# Patient Record
Sex: FEMALE | Race: WHITE | NOT HISPANIC OR LATINO | Employment: UNEMPLOYED | URBAN - METROPOLITAN AREA
[De-identification: names, ages, dates, MRNs, and addresses within clinical notes are randomized per-mention and may not be internally consistent; named-entity substitution may affect disease eponyms.]

---

## 2017-03-01 ENCOUNTER — ALLSCRIPTS OFFICE VISIT (OUTPATIENT)
Dept: OTHER | Facility: OTHER | Age: 57
End: 2017-03-01

## 2017-03-01 DIAGNOSIS — D05.10 INTRADUCTAL CARCINOMA IN SITU OF BREAST: ICD-10-CM

## 2017-03-13 ENCOUNTER — TRANSCRIBE ORDERS (OUTPATIENT)
Dept: LAB | Facility: HOSPITAL | Age: 57
End: 2017-03-13

## 2017-03-13 ENCOUNTER — HOSPITAL ENCOUNTER (OUTPATIENT)
Dept: CT IMAGING | Facility: HOSPITAL | Age: 57
Discharge: HOME | End: 2017-03-13
Attending: INTERNAL MEDICINE | Admitting: RADIOLOGY
Payer: OTHER GOVERNMENT

## 2017-03-13 VITALS
WEIGHT: 185 LBS | SYSTOLIC BLOOD PRESSURE: 116 MMHG | HEART RATE: 83 BPM | RESPIRATION RATE: 16 BRPM | DIASTOLIC BLOOD PRESSURE: 62 MMHG | BODY MASS INDEX: 31.58 KG/M2 | HEIGHT: 64 IN | OXYGEN SATURATION: 98 % | TEMPERATURE: 98.1 F

## 2017-03-13 DIAGNOSIS — D64.9 ANEMIA: ICD-10-CM

## 2017-03-13 DIAGNOSIS — D61.818 PANCYTOPENIA (HCC): Primary | ICD-10-CM

## 2017-03-13 DIAGNOSIS — K70.30 ALCOHOLIC CIRRHOSIS OF LIVER WITHOUT ASCITES (HCC): ICD-10-CM

## 2017-03-13 PROCEDURE — 36415 COLL VENOUS BLD VENIPUNCTURE: CPT

## 2017-03-13 PROCEDURE — 77012 CT SCAN FOR NEEDLE BIOPSY: CPT

## 2017-03-13 PROCEDURE — 38220 DX BONE MARROW ASPIRATIONS: CPT

## 2017-03-13 PROCEDURE — 88342 IMHCHEM/IMCYTCHM 1ST ANTB: CPT | Performed by: INTERNAL MEDICINE

## 2017-03-13 PROCEDURE — 88184 FLOWCYTOMETRY/ TC 1 MARKER: CPT

## 2017-03-13 PROCEDURE — 88365 INSITU HYBRIDIZATION (FISH): CPT | Performed by: INTERNAL MEDICINE

## 2017-03-13 PROCEDURE — 88305 TISSUE EXAM BY PATHOLOGIST: CPT | Performed by: INTERNAL MEDICINE

## 2017-03-13 PROCEDURE — 38221 DX BONE MARROW BIOPSIES: CPT

## 2017-03-13 PROCEDURE — 85097 BONE MARROW INTERPRETATION: CPT | Performed by: INTERNAL MEDICINE

## 2017-03-13 PROCEDURE — 88341 IMHCHEM/IMCYTCHM EA ADD ANTB: CPT | Performed by: INTERNAL MEDICINE

## 2017-03-13 PROCEDURE — 99152 MOD SED SAME PHYS/QHP 5/>YRS: CPT

## 2017-03-13 PROCEDURE — 88237 TISSUE CULTURE BONE MARROW: CPT

## 2017-03-13 PROCEDURE — 88313 SPECIAL STAINS GROUP 2: CPT | Performed by: INTERNAL MEDICINE

## 2017-03-13 PROCEDURE — 88311 DECALCIFY TISSUE: CPT | Performed by: INTERNAL MEDICINE

## 2017-03-13 PROCEDURE — 99153 MOD SED SAME PHYS/QHP EA: CPT

## 2017-03-13 PROCEDURE — 88189 FLOWCYTOMETRY/READ 16 & >: CPT

## 2017-03-13 PROCEDURE — 81406 MOPATH PROCEDURE LEVEL 7: CPT

## 2017-03-13 PROCEDURE — 88185 FLOWCYTOMETRY/TC ADD-ON: CPT

## 2017-03-13 PROCEDURE — 88280 CHROMOSOME KARYOTYPE STUDY: CPT

## 2017-03-13 RX ORDER — FENTANYL CITRATE 50 UG/ML
INJECTION, SOLUTION INTRAMUSCULAR; INTRAVENOUS CODE/TRAUMA/SEDATION MEDICATION
Status: COMPLETED | OUTPATIENT
Start: 2017-03-13 | End: 2017-03-13

## 2017-03-13 RX ORDER — PREGABALIN 300 MG/1
300 CAPSULE ORAL 2 TIMES DAILY
COMMUNITY
End: 2018-04-19 | Stop reason: SDUPTHER

## 2017-03-13 RX ORDER — VITAMIN B COMPLEX
1 CAPSULE ORAL 3 TIMES WEEKLY
COMMUNITY
End: 2017-08-01

## 2017-03-13 RX ORDER — SODIUM CHLORIDE 9 MG/ML
75 INJECTION, SOLUTION INTRAVENOUS CONTINUOUS
Status: DISCONTINUED | OUTPATIENT
Start: 2017-03-13 | End: 2017-03-14 | Stop reason: HOSPADM

## 2017-03-13 RX ORDER — LANOLIN ALCOHOL/MO/W.PET/CERES
200 CREAM (GRAM) TOPICAL 3 TIMES WEEKLY
COMMUNITY
End: 2017-08-01

## 2017-03-13 RX ORDER — ERGOCALCIFEROL 1.25 MG/1
50000 CAPSULE ORAL 3 TIMES WEEKLY
COMMUNITY
End: 2017-08-01

## 2017-03-13 RX ORDER — MIDAZOLAM HYDROCHLORIDE 1 MG/ML
INJECTION INTRAMUSCULAR; INTRAVENOUS CODE/TRAUMA/SEDATION MEDICATION
Status: COMPLETED | OUTPATIENT
Start: 2017-03-13 | End: 2017-03-13

## 2017-03-13 RX ORDER — FOLIC ACID 1 MG/1
2 TABLET ORAL DAILY
COMMUNITY
End: 2017-08-01

## 2017-03-13 RX ORDER — VITAMIN E 268 MG
400 CAPSULE ORAL 3 TIMES WEEKLY
COMMUNITY
End: 2017-08-01

## 2017-03-13 RX ADMIN — FENTANYL CITRATE 50 MCG: 50 INJECTION, SOLUTION INTRAMUSCULAR; INTRAVENOUS at 10:33

## 2017-03-13 RX ADMIN — MIDAZOLAM HYDROCHLORIDE 1 MG: 1 INJECTION, SOLUTION INTRAMUSCULAR; INTRAVENOUS at 10:19

## 2017-03-13 RX ADMIN — FENTANYL CITRATE 50 MCG: 50 INJECTION, SOLUTION INTRAMUSCULAR; INTRAVENOUS at 10:19

## 2017-03-13 RX ADMIN — MIDAZOLAM HYDROCHLORIDE 0.5 MG: 1 INJECTION, SOLUTION INTRAMUSCULAR; INTRAVENOUS at 10:33

## 2017-03-13 RX ADMIN — SODIUM CHLORIDE 75 ML/HR: 0.9 INJECTION, SOLUTION INTRAVENOUS at 08:47

## 2017-03-13 RX ADMIN — MIDAZOLAM HYDROCHLORIDE 1 MG: 1 INJECTION, SOLUTION INTRAMUSCULAR; INTRAVENOUS at 10:24

## 2017-03-16 ENCOUNTER — TRANSCRIBE ORDERS (OUTPATIENT)
Dept: ADMINISTRATIVE | Facility: HOSPITAL | Age: 57
End: 2017-03-16

## 2017-03-16 ENCOUNTER — ALLSCRIPTS OFFICE VISIT (OUTPATIENT)
Dept: OTHER | Facility: OTHER | Age: 57
End: 2017-03-16

## 2017-03-16 DIAGNOSIS — K76.9 UNSPECIFIED CHRONIC LIVER DISEASE WITHOUT MENTION OF ALCOHOL: Primary | ICD-10-CM

## 2017-03-16 LAB — SCAN RESULT: NORMAL

## 2017-03-17 DIAGNOSIS — K76.9 LIVER DISEASE: ICD-10-CM

## 2017-03-17 DIAGNOSIS — D64.9 ANEMIA: ICD-10-CM

## 2017-03-17 DIAGNOSIS — D61.818 OTHER PANCYTOPENIA (HCC): ICD-10-CM

## 2017-03-17 DIAGNOSIS — K74.60 CIRRHOSIS OF LIVER (HCC): ICD-10-CM

## 2017-03-19 LAB
MISCELLANEOUS LAB TEST RESULT: NORMAL
SCAN RESULT: NORMAL

## 2017-03-23 ENCOUNTER — HOSPITAL ENCOUNTER (OUTPATIENT)
Dept: CT IMAGING | Facility: HOSPITAL | Age: 57
Discharge: HOME | End: 2017-03-23
Attending: INTERNAL MEDICINE
Payer: OTHER GOVERNMENT

## 2017-03-23 DIAGNOSIS — K76.9 UNSPECIFIED CHRONIC LIVER DISEASE WITHOUT MENTION OF ALCOHOL: ICD-10-CM

## 2017-03-23 PROCEDURE — 74177 CT ABD & PELVIS W/CONTRAST: CPT

## 2017-03-23 RX ADMIN — IOHEXOL 100 ML: 350 INJECTION, SOLUTION INTRAVENOUS at 18:46

## 2017-03-24 ENCOUNTER — GENERIC CONVERSION - ENCOUNTER (OUTPATIENT)
Dept: OTHER | Facility: OTHER | Age: 57
End: 2017-03-24

## 2017-04-03 ENCOUNTER — ALLSCRIPTS OFFICE VISIT (OUTPATIENT)
Dept: OTHER | Facility: OTHER | Age: 57
End: 2017-04-03

## 2017-04-05 RX ORDER — CYANOCOBALAMIN 1000 UG/ML
1000 INJECTION INTRAMUSCULAR; SUBCUTANEOUS ONCE
Status: COMPLETED | OUTPATIENT
Start: 2017-04-11 | End: 2017-04-11

## 2017-04-05 RX ORDER — SODIUM CHLORIDE 9 MG/ML
20 INJECTION, SOLUTION INTRAVENOUS CONTINUOUS
Status: DISCONTINUED | OUTPATIENT
Start: 2017-04-11 | End: 2017-04-14 | Stop reason: HOSPADM

## 2017-04-11 ENCOUNTER — HOSPITAL ENCOUNTER (OUTPATIENT)
Dept: INFUSION CENTER | Facility: HOSPITAL | Age: 57
Discharge: HOME | End: 2017-04-11
Payer: OTHER GOVERNMENT

## 2017-04-11 VITALS
TEMPERATURE: 97.6 F | OXYGEN SATURATION: 98 % | RESPIRATION RATE: 16 BRPM | HEART RATE: 97 BPM | DIASTOLIC BLOOD PRESSURE: 66 MMHG | SYSTOLIC BLOOD PRESSURE: 114 MMHG

## 2017-04-11 PROCEDURE — 96372 THER/PROPH/DIAG INJ SC/IM: CPT

## 2017-04-11 PROCEDURE — 96365 THER/PROPH/DIAG IV INF INIT: CPT

## 2017-04-11 RX ORDER — FUROSEMIDE 20 MG/1
40 TABLET ORAL DAILY
COMMUNITY
End: 2018-04-02 | Stop reason: SDUPTHER

## 2017-04-11 RX ORDER — SPIRONOLACTONE 50 MG/1
50 TABLET, FILM COATED ORAL DAILY
COMMUNITY
Start: 2017-03-25 | End: 2017-08-01

## 2017-04-11 RX ADMIN — IRON SUCROSE 200 MG: 20 INJECTION, SOLUTION INTRAVENOUS at 15:19

## 2017-04-11 RX ADMIN — CYANOCOBALAMIN 1000 MCG: 1000 INJECTION, SOLUTION INTRAMUSCULAR at 15:23

## 2017-04-11 NOTE — PROGRESS NOTES
Rec'd pt for first Venofer infusion and B12 inj  VSS, c/o chronic neuropathy of the feet pt states "is not much of a problem for me today"  Made comfortable in recliner for PIV in L forearm  Venofer now infusing  B12 inj to Left deltoid tolerated well  Will continue to monitor

## 2017-04-11 NOTE — PROGRESS NOTES
Venofer infused w/o adverse reaction, pt offered no complaints  PIV dc'd, sm coban pressure dressing applied  Pt then d/c'd to home with steady gait

## 2017-04-17 RX ORDER — SODIUM CHLORIDE 9 MG/ML
20 INJECTION, SOLUTION INTRAVENOUS CONTINUOUS
Status: DISCONTINUED | OUTPATIENT
Start: 2017-04-18 | End: 2017-04-21 | Stop reason: HOSPADM

## 2017-04-17 RX ORDER — CYANOCOBALAMIN 1000 UG/ML
1000 INJECTION INTRAMUSCULAR; SUBCUTANEOUS ONCE
Status: COMPLETED | OUTPATIENT
Start: 2017-04-18 | End: 2017-04-18

## 2017-04-18 ENCOUNTER — HOSPITAL ENCOUNTER (OUTPATIENT)
Dept: INFUSION CENTER | Facility: HOSPITAL | Age: 57
Discharge: HOME | End: 2017-04-18
Payer: OTHER GOVERNMENT

## 2017-04-18 VITALS
TEMPERATURE: 98.4 F | OXYGEN SATURATION: 98 % | HEART RATE: 91 BPM | SYSTOLIC BLOOD PRESSURE: 109 MMHG | RESPIRATION RATE: 16 BRPM | DIASTOLIC BLOOD PRESSURE: 64 MMHG

## 2017-04-18 PROCEDURE — 96365 THER/PROPH/DIAG IV INF INIT: CPT

## 2017-04-18 PROCEDURE — 96372 THER/PROPH/DIAG INJ SC/IM: CPT

## 2017-04-18 RX ADMIN — IRON SUCROSE 200 MG: 20 INJECTION, SOLUTION INTRAVENOUS at 15:03

## 2017-04-18 RX ADMIN — CYANOCOBALAMIN 1000 MCG: 1000 INJECTION, SOLUTION INTRAMUSCULAR at 16:09

## 2017-04-24 ENCOUNTER — ALLSCRIPTS OFFICE VISIT (OUTPATIENT)
Dept: OTHER | Facility: OTHER | Age: 57
End: 2017-04-24

## 2017-04-24 RX ORDER — SODIUM CHLORIDE 9 MG/ML
20 INJECTION, SOLUTION INTRAVENOUS CONTINUOUS
Status: DISCONTINUED | OUTPATIENT
Start: 2017-04-25 | End: 2017-04-28 | Stop reason: HOSPADM

## 2017-04-24 RX ORDER — CYANOCOBALAMIN 1000 UG/ML
1000 INJECTION INTRAMUSCULAR; SUBCUTANEOUS ONCE
Status: COMPLETED | OUTPATIENT
Start: 2017-04-25 | End: 2017-04-25

## 2017-04-25 ENCOUNTER — HOSPITAL ENCOUNTER (OUTPATIENT)
Dept: INFUSION CENTER | Facility: HOSPITAL | Age: 57
Discharge: HOME | End: 2017-04-25
Payer: OTHER GOVERNMENT

## 2017-04-25 VITALS
SYSTOLIC BLOOD PRESSURE: 127 MMHG | OXYGEN SATURATION: 99 % | DIASTOLIC BLOOD PRESSURE: 75 MMHG | TEMPERATURE: 97.1 F | HEART RATE: 107 BPM

## 2017-04-25 PROCEDURE — 96365 THER/PROPH/DIAG IV INF INIT: CPT

## 2017-04-25 PROCEDURE — 96372 THER/PROPH/DIAG INJ SC/IM: CPT

## 2017-04-25 RX ADMIN — IRON SUCROSE 200 MG: 20 INJECTION, SOLUTION INTRAVENOUS at 15:03

## 2017-04-25 RX ADMIN — CYANOCOBALAMIN 1000 MCG: 1000 INJECTION, SOLUTION INTRAMUSCULAR at 15:04

## 2017-04-25 NOTE — PROGRESS NOTES
Pt in infusion center today for vernofer infusion and B12 Inj  Pt tolerated B12 IM in left deltoid with no adverse reactions

## 2017-05-01 RX ORDER — CYANOCOBALAMIN 1000 UG/ML
1000 INJECTION INTRAMUSCULAR; SUBCUTANEOUS ONCE
Status: COMPLETED | OUTPATIENT
Start: 2017-05-02 | End: 2017-05-02

## 2017-05-01 RX ORDER — SODIUM CHLORIDE 9 MG/ML
20 INJECTION, SOLUTION INTRAVENOUS CONTINUOUS
Status: DISCONTINUED | OUTPATIENT
Start: 2017-05-02 | End: 2017-05-05 | Stop reason: HOSPADM

## 2017-05-02 ENCOUNTER — HOSPITAL ENCOUNTER (OUTPATIENT)
Dept: INFUSION CENTER | Facility: HOSPITAL | Age: 57
Discharge: HOME | End: 2017-05-02
Payer: OTHER GOVERNMENT

## 2017-05-02 VITALS
TEMPERATURE: 98.2 F | RESPIRATION RATE: 18 BRPM | DIASTOLIC BLOOD PRESSURE: 70 MMHG | HEART RATE: 110 BPM | OXYGEN SATURATION: 98 % | SYSTOLIC BLOOD PRESSURE: 131 MMHG

## 2017-05-02 PROCEDURE — 96365 THER/PROPH/DIAG IV INF INIT: CPT

## 2017-05-02 PROCEDURE — 96372 THER/PROPH/DIAG INJ SC/IM: CPT

## 2017-05-02 RX ADMIN — IRON SUCROSE 200 MG: 20 INJECTION, SOLUTION INTRAVENOUS at 14:17

## 2017-05-02 RX ADMIN — CYANOCOBALAMIN 1000 MCG: 1000 INJECTION, SOLUTION INTRAMUSCULAR at 14:40

## 2017-05-15 ENCOUNTER — ALLSCRIPTS OFFICE VISIT (OUTPATIENT)
Dept: OTHER | Facility: OTHER | Age: 57
End: 2017-05-15

## 2017-05-17 ENCOUNTER — ANESTHESIA EVENT (OUTPATIENT)
Dept: GASTROENTEROLOGY | Facility: HOSPITAL | Age: 57
End: 2017-05-17
Payer: OTHER GOVERNMENT

## 2017-05-18 ENCOUNTER — GENERIC CONVERSION - ENCOUNTER (OUTPATIENT)
Dept: OTHER | Facility: OTHER | Age: 57
End: 2017-05-18

## 2017-05-18 ENCOUNTER — HOSPITAL ENCOUNTER (OUTPATIENT)
Facility: HOSPITAL | Age: 57
Setting detail: OUTPATIENT SURGERY
Discharge: HOME | End: 2017-05-18
Attending: INTERNAL MEDICINE | Admitting: INTERNAL MEDICINE
Payer: OTHER GOVERNMENT

## 2017-05-18 ENCOUNTER — ANESTHESIA (OUTPATIENT)
Dept: GASTROENTEROLOGY | Facility: HOSPITAL | Age: 57
End: 2017-05-18
Payer: OTHER GOVERNMENT

## 2017-05-18 VITALS
BODY MASS INDEX: 33.29 KG/M2 | TEMPERATURE: 97.9 F | WEIGHT: 195 LBS | HEART RATE: 87 BPM | SYSTOLIC BLOOD PRESSURE: 122 MMHG | RESPIRATION RATE: 18 BRPM | DIASTOLIC BLOOD PRESSURE: 72 MMHG | HEIGHT: 64 IN | OXYGEN SATURATION: 97 %

## 2017-05-18 DIAGNOSIS — K74.60 CIRRHOSIS OF LIVER (HCC): ICD-10-CM

## 2017-05-18 PROCEDURE — 88305 TISSUE EXAM BY PATHOLOGIST: CPT | Performed by: INTERNAL MEDICINE

## 2017-05-18 RX ORDER — PROPOFOL 10 MG/ML
INJECTION, EMULSION INTRAVENOUS AS NEEDED
Status: DISCONTINUED | OUTPATIENT
Start: 2017-05-18 | End: 2017-05-18 | Stop reason: SURG

## 2017-05-18 RX ORDER — SODIUM CHLORIDE 9 MG/ML
125 INJECTION, SOLUTION INTRAVENOUS CONTINUOUS
Status: DISCONTINUED | OUTPATIENT
Start: 2017-05-18 | End: 2017-05-18 | Stop reason: HOSPADM

## 2017-05-18 RX ADMIN — PROPOFOL 200 MG: 10 INJECTION, EMULSION INTRAVENOUS at 10:22

## 2017-05-18 RX ADMIN — SODIUM CHLORIDE 125 ML/HR: 0.9 INJECTION, SOLUTION INTRAVENOUS at 10:00

## 2017-05-24 ENCOUNTER — GENERIC CONVERSION - ENCOUNTER (OUTPATIENT)
Dept: OTHER | Facility: OTHER | Age: 57
End: 2017-05-24

## 2017-05-26 RX ORDER — CYANOCOBALAMIN 1000 UG/ML
1000 INJECTION INTRAMUSCULAR; SUBCUTANEOUS ONCE
Status: DISCONTINUED | OUTPATIENT
Start: 2017-05-30 | End: 2017-06-02 | Stop reason: HOSPADM

## 2017-05-30 ENCOUNTER — HOSPITAL ENCOUNTER (OUTPATIENT)
Dept: INFUSION CENTER | Facility: HOSPITAL | Age: 57
Discharge: HOME | End: 2017-05-30
Payer: OTHER GOVERNMENT

## 2017-06-01 ENCOUNTER — HOSPITAL ENCOUNTER (OUTPATIENT)
Dept: INFUSION CENTER | Facility: HOSPITAL | Age: 57
Discharge: HOME | End: 2017-06-01
Payer: OTHER GOVERNMENT

## 2017-06-01 VITALS
DIASTOLIC BLOOD PRESSURE: 61 MMHG | RESPIRATION RATE: 18 BRPM | SYSTOLIC BLOOD PRESSURE: 109 MMHG | TEMPERATURE: 97.9 F | OXYGEN SATURATION: 96 % | HEART RATE: 106 BPM

## 2017-06-01 PROCEDURE — 96372 THER/PROPH/DIAG INJ SC/IM: CPT

## 2017-06-01 RX ORDER — NORTRIPTYLINE HYDROCHLORIDE 25 MG/1
25 CAPSULE ORAL
COMMUNITY
End: 2017-08-01

## 2017-06-01 RX ORDER — CYANOCOBALAMIN 1000 UG/ML
1000 INJECTION INTRAMUSCULAR; SUBCUTANEOUS ONCE
Status: COMPLETED | OUTPATIENT
Start: 2017-06-01 | End: 2017-06-01

## 2017-06-01 RX ADMIN — CYANOCOBALAMIN 1000 MCG: 1000 INJECTION, SOLUTION INTRAMUSCULAR at 13:52

## 2017-06-01 NOTE — PLAN OF CARE
Patient/family/caregiver demonstrates understanding of disease process, treatment plan, medications, and discharge instructions Progressing

## 2017-06-01 NOTE — PROGRESS NOTES
Pt here for B12; VSS: given in the LD with no adverse reactions; bandaid dry and intact; pt d/c'd home ambulatory with steady gait

## 2017-06-26 RX ORDER — CYANOCOBALAMIN 1000 UG/ML
1000 INJECTION INTRAMUSCULAR; SUBCUTANEOUS ONCE
Status: COMPLETED | OUTPATIENT
Start: 2017-06-27 | End: 2017-06-27

## 2017-06-27 ENCOUNTER — HOSPITAL ENCOUNTER (OUTPATIENT)
Dept: INFUSION CENTER | Facility: HOSPITAL | Age: 57
Discharge: HOME | End: 2017-06-27
Payer: OTHER GOVERNMENT

## 2017-06-27 VITALS
SYSTOLIC BLOOD PRESSURE: 115 MMHG | TEMPERATURE: 97.9 F | OXYGEN SATURATION: 98 % | DIASTOLIC BLOOD PRESSURE: 63 MMHG | HEART RATE: 98 BPM | RESPIRATION RATE: 16 BRPM

## 2017-06-27 PROCEDURE — 96372 THER/PROPH/DIAG INJ SC/IM: CPT

## 2017-06-27 RX ADMIN — CYANOCOBALAMIN 1000 MCG: 1000 INJECTION, SOLUTION INTRAMUSCULAR at 15:15

## 2017-06-27 NOTE — PLAN OF CARE
Knowledge Deficit     Patient/family/caregiver demonstrates understanding of disease process, treatment plan, medications, and discharge instructions Progressing

## 2017-06-27 NOTE — PROGRESS NOTES
Pt arrived amb for B12 injection  Offers no c/o  B12 given IM Lt deltoid  Dsd applied  Disch amb to home, steady gait

## 2017-06-30 DIAGNOSIS — D64.9 ANEMIA: ICD-10-CM

## 2017-07-10 ENCOUNTER — ALLSCRIPTS OFFICE VISIT (OUTPATIENT)
Dept: OTHER | Facility: OTHER | Age: 57
End: 2017-07-10

## 2017-07-18 ENCOUNTER — GENERIC CONVERSION - ENCOUNTER (OUTPATIENT)
Dept: OTHER | Facility: OTHER | Age: 57
End: 2017-07-18

## 2017-08-01 ENCOUNTER — HOSPITAL ENCOUNTER (INPATIENT)
Facility: HOSPITAL | Age: 57
LOS: 4 days | DRG: 377 | End: 2017-08-05
Attending: EMERGENCY MEDICINE | Admitting: INTERNAL MEDICINE
Payer: OTHER GOVERNMENT

## 2017-08-01 DIAGNOSIS — K92.2 GI BLEED: Primary | ICD-10-CM

## 2017-08-01 DIAGNOSIS — D63.8 ANEMIA, CHRONIC DISEASE: ICD-10-CM

## 2017-08-01 DIAGNOSIS — D69.6 THROMBOCYTOPENIA (HCC): ICD-10-CM

## 2017-08-01 DIAGNOSIS — K70.31 ALCOHOLIC CIRRHOSIS OF LIVER WITH ASCITES (HCC): ICD-10-CM

## 2017-08-01 LAB
ABO GROUP BLD: NORMAL
ALBUMIN SERPL BCP-MCNC: 2.6 G/DL (ref 3.5–5)
ALP SERPL-CCNC: 147 U/L (ref 46–116)
ALT SERPL W P-5'-P-CCNC: 60 U/L (ref 12–78)
AMMONIA PLAS-SCNC: 73 UMOL/L (ref 11–35)
ANION GAP SERPL CALCULATED.3IONS-SCNC: 19 MMOL/L (ref 4–13)
APAP SERPL-MCNC: <2 UG/ML (ref 10–30)
APTT PPP: 36 SECONDS (ref 23–35)
AST SERPL W P-5'-P-CCNC: 147 U/L (ref 5–45)
BASOPHILS # BLD AUTO: 0.02 THOUSANDS/ΜL (ref 0–0.1)
BASOPHILS NFR BLD AUTO: 0 % (ref 0–1)
BILIRUB SERPL-MCNC: 5.8 MG/DL (ref 0.2–1)
BLD GP AB SCN SERPL QL: NEGATIVE
BUN SERPL-MCNC: 23 MG/DL (ref 5–25)
CALCIUM SERPL-MCNC: 8.6 MG/DL (ref 8.3–10.1)
CHLORIDE SERPL-SCNC: 109 MMOL/L (ref 100–108)
CO2 SERPL-SCNC: 21 MMOL/L (ref 21–32)
CREAT SERPL-MCNC: 1.1 MG/DL (ref 0.6–1.3)
EOSINOPHIL # BLD AUTO: 0 THOUSAND/ΜL (ref 0–0.61)
EOSINOPHIL NFR BLD AUTO: 0 % (ref 0–6)
ERYTHROCYTE [DISTWIDTH] IN BLOOD BY AUTOMATED COUNT: 18.6 % (ref 11.6–15.1)
ETHANOL SERPL-MCNC: <3 MG/DL (ref 0–3)
GFR SERPL CREATININE-BSD FRML MDRD: 56 ML/MIN/1.73SQ M
GLUCOSE SERPL-MCNC: 149 MG/DL (ref 65–140)
HCT VFR BLD AUTO: 28.4 % (ref 34.8–46.1)
HGB BLD-MCNC: 8.5 G/DL (ref 11.5–15.4)
INR PPP: 1.98 (ref 0.86–1.16)
LIPASE SERPL-CCNC: 110 U/L (ref 73–393)
LYMPHOCYTES # BLD AUTO: 0.84 THOUSANDS/ΜL (ref 0.6–4.47)
LYMPHOCYTES NFR BLD AUTO: 9 % (ref 14–44)
MCH RBC QN AUTO: 29.7 PG (ref 26.8–34.3)
MCHC RBC AUTO-ENTMCNC: 29.9 G/DL (ref 31.4–37.4)
MCV RBC AUTO: 99 FL (ref 82–98)
MONOCYTES # BLD AUTO: 0.91 THOUSAND/ΜL (ref 0.17–1.22)
MONOCYTES NFR BLD AUTO: 9 % (ref 4–12)
NEUTROPHILS # BLD AUTO: 7.89 THOUSANDS/ΜL (ref 1.85–7.62)
NEUTS SEG NFR BLD AUTO: 82 % (ref 43–75)
PLATELET # BLD AUTO: 65 THOUSANDS/UL (ref 149–390)
PMV BLD AUTO: 11.8 FL (ref 8.9–12.7)
POTASSIUM SERPL-SCNC: 3.9 MMOL/L (ref 3.5–5.3)
PROT SERPL-MCNC: 7.2 G/DL (ref 6.4–8.2)
PROTHROMBIN TIME: 22.4 SECONDS (ref 12.1–14.4)
RBC # BLD AUTO: 2.86 MILLION/UL (ref 3.81–5.12)
RH BLD: POSITIVE
SALICYLATES SERPL-MCNC: <3 MG/DL (ref 3–20)
SODIUM SERPL-SCNC: 149 MMOL/L (ref 136–145)
SPECIMEN EXPIRATION DATE: NORMAL
TROPONIN I SERPL-MCNC: 0.12 NG/ML
WBC # BLD AUTO: 9.66 THOUSAND/UL (ref 4.31–10.16)

## 2017-08-01 PROCEDURE — 82140 ASSAY OF AMMONIA: CPT | Performed by: EMERGENCY MEDICINE

## 2017-08-01 PROCEDURE — 96375 TX/PRO/DX INJ NEW DRUG ADDON: CPT

## 2017-08-01 PROCEDURE — 96372 THER/PROPH/DIAG INJ SC/IM: CPT

## 2017-08-01 PROCEDURE — 83690 ASSAY OF LIPASE: CPT | Performed by: EMERGENCY MEDICINE

## 2017-08-01 PROCEDURE — 85610 PROTHROMBIN TIME: CPT | Performed by: EMERGENCY MEDICINE

## 2017-08-01 PROCEDURE — 36415 COLL VENOUS BLD VENIPUNCTURE: CPT | Performed by: EMERGENCY MEDICINE

## 2017-08-01 PROCEDURE — 80053 COMPREHEN METABOLIC PANEL: CPT | Performed by: EMERGENCY MEDICINE

## 2017-08-01 PROCEDURE — 80329 ANALGESICS NON-OPIOID 1 OR 2: CPT | Performed by: EMERGENCY MEDICINE

## 2017-08-01 PROCEDURE — C9113 INJ PANTOPRAZOLE SODIUM, VIA: HCPCS | Performed by: EMERGENCY MEDICINE

## 2017-08-01 PROCEDURE — 86920 COMPATIBILITY TEST SPIN: CPT

## 2017-08-01 PROCEDURE — 86901 BLOOD TYPING SEROLOGIC RH(D): CPT | Performed by: EMERGENCY MEDICINE

## 2017-08-01 PROCEDURE — 86850 RBC ANTIBODY SCREEN: CPT | Performed by: EMERGENCY MEDICINE

## 2017-08-01 PROCEDURE — 80320 DRUG SCREEN QUANTALCOHOLS: CPT | Performed by: EMERGENCY MEDICINE

## 2017-08-01 PROCEDURE — 86900 BLOOD TYPING SEROLOGIC ABO: CPT | Performed by: EMERGENCY MEDICINE

## 2017-08-01 PROCEDURE — 84484 ASSAY OF TROPONIN QUANT: CPT | Performed by: EMERGENCY MEDICINE

## 2017-08-01 PROCEDURE — 93005 ELECTROCARDIOGRAM TRACING: CPT | Performed by: EMERGENCY MEDICINE

## 2017-08-01 PROCEDURE — 85730 THROMBOPLASTIN TIME PARTIAL: CPT | Performed by: EMERGENCY MEDICINE

## 2017-08-01 PROCEDURE — 85025 COMPLETE CBC W/AUTO DIFF WBC: CPT | Performed by: EMERGENCY MEDICINE

## 2017-08-01 PROCEDURE — 96361 HYDRATE IV INFUSION ADD-ON: CPT

## 2017-08-01 PROCEDURE — 96374 THER/PROPH/DIAG INJ IV PUSH: CPT

## 2017-08-01 RX ORDER — THIAMINE HYDROCHLORIDE 100 MG/ML
100 INJECTION, SOLUTION INTRAMUSCULAR; INTRAVENOUS ONCE
Status: COMPLETED | OUTPATIENT
Start: 2017-08-01 | End: 2017-08-01

## 2017-08-01 RX ORDER — PANTOPRAZOLE SODIUM 40 MG/1
40 INJECTION, POWDER, FOR SOLUTION INTRAVENOUS ONCE
Status: COMPLETED | OUTPATIENT
Start: 2017-08-01 | End: 2017-08-01

## 2017-08-01 RX ORDER — ONDANSETRON 2 MG/ML
4 INJECTION INTRAMUSCULAR; INTRAVENOUS ONCE
Status: COMPLETED | OUTPATIENT
Start: 2017-08-01 | End: 2017-08-01

## 2017-08-01 RX ADMIN — ONDANSETRON 4 MG: 2 INJECTION INTRAMUSCULAR; INTRAVENOUS at 22:49

## 2017-08-01 RX ADMIN — SODIUM CHLORIDE 1000 ML: 0.9 INJECTION, SOLUTION INTRAVENOUS at 21:44

## 2017-08-01 RX ADMIN — SODIUM CHLORIDE 1000 ML: 0.9 INJECTION, SOLUTION INTRAVENOUS at 23:11

## 2017-08-01 RX ADMIN — THIAMINE HYDROCHLORIDE 100 MG: 100 INJECTION, SOLUTION INTRAMUSCULAR; INTRAVENOUS at 21:47

## 2017-08-01 RX ADMIN — PANTOPRAZOLE SODIUM 40 MG: 40 INJECTION, POWDER, FOR SOLUTION INTRAVENOUS at 21:44

## 2017-08-02 ENCOUNTER — ANESTHESIA EVENT (INPATIENT)
Dept: PERIOP | Facility: HOSPITAL | Age: 57
DRG: 377 | End: 2017-08-02
Payer: OTHER GOVERNMENT

## 2017-08-02 ENCOUNTER — APPOINTMENT (INPATIENT)
Dept: CT IMAGING | Facility: HOSPITAL | Age: 57
DRG: 377 | End: 2017-08-02
Payer: OTHER GOVERNMENT

## 2017-08-02 ENCOUNTER — ANESTHESIA (INPATIENT)
Dept: PERIOP | Facility: HOSPITAL | Age: 57
DRG: 377 | End: 2017-08-02
Payer: OTHER GOVERNMENT

## 2017-08-02 ENCOUNTER — APPOINTMENT (INPATIENT)
Dept: RADIOLOGY | Facility: HOSPITAL | Age: 57
DRG: 377 | End: 2017-08-02
Payer: OTHER GOVERNMENT

## 2017-08-02 PROBLEM — F10.10 ALCOHOL ABUSE: Status: ACTIVE | Noted: 2017-08-02

## 2017-08-02 PROBLEM — E87.0 HYPERNATREMIA: Status: ACTIVE | Noted: 2017-08-02

## 2017-08-02 PROBLEM — K92.2 GI BLEED: Status: ACTIVE | Noted: 2017-08-02

## 2017-08-02 PROBLEM — K72.00 ACUTE HEPATIC ENCEPHALOPATHY: Status: ACTIVE | Noted: 2017-08-02

## 2017-08-02 PROBLEM — D64.9 ANEMIA: Status: ACTIVE | Noted: 2017-08-02

## 2017-08-02 PROBLEM — K70.31 ALCOHOLIC CIRRHOSIS OF LIVER WITH ASCITES (HCC): Status: ACTIVE | Noted: 2017-08-02

## 2017-08-02 PROBLEM — R77.8 ELEVATED TROPONIN: Status: ACTIVE | Noted: 2017-08-02

## 2017-08-02 PROBLEM — Z98.84 HISTORY OF GASTRIC BYPASS: Status: ACTIVE | Noted: 2017-08-02

## 2017-08-02 LAB
ABO GROUP BLD BPU: NORMAL
ALBUMIN SERPL BCP-MCNC: 2.2 G/DL (ref 3.5–5)
ALP SERPL-CCNC: 119 U/L (ref 46–116)
ALT SERPL W P-5'-P-CCNC: 49 U/L (ref 12–78)
ANION GAP SERPL CALCULATED.3IONS-SCNC: 9 MMOL/L (ref 4–13)
APTT PPP: 40 SECONDS (ref 23–35)
AST SERPL W P-5'-P-CCNC: 137 U/L (ref 5–45)
ATRIAL RATE: 122 BPM
BILIRUB SERPL-MCNC: 5.1 MG/DL (ref 0.2–1)
BPU ID: NORMAL
BUN SERPL-MCNC: 23 MG/DL (ref 5–25)
CALCIUM SERPL-MCNC: 8 MG/DL (ref 8.3–10.1)
CHLORIDE SERPL-SCNC: 113 MMOL/L (ref 100–108)
CO2 SERPL-SCNC: 27 MMOL/L (ref 21–32)
CREAT SERPL-MCNC: 0.96 MG/DL (ref 0.6–1.3)
CROSSMATCH: NORMAL
CROSSMATCH: NORMAL
ERYTHROCYTE [DISTWIDTH] IN BLOOD BY AUTOMATED COUNT: 17 % (ref 11.6–15.1)
ERYTHROCYTE [DISTWIDTH] IN BLOOD BY AUTOMATED COUNT: 18 % (ref 11.6–15.1)
ERYTHROCYTE [DISTWIDTH] IN BLOOD BY AUTOMATED COUNT: 18.5 % (ref 11.6–15.1)
GFR SERPL CREATININE-BSD FRML MDRD: 66 ML/MIN/1.73SQ M
GLUCOSE SERPL-MCNC: 146 MG/DL (ref 65–140)
HCT VFR BLD AUTO: 21.5 % (ref 34.8–46.1)
HCT VFR BLD AUTO: 22.9 % (ref 34.8–46.1)
HCT VFR BLD AUTO: 24.6 % (ref 34.8–46.1)
HGB BLD-MCNC: 6.9 G/DL (ref 11.5–15.4)
HGB BLD-MCNC: 7.1 G/DL (ref 11.5–15.4)
HGB BLD-MCNC: 7.9 G/DL (ref 11.5–15.4)
INR PPP: 1.75 (ref 0.86–1.16)
INR PPP: 2.13 (ref 0.86–1.16)
MAGNESIUM SERPL-MCNC: 1.7 MG/DL (ref 1.6–2.6)
MCH RBC QN AUTO: 30.6 PG (ref 26.8–34.3)
MCH RBC QN AUTO: 31.1 PG (ref 26.8–34.3)
MCH RBC QN AUTO: 31.1 PG (ref 26.8–34.3)
MCHC RBC AUTO-ENTMCNC: 31 G/DL (ref 31.4–37.4)
MCHC RBC AUTO-ENTMCNC: 32.1 G/DL (ref 31.4–37.4)
MCHC RBC AUTO-ENTMCNC: 32.1 G/DL (ref 31.4–37.4)
MCV RBC AUTO: 97 FL (ref 82–98)
MCV RBC AUTO: 97 FL (ref 82–98)
MCV RBC AUTO: 99 FL (ref 82–98)
P AXIS: 65 DEGREES
PHOSPHATE SERPL-MCNC: 2.1 MG/DL (ref 2.7–4.5)
PLATELET # BLD AUTO: 43 THOUSANDS/UL (ref 149–390)
PLATELET # BLD AUTO: 65 THOUSANDS/UL (ref 149–390)
PLATELET # BLD AUTO: 91 THOUSANDS/UL (ref 149–390)
PMV BLD AUTO: 11 FL (ref 8.9–12.7)
PMV BLD AUTO: 11.3 FL (ref 8.9–12.7)
PMV BLD AUTO: 11.7 FL (ref 8.9–12.7)
POTASSIUM SERPL-SCNC: 3.5 MMOL/L (ref 3.5–5.3)
PR INTERVAL: 132 MS
PROT SERPL-MCNC: 6.3 G/DL (ref 6.4–8.2)
PROTHROMBIN TIME: 20.3 SECONDS (ref 12.1–14.4)
PROTHROMBIN TIME: 23.7 SECONDS (ref 12.1–14.4)
QRS AXIS: 41 DEGREES
QRSD INTERVAL: 78 MS
QT INTERVAL: 342 MS
QTC INTERVAL: 487 MS
RBC # BLD AUTO: 2.22 MILLION/UL (ref 3.81–5.12)
RBC # BLD AUTO: 2.32 MILLION/UL (ref 3.81–5.12)
RBC # BLD AUTO: 2.54 MILLION/UL (ref 3.81–5.12)
SODIUM SERPL-SCNC: 149 MMOL/L (ref 136–145)
T WAVE AXIS: 29 DEGREES
TROPONIN I SERPL-MCNC: 0.13 NG/ML
TROPONIN I SERPL-MCNC: 0.13 NG/ML
UNIT DISPENSE STATUS: NORMAL
UNIT PRODUCT CODE: NORMAL
UNIT RH: NORMAL
VENTRICULAR RATE: 122 BPM
WBC # BLD AUTO: 7.34 THOUSAND/UL (ref 4.31–10.16)
WBC # BLD AUTO: 9.46 THOUSAND/UL (ref 4.31–10.16)
WBC # BLD AUTO: 9.73 THOUSAND/UL (ref 4.31–10.16)

## 2017-08-02 PROCEDURE — 3E0G8GC INTRODUCTION OF OTHER THERAPEUTIC SUBSTANCE INTO UPPER GI, VIA NATURAL OR ARTIFICIAL OPENING ENDOSCOPIC: ICD-10-PCS | Performed by: INTERNAL MEDICINE

## 2017-08-02 PROCEDURE — 80053 COMPREHEN METABOLIC PANEL: CPT | Performed by: NURSE PRACTITIONER

## 2017-08-02 PROCEDURE — 02HV33Z INSERTION OF INFUSION DEVICE INTO SUPERIOR VENA CAVA, PERCUTANEOUS APPROACH: ICD-10-PCS | Performed by: EMERGENCY MEDICINE

## 2017-08-02 PROCEDURE — 99285 EMERGENCY DEPT VISIT HI MDM: CPT

## 2017-08-02 PROCEDURE — 30233N1 TRANSFUSION OF NONAUTOLOGOUS RED BLOOD CELLS INTO PERIPHERAL VEIN, PERCUTANEOUS APPROACH: ICD-10-PCS | Performed by: INTERNAL MEDICINE

## 2017-08-02 PROCEDURE — 70450 CT HEAD/BRAIN W/O DYE: CPT

## 2017-08-02 PROCEDURE — 30233K1 TRANSFUSION OF NONAUTOLOGOUS FROZEN PLASMA INTO PERIPHERAL VEIN, PERCUTANEOUS APPROACH: ICD-10-PCS | Performed by: INTERNAL MEDICINE

## 2017-08-02 PROCEDURE — 30233R1 TRANSFUSION OF NONAUTOLOGOUS PLATELETS INTO PERIPHERAL VEIN, PERCUTANEOUS APPROACH: ICD-10-PCS | Performed by: INTERNAL MEDICINE

## 2017-08-02 PROCEDURE — 71010 HB CHEST X-RAY 1 VIEW FRONTAL (PORTABLE): CPT

## 2017-08-02 PROCEDURE — P9035 PLATELET PHERES LEUKOREDUCED: HCPCS

## 2017-08-02 PROCEDURE — 85027 COMPLETE CBC AUTOMATED: CPT | Performed by: INTERNAL MEDICINE

## 2017-08-02 PROCEDURE — 85610 PROTHROMBIN TIME: CPT | Performed by: NURSE PRACTITIONER

## 2017-08-02 PROCEDURE — 85730 THROMBOPLASTIN TIME PARTIAL: CPT | Performed by: NURSE PRACTITIONER

## 2017-08-02 PROCEDURE — 85027 COMPLETE CBC AUTOMATED: CPT | Performed by: NURSE PRACTITIONER

## 2017-08-02 PROCEDURE — P9021 RED BLOOD CELLS UNIT: HCPCS

## 2017-08-02 PROCEDURE — P9017 PLASMA 1 DONOR FRZ W/IN 8 HR: HCPCS

## 2017-08-02 PROCEDURE — 86927 PLASMA FRESH FROZEN: CPT

## 2017-08-02 PROCEDURE — 84100 ASSAY OF PHOSPHORUS: CPT | Performed by: NURSE PRACTITIONER

## 2017-08-02 PROCEDURE — 83735 ASSAY OF MAGNESIUM: CPT | Performed by: NURSE PRACTITIONER

## 2017-08-02 PROCEDURE — 85610 PROTHROMBIN TIME: CPT | Performed by: INTERNAL MEDICINE

## 2017-08-02 PROCEDURE — C9113 INJ PANTOPRAZOLE SODIUM, VIA: HCPCS | Performed by: NURSE PRACTITIONER

## 2017-08-02 PROCEDURE — 84484 ASSAY OF TROPONIN QUANT: CPT | Performed by: NURSE PRACTITIONER

## 2017-08-02 PROCEDURE — 0JH63XZ INSERTION OF TUNNELED VASCULAR ACCESS DEVICE INTO CHEST SUBCUTANEOUS TISSUE AND FASCIA, PERCUTANEOUS APPROACH: ICD-10-PCS | Performed by: EMERGENCY MEDICINE

## 2017-08-02 RX ORDER — DEXTROSE AND POTASSIUM CHLORIDE 5; .15 G/100ML; G/100ML
100 SOLUTION INTRAVENOUS CONTINUOUS
Status: DISCONTINUED | OUTPATIENT
Start: 2017-08-02 | End: 2017-08-05 | Stop reason: HOSPADM

## 2017-08-02 RX ORDER — LACTULOSE 20 G/30ML
30 SOLUTION ORAL 4 TIMES DAILY
Status: DISCONTINUED | OUTPATIENT
Start: 2017-08-02 | End: 2017-08-02

## 2017-08-02 RX ORDER — LORAZEPAM 2 MG/ML
1 INJECTION INTRAMUSCULAR EVERY 4 HOURS PRN
Status: DISCONTINUED | OUTPATIENT
Start: 2017-08-02 | End: 2017-08-05 | Stop reason: HOSPADM

## 2017-08-02 RX ORDER — FUROSEMIDE 20 MG/1
20 TABLET ORAL
Status: DISCONTINUED | OUTPATIENT
Start: 2017-08-02 | End: 2017-08-02

## 2017-08-02 RX ORDER — DEXTROSE AND SODIUM CHLORIDE 5; .45 G/100ML; G/100ML
100 INJECTION, SOLUTION INTRAVENOUS CONTINUOUS
Status: DISCONTINUED | OUTPATIENT
Start: 2017-08-02 | End: 2017-08-02

## 2017-08-02 RX ORDER — LACTULOSE 20 G/30ML
30 SOLUTION ORAL 2 TIMES DAILY
Status: DISCONTINUED | OUTPATIENT
Start: 2017-08-02 | End: 2017-08-02

## 2017-08-02 RX ORDER — LORAZEPAM 2 MG/ML
2 INJECTION INTRAMUSCULAR EVERY 2 HOUR PRN
Status: DISCONTINUED | OUTPATIENT
Start: 2017-08-02 | End: 2017-08-02

## 2017-08-02 RX ORDER — PREGABALIN 100 MG/1
300 CAPSULE ORAL 3 TIMES DAILY
Status: DISCONTINUED | OUTPATIENT
Start: 2017-08-02 | End: 2017-08-02

## 2017-08-02 RX ORDER — CHLORDIAZEPOXIDE HYDROCHLORIDE 25 MG/1
25 CAPSULE, GELATIN COATED ORAL EVERY 6 HOURS SCHEDULED
Status: DISCONTINUED | OUTPATIENT
Start: 2017-08-02 | End: 2017-08-02

## 2017-08-02 RX ORDER — SODIUM CHLORIDE 9 MG/ML
INJECTION, SOLUTION INTRAVENOUS CONTINUOUS PRN
Status: DISCONTINUED | OUTPATIENT
Start: 2017-08-02 | End: 2017-08-02 | Stop reason: SURG

## 2017-08-02 RX ORDER — LORAZEPAM 2 MG/ML
1 INJECTION INTRAMUSCULAR EVERY 6 HOURS PRN
Status: DISCONTINUED | OUTPATIENT
Start: 2017-08-02 | End: 2017-08-02

## 2017-08-02 RX ORDER — FUROSEMIDE 10 MG/ML
20 INJECTION INTRAMUSCULAR; INTRAVENOUS ONCE
Status: COMPLETED | OUTPATIENT
Start: 2017-08-02 | End: 2017-08-02

## 2017-08-02 RX ORDER — THIAMINE MONONITRATE (VIT B1) 100 MG
100 TABLET ORAL DAILY
Status: DISCONTINUED | OUTPATIENT
Start: 2017-08-02 | End: 2017-08-02

## 2017-08-02 RX ORDER — FOLIC ACID 1 MG/1
1 TABLET ORAL DAILY
Status: DISCONTINUED | OUTPATIENT
Start: 2017-08-02 | End: 2017-08-02

## 2017-08-02 RX ORDER — PROPOFOL 10 MG/ML
INJECTION, EMULSION INTRAVENOUS AS NEEDED
Status: DISCONTINUED | OUTPATIENT
Start: 2017-08-02 | End: 2017-08-02 | Stop reason: SURG

## 2017-08-02 RX ORDER — FUROSEMIDE 20 MG/1
20 TABLET ORAL DAILY
Status: DISCONTINUED | OUTPATIENT
Start: 2017-08-02 | End: 2017-08-02

## 2017-08-02 RX ORDER — THIAMINE HYDROCHLORIDE 100 MG/ML
100 INJECTION, SOLUTION INTRAMUSCULAR; INTRAVENOUS DAILY
Status: DISCONTINUED | OUTPATIENT
Start: 2017-08-02 | End: 2017-08-02

## 2017-08-02 RX ORDER — METHYLPREDNISOLONE SODIUM SUCCINATE 40 MG/ML
40 INJECTION, POWDER, LYOPHILIZED, FOR SOLUTION INTRAMUSCULAR; INTRAVENOUS DAILY
Status: DISCONTINUED | OUTPATIENT
Start: 2017-08-02 | End: 2017-08-05 | Stop reason: HOSPADM

## 2017-08-02 RX ADMIN — CEFTRIAXONE SODIUM 1000 MG: 1 INJECTION, POWDER, FOR SOLUTION INTRAMUSCULAR; INTRAVENOUS at 23:19

## 2017-08-02 RX ADMIN — CEFTRIAXONE SODIUM 1000 MG: 1 INJECTION, POWDER, FOR SOLUTION INTRAMUSCULAR; INTRAVENOUS at 11:41

## 2017-08-02 RX ADMIN — PHYTONADIONE 10 MG: 10 INJECTION, EMULSION INTRAMUSCULAR; INTRAVENOUS; SUBCUTANEOUS at 08:57

## 2017-08-02 RX ADMIN — DEXTROSE AND SODIUM CHLORIDE 100 ML/HR: 5; .45 INJECTION, SOLUTION INTRAVENOUS at 00:47

## 2017-08-02 RX ADMIN — SODIUM CHLORIDE 8 MG/HR: 9 INJECTION, SOLUTION INTRAVENOUS at 01:40

## 2017-08-02 RX ADMIN — LACTULOSE 200 G: 10 SOLUTION ORAL; RECTAL at 21:19

## 2017-08-02 RX ADMIN — PROPOFOL 60 MG: 10 INJECTION, EMULSION INTRAVENOUS at 14:11

## 2017-08-02 RX ADMIN — OCTREOTIDE ACETATE 25 MCG/HR: 500 INJECTION, SOLUTION INTRAVENOUS; SUBCUTANEOUS at 09:01

## 2017-08-02 RX ADMIN — PROPOFOL 60 MG: 10 INJECTION, EMULSION INTRAVENOUS at 14:05

## 2017-08-02 RX ADMIN — SODIUM CHLORIDE 80 MG: 9 INJECTION, SOLUTION INTRAVENOUS at 01:14

## 2017-08-02 RX ADMIN — LACTULOSE 30 G: 20 SOLUTION ORAL at 01:08

## 2017-08-02 RX ADMIN — PROPOFOL 30 MG: 10 INJECTION, EMULSION INTRAVENOUS at 14:14

## 2017-08-02 RX ADMIN — SODIUM CHLORIDE 8 MG/HR: 9 INJECTION, SOLUTION INTRAVENOUS at 17:30

## 2017-08-02 RX ADMIN — SODIUM CHLORIDE: 0.9 INJECTION, SOLUTION INTRAVENOUS at 14:06

## 2017-08-02 RX ADMIN — POTASSIUM CHLORIDE AND DEXTROSE MONOHYDRATE 100 ML/HR: 150; 5 INJECTION, SOLUTION INTRAVENOUS at 07:56

## 2017-08-02 RX ADMIN — LORAZEPAM 1 MG: 2 INJECTION INTRAMUSCULAR; INTRAVENOUS at 12:35

## 2017-08-02 RX ADMIN — METHYLPREDNISOLONE SODIUM SUCCINATE 40 MG: 40 INJECTION, POWDER, FOR SOLUTION INTRAMUSCULAR; INTRAVENOUS at 10:31

## 2017-08-02 RX ADMIN — LACTULOSE 200 G: 10 SOLUTION ORAL; RECTAL at 16:46

## 2017-08-02 RX ADMIN — THIAMINE HYDROCHLORIDE 100 MG: 100 INJECTION, SOLUTION INTRAMUSCULAR; INTRAVENOUS at 10:25

## 2017-08-02 RX ADMIN — FUROSEMIDE 20 MG: 10 INJECTION, SOLUTION INTRAMUSCULAR; INTRAVENOUS at 12:00

## 2017-08-02 RX ADMIN — CHLORDIAZEPOXIDE HYDROCHLORIDE 25 MG: 25 CAPSULE ORAL at 01:09

## 2017-08-03 ENCOUNTER — APPOINTMENT (INPATIENT)
Dept: NUCLEAR MEDICINE | Facility: HOSPITAL | Age: 57
DRG: 377 | End: 2017-08-03
Payer: OTHER GOVERNMENT

## 2017-08-03 LAB
ABO GROUP BLD BPU: NORMAL
ABO GROUP BLD BPU: NORMAL
ALBUMIN SERPL BCP-MCNC: 1.8 G/DL (ref 3.5–5)
ALP SERPL-CCNC: 77 U/L (ref 46–116)
ALT SERPL W P-5'-P-CCNC: 34 U/L (ref 12–78)
AMMONIA PLAS-SCNC: 108 UMOL/L (ref 11–35)
ANION GAP SERPL CALCULATED.3IONS-SCNC: 5 MMOL/L (ref 4–13)
ANION GAP SERPL CALCULATED.3IONS-SCNC: 7 MMOL/L (ref 4–13)
AST SERPL W P-5'-P-CCNC: 81 U/L (ref 5–45)
BILIRUB SERPL-MCNC: 4.2 MG/DL (ref 0.2–1)
BPU ID: NORMAL
BPU ID: NORMAL
BUN SERPL-MCNC: 25 MG/DL (ref 5–25)
BUN SERPL-MCNC: 31 MG/DL (ref 5–25)
CALCIUM SERPL-MCNC: 7.4 MG/DL (ref 8.3–10.1)
CALCIUM SERPL-MCNC: 7.4 MG/DL (ref 8.3–10.1)
CHLORIDE SERPL-SCNC: 117 MMOL/L (ref 100–108)
CHLORIDE SERPL-SCNC: 118 MMOL/L (ref 100–108)
CO2 SERPL-SCNC: 27 MMOL/L (ref 21–32)
CO2 SERPL-SCNC: 28 MMOL/L (ref 21–32)
CREAT SERPL-MCNC: 0.89 MG/DL (ref 0.6–1.3)
CREAT SERPL-MCNC: 1.01 MG/DL (ref 0.6–1.3)
CROSSMATCH: NORMAL
CROSSMATCH: NORMAL
ERYTHROCYTE [DISTWIDTH] IN BLOOD BY AUTOMATED COUNT: 16.5 % (ref 11.6–15.1)
ERYTHROCYTE [DISTWIDTH] IN BLOOD BY AUTOMATED COUNT: 17.2 % (ref 11.6–15.1)
ERYTHROCYTE [DISTWIDTH] IN BLOOD BY AUTOMATED COUNT: 17.6 % (ref 11.6–15.1)
ERYTHROCYTE [DISTWIDTH] IN BLOOD BY AUTOMATED COUNT: 17.7 % (ref 11.6–15.1)
ERYTHROCYTE [DISTWIDTH] IN BLOOD BY AUTOMATED COUNT: 18.1 % (ref 11.6–15.1)
GFR SERPL CREATININE-BSD FRML MDRD: 62 ML/MIN/1.73SQ M
GFR SERPL CREATININE-BSD FRML MDRD: 72 ML/MIN/1.73SQ M
GLUCOSE SERPL-MCNC: 125 MG/DL (ref 65–140)
GLUCOSE SERPL-MCNC: 147 MG/DL (ref 65–140)
GLUCOSE SERPL-MCNC: 166 MG/DL (ref 65–140)
GLUCOSE SERPL-MCNC: 169 MG/DL (ref 65–140)
HCT VFR BLD AUTO: 20.9 % (ref 34.8–46.1)
HCT VFR BLD AUTO: 21.5 % (ref 34.8–46.1)
HCT VFR BLD AUTO: 21.6 % (ref 34.8–46.1)
HCT VFR BLD AUTO: 24 % (ref 34.8–46.1)
HCT VFR BLD AUTO: 24.1 % (ref 34.8–46.1)
HGB BLD-MCNC: 6.7 G/DL (ref 11.5–15.4)
HGB BLD-MCNC: 7 G/DL (ref 11.5–15.4)
HGB BLD-MCNC: 7.2 G/DL (ref 11.5–15.4)
HGB BLD-MCNC: 7.8 G/DL (ref 11.5–15.4)
HGB BLD-MCNC: 8.1 G/DL (ref 11.5–15.4)
INR PPP: 1.9 (ref 0.86–1.16)
MCH RBC QN AUTO: 30.3 PG (ref 26.8–34.3)
MCH RBC QN AUTO: 30.6 PG (ref 26.8–34.3)
MCH RBC QN AUTO: 30.8 PG (ref 26.8–34.3)
MCH RBC QN AUTO: 31.3 PG (ref 26.8–34.3)
MCH RBC QN AUTO: 31.4 PG (ref 26.8–34.3)
MCHC RBC AUTO-ENTMCNC: 32.1 G/DL (ref 31.4–37.4)
MCHC RBC AUTO-ENTMCNC: 32.4 G/DL (ref 31.4–37.4)
MCHC RBC AUTO-ENTMCNC: 32.6 G/DL (ref 31.4–37.4)
MCHC RBC AUTO-ENTMCNC: 33.3 G/DL (ref 31.4–37.4)
MCHC RBC AUTO-ENTMCNC: 33.8 G/DL (ref 31.4–37.4)
MCV RBC AUTO: 93 FL (ref 82–98)
MCV RBC AUTO: 94 FL (ref 82–98)
MCV RBC AUTO: 95 FL (ref 82–98)
PLATELET # BLD AUTO: 103 THOUSANDS/UL (ref 149–390)
PLATELET # BLD AUTO: 75 THOUSANDS/UL (ref 149–390)
PLATELET # BLD AUTO: 77 THOUSANDS/UL (ref 149–390)
PLATELET # BLD AUTO: 84 THOUSANDS/UL (ref 149–390)
PLATELET # BLD AUTO: 84 THOUSANDS/UL (ref 149–390)
PMV BLD AUTO: 11.1 FL (ref 8.9–12.7)
PMV BLD AUTO: 11.1 FL (ref 8.9–12.7)
PMV BLD AUTO: 11.2 FL (ref 8.9–12.7)
PMV BLD AUTO: 11.3 FL (ref 8.9–12.7)
PMV BLD AUTO: 11.4 FL (ref 8.9–12.7)
POTASSIUM SERPL-SCNC: 4 MMOL/L (ref 3.5–5.3)
POTASSIUM SERPL-SCNC: 4.3 MMOL/L (ref 3.5–5.3)
PROT SERPL-MCNC: 4.6 G/DL (ref 6.4–8.2)
PROTHROMBIN TIME: 21.6 SECONDS (ref 12.1–14.4)
RBC # BLD AUTO: 2.21 MILLION/UL (ref 3.81–5.12)
RBC # BLD AUTO: 2.27 MILLION/UL (ref 3.81–5.12)
RBC # BLD AUTO: 2.3 MILLION/UL (ref 3.81–5.12)
RBC # BLD AUTO: 2.55 MILLION/UL (ref 3.81–5.12)
RBC # BLD AUTO: 2.58 MILLION/UL (ref 3.81–5.12)
SODIUM SERPL-SCNC: 151 MMOL/L (ref 136–145)
SODIUM SERPL-SCNC: 151 MMOL/L (ref 136–145)
UNIT DISPENSE STATUS: NORMAL
UNIT DISPENSE STATUS: NORMAL
UNIT PRODUCT CODE: NORMAL
UNIT PRODUCT CODE: NORMAL
UNIT RH: NORMAL
UNIT RH: NORMAL
WBC # BLD AUTO: 11.47 THOUSAND/UL (ref 4.31–10.16)
WBC # BLD AUTO: 12.09 THOUSAND/UL (ref 4.31–10.16)
WBC # BLD AUTO: 8.95 THOUSAND/UL (ref 4.31–10.16)
WBC # BLD AUTO: 9.18 THOUSAND/UL (ref 4.31–10.16)
WBC # BLD AUTO: 9.99 THOUSAND/UL (ref 4.31–10.16)

## 2017-08-03 PROCEDURE — 85027 COMPLETE CBC AUTOMATED: CPT | Performed by: INTERNAL MEDICINE

## 2017-08-03 PROCEDURE — 82948 REAGENT STRIP/BLOOD GLUCOSE: CPT

## 2017-08-03 PROCEDURE — P9040 RBC LEUKOREDUCED IRRADIATED: HCPCS

## 2017-08-03 PROCEDURE — A9560 TC99M LABELED RBC: HCPCS

## 2017-08-03 PROCEDURE — 78278 ACUTE GI BLOOD LOSS IMAGING: CPT

## 2017-08-03 PROCEDURE — 80053 COMPREHEN METABOLIC PANEL: CPT | Performed by: INTERNAL MEDICINE

## 2017-08-03 PROCEDURE — C9113 INJ PANTOPRAZOLE SODIUM, VIA: HCPCS | Performed by: NURSE PRACTITIONER

## 2017-08-03 PROCEDURE — P9021 RED BLOOD CELLS UNIT: HCPCS

## 2017-08-03 PROCEDURE — 82140 ASSAY OF AMMONIA: CPT | Performed by: INTERNAL MEDICINE

## 2017-08-03 PROCEDURE — 85610 PROTHROMBIN TIME: CPT | Performed by: INTERNAL MEDICINE

## 2017-08-03 PROCEDURE — 80048 BASIC METABOLIC PNL TOTAL CA: CPT | Performed by: INTERNAL MEDICINE

## 2017-08-03 RX ADMIN — CEFTRIAXONE SODIUM 1000 MG: 1 INJECTION, POWDER, FOR SOLUTION INTRAMUSCULAR; INTRAVENOUS at 23:52

## 2017-08-03 RX ADMIN — LACTULOSE 200 G: 10 SOLUTION ORAL; RECTAL at 21:52

## 2017-08-03 RX ADMIN — OCTREOTIDE ACETATE 25 MCG/HR: 500 INJECTION, SOLUTION INTRAVENOUS; SUBCUTANEOUS at 06:36

## 2017-08-03 RX ADMIN — POTASSIUM CHLORIDE AND DEXTROSE MONOHYDRATE 100 ML/HR: 150; 5 INJECTION, SOLUTION INTRAVENOUS at 11:11

## 2017-08-03 RX ADMIN — LACTULOSE 200 G: 10 SOLUTION ORAL; RECTAL at 18:27

## 2017-08-03 RX ADMIN — CEFTRIAXONE SODIUM 1000 MG: 1 INJECTION, POWDER, FOR SOLUTION INTRAMUSCULAR; INTRAVENOUS at 11:06

## 2017-08-03 RX ADMIN — SODIUM CHLORIDE 8 MG/HR: 9 INJECTION, SOLUTION INTRAVENOUS at 01:53

## 2017-08-03 RX ADMIN — Medication 1 TABLET: at 09:20

## 2017-08-03 RX ADMIN — THIAMINE HYDROCHLORIDE 100 MG: 100 INJECTION, SOLUTION INTRAMUSCULAR; INTRAVENOUS at 09:36

## 2017-08-03 RX ADMIN — SODIUM CHLORIDE 8 MG/HR: 9 INJECTION, SOLUTION INTRAVENOUS at 13:38

## 2017-08-03 RX ADMIN — SODIUM CHLORIDE 8 MG/HR: 9 INJECTION, SOLUTION INTRAVENOUS at 23:57

## 2017-08-03 RX ADMIN — LACTULOSE 200 G: 10 SOLUTION ORAL; RECTAL at 08:17

## 2017-08-03 RX ADMIN — METHYLPREDNISOLONE SODIUM SUCCINATE 40 MG: 40 INJECTION, POWDER, FOR SOLUTION INTRAMUSCULAR; INTRAVENOUS at 08:19

## 2017-08-04 LAB
ABO GROUP BLD BPU: NORMAL
ALBUMIN SERPL BCP-MCNC: 1.8 G/DL (ref 3.5–5)
ALP SERPL-CCNC: 72 U/L (ref 46–116)
ALT SERPL W P-5'-P-CCNC: 37 U/L (ref 12–78)
AMMONIA PLAS-SCNC: 90 UMOL/L (ref 11–35)
ANION GAP SERPL CALCULATED.3IONS-SCNC: 5 MMOL/L (ref 4–13)
ANION GAP SERPL CALCULATED.3IONS-SCNC: 6 MMOL/L (ref 4–13)
ANION GAP SERPL CALCULATED.3IONS-SCNC: 8 MMOL/L (ref 4–13)
AST SERPL W P-5'-P-CCNC: 75 U/L (ref 5–45)
BILIRUB SERPL-MCNC: 5.1 MG/DL (ref 0.2–1)
BPU ID: NORMAL
BUN SERPL-MCNC: 29 MG/DL (ref 5–25)
CALCIUM SERPL-MCNC: 7.6 MG/DL (ref 8.3–10.1)
CALCIUM SERPL-MCNC: 7.6 MG/DL (ref 8.3–10.1)
CALCIUM SERPL-MCNC: 7.7 MG/DL (ref 8.3–10.1)
CHLORIDE SERPL-SCNC: 114 MMOL/L (ref 100–108)
CHLORIDE SERPL-SCNC: 115 MMOL/L (ref 100–108)
CHLORIDE SERPL-SCNC: 118 MMOL/L (ref 100–108)
CO2 SERPL-SCNC: 25 MMOL/L (ref 21–32)
CO2 SERPL-SCNC: 27 MMOL/L (ref 21–32)
CO2 SERPL-SCNC: 27 MMOL/L (ref 21–32)
CREAT SERPL-MCNC: 1.04 MG/DL (ref 0.6–1.3)
CREAT SERPL-MCNC: 1.05 MG/DL (ref 0.6–1.3)
CREAT SERPL-MCNC: 1.1 MG/DL (ref 0.6–1.3)
CROSSMATCH: NORMAL
ERYTHROCYTE [DISTWIDTH] IN BLOOD BY AUTOMATED COUNT: 16.9 % (ref 11.6–15.1)
GFR SERPL CREATININE-BSD FRML MDRD: 56 ML/MIN/1.73SQ M
GFR SERPL CREATININE-BSD FRML MDRD: 59 ML/MIN/1.73SQ M
GFR SERPL CREATININE-BSD FRML MDRD: 60 ML/MIN/1.73SQ M
GLUCOSE SERPL-MCNC: 146 MG/DL (ref 65–140)
GLUCOSE SERPL-MCNC: 174 MG/DL (ref 65–140)
GLUCOSE SERPL-MCNC: 202 MG/DL (ref 65–140)
HCT VFR BLD AUTO: 22.7 % (ref 34.8–46.1)
HCT VFR BLD AUTO: 23.3 % (ref 34.8–46.1)
HCT VFR BLD AUTO: 23.8 % (ref 34.8–46.1)
HGB BLD-MCNC: 7.4 G/DL (ref 11.5–15.4)
HGB BLD-MCNC: 7.7 G/DL (ref 11.5–15.4)
HGB BLD-MCNC: 7.8 G/DL (ref 11.5–15.4)
INR PPP: 1.9 (ref 0.86–1.16)
MCH RBC QN AUTO: 31 PG (ref 26.8–34.3)
MCHC RBC AUTO-ENTMCNC: 33 G/DL (ref 31.4–37.4)
MCV RBC AUTO: 94 FL (ref 82–98)
PLATELET # BLD AUTO: 78 THOUSANDS/UL (ref 149–390)
PMV BLD AUTO: 10.7 FL (ref 8.9–12.7)
POTASSIUM SERPL-SCNC: 3.9 MMOL/L (ref 3.5–5.3)
POTASSIUM SERPL-SCNC: 4.1 MMOL/L (ref 3.5–5.3)
POTASSIUM SERPL-SCNC: 4.2 MMOL/L (ref 3.5–5.3)
PROT SERPL-MCNC: 4.6 G/DL (ref 6.4–8.2)
PROTHROMBIN TIME: 21.6 SECONDS (ref 12.1–14.4)
RBC # BLD AUTO: 2.48 MILLION/UL (ref 3.81–5.12)
SODIUM SERPL-SCNC: 147 MMOL/L (ref 136–145)
SODIUM SERPL-SCNC: 148 MMOL/L (ref 136–145)
SODIUM SERPL-SCNC: 150 MMOL/L (ref 136–145)
UNIT DISPENSE STATUS: NORMAL
UNIT PRODUCT CODE: NORMAL
UNIT RH: NORMAL
WBC # BLD AUTO: 15.36 THOUSAND/UL (ref 4.31–10.16)

## 2017-08-04 PROCEDURE — 85027 COMPLETE CBC AUTOMATED: CPT | Performed by: INTERNAL MEDICINE

## 2017-08-04 PROCEDURE — 82140 ASSAY OF AMMONIA: CPT | Performed by: INTERNAL MEDICINE

## 2017-08-04 PROCEDURE — 85018 HEMOGLOBIN: CPT | Performed by: INTERNAL MEDICINE

## 2017-08-04 PROCEDURE — 85014 HEMATOCRIT: CPT | Performed by: INTERNAL MEDICINE

## 2017-08-04 PROCEDURE — 80053 COMPREHEN METABOLIC PANEL: CPT | Performed by: INTERNAL MEDICINE

## 2017-08-04 PROCEDURE — 80048 BASIC METABOLIC PNL TOTAL CA: CPT | Performed by: INTERNAL MEDICINE

## 2017-08-04 PROCEDURE — 85610 PROTHROMBIN TIME: CPT | Performed by: INTERNAL MEDICINE

## 2017-08-04 PROCEDURE — C9113 INJ PANTOPRAZOLE SODIUM, VIA: HCPCS | Performed by: NURSE PRACTITIONER

## 2017-08-04 RX ORDER — LACTULOSE 20 G/30ML
20 SOLUTION ORAL 4 TIMES DAILY
Status: DISCONTINUED | OUTPATIENT
Start: 2017-08-04 | End: 2017-08-05 | Stop reason: HOSPADM

## 2017-08-04 RX ADMIN — LACTULOSE 20 G: 20 SOLUTION ORAL at 11:02

## 2017-08-04 RX ADMIN — RIFAXIMIN 400 MG: 200 TABLET ORAL at 13:17

## 2017-08-04 RX ADMIN — Medication 1 TABLET: at 08:20

## 2017-08-04 RX ADMIN — LACTULOSE 20 G: 20 SOLUTION ORAL at 22:16

## 2017-08-04 RX ADMIN — POTASSIUM CHLORIDE AND DEXTROSE MONOHYDRATE 100 ML/HR: 150; 5 INJECTION, SOLUTION INTRAVENOUS at 16:46

## 2017-08-04 RX ADMIN — POTASSIUM CHLORIDE AND DEXTROSE MONOHYDRATE 100 ML/HR: 150; 5 INJECTION, SOLUTION INTRAVENOUS at 05:33

## 2017-08-04 RX ADMIN — METHYLPREDNISOLONE SODIUM SUCCINATE 40 MG: 40 INJECTION, POWDER, FOR SOLUTION INTRAMUSCULAR; INTRAVENOUS at 08:20

## 2017-08-04 RX ADMIN — CEFTRIAXONE SODIUM 1000 MG: 1 INJECTION, POWDER, FOR SOLUTION INTRAMUSCULAR; INTRAVENOUS at 10:27

## 2017-08-04 RX ADMIN — LACTULOSE 20 G: 20 SOLUTION ORAL at 17:10

## 2017-08-04 RX ADMIN — THIAMINE HYDROCHLORIDE 100 MG: 100 INJECTION, SOLUTION INTRAMUSCULAR; INTRAVENOUS at 08:20

## 2017-08-04 RX ADMIN — CEFTRIAXONE SODIUM 1000 MG: 1 INJECTION, POWDER, FOR SOLUTION INTRAMUSCULAR; INTRAVENOUS at 22:43

## 2017-08-04 RX ADMIN — OCTREOTIDE ACETATE 25 MCG/HR: 500 INJECTION, SOLUTION INTRAVENOUS; SUBCUTANEOUS at 02:49

## 2017-08-04 RX ADMIN — LACTULOSE 200 G: 10 SOLUTION ORAL; RECTAL at 08:30

## 2017-08-04 RX ADMIN — RIFAXIMIN 400 MG: 200 TABLET ORAL at 22:16

## 2017-08-04 RX ADMIN — SODIUM CHLORIDE 8 MG/HR: 9 INJECTION, SOLUTION INTRAVENOUS at 21:34

## 2017-08-04 RX ADMIN — SODIUM CHLORIDE 8 MG/HR: 9 INJECTION, SOLUTION INTRAVENOUS at 10:27

## 2017-08-05 ENCOUNTER — HOSPITAL ENCOUNTER (INPATIENT)
Facility: HOSPITAL | Age: 57
LOS: 4 days | Discharge: HOME | DRG: 393 | End: 2017-08-09
Attending: INTERNAL MEDICINE | Admitting: INTERNAL MEDICINE
Payer: OTHER GOVERNMENT

## 2017-08-05 VITALS
OXYGEN SATURATION: 98 % | BODY MASS INDEX: 33.2 KG/M2 | WEIGHT: 194.45 LBS | TEMPERATURE: 98.5 F | HEIGHT: 64 IN | RESPIRATION RATE: 20 BRPM | SYSTOLIC BLOOD PRESSURE: 130 MMHG | DIASTOLIC BLOOD PRESSURE: 79 MMHG | HEART RATE: 79 BPM

## 2017-08-05 DIAGNOSIS — K70.31 ALCOHOLIC CIRRHOSIS OF LIVER WITH ASCITES (HCC): ICD-10-CM

## 2017-08-05 DIAGNOSIS — K72.00 ACUTE HEPATIC ENCEPHALOPATHY: Primary | ICD-10-CM

## 2017-08-05 DIAGNOSIS — K92.2 GI BLEED: ICD-10-CM

## 2017-08-05 PROBLEM — D69.6 THROMBOCYTOPENIC (HCC): Status: ACTIVE | Noted: 2017-08-05

## 2017-08-05 LAB
ABO GROUP BLD BPU: NORMAL
ABO GROUP BLD BPU: NORMAL
ABO GROUP BLD: NORMAL
ALBUMIN SERPL BCP-MCNC: 2 G/DL (ref 3.5–5)
ALP SERPL-CCNC: 79 U/L (ref 46–116)
ALT SERPL W P-5'-P-CCNC: 58 U/L (ref 12–78)
ANION GAP SERPL CALCULATED.3IONS-SCNC: 6 MMOL/L (ref 4–13)
ANION GAP SERPL CALCULATED.3IONS-SCNC: 9 MMOL/L (ref 4–13)
AST SERPL W P-5'-P-CCNC: 115 U/L (ref 5–45)
BILIRUB SERPL-MCNC: 5.4 MG/DL (ref 0.2–1)
BLD GP AB SCN SERPL QL: NEGATIVE
BPU ID: NORMAL
BPU ID: NORMAL
BUN SERPL-MCNC: 24 MG/DL (ref 5–25)
BUN SERPL-MCNC: 28 MG/DL (ref 5–25)
CALCIUM SERPL-MCNC: 7.5 MG/DL (ref 8.3–10.1)
CALCIUM SERPL-MCNC: 7.5 MG/DL (ref 8.3–10.1)
CHLORIDE SERPL-SCNC: 110 MMOL/L (ref 100–108)
CHLORIDE SERPL-SCNC: 115 MMOL/L (ref 100–108)
CO2 SERPL-SCNC: 23 MMOL/L (ref 21–32)
CO2 SERPL-SCNC: 25 MMOL/L (ref 21–32)
CREAT SERPL-MCNC: 1.05 MG/DL (ref 0.6–1.3)
CREAT SERPL-MCNC: 1.07 MG/DL (ref 0.6–1.3)
GFR SERPL CREATININE-BSD FRML MDRD: 58 ML/MIN/1.73SQ M
GFR SERPL CREATININE-BSD FRML MDRD: 59 ML/MIN/1.73SQ M
GLUCOSE SERPL-MCNC: 163 MG/DL (ref 65–140)
GLUCOSE SERPL-MCNC: 164 MG/DL (ref 65–140)
HCT VFR BLD AUTO: 20.6 % (ref 34.8–46.1)
HCT VFR BLD AUTO: 30.4 % (ref 34.8–46.1)
HGB BLD-MCNC: 10.1 G/DL (ref 11.5–15.4)
HGB BLD-MCNC: 10.2 G/DL (ref 11.5–15.4)
HGB BLD-MCNC: 6.8 G/DL (ref 11.5–15.4)
MAGNESIUM SERPL-MCNC: 1.9 MG/DL (ref 1.6–2.6)
POTASSIUM SERPL-SCNC: 4 MMOL/L (ref 3.5–5.3)
POTASSIUM SERPL-SCNC: 4.2 MMOL/L (ref 3.5–5.3)
PROT SERPL-MCNC: 4.9 G/DL (ref 6.4–8.2)
RH BLD: POSITIVE
SODIUM SERPL-SCNC: 142 MMOL/L (ref 136–145)
SODIUM SERPL-SCNC: 146 MMOL/L (ref 136–145)
SPECIMEN EXPIRATION DATE: NORMAL
UNIT DISPENSE STATUS: NORMAL
UNIT DISPENSE STATUS: NORMAL
UNIT PRODUCT CODE: NORMAL
UNIT PRODUCT CODE: NORMAL
UNIT RH: NORMAL
UNIT RH: NORMAL

## 2017-08-05 PROCEDURE — 80053 COMPREHEN METABOLIC PANEL: CPT | Performed by: INTERNAL MEDICINE

## 2017-08-05 PROCEDURE — 30233N1 TRANSFUSION OF NONAUTOLOGOUS RED BLOOD CELLS INTO PERIPHERAL VEIN, PERCUTANEOUS APPROACH: ICD-10-PCS | Performed by: INTERNAL MEDICINE

## 2017-08-05 PROCEDURE — 30233K1 TRANSFUSION OF NONAUTOLOGOUS FROZEN PLASMA INTO PERIPHERAL VEIN, PERCUTANEOUS APPROACH: ICD-10-PCS | Performed by: INTERNAL MEDICINE

## 2017-08-05 PROCEDURE — 86850 RBC ANTIBODY SCREEN: CPT | Performed by: NURSE PRACTITIONER

## 2017-08-05 PROCEDURE — 86923 COMPATIBILITY TEST ELECTRIC: CPT

## 2017-08-05 PROCEDURE — 85014 HEMATOCRIT: CPT | Performed by: INTERNAL MEDICINE

## 2017-08-05 PROCEDURE — 85018 HEMOGLOBIN: CPT | Performed by: INTERNAL MEDICINE

## 2017-08-05 PROCEDURE — P9021 RED BLOOD CELLS UNIT: HCPCS

## 2017-08-05 PROCEDURE — 80048 BASIC METABOLIC PNL TOTAL CA: CPT | Performed by: INTERNAL MEDICINE

## 2017-08-05 PROCEDURE — 86900 BLOOD TYPING SEROLOGIC ABO: CPT | Performed by: NURSE PRACTITIONER

## 2017-08-05 PROCEDURE — C9113 INJ PANTOPRAZOLE SODIUM, VIA: HCPCS | Performed by: INTERNAL MEDICINE

## 2017-08-05 PROCEDURE — 30233R1 TRANSFUSION OF NONAUTOLOGOUS PLATELETS INTO PERIPHERAL VEIN, PERCUTANEOUS APPROACH: ICD-10-PCS | Performed by: INTERNAL MEDICINE

## 2017-08-05 PROCEDURE — 83735 ASSAY OF MAGNESIUM: CPT | Performed by: INTERNAL MEDICINE

## 2017-08-05 PROCEDURE — 86901 BLOOD TYPING SEROLOGIC RH(D): CPT | Performed by: NURSE PRACTITIONER

## 2017-08-05 PROCEDURE — C9113 INJ PANTOPRAZOLE SODIUM, VIA: HCPCS | Performed by: NURSE PRACTITIONER

## 2017-08-05 RX ORDER — LORAZEPAM 2 MG/ML
1 INJECTION INTRAMUSCULAR EVERY 4 HOURS PRN
Status: DISCONTINUED | OUTPATIENT
Start: 2017-08-05 | End: 2017-08-06

## 2017-08-05 RX ORDER — FOLIC ACID 1 MG/1
1 TABLET ORAL DAILY
Status: DISCONTINUED | OUTPATIENT
Start: 2017-08-05 | End: 2017-08-06

## 2017-08-05 RX ORDER — LORAZEPAM 2 MG/ML
1 INJECTION INTRAMUSCULAR EVERY 4 HOURS PRN
Status: CANCELLED | OUTPATIENT
Start: 2017-08-05

## 2017-08-05 RX ORDER — LACTULOSE 20 G/30ML
20 SOLUTION ORAL 4 TIMES DAILY
Status: CANCELLED | OUTPATIENT
Start: 2017-08-05

## 2017-08-05 RX ORDER — METHYLPREDNISOLONE SODIUM SUCCINATE 40 MG/ML
40 INJECTION, POWDER, LYOPHILIZED, FOR SOLUTION INTRAMUSCULAR; INTRAVENOUS DAILY
Status: CANCELLED | OUTPATIENT
Start: 2017-08-06

## 2017-08-05 RX ORDER — FENTANYL CITRATE 50 UG/ML
25 INJECTION, SOLUTION INTRAMUSCULAR; INTRAVENOUS
Status: DISCONTINUED | OUTPATIENT
Start: 2017-08-05 | End: 2017-08-06

## 2017-08-05 RX ORDER — THIAMINE MONONITRATE (VIT B1) 100 MG
100 TABLET ORAL DAILY
Status: DISCONTINUED | OUTPATIENT
Start: 2017-08-06 | End: 2017-08-06

## 2017-08-05 RX ORDER — METHYLPREDNISOLONE SODIUM SUCCINATE 40 MG/ML
40 INJECTION, POWDER, LYOPHILIZED, FOR SOLUTION INTRAMUSCULAR; INTRAVENOUS DAILY
Status: DISCONTINUED | OUTPATIENT
Start: 2017-08-06 | End: 2017-08-06

## 2017-08-05 RX ORDER — DEXTROSE AND POTASSIUM CHLORIDE 5; .15 G/100ML; G/100ML
100 SOLUTION INTRAVENOUS CONTINUOUS
Status: DISCONTINUED | OUTPATIENT
Start: 2017-08-05 | End: 2017-08-05

## 2017-08-05 RX ORDER — THIAMINE MONONITRATE (VIT B1) 100 MG
100 TABLET ORAL DAILY
Status: CANCELLED | OUTPATIENT
Start: 2017-08-06

## 2017-08-05 RX ORDER — DEXTROSE AND POTASSIUM CHLORIDE 5; .15 G/100ML; G/100ML
100 SOLUTION INTRAVENOUS CONTINUOUS
Status: CANCELLED | OUTPATIENT
Start: 2017-08-05

## 2017-08-05 RX ORDER — THIAMINE MONONITRATE (VIT B1) 100 MG
100 TABLET ORAL DAILY
Status: DISCONTINUED | OUTPATIENT
Start: 2017-08-05 | End: 2017-08-05 | Stop reason: HOSPADM

## 2017-08-05 RX ORDER — LACTULOSE 20 G/30ML
20 SOLUTION ORAL 4 TIMES DAILY
Status: DISCONTINUED | OUTPATIENT
Start: 2017-08-05 | End: 2017-08-06

## 2017-08-05 RX ORDER — PREGABALIN 100 MG/1
300 CAPSULE ORAL 3 TIMES DAILY
Status: DISCONTINUED | OUTPATIENT
Start: 2017-08-05 | End: 2017-08-06

## 2017-08-05 RX ADMIN — SODIUM CHLORIDE 8 MG/HR: 9 INJECTION, SOLUTION INTRAVENOUS at 17:20

## 2017-08-05 RX ADMIN — PREGABALIN 300 MG: 100 CAPSULE ORAL at 19:47

## 2017-08-05 RX ADMIN — FOLIC ACID 1 MG: 1 TABLET ORAL at 16:58

## 2017-08-05 RX ADMIN — RIFAXIMIN 400 MG: 200 TABLET ORAL at 22:04

## 2017-08-05 RX ADMIN — LACTULOSE 20 G: 20 SOLUTION ORAL at 22:04

## 2017-08-05 RX ADMIN — Medication 1 TABLET: at 08:53

## 2017-08-05 RX ADMIN — CEFTRIAXONE SODIUM 1000 MG: 1 INJECTION, POWDER, FOR SOLUTION INTRAMUSCULAR; INTRAVENOUS at 11:37

## 2017-08-05 RX ADMIN — OCTREOTIDE ACETATE 25 MCG/HR: 500 INJECTION, SOLUTION INTRAVENOUS; SUBCUTANEOUS at 00:37

## 2017-08-05 RX ADMIN — POTASSIUM CHLORIDE AND DEXTROSE MONOHYDRATE 100 ML/HR: 150; 5 INJECTION, SOLUTION INTRAVENOUS at 03:31

## 2017-08-05 RX ADMIN — LACTULOSE 20 G: 20 SOLUTION ORAL at 18:17

## 2017-08-05 RX ADMIN — RIFAXIMIN 400 MG: 200 TABLET ORAL at 06:21

## 2017-08-05 RX ADMIN — THIAMINE HCL TAB 100 MG 100 MG: 100 TAB at 11:34

## 2017-08-05 RX ADMIN — METHYLPREDNISOLONE SODIUM SUCCINATE 40 MG: 40 INJECTION, POWDER, FOR SOLUTION INTRAMUSCULAR; INTRAVENOUS at 08:46

## 2017-08-05 RX ADMIN — SODIUM CHLORIDE 8 MG/HR: 9 INJECTION, SOLUTION INTRAVENOUS at 08:46

## 2017-08-05 RX ADMIN — LACTULOSE 20 G: 20 SOLUTION ORAL at 11:33

## 2017-08-05 RX ADMIN — RIFAXIMIN 400 MG: 200 TABLET ORAL at 16:58

## 2017-08-05 RX ADMIN — LACTULOSE 20 G: 20 SOLUTION ORAL at 08:46

## 2017-08-06 PROBLEM — E87.0 HYPERNATREMIA: Status: RESOLVED | Noted: 2017-08-02 | Resolved: 2017-08-06

## 2017-08-06 LAB
ALBUMIN SERPL BCP-MCNC: 2.2 G/DL (ref 3.5–5)
ALP SERPL-CCNC: 88 U/L (ref 46–116)
ALT SERPL W P-5'-P-CCNC: 68 U/L (ref 12–78)
ANION GAP SERPL CALCULATED.3IONS-SCNC: 10 MMOL/L (ref 4–13)
AST SERPL W P-5'-P-CCNC: 125 U/L (ref 5–45)
BILIRUB SERPL-MCNC: 4.72 MG/DL (ref 0.2–1)
BUN SERPL-MCNC: 22 MG/DL (ref 5–25)
CALCIUM SERPL-MCNC: 7.8 MG/DL (ref 8.3–10.1)
CHLORIDE SERPL-SCNC: 108 MMOL/L (ref 100–108)
CO2 SERPL-SCNC: 21 MMOL/L (ref 21–32)
CREAT SERPL-MCNC: 1.04 MG/DL (ref 0.6–1.3)
ERYTHROCYTE [DISTWIDTH] IN BLOOD BY AUTOMATED COUNT: 18.9 % (ref 11.6–15.1)
GFR SERPL CREATININE-BSD FRML MDRD: 60 ML/MIN/1.73SQ M
GLUCOSE SERPL-MCNC: 129 MG/DL (ref 65–140)
GLUCOSE SERPL-MCNC: 131 MG/DL (ref 65–140)
GLUCOSE SERPL-MCNC: 131 MG/DL (ref 65–140)
GLUCOSE SERPL-MCNC: 181 MG/DL (ref 65–140)
HCT VFR BLD AUTO: 32.1 % (ref 34.8–46.1)
HGB BLD-MCNC: 10.3 G/DL (ref 11.5–15.4)
HGB BLD-MCNC: 10.8 G/DL (ref 11.5–15.4)
INR PPP: 1.68 (ref 0.86–1.16)
MCH RBC QN AUTO: 31.7 PG (ref 26.8–34.3)
MCHC RBC AUTO-ENTMCNC: 33.6 G/DL (ref 31.4–37.4)
MCV RBC AUTO: 94 FL (ref 82–98)
PLATELET # BLD AUTO: 80 THOUSANDS/UL (ref 149–390)
PMV BLD AUTO: 10.7 FL (ref 8.9–12.7)
POTASSIUM SERPL-SCNC: 3.8 MMOL/L (ref 3.5–5.3)
PROT SERPL-MCNC: 5.4 G/DL (ref 6.4–8.2)
PROTHROMBIN TIME: 19.9 SECONDS (ref 12.1–14.4)
RBC # BLD AUTO: 3.41 MILLION/UL (ref 3.81–5.12)
SODIUM SERPL-SCNC: 139 MMOL/L (ref 136–145)
WBC # BLD AUTO: 16.9 THOUSAND/UL (ref 4.31–10.16)

## 2017-08-06 PROCEDURE — C9113 INJ PANTOPRAZOLE SODIUM, VIA: HCPCS | Performed by: INTERNAL MEDICINE

## 2017-08-06 PROCEDURE — 85610 PROTHROMBIN TIME: CPT | Performed by: INTERNAL MEDICINE

## 2017-08-06 PROCEDURE — 82948 REAGENT STRIP/BLOOD GLUCOSE: CPT

## 2017-08-06 PROCEDURE — 80053 COMPREHEN METABOLIC PANEL: CPT | Performed by: INTERNAL MEDICINE

## 2017-08-06 PROCEDURE — 85027 COMPLETE CBC AUTOMATED: CPT | Performed by: INTERNAL MEDICINE

## 2017-08-06 PROCEDURE — 85018 HEMOGLOBIN: CPT | Performed by: INTERNAL MEDICINE

## 2017-08-06 RX ORDER — PREDNISONE 20 MG/1
40 TABLET ORAL DAILY
Status: DISCONTINUED | OUTPATIENT
Start: 2017-08-07 | End: 2017-08-06

## 2017-08-06 RX ORDER — POTASSIUM CHLORIDE 14.9 MG/ML
20 INJECTION INTRAVENOUS ONCE
Status: COMPLETED | OUTPATIENT
Start: 2017-08-06 | End: 2017-08-08

## 2017-08-06 RX ORDER — PANTOPRAZOLE SODIUM 40 MG/1
40 TABLET, DELAYED RELEASE ORAL
Status: DISCONTINUED | OUTPATIENT
Start: 2017-08-06 | End: 2017-08-09 | Stop reason: HOSPADM

## 2017-08-06 RX ORDER — LACTULOSE 20 G/30ML
20 SOLUTION ORAL 3 TIMES DAILY
Status: DISCONTINUED | OUTPATIENT
Start: 2017-08-06 | End: 2017-08-08

## 2017-08-06 RX ORDER — PREDNISONE 20 MG/1
40 TABLET ORAL DAILY
Status: DISCONTINUED | OUTPATIENT
Start: 2017-08-07 | End: 2017-08-09 | Stop reason: HOSPADM

## 2017-08-06 RX ORDER — THIAMINE MONONITRATE (VIT B1) 100 MG
100 TABLET ORAL DAILY
Status: DISCONTINUED | OUTPATIENT
Start: 2017-08-07 | End: 2017-08-09 | Stop reason: HOSPADM

## 2017-08-06 RX ORDER — PREGABALIN 100 MG/1
300 CAPSULE ORAL 3 TIMES DAILY
Status: DISCONTINUED | OUTPATIENT
Start: 2017-08-06 | End: 2017-08-08

## 2017-08-06 RX ORDER — LORAZEPAM 2 MG/ML
1 INJECTION INTRAMUSCULAR EVERY 4 HOURS PRN
Status: DISCONTINUED | OUTPATIENT
Start: 2017-08-06 | End: 2017-08-09 | Stop reason: HOSPADM

## 2017-08-06 RX ORDER — FOLIC ACID 1 MG/1
1 TABLET ORAL DAILY
Status: DISCONTINUED | OUTPATIENT
Start: 2017-08-07 | End: 2017-08-09 | Stop reason: HOSPADM

## 2017-08-06 RX ORDER — LORAZEPAM 2 MG/ML
1 INJECTION INTRAMUSCULAR ONCE
Status: DISCONTINUED | OUTPATIENT
Start: 2017-08-06 | End: 2017-08-06

## 2017-08-06 RX ORDER — LACTULOSE 20 G/30ML
20 SOLUTION ORAL 3 TIMES DAILY
Status: DISCONTINUED | OUTPATIENT
Start: 2017-08-06 | End: 2017-08-06

## 2017-08-06 RX ADMIN — SODIUM CHLORIDE 8 MG/HR: 9 INJECTION, SOLUTION INTRAVENOUS at 00:25

## 2017-08-06 RX ADMIN — RIFAXIMIN 400 MG: 200 TABLET ORAL at 14:32

## 2017-08-06 RX ADMIN — PREGABALIN 300 MG: 100 CAPSULE ORAL at 16:44

## 2017-08-06 RX ADMIN — RIFAXIMIN 400 MG: 200 TABLET ORAL at 21:25

## 2017-08-06 RX ADMIN — Medication 100 MG: at 12:23

## 2017-08-06 RX ADMIN — PREGABALIN 300 MG: 100 CAPSULE ORAL at 09:04

## 2017-08-06 RX ADMIN — LACTULOSE 20 G: 20 SOLUTION ORAL at 09:04

## 2017-08-06 RX ADMIN — PANTOPRAZOLE SODIUM 40 MG: 40 TABLET, DELAYED RELEASE ORAL at 16:41

## 2017-08-06 RX ADMIN — FOLIC ACID 1 MG: 1 TABLET ORAL at 09:03

## 2017-08-06 RX ADMIN — RIFAXIMIN 400 MG: 200 TABLET ORAL at 05:19

## 2017-08-06 RX ADMIN — CEFTRIAXONE 1000 MG: 1 INJECTION, POWDER, FOR SOLUTION INTRAMUSCULAR; INTRAVENOUS at 12:23

## 2017-08-06 RX ADMIN — Medication 1 TABLET: at 12:23

## 2017-08-06 RX ADMIN — METHYLPREDNISOLONE SODIUM SUCCINATE 40 MG: 40 INJECTION, POWDER, FOR SOLUTION INTRAMUSCULAR; INTRAVENOUS at 09:04

## 2017-08-06 RX ADMIN — CEFTRIAXONE 1000 MG: 1 INJECTION, POWDER, FOR SOLUTION INTRAMUSCULAR; INTRAVENOUS at 00:25

## 2017-08-06 RX ADMIN — POTASSIUM CHLORIDE 20 MEQ: 200 INJECTION, SOLUTION INTRAVENOUS at 09:03

## 2017-08-06 RX ADMIN — LACTULOSE 20 G: 20 SOLUTION ORAL at 16:41

## 2017-08-06 RX ADMIN — PREGABALIN 300 MG: 100 CAPSULE ORAL at 21:25

## 2017-08-06 RX ADMIN — LACTULOSE 20 G: 20 SOLUTION ORAL at 21:25

## 2017-08-07 PROBLEM — D62 ACUTE BLOOD LOSS ANEMIA: Status: ACTIVE | Noted: 2017-08-02

## 2017-08-07 PROBLEM — K72.90 HEPATIC ENCEPHALOPATHY (HCC): Status: ACTIVE | Noted: 2017-08-02

## 2017-08-07 RX ADMIN — FOLIC ACID 1 MG: 1 TABLET ORAL at 08:51

## 2017-08-07 RX ADMIN — LACTULOSE 20 G: 20 SOLUTION ORAL at 08:52

## 2017-08-07 RX ADMIN — PANTOPRAZOLE SODIUM 40 MG: 40 TABLET, DELAYED RELEASE ORAL at 06:28

## 2017-08-07 RX ADMIN — PREGABALIN 300 MG: 100 CAPSULE ORAL at 21:20

## 2017-08-07 RX ADMIN — PREDNISONE 40 MG: 20 TABLET ORAL at 08:52

## 2017-08-07 RX ADMIN — Medication 1 TABLET: at 08:52

## 2017-08-07 RX ADMIN — RIFAXIMIN 400 MG: 200 TABLET ORAL at 06:28

## 2017-08-07 RX ADMIN — LACTULOSE 20 G: 20 SOLUTION ORAL at 16:27

## 2017-08-07 RX ADMIN — RIFAXIMIN 550 MG: 550 TABLET ORAL at 21:20

## 2017-08-07 RX ADMIN — CEFTRIAXONE 1000 MG: 1 INJECTION, POWDER, FOR SOLUTION INTRAMUSCULAR; INTRAVENOUS at 00:09

## 2017-08-07 RX ADMIN — PREGABALIN 300 MG: 100 CAPSULE ORAL at 08:52

## 2017-08-07 RX ADMIN — PANTOPRAZOLE SODIUM 40 MG: 40 TABLET, DELAYED RELEASE ORAL at 16:27

## 2017-08-07 RX ADMIN — PREGABALIN 300 MG: 100 CAPSULE ORAL at 16:27

## 2017-08-07 RX ADMIN — Medication 100 MG: at 08:51

## 2017-08-08 LAB
ALBUMIN SERPL BCP-MCNC: 2.1 G/DL (ref 3.5–5)
ALP SERPL-CCNC: 91 U/L (ref 46–116)
ALT SERPL W P-5'-P-CCNC: 67 U/L (ref 12–78)
ANION GAP SERPL CALCULATED.3IONS-SCNC: 10 MMOL/L (ref 4–13)
AST SERPL W P-5'-P-CCNC: 89 U/L (ref 5–45)
BILIRUB DIRECT SERPL-MCNC: 1.86 MG/DL (ref 0–0.2)
BILIRUB SERPL-MCNC: 2.97 MG/DL (ref 0.2–1)
BUN SERPL-MCNC: 18 MG/DL (ref 5–25)
CALCIUM SERPL-MCNC: 7.7 MG/DL (ref 8.3–10.1)
CHLORIDE SERPL-SCNC: 107 MMOL/L (ref 100–108)
CO2 SERPL-SCNC: 20 MMOL/L (ref 21–32)
CREAT SERPL-MCNC: 0.98 MG/DL (ref 0.6–1.3)
ERYTHROCYTE [DISTWIDTH] IN BLOOD BY AUTOMATED COUNT: 20 % (ref 11.6–15.1)
GFR SERPL CREATININE-BSD FRML MDRD: 64 ML/MIN/1.73SQ M
GLUCOSE SERPL-MCNC: 105 MG/DL (ref 65–140)
HCT VFR BLD AUTO: 27.9 % (ref 34.8–46.1)
HGB BLD-MCNC: 8.8 G/DL (ref 11.5–15.4)
HGB BLD-MCNC: 9.2 G/DL (ref 11.5–15.4)
MCH RBC QN AUTO: 31.6 PG (ref 26.8–34.3)
MCHC RBC AUTO-ENTMCNC: 33 G/DL (ref 31.4–37.4)
MCV RBC AUTO: 96 FL (ref 82–98)
PLATELET # BLD AUTO: 77 THOUSANDS/UL (ref 149–390)
PMV BLD AUTO: 10.7 FL (ref 8.9–12.7)
POTASSIUM SERPL-SCNC: 3.7 MMOL/L (ref 3.5–5.3)
PROT SERPL-MCNC: 5.1 G/DL (ref 6.4–8.2)
RBC # BLD AUTO: 2.91 MILLION/UL (ref 3.81–5.12)
SODIUM SERPL-SCNC: 137 MMOL/L (ref 136–145)
WBC # BLD AUTO: 14.25 THOUSAND/UL (ref 4.31–10.16)

## 2017-08-08 PROCEDURE — 85027 COMPLETE CBC AUTOMATED: CPT | Performed by: FAMILY MEDICINE

## 2017-08-08 PROCEDURE — 85018 HEMOGLOBIN: CPT | Performed by: INTERNAL MEDICINE

## 2017-08-08 PROCEDURE — 80048 BASIC METABOLIC PNL TOTAL CA: CPT | Performed by: FAMILY MEDICINE

## 2017-08-08 PROCEDURE — 80076 HEPATIC FUNCTION PANEL: CPT | Performed by: PHYSICIAN ASSISTANT

## 2017-08-08 RX ORDER — PREGABALIN 100 MG/1
300 CAPSULE ORAL 2 TIMES DAILY
Status: DISCONTINUED | OUTPATIENT
Start: 2017-08-08 | End: 2017-08-08

## 2017-08-08 RX ORDER — LACTULOSE 20 G/30ML
10 SOLUTION ORAL 3 TIMES DAILY
Status: DISCONTINUED | OUTPATIENT
Start: 2017-08-08 | End: 2017-08-09 | Stop reason: HOSPADM

## 2017-08-08 RX ORDER — PREGABALIN 100 MG/1
300 CAPSULE ORAL 2 TIMES DAILY
Status: DISCONTINUED | OUTPATIENT
Start: 2017-08-08 | End: 2017-08-09 | Stop reason: HOSPADM

## 2017-08-08 RX ORDER — LACTULOSE 20 G/30ML
10 SOLUTION ORAL 3 TIMES DAILY
Status: DISCONTINUED | OUTPATIENT
Start: 2017-08-08 | End: 2017-08-08

## 2017-08-08 RX ADMIN — PREGABALIN 300 MG: 100 CAPSULE ORAL at 18:01

## 2017-08-08 RX ADMIN — LACTULOSE 10 G: 20 SOLUTION ORAL at 21:05

## 2017-08-08 RX ADMIN — PREDNISONE 40 MG: 20 TABLET ORAL at 10:03

## 2017-08-08 RX ADMIN — Medication 1 TABLET: at 10:02

## 2017-08-08 RX ADMIN — RIFAXIMIN 550 MG: 550 TABLET ORAL at 21:05

## 2017-08-08 RX ADMIN — LACTULOSE 10 G: 20 SOLUTION ORAL at 10:05

## 2017-08-08 RX ADMIN — PANTOPRAZOLE SODIUM 40 MG: 40 TABLET, DELAYED RELEASE ORAL at 15:48

## 2017-08-08 RX ADMIN — LACTULOSE 10 G: 20 SOLUTION ORAL at 15:49

## 2017-08-08 RX ADMIN — Medication 100 MG: at 10:02

## 2017-08-08 RX ADMIN — RIFAXIMIN 550 MG: 550 TABLET ORAL at 10:03

## 2017-08-08 RX ADMIN — FOLIC ACID 1 MG: 1 TABLET ORAL at 10:03

## 2017-08-08 RX ADMIN — PANTOPRAZOLE SODIUM 40 MG: 40 TABLET, DELAYED RELEASE ORAL at 06:10

## 2017-08-08 RX ADMIN — PREGABALIN 300 MG: 100 CAPSULE ORAL at 10:02

## 2017-08-09 VITALS
RESPIRATION RATE: 18 BRPM | DIASTOLIC BLOOD PRESSURE: 58 MMHG | HEIGHT: 64 IN | SYSTOLIC BLOOD PRESSURE: 111 MMHG | OXYGEN SATURATION: 100 % | BODY MASS INDEX: 38.41 KG/M2 | TEMPERATURE: 98.3 F | WEIGHT: 225 LBS | HEART RATE: 97 BPM

## 2017-08-09 LAB
ALBUMIN SERPL BCP-MCNC: 2.2 G/DL (ref 3.5–5)
ALP SERPL-CCNC: 95 U/L (ref 46–116)
ALT SERPL W P-5'-P-CCNC: 67 U/L (ref 12–78)
ANION GAP SERPL CALCULATED.3IONS-SCNC: 7 MMOL/L (ref 4–13)
ANISOCYTOSIS BLD QL SMEAR: PRESENT
AST SERPL W P-5'-P-CCNC: 84 U/L (ref 5–45)
BASOPHILS # BLD MANUAL: 0 THOUSAND/UL (ref 0–0.1)
BASOPHILS NFR MAR MANUAL: 0 % (ref 0–1)
BILIRUB SERPL-MCNC: 3.39 MG/DL (ref 0.2–1)
BUN SERPL-MCNC: 18 MG/DL (ref 5–25)
BURR CELLS BLD QL SMEAR: PRESENT
CALCIUM SERPL-MCNC: 8.2 MG/DL (ref 8.3–10.1)
CHLORIDE SERPL-SCNC: 107 MMOL/L (ref 100–108)
CO2 SERPL-SCNC: 21 MMOL/L (ref 21–32)
CREAT SERPL-MCNC: 0.91 MG/DL (ref 0.6–1.3)
EOSINOPHIL # BLD MANUAL: 0.3 THOUSAND/UL (ref 0–0.4)
EOSINOPHIL NFR BLD MANUAL: 2 % (ref 0–6)
ERYTHROCYTE [DISTWIDTH] IN BLOOD BY AUTOMATED COUNT: 20.7 % (ref 11.6–15.1)
GFR SERPL CREATININE-BSD FRML MDRD: 70 ML/MIN/1.73SQ M
GLUCOSE SERPL-MCNC: 102 MG/DL (ref 65–140)
HCT VFR BLD AUTO: 27.2 % (ref 34.8–46.1)
HGB BLD-MCNC: 9.1 G/DL (ref 11.5–15.4)
LYMPHOCYTES # BLD AUTO: 21 % (ref 14–44)
LYMPHOCYTES # BLD AUTO: 3.12 THOUSAND/UL (ref 0.6–4.47)
MCH RBC QN AUTO: 32 PG (ref 26.8–34.3)
MCHC RBC AUTO-ENTMCNC: 33.5 G/DL (ref 31.4–37.4)
MCV RBC AUTO: 96 FL (ref 82–98)
METAMYELOCYTES NFR BLD MANUAL: 2 % (ref 0–1)
MONOCYTES # BLD AUTO: 1.04 THOUSAND/UL (ref 0–1.22)
MONOCYTES NFR BLD: 7 % (ref 4–12)
NEUTROPHILS # BLD MANUAL: 10.12 THOUSAND/UL (ref 1.85–7.62)
NEUTS BAND NFR BLD MANUAL: 2 % (ref 0–8)
NEUTS SEG NFR BLD AUTO: 66 % (ref 43–75)
NRBC BLD AUTO-RTO: 0 /100 WBCS
PLATELET # BLD AUTO: 98 THOUSANDS/UL (ref 149–390)
PLATELET BLD QL SMEAR: ABNORMAL
PMV BLD AUTO: 10.8 FL (ref 8.9–12.7)
POIKILOCYTOSIS BLD QL SMEAR: PRESENT
POLYCHROMASIA BLD QL SMEAR: PRESENT
POTASSIUM SERPL-SCNC: 4.2 MMOL/L (ref 3.5–5.3)
PROT SERPL-MCNC: 5.3 G/DL (ref 6.4–8.2)
RBC # BLD AUTO: 2.84 MILLION/UL (ref 3.81–5.12)
RBC MORPH BLD: PRESENT
SODIUM SERPL-SCNC: 135 MMOL/L (ref 136–145)
WBC # BLD AUTO: 14.88 THOUSAND/UL (ref 4.31–10.16)

## 2017-08-09 PROCEDURE — 80053 COMPREHEN METABOLIC PANEL: CPT | Performed by: PHYSICIAN ASSISTANT

## 2017-08-09 PROCEDURE — 85027 COMPLETE CBC AUTOMATED: CPT | Performed by: INTERNAL MEDICINE

## 2017-08-09 PROCEDURE — 85007 BL SMEAR W/DIFF WBC COUNT: CPT | Performed by: INTERNAL MEDICINE

## 2017-08-09 RX ORDER — FOLIC ACID 1 MG/1
1 TABLET ORAL DAILY
Qty: 30 TABLET | Refills: 0 | Status: SHIPPED | OUTPATIENT
Start: 2017-08-09

## 2017-08-09 RX ORDER — PREDNISONE 20 MG/1
40 TABLET ORAL DAILY
Qty: 20 TABLET | Refills: 0 | Status: ON HOLD | OUTPATIENT
Start: 2017-08-09 | End: 2017-09-14 | Stop reason: ALTCHOICE

## 2017-08-09 RX ORDER — PANTOPRAZOLE SODIUM 40 MG/1
40 TABLET, DELAYED RELEASE ORAL
Qty: 60 TABLET | Refills: 0 | Status: SHIPPED | OUTPATIENT
Start: 2017-08-09 | End: 2018-10-03

## 2017-08-09 RX ORDER — LANOLIN ALCOHOL/MO/W.PET/CERES
100 CREAM (GRAM) TOPICAL DAILY
Qty: 10 TABLET | Refills: 0 | Status: ON HOLD | OUTPATIENT
Start: 2017-08-09 | End: 2017-09-14 | Stop reason: ALTCHOICE

## 2017-08-09 RX ORDER — LACTULOSE 20 G/30ML
10 SOLUTION ORAL DAILY
Qty: 1 BOTTLE | Refills: 0 | Status: SHIPPED | OUTPATIENT
Start: 2017-08-09 | End: 2018-10-08 | Stop reason: SDUPTHER

## 2017-08-09 RX ADMIN — RIFAXIMIN 550 MG: 550 TABLET ORAL at 08:52

## 2017-08-09 RX ADMIN — FOLIC ACID 1 MG: 1 TABLET ORAL at 08:52

## 2017-08-09 RX ADMIN — PREGABALIN 300 MG: 100 CAPSULE ORAL at 08:52

## 2017-08-09 RX ADMIN — LACTULOSE 10 G: 20 SOLUTION ORAL at 08:53

## 2017-08-09 RX ADMIN — PANTOPRAZOLE SODIUM 40 MG: 40 TABLET, DELAYED RELEASE ORAL at 06:02

## 2017-08-09 RX ADMIN — PREDNISONE 40 MG: 20 TABLET ORAL at 08:52

## 2017-08-09 RX ADMIN — Medication 1 TABLET: at 08:53

## 2017-08-09 RX ADMIN — Medication 100 MG: at 08:52

## 2017-08-14 ENCOUNTER — ALLSCRIPTS OFFICE VISIT (OUTPATIENT)
Dept: OTHER | Facility: OTHER | Age: 57
End: 2017-08-14

## 2017-08-14 DIAGNOSIS — J90 PLEURAL EFFUSION, NOT ELSEWHERE CLASSIFIED: ICD-10-CM

## 2017-08-14 DIAGNOSIS — K74.60 CIRRHOSIS OF LIVER (HCC): ICD-10-CM

## 2017-08-14 DIAGNOSIS — K70.10 ALCOHOLIC HEPATITIS WITHOUT ASCITES: ICD-10-CM

## 2017-08-14 DIAGNOSIS — R18.8 OTHER ASCITES: ICD-10-CM

## 2017-08-21 ENCOUNTER — HOSPITAL ENCOUNTER (EMERGENCY)
Facility: HOSPITAL | Age: 57
Discharge: HOME | End: 2017-08-21
Attending: EMERGENCY MEDICINE | Admitting: EMERGENCY MEDICINE
Payer: OTHER GOVERNMENT

## 2017-08-21 ENCOUNTER — APPOINTMENT (EMERGENCY)
Dept: RADIOLOGY | Facility: HOSPITAL | Age: 57
End: 2017-08-21
Payer: OTHER GOVERNMENT

## 2017-08-21 ENCOUNTER — HOSPITAL ENCOUNTER (OUTPATIENT)
Dept: ULTRASOUND IMAGING | Facility: HOSPITAL | Age: 57
Discharge: HOME | End: 2017-08-21
Payer: OTHER GOVERNMENT

## 2017-08-21 VITALS
HEART RATE: 77 BPM | DIASTOLIC BLOOD PRESSURE: 53 MMHG | SYSTOLIC BLOOD PRESSURE: 106 MMHG | WEIGHT: 220 LBS | RESPIRATION RATE: 18 BRPM | TEMPERATURE: 98.5 F | BODY MASS INDEX: 37.76 KG/M2 | OXYGEN SATURATION: 100 %

## 2017-08-21 DIAGNOSIS — K74.60 CIRRHOSIS OF LIVER (HCC): ICD-10-CM

## 2017-08-21 DIAGNOSIS — J90 PLEURAL EFFUSION ON RIGHT: Primary | ICD-10-CM

## 2017-08-21 DIAGNOSIS — R06.02 SHORTNESS OF BREATH: ICD-10-CM

## 2017-08-21 DIAGNOSIS — K74.60 CIRRHOSIS (HCC): ICD-10-CM

## 2017-08-21 LAB
ALBUMIN SERPL BCP-MCNC: 2.2 G/DL (ref 3.5–5)
ALP SERPL-CCNC: 98 U/L (ref 46–116)
ALT SERPL W P-5'-P-CCNC: 56 U/L (ref 12–78)
ANION GAP SERPL CALCULATED.3IONS-SCNC: 8 MMOL/L (ref 4–13)
AST SERPL W P-5'-P-CCNC: 53 U/L (ref 5–45)
BACTERIA UR QL AUTO: ABNORMAL /HPF
BASOPHILS # BLD AUTO: 0.01 THOUSANDS/ΜL (ref 0–0.1)
BASOPHILS NFR BLD AUTO: 0 % (ref 0–1)
BILIRUB SERPL-MCNC: 2.66 MG/DL (ref 0.2–1)
BILIRUB UR QL STRIP: NEGATIVE
BUN SERPL-MCNC: 15 MG/DL (ref 5–25)
CALCIUM SERPL-MCNC: 8.3 MG/DL (ref 8.3–10.1)
CHLORIDE SERPL-SCNC: 109 MMOL/L (ref 100–108)
CLARITY UR: CLEAR
CO2 SERPL-SCNC: 26 MMOL/L (ref 21–32)
COLOR UR: YELLOW
CREAT SERPL-MCNC: 0.95 MG/DL (ref 0.6–1.3)
EOSINOPHIL # BLD AUTO: 0.02 THOUSAND/ΜL (ref 0–0.61)
EOSINOPHIL NFR BLD AUTO: 0 % (ref 0–6)
ERYTHROCYTE [DISTWIDTH] IN BLOOD BY AUTOMATED COUNT: 18.5 % (ref 11.6–15.1)
GFR SERPL CREATININE-BSD FRML MDRD: 67 ML/MIN/1.73SQ M
GLUCOSE SERPL-MCNC: 103 MG/DL (ref 65–140)
GLUCOSE UR STRIP-MCNC: NEGATIVE MG/DL
HCT VFR BLD AUTO: 24.5 % (ref 34.8–46.1)
HGB BLD-MCNC: 7.7 G/DL (ref 11.5–15.4)
HGB UR QL STRIP.AUTO: NEGATIVE
HYALINE CASTS #/AREA URNS LPF: ABNORMAL /LPF
KETONES UR STRIP-MCNC: NEGATIVE MG/DL
LEUKOCYTE ESTERASE UR QL STRIP: NEGATIVE
LIPASE SERPL-CCNC: 243 U/L (ref 73–393)
LYMPHOCYTES # BLD AUTO: 1.23 THOUSANDS/ΜL (ref 0.6–4.47)
LYMPHOCYTES NFR BLD AUTO: 17 % (ref 14–44)
MCH RBC QN AUTO: 29.6 PG (ref 26.8–34.3)
MCHC RBC AUTO-ENTMCNC: 31.4 G/DL (ref 31.4–37.4)
MCV RBC AUTO: 94 FL (ref 82–98)
MONOCYTES # BLD AUTO: 0.52 THOUSAND/ΜL (ref 0.17–1.22)
MONOCYTES NFR BLD AUTO: 7 % (ref 4–12)
NEUTROPHILS # BLD AUTO: 5.66 THOUSANDS/ΜL (ref 1.85–7.62)
NEUTS SEG NFR BLD AUTO: 76 % (ref 43–75)
NITRITE UR QL STRIP: NEGATIVE
NON-SQ EPI CELLS URNS QL MICRO: ABNORMAL /HPF
NRBC BLD AUTO-RTO: 0 /100 WBCS
PH UR STRIP.AUTO: 8.5 [PH] (ref 4.5–8)
PLATELET # BLD AUTO: 57 THOUSANDS/UL (ref 149–390)
PMV BLD AUTO: 11.5 FL (ref 8.9–12.7)
POTASSIUM SERPL-SCNC: 4.2 MMOL/L (ref 3.5–5.3)
PROT SERPL-MCNC: 5.9 G/DL (ref 6.4–8.2)
PROT UR STRIP-MCNC: ABNORMAL MG/DL
RBC # BLD AUTO: 2.6 MILLION/UL (ref 3.81–5.12)
RBC #/AREA URNS AUTO: ABNORMAL /HPF
SODIUM SERPL-SCNC: 143 MMOL/L (ref 136–145)
SP GR UR STRIP.AUTO: 1.01 (ref 1–1.03)
SPECIMEN SOURCE: NORMAL
TROPONIN I BLD-MCNC: 0.02 NG/ML (ref 0–0.08)
UROBILINOGEN UR QL STRIP.AUTO: 4 E.U./DL
WBC # BLD AUTO: 7.46 THOUSAND/UL (ref 4.31–10.16)
WBC #/AREA URNS AUTO: ABNORMAL /HPF

## 2017-08-21 PROCEDURE — 80053 COMPREHEN METABOLIC PANEL: CPT | Performed by: EMERGENCY MEDICINE

## 2017-08-21 PROCEDURE — 93005 ELECTROCARDIOGRAM TRACING: CPT | Performed by: EMERGENCY MEDICINE

## 2017-08-21 PROCEDURE — 81001 URINALYSIS AUTO W/SCOPE: CPT

## 2017-08-21 PROCEDURE — 76705 ECHO EXAM OF ABDOMEN: CPT

## 2017-08-21 PROCEDURE — 83690 ASSAY OF LIPASE: CPT | Performed by: EMERGENCY MEDICINE

## 2017-08-21 PROCEDURE — 84484 ASSAY OF TROPONIN QUANT: CPT

## 2017-08-21 PROCEDURE — 36415 COLL VENOUS BLD VENIPUNCTURE: CPT | Performed by: EMERGENCY MEDICINE

## 2017-08-21 PROCEDURE — 71020 HB CHEST X-RAY 2VW FRONTAL&LATL: CPT

## 2017-08-21 PROCEDURE — 99285 EMERGENCY DEPT VISIT HI MDM: CPT

## 2017-08-21 PROCEDURE — 85025 COMPLETE CBC W/AUTO DIFF WBC: CPT | Performed by: EMERGENCY MEDICINE

## 2017-08-21 RX ORDER — CYANOCOBALAMIN 1000 UG/ML
1000 INJECTION INTRAMUSCULAR; SUBCUTANEOUS ONCE
Status: COMPLETED | OUTPATIENT
Start: 2017-08-22 | End: 2017-08-22

## 2017-08-21 RX ORDER — SPIRONOLACTONE 100 MG/1
25 TABLET, FILM COATED ORAL DAILY
COMMUNITY
End: 2018-10-03

## 2017-08-22 ENCOUNTER — HOSPITAL ENCOUNTER (OUTPATIENT)
Dept: INFUSION CENTER | Facility: HOSPITAL | Age: 57
Discharge: HOME | End: 2017-08-22
Payer: OTHER GOVERNMENT

## 2017-08-22 VITALS
DIASTOLIC BLOOD PRESSURE: 54 MMHG | RESPIRATION RATE: 18 BRPM | TEMPERATURE: 98.8 F | HEART RATE: 88 BPM | SYSTOLIC BLOOD PRESSURE: 101 MMHG | OXYGEN SATURATION: 97 %

## 2017-08-22 LAB
ATRIAL RATE: 66 BPM
P AXIS: 74 DEGREES
PR INTERVAL: 136 MS
QRS AXIS: 45 DEGREES
QRSD INTERVAL: 76 MS
QT INTERVAL: 450 MS
QTC INTERVAL: 471 MS
T WAVE AXIS: 61 DEGREES
VENTRICULAR RATE: 66 BPM

## 2017-08-22 PROCEDURE — 96372 THER/PROPH/DIAG INJ SC/IM: CPT

## 2017-08-22 RX ORDER — DIPHENOXYLATE HYDROCHLORIDE AND ATROPINE SULFATE 2.5; .025 MG/1; MG/1
1 TABLET ORAL DAILY
Status: ON HOLD | COMMUNITY
End: 2017-09-14 | Stop reason: ALTCHOICE

## 2017-08-22 RX ADMIN — CYANOCOBALAMIN 1000 MCG: 1000 INJECTION, SOLUTION INTRAMUSCULAR at 15:00

## 2017-08-22 NOTE — PROGRESS NOTES
Ramirez Wilson RN with Dr Indira Amaya office notified of patient's ED and inpatient visits  Of to continue with current orders no new orders at this time  Pt toleratd B12 IM in left deltoid with no adverse reactions  Pt then d/c'd home ambulatory with steady gait

## 2017-08-24 ENCOUNTER — ALLSCRIPTS OFFICE VISIT (OUTPATIENT)
Dept: OTHER | Facility: OTHER | Age: 57
End: 2017-08-24

## 2017-08-24 ENCOUNTER — GENERIC CONVERSION - ENCOUNTER (OUTPATIENT)
Dept: OTHER | Facility: OTHER | Age: 57
End: 2017-08-24

## 2017-08-25 ENCOUNTER — ALLSCRIPTS OFFICE VISIT (OUTPATIENT)
Dept: OTHER | Facility: OTHER | Age: 57
End: 2017-08-25

## 2017-08-29 ENCOUNTER — GENERIC CONVERSION - ENCOUNTER (OUTPATIENT)
Dept: OTHER | Facility: OTHER | Age: 57
End: 2017-08-29

## 2017-08-29 ENCOUNTER — TRANSCRIBE ORDERS (OUTPATIENT)
Dept: ADMINISTRATIVE | Facility: HOSPITAL | Age: 57
End: 2017-08-29

## 2017-08-29 ENCOUNTER — HOSPITAL ENCOUNTER (OUTPATIENT)
Dept: RADIOLOGY | Facility: HOSPITAL | Age: 57
Discharge: HOME | End: 2017-08-29
Attending: INTERNAL MEDICINE
Payer: OTHER GOVERNMENT

## 2017-08-29 DIAGNOSIS — J90 PLEURAL EFFUSION, NOT ELSEWHERE CLASSIFIED: ICD-10-CM

## 2017-08-29 PROCEDURE — 71020 HB CHEST X-RAY 2VW FRONTAL&LATL: CPT

## 2017-09-05 ENCOUNTER — GENERIC CONVERSION - ENCOUNTER (OUTPATIENT)
Dept: OTHER | Facility: OTHER | Age: 57
End: 2017-09-05

## 2017-09-08 ENCOUNTER — APPOINTMENT (OUTPATIENT)
Dept: LAB | Facility: HOSPITAL | Age: 57
End: 2017-09-08
Attending: INTERNAL MEDICINE
Payer: OTHER GOVERNMENT

## 2017-09-08 ENCOUNTER — TRANSCRIBE ORDERS (OUTPATIENT)
Dept: ADMINISTRATIVE | Facility: HOSPITAL | Age: 57
End: 2017-09-08

## 2017-09-08 DIAGNOSIS — K74.60 CIRRHOSIS OF LIVER (HCC): ICD-10-CM

## 2017-09-08 LAB
ALBUMIN SERPL BCP-MCNC: 2.4 G/DL (ref 3.5–5)
ALP SERPL-CCNC: 123 U/L (ref 46–116)
ALT SERPL W P-5'-P-CCNC: 67 U/L (ref 12–78)
ANION GAP SERPL CALCULATED.3IONS-SCNC: 13 MMOL/L (ref 4–13)
AST SERPL W P-5'-P-CCNC: 68 U/L (ref 5–45)
BILIRUB SERPL-MCNC: 1.9 MG/DL (ref 0.2–1)
BUN SERPL-MCNC: 11 MG/DL (ref 5–25)
CALCIUM SERPL-MCNC: 8.1 MG/DL (ref 8.3–10.1)
CHLORIDE SERPL-SCNC: 109 MMOL/L (ref 100–108)
CO2 SERPL-SCNC: 20 MMOL/L (ref 21–32)
CREAT SERPL-MCNC: 1.2 MG/DL (ref 0.6–1.3)
GFR SERPL CREATININE-BSD FRML MDRD: 50 ML/MIN/1.73SQ M
GLUCOSE SERPL-MCNC: 215 MG/DL (ref 65–140)
MAGNESIUM SERPL-MCNC: 1.8 MG/DL (ref 1.6–2.6)
POTASSIUM SERPL-SCNC: 4 MMOL/L (ref 3.5–5.3)
PROT SERPL-MCNC: 6.9 G/DL (ref 6.4–8.2)
SODIUM SERPL-SCNC: 142 MMOL/L (ref 136–145)

## 2017-09-08 PROCEDURE — 36415 COLL VENOUS BLD VENIPUNCTURE: CPT

## 2017-09-08 PROCEDURE — 83735 ASSAY OF MAGNESIUM: CPT

## 2017-09-08 PROCEDURE — 80053 COMPREHEN METABOLIC PANEL: CPT

## 2017-09-13 ENCOUNTER — ANESTHESIA EVENT (OUTPATIENT)
Dept: GASTROENTEROLOGY | Facility: HOSPITAL | Age: 57
End: 2017-09-13
Payer: OTHER GOVERNMENT

## 2017-09-14 ENCOUNTER — HOSPITAL ENCOUNTER (OUTPATIENT)
Facility: HOSPITAL | Age: 57
Setting detail: OUTPATIENT SURGERY
Discharge: HOME | End: 2017-09-14
Attending: INTERNAL MEDICINE | Admitting: INTERNAL MEDICINE
Payer: OTHER GOVERNMENT

## 2017-09-14 ENCOUNTER — ANESTHESIA (OUTPATIENT)
Dept: GASTROENTEROLOGY | Facility: HOSPITAL | Age: 57
End: 2017-09-14
Payer: OTHER GOVERNMENT

## 2017-09-14 ENCOUNTER — GENERIC CONVERSION - ENCOUNTER (OUTPATIENT)
Dept: OTHER | Facility: OTHER | Age: 57
End: 2017-09-14

## 2017-09-14 VITALS
RESPIRATION RATE: 18 BRPM | OXYGEN SATURATION: 100 % | DIASTOLIC BLOOD PRESSURE: 57 MMHG | BODY MASS INDEX: 34.15 KG/M2 | SYSTOLIC BLOOD PRESSURE: 126 MMHG | HEIGHT: 64 IN | HEART RATE: 88 BPM | WEIGHT: 200 LBS | TEMPERATURE: 98.6 F

## 2017-09-14 DIAGNOSIS — G62.9 POLYNEUROPATHY: ICD-10-CM

## 2017-09-14 DIAGNOSIS — K74.60 CIRRHOSIS OF LIVER (HCC): ICD-10-CM

## 2017-09-14 DIAGNOSIS — D64.9 ANEMIA: ICD-10-CM

## 2017-09-14 DIAGNOSIS — M79.643 PAIN OF HAND: ICD-10-CM

## 2017-09-14 DIAGNOSIS — R29.898 OTHER SYMPTOMS AND SIGNS INVOLVING THE MUSCULOSKELETAL SYSTEM: ICD-10-CM

## 2017-09-14 DIAGNOSIS — M79.673 PAIN OF FOOT: ICD-10-CM

## 2017-09-14 DIAGNOSIS — K70.10 ALCOHOLIC HEPATITIS WITHOUT ASCITES: ICD-10-CM

## 2017-09-14 DIAGNOSIS — G89.4 CHRONIC PAIN SYNDROME: ICD-10-CM

## 2017-09-14 PROCEDURE — 88305 TISSUE EXAM BY PATHOLOGIST: CPT | Performed by: INTERNAL MEDICINE

## 2017-09-14 RX ORDER — SODIUM CHLORIDE 9 MG/ML
125 INJECTION, SOLUTION INTRAVENOUS CONTINUOUS
Status: DISCONTINUED | OUTPATIENT
Start: 2017-09-14 | End: 2017-09-14 | Stop reason: HOSPADM

## 2017-09-14 RX ORDER — ONDANSETRON 2 MG/ML
4 INJECTION INTRAMUSCULAR; INTRAVENOUS EVERY 6 HOURS PRN
Status: DISCONTINUED | OUTPATIENT
Start: 2017-09-14 | End: 2017-09-14 | Stop reason: HOSPADM

## 2017-09-14 RX ORDER — PROPOFOL 10 MG/ML
INJECTION, EMULSION INTRAVENOUS AS NEEDED
Status: DISCONTINUED | OUTPATIENT
Start: 2017-09-14 | End: 2017-09-14 | Stop reason: SURG

## 2017-09-14 RX ORDER — PROPOFOL 10 MG/ML
INJECTION, EMULSION INTRAVENOUS CONTINUOUS PRN
Status: DISCONTINUED | OUTPATIENT
Start: 2017-09-14 | End: 2017-09-14 | Stop reason: SURG

## 2017-09-14 RX ORDER — SUCRALFATE ORAL 1 G/10ML
1 SUSPENSION ORAL 3 TIMES DAILY
COMMUNITY
End: 2018-04-19

## 2017-09-14 RX ADMIN — PROPOFOL 150 MG: 10 INJECTION, EMULSION INTRAVENOUS at 10:25

## 2017-09-14 RX ADMIN — PROPOFOL 200 MCG/KG/MIN: 10 INJECTION, EMULSION INTRAVENOUS at 10:25

## 2017-09-14 RX ADMIN — SODIUM CHLORIDE 125 ML/HR: 0.9 INJECTION, SOLUTION INTRAVENOUS at 10:14

## 2017-09-15 ENCOUNTER — APPOINTMENT (OUTPATIENT)
Dept: LAB | Facility: HOSPITAL | Age: 57
End: 2017-09-15
Attending: INTERNAL MEDICINE
Payer: OTHER GOVERNMENT

## 2017-09-15 ENCOUNTER — GENERIC CONVERSION - ENCOUNTER (OUTPATIENT)
Dept: OTHER | Facility: OTHER | Age: 57
End: 2017-09-15

## 2017-09-15 DIAGNOSIS — K74.60 CIRRHOSIS OF LIVER (HCC): ICD-10-CM

## 2017-09-15 DIAGNOSIS — D64.9 ANEMIA: ICD-10-CM

## 2017-09-15 DIAGNOSIS — K70.10 ALCOHOLIC HEPATITIS WITHOUT ASCITES: ICD-10-CM

## 2017-09-15 LAB
ALBUMIN SERPL BCP-MCNC: 2.2 G/DL (ref 3.5–5)
ALP SERPL-CCNC: 109 U/L (ref 46–116)
ALT SERPL W P-5'-P-CCNC: 46 U/L (ref 12–78)
ANION GAP SERPL CALCULATED.3IONS-SCNC: 10 MMOL/L (ref 4–13)
AST SERPL W P-5'-P-CCNC: 56 U/L (ref 5–45)
BASOPHILS # BLD AUTO: 0.02 THOUSANDS/ΜL (ref 0–0.1)
BASOPHILS NFR BLD AUTO: 1 % (ref 0–1)
BILIRUB SERPL-MCNC: 1.4 MG/DL (ref 0.2–1)
BUN SERPL-MCNC: 14 MG/DL (ref 5–25)
CALCIUM SERPL-MCNC: 7.8 MG/DL (ref 8.3–10.1)
CHLORIDE SERPL-SCNC: 113 MMOL/L (ref 100–108)
CO2 SERPL-SCNC: 20 MMOL/L (ref 21–32)
CREAT SERPL-MCNC: 0.92 MG/DL (ref 0.6–1.3)
EOSINOPHIL # BLD AUTO: 0.08 THOUSAND/ΜL (ref 0–0.61)
EOSINOPHIL NFR BLD AUTO: 2 % (ref 0–6)
ERYTHROCYTE [DISTWIDTH] IN BLOOD BY AUTOMATED COUNT: 18.9 % (ref 11.6–15.1)
FERRITIN SERPL-MCNC: 19 NG/ML (ref 8–388)
GFR SERPL CREATININE-BSD FRML MDRD: 69 ML/MIN/1.73SQ M
GLUCOSE SERPL-MCNC: 78 MG/DL (ref 65–140)
HCT VFR BLD AUTO: 25.5 % (ref 34.8–46.1)
HGB BLD-MCNC: 7.4 G/DL (ref 11.5–15.4)
INR PPP: 1.65 (ref 0.86–1.16)
IRON SATN MFR SERPL: 7 %
IRON SERPL-MCNC: 25 UG/DL (ref 50–170)
LYMPHOCYTES # BLD AUTO: 1.27 THOUSANDS/ΜL (ref 0.6–4.47)
LYMPHOCYTES NFR BLD AUTO: 30 % (ref 14–44)
MCH RBC QN AUTO: 25.6 PG (ref 26.8–34.3)
MCHC RBC AUTO-ENTMCNC: 29 G/DL (ref 31.4–37.4)
MCV RBC AUTO: 88 FL (ref 82–98)
MONOCYTES # BLD AUTO: 0.42 THOUSAND/ΜL (ref 0.17–1.22)
MONOCYTES NFR BLD AUTO: 10 % (ref 4–12)
NEUTROPHILS # BLD AUTO: 2.45 THOUSANDS/ΜL (ref 1.85–7.62)
NEUTS SEG NFR BLD AUTO: 57 % (ref 43–75)
PLATELET # BLD AUTO: 93 THOUSANDS/UL (ref 149–390)
PMV BLD AUTO: 12 FL (ref 8.9–12.7)
POTASSIUM SERPL-SCNC: 3.2 MMOL/L (ref 3.5–5.3)
PROT SERPL-MCNC: 6.5 G/DL (ref 6.4–8.2)
PROTHROMBIN TIME: 19.3 SECONDS (ref 12.1–14.4)
RBC # BLD AUTO: 2.89 MILLION/UL (ref 3.81–5.12)
SODIUM SERPL-SCNC: 143 MMOL/L (ref 136–145)
TIBC SERPL-MCNC: 356 UG/DL (ref 250–450)
WBC # BLD AUTO: 4.24 THOUSAND/UL (ref 4.31–10.16)

## 2017-09-15 PROCEDURE — 83540 ASSAY OF IRON: CPT

## 2017-09-15 PROCEDURE — 83918 ORGANIC ACIDS TOTAL QUANT: CPT

## 2017-09-15 PROCEDURE — 36415 COLL VENOUS BLD VENIPUNCTURE: CPT

## 2017-09-15 PROCEDURE — 83550 IRON BINDING TEST: CPT

## 2017-09-15 PROCEDURE — 85025 COMPLETE CBC W/AUTO DIFF WBC: CPT

## 2017-09-15 PROCEDURE — 82728 ASSAY OF FERRITIN: CPT

## 2017-09-15 PROCEDURE — 85610 PROTHROMBIN TIME: CPT

## 2017-09-15 PROCEDURE — 80053 COMPREHEN METABOLIC PANEL: CPT

## 2017-09-19 ENCOUNTER — GENERIC CONVERSION - ENCOUNTER (OUTPATIENT)
Dept: OTHER | Facility: OTHER | Age: 57
End: 2017-09-19

## 2017-09-19 LAB — METHYLMALONATE SERPL-SCNC: 163 NMOL/L (ref 0–378)

## 2017-10-02 ENCOUNTER — ALLSCRIPTS OFFICE VISIT (OUTPATIENT)
Dept: OTHER | Facility: OTHER | Age: 57
End: 2017-10-02

## 2017-10-02 ENCOUNTER — APPOINTMENT (OUTPATIENT)
Dept: LAB | Facility: HOSPITAL | Age: 57
End: 2017-10-02
Attending: INTERNAL MEDICINE
Payer: OTHER GOVERNMENT

## 2017-10-02 ENCOUNTER — LAB REQUISITION (OUTPATIENT)
Dept: LAB | Facility: HOSPITAL | Age: 57
End: 2017-10-02
Payer: OTHER GOVERNMENT

## 2017-10-02 ENCOUNTER — TRANSCRIBE ORDERS (OUTPATIENT)
Dept: ADMINISTRATIVE | Facility: HOSPITAL | Age: 57
End: 2017-10-02

## 2017-10-02 DIAGNOSIS — D50.9 IRON DEFICIENCY ANEMIA: ICD-10-CM

## 2017-10-02 DIAGNOSIS — D50.9 IRON DEFICIENCY ANEMIA, UNSPECIFIED: ICD-10-CM

## 2017-10-02 DIAGNOSIS — D50.9 IRON DEFICIENCY ANEMIA, UNSPECIFIED: Primary | ICD-10-CM

## 2017-10-02 LAB
ABO GROUP BLD: NORMAL
ALBUMIN SERPL BCP-MCNC: 2.1 G/DL (ref 3.5–5)
ALP SERPL-CCNC: 164 U/L (ref 46–116)
ALT SERPL W P-5'-P-CCNC: 50 U/L (ref 12–78)
ANION GAP SERPL CALCULATED.3IONS-SCNC: 9 MMOL/L (ref 4–13)
AST SERPL W P-5'-P-CCNC: 73 U/L (ref 5–45)
BASOPHILS # BLD AUTO: 0.02 THOUSANDS/ΜL (ref 0–0.1)
BASOPHILS NFR BLD AUTO: 0 % (ref 0–1)
BILIRUB SERPL-MCNC: 2.3 MG/DL (ref 0.2–1)
BLD GP AB SCN SERPL QL: NEGATIVE
BUN SERPL-MCNC: 8 MG/DL (ref 5–25)
CALCIUM SERPL-MCNC: 8 MG/DL (ref 8.3–10.1)
CHLORIDE SERPL-SCNC: 111 MMOL/L (ref 100–108)
CO2 SERPL-SCNC: 23 MMOL/L (ref 21–32)
CREAT SERPL-MCNC: 1.18 MG/DL (ref 0.6–1.3)
EOSINOPHIL # BLD AUTO: 0.08 THOUSAND/ΜL (ref 0–0.61)
EOSINOPHIL NFR BLD AUTO: 2 % (ref 0–6)
ERYTHROCYTE [DISTWIDTH] IN BLOOD BY AUTOMATED COUNT: 20.1 % (ref 11.6–15.1)
FERRITIN SERPL-MCNC: 19 NG/ML (ref 8–388)
GFR SERPL CREATININE-BSD FRML MDRD: 51 ML/MIN/1.73SQ M
GLUCOSE SERPL-MCNC: 103 MG/DL (ref 65–140)
HCT VFR BLD AUTO: 25.4 % (ref 34.8–46.1)
HGB BLD-MCNC: 7.3 G/DL (ref 11.5–15.4)
IRON SATN MFR SERPL: 6 %
IRON SERPL-MCNC: 18 UG/DL (ref 50–170)
LYMPHOCYTES # BLD AUTO: 1.37 THOUSANDS/ΜL (ref 0.6–4.47)
LYMPHOCYTES NFR BLD AUTO: 29 % (ref 14–44)
MCH RBC QN AUTO: 23 PG (ref 26.8–34.3)
MCHC RBC AUTO-ENTMCNC: 28.7 G/DL (ref 31.4–37.4)
MCV RBC AUTO: 80 FL (ref 82–98)
MONOCYTES # BLD AUTO: 0.51 THOUSAND/ΜL (ref 0.17–1.22)
MONOCYTES NFR BLD AUTO: 11 % (ref 4–12)
NEUTROPHILS # BLD AUTO: 2.69 THOUSANDS/ΜL (ref 1.85–7.62)
NEUTS SEG NFR BLD AUTO: 58 % (ref 43–75)
PLATELET # BLD AUTO: 87 THOUSANDS/UL (ref 149–390)
PMV BLD AUTO: 10.7 FL (ref 8.9–12.7)
POTASSIUM SERPL-SCNC: 3.4 MMOL/L (ref 3.5–5.3)
PROT SERPL-MCNC: 6.8 G/DL (ref 6.4–8.2)
RBC # BLD AUTO: 3.18 MILLION/UL (ref 3.81–5.12)
RH BLD: POSITIVE
SODIUM SERPL-SCNC: 143 MMOL/L (ref 136–145)
SPECIMEN EXPIRATION DATE: NORMAL
TIBC SERPL-MCNC: 313 UG/DL (ref 250–450)
WBC # BLD AUTO: 4.67 THOUSAND/UL (ref 4.31–10.16)

## 2017-10-02 PROCEDURE — 86901 BLOOD TYPING SEROLOGIC RH(D): CPT | Performed by: INTERNAL MEDICINE

## 2017-10-02 PROCEDURE — 85025 COMPLETE CBC W/AUTO DIFF WBC: CPT

## 2017-10-02 PROCEDURE — 36415 COLL VENOUS BLD VENIPUNCTURE: CPT

## 2017-10-02 PROCEDURE — 86850 RBC ANTIBODY SCREEN: CPT | Performed by: INTERNAL MEDICINE

## 2017-10-02 PROCEDURE — 86900 BLOOD TYPING SEROLOGIC ABO: CPT | Performed by: INTERNAL MEDICINE

## 2017-10-02 PROCEDURE — 83918 ORGANIC ACIDS TOTAL QUANT: CPT

## 2017-10-02 PROCEDURE — 86920 COMPATIBILITY TEST SPIN: CPT

## 2017-10-02 PROCEDURE — 80053 COMPREHEN METABOLIC PANEL: CPT

## 2017-10-02 PROCEDURE — 82728 ASSAY OF FERRITIN: CPT

## 2017-10-02 PROCEDURE — 83540 ASSAY OF IRON: CPT

## 2017-10-02 PROCEDURE — 83550 IRON BINDING TEST: CPT

## 2017-10-03 ENCOUNTER — HOSPITAL ENCOUNTER (OUTPATIENT)
Dept: INFUSION CENTER | Facility: CLINIC | Age: 57
Discharge: HOME | End: 2017-10-03
Payer: OTHER GOVERNMENT

## 2017-10-03 VITALS
SYSTOLIC BLOOD PRESSURE: 112 MMHG | BODY MASS INDEX: 38.88 KG/M2 | WEIGHT: 226.5 LBS | HEART RATE: 95 BPM | TEMPERATURE: 95.7 F | DIASTOLIC BLOOD PRESSURE: 64 MMHG | RESPIRATION RATE: 18 BRPM

## 2017-10-03 PROCEDURE — P9040 RBC LEUKOREDUCED IRRADIATED: HCPCS

## 2017-10-03 PROCEDURE — 36430 TRANSFUSION BLD/BLD COMPNT: CPT

## 2017-10-03 PROCEDURE — 96374 THER/PROPH/DIAG INJ IV PUSH: CPT

## 2017-10-03 RX ORDER — SODIUM CHLORIDE 9 MG/ML
20 INJECTION, SOLUTION INTRAVENOUS CONTINUOUS
Status: DISCONTINUED | OUTPATIENT
Start: 2017-10-03 | End: 2017-10-06 | Stop reason: HOSPADM

## 2017-10-03 RX ORDER — DIPHENHYDRAMINE HYDROCHLORIDE 50 MG/ML
25 INJECTION INTRAMUSCULAR; INTRAVENOUS ONCE
Status: COMPLETED | OUTPATIENT
Start: 2017-10-03 | End: 2017-10-03

## 2017-10-03 RX ADMIN — SODIUM CHLORIDE 20 ML/HR: 0.9 INJECTION, SOLUTION INTRAVENOUS at 11:53

## 2017-10-03 RX ADMIN — DIPHENHYDRAMINE HYDROCHLORIDE 25 MG: 50 INJECTION, SOLUTION INTRAMUSCULAR; INTRAVENOUS at 11:54

## 2017-10-03 NOTE — PROGRESS NOTES
First transfusion of PRBC initiated; Patient educated on possible signs/symptoms of transfusion reaction and aware to notify us if she experiences any chest pain, back pain, shortness of breath, pain/itching near IV site, itching, or any other new symptoms; Patient verbalized understanding of teaching

## 2017-10-03 NOTE — PROGRESS NOTES
Assessment  1  Cirrhosis (571 5) (K74 60)   2  Iron (Fe) deficiency anemia (280 9) (D50 9)    Plan  Iron (Fe) deficiency anemia    · (1) CBC/PLT/DIFF; Status:Active; Requested UGI:77MJU7628; Perform:[facility]; ZAT:36ERB6034;BUGGUAK; For:Iron (Fe) deficiency anemia; Ordered By:Epifanio Hugo;   · (1) COMPREHENSIVE METABOLIC PANEL; Status:Active; Requested TEQ:14BWW7652; Perform:[facility]; UPW:05PUG5921;AKDKKNR; For:Iron (Fe) deficiency anemia; Ordered By:Epifanio Hugo;   · (1) FERRITIN; Status:Active; Requested GOO:57ZPT3584; Perform:[facility]; GDD:88RWN7791;SBMJUSZ; For:Iron (Fe) deficiency anemia; Ordered By:Epifanio Hugo;   · (1) IRON SATURATION %, TIBC; Status:Active; Requested UOL:78JYV8069; Perform:[facility]; QEC:48JEJ3213;GLTENGE; For:Iron (Fe) deficiency anemia; Ordered By:Epifanio Hugo;   · (1) IRON; Status:Active; Requested NHM:78JLL9922; Perform:[facility]; GEY:46EBW3380;JRULLCB; For:Iron (Fe) deficiency anemia; Ordered By:Epifanio Hugo;   · (1) METHYLMALONIC ACID,BLOOD; Status:Active; Requested [id number]; Perform:[facility]; HQH:89WOO9487;MZNFGVF; For:Iron (Fe) deficiency anemia; Ordered By:Epifanio Hugo;   · (1) TIBC; Status:Active; Requested PGH:20LIZ1802; Perform:[facility]; QSO:24ZVO4025;DEOZXXW; For:Iron (Fe) deficiency anemia; Ordered By:Epifanio Hugo;   · Follow-up visit in 3 months Evaluation and Treatment  Follow-up  Status: Hold For -  Scheduling  Requested for: 30BER3484   Ordered; For: Iron (Fe) deficiency anemia; Ordered By: Enrrique Man Performed:  Due: 06GIW3492    Discussion/Summary  Discussion Summary:   The patient is a pleasant 60-year-old female with a past medical history of cirrhosis who presented with pancytopenia  This may be related to her cirrhosis  Because she was pancytopenic and all 3 cell lines were depressed I ordered a bone marrow biopsy   Bone marrow biopsy did not show any major abnormalities but did show iron deficiency  I gave her 4 doses of Venofer  She was then in the hospital with a GI bleed  She has now recovered from this  She still has iron deficiency which I suspect is from her GI bleeding  I will give her 8 more doses of Venofer  I'll see her back in a few months with the results of repeat blood work  I will give her 4 more doses of B12  Because her hemoglobin is under 8 I will also give her 2 units of blood  She will continue to follow with a liver transplant doctors  She is now finally not drinking  We will continue to monitor her counts  She will follow closely with her GI physician  Counseling Documentation With Imm: The patient was counseled regarding diagnostic results,-instructions for management,-prognosis,-patient and family education  Goals and Barriers: The patient has the current Goals: Workup of pancytopenia  The patent has the current Barriers: None  Patient's Capacity to Self-Care: Patient is able to Self-Care  Medication SE Review and Pt Understands Tx: The treatment plan was reviewed with the patient/guardian  The patient/guardian understands and agrees with the treatment plan      Chief Complaint  Chief Complaint Free Text Note Form: Pancytopenia      History of Present Illness  Previous Therapy:   Workup      Current Therapy: Observation       Interval History: The patient returns for follow-up visit  In the past Her bone marrow biopsy did not show any abnormalities but she did have absent storage iron  Her blood work also showed a low iron  I gave her 4 doses of Venofer  She felt much better but did not have blood work drawn  She was then in the hospital for GI bleed  She is now recovered from this but her most recent iron studies are down again  Does feel fatigued  Denies any nausea denies any vomiting or diarrhea  He states she saw our colleagues in liver transplant and is now no longer drinking as of a month ago    The rest of her 14 point review of systems today was negative  Review of Systems  Complete-Female: As stated in the history of present illness otherwise her 14 point review of systems today was negative  Active Problems  1  Alcohol use disorder (305 00) (F10 99)   2  Alcoholic hepatitis (084 1) (K70 10)   3  Anastomotic ulcer (534 90) (K28 9)   4  Anemia (285 9) (D64 9)   5  Ascites (789 59) (R18 8)   6  Breast cancer (174 9) (C50 919)   7  Chronic anxiety (300 00) (F41 9)   8  Chronic depression (311) (F32 9)   9  Chronic foot pain (729 5,338 29) (M79 673,G89 29)   10  Chronic hand pain (729 5,338 29) (M79 643,G89 29)   11  Chronic narcotic dependence (304 91) (F11 20)   12  Cirrhosis (571 5) (K74 60)   13  Deconditioned low back (728 87) (R29 898)   14  Dyspnea (786 09) (R06 00)   15  Encephalopathy, hepatic (572 2) (K72 90)   16  Encounter for long-term opiate analgesic use (V58 69) (Z79 891)   17  Fatty liver (571 8) (K76 0)   18  Foot fracture (825 20) (S92 909A)   19  Liver disease (573 9) (K76 9)   20  Liver lesion (573 8) (K76 9)   21  Pain syndrome, chronic (338 4) (G89 4)   22  Pancytopenia (284 19) (D61 818)   23  Peripheral neuropathy (356 9) (G62 9)   24  Pleural effusion, right (511 9) (J90)   25  Rosacea (695 3) (L71 9)    Past Medical History  1  History of alcoholism (V11 3) (F10 21)   2  History of anxiety disorder (V11 8) (Z86 59)   3  History of breast cancer (V10 3) (Z85 3)   4  History of esophagitis (V12 79) (Z87 19)   5  History of hypertension (V12 59) (Z86 79)   6  History of liver disease (V12 79) (Z87 19)   7  History of Palpitations (785 1) (R00 2)  Active Problems And Past Medical History Reviewed: The active problems and past medical history were reviewed and updated today  Positive for anxiety, cirrhosis, history of reactive surgery in 2005, rosacea, neuropathy      Surgical History  1  History of Abdominoplasty   2  History of Breast Surgery Mastectomy   3  History of Gallbladder Surgery   4   History of Gastric Surgery For Morbid Obesity Bypass With Nicole-en-Y   5  History of Hernia Repair  Surgical History Reviewed: The surgical history was reviewed and updated today  Multiple surgeries including abdominoplasty bariatric surgery gallbladder removal for fracture, 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 with reconstruction      Family History  Mother    1  Denied: Family history of Colon cancer  Father    2  Denied: Family history of Colon cancer  Family History    3  Denied: Family history of Colon cancer   4  Family history of diabetes mellitus (V18 0) (Z83 3)   5  Family history of hypertension (V17 49) (Z82 49)   6  Family history of thyroid disease (V18 19) (Z83 49)  Family History Reviewed: The family history was reviewed and updated today  Social History   · Alcohol use (V49 89) (Z78 9)   ·    · No alcohol use   · Non-smoker (V49 89) (Z78 9)  Social History Reviewed: The social history was reviewed and updated today  She even smells of alcohol today  She states she is to drink a lot and then had stopped but now is currently on a binge because of her stress levels  I explained this is very dangerous and she could die because of her drinking  She understands and states she will try to decrease her consumption      Current Meds   1  Aldactone 25 MG Oral Tablet Recorded   2  Carafate 1 GM/10ML Oral Suspension; TAKE 10 ML 3 TIMES DAILY; Therapy: 80Gcj3672 to (Evaluate:46Nmg8824)  Requested for: 33SPG9337; Last   Rx:20Cxk3382 Ordered   3  Folic Acid 1 MG Oral Tablet; Take 1 tablet daily Recorded   4  Furosemide 40 MG Oral Tablet; TAKE 2 TABLETS DAILY; Therapy: (Devin Powell) to Recorded   5  GNP Vitamin B1 TABS Recorded   6  Lactulose 20 GM/30ML Oral Solution; SWALLOW 10 ML 3 times daily; Therapy: 75BHZ8282 to (Evaluate:68Ozb4221)  Requested for: 54IPY5536; Last   Rx:94Yol6724 Ordered   7  Lyrica 300 MG Oral Capsule;  Take 1 po bid    8/25/17  per pt  2 tabs  bid; Last Rx: 48DEB1500 Ordered   8  Pantoprazole Sodium 40 MG Oral Tablet Delayed Release; Take 1 tablet twice daily; Therapy: 48QUI2916 to (Evaluate:02Nkg4946)  Requested for: 20Sep2017; Last   Rx:67Sab0575 Ordered    Allergies  1  Cymbalta    Vitals  Vital Signs    Recorded: 38KBX1849 02:49PM   Temperature 98 5 F   Heart Rate 109   Respiration 16   Systolic 353 mm Hg   Diastolic 68 mm Hg   Height 5 ft 4 in   Weight 224 lb    BMI Calculated 38 45 kg/m2   BSA Calculated 2 05 m2   O2 Saturation 98   Pain Scale 0     Physical Exam    Constitutional   General appearance: No acute distress, well appearing and well nourished  Eyes   Conjunctiva and lids: No swelling, erythema or discharge  Pupils and irises: Equal, round and reactive to light  Ears, Nose, Mouth, and Throat   External inspection of ears and nose: Normal     Nasal mucosa, septum, and turbinates: Normal without edema or erythema  Oropharynx: Normal with no erythema, edema, exudate or lesions  Pulmonary   Respiratory effort: No increased work of breathing or signs of respiratory distress  Auscultation of lungs: Clear to auscultation  Cardiovascular   Palpation of heart: Normal PMI, no thrills  Auscultation of heart: Normal rate and rhythm, normal S1 and S2, without murmurs  Examination of extremities for edema and/or varicosities: Normal     Carotid pulses: Normal     Abdomen   Abdomen: Non-tender, no masses  Liver and spleen: No hepatomegaly or splenomegaly  Lymphatic   Palpation of lymph nodes in neck: No lymphadenopathy  Musculoskeletal   Gait and station: Normal     Digits and nails: Normal without clubbing or cyanosis  Inspection/palpation of joints, bones, and muscles: Normal     Skin   Skin and subcutaneous tissue: Normal without rashes or lesions  Neurologic   Cranial nerves: Cranial nerves 2-12 intact  Reflexes: 2+ and symmetric  Sensation: No sensory loss      Psychiatric   Orientation to person, place, and time: Normal     Mood and affect: Normal          Results/Data  (1) CBC/PLT/DIFF 14PHZ9890 12:24PM Epifanio Lopez     Test Name Result Flag Reference   WBC COUNT 4 24 Thousand/uL L 4 31-10 16   RBC COUNT 2 89 Million/uL L 3 81-5 12   HEMOGLOBIN 7 4 g/dL L 11 5-15 4   HEMATOCRIT 25 5 % L 34 8-46  1   MCV 88 fL  82-98   MCH 25 6 pg L 26 8-34 3   MCHC 29 0 g/dL L 31 4-37 4   RDW 18 9 % H 11 6-15 1   MPV 12 0 fL  8 9-12 7   PLATELET COUNT 93 Thousands/uL L 149-390   NEUTROPHILS RELATIVE PERCENT 57 %  43-75   LYMPHOCYTES RELATIVE PERCENT 30 %  14-44   MONOCYTES RELATIVE PERCENT 10 %  4-12   EOSINOPHILS RELATIVE PERCENT 2 %  0-6   BASOPHILS RELATIVE PERCENT 1 %  0-1   NEUTROPHILS ABSOLUTE COUNT 2 45 Thousands/? ??L  1 85-7 62   LYMPHOCYTES ABSOLUTE COUNT 1 27 Thousands/? ??L  0 60-4 47   MONOCYTES ABSOLUTE COUNT 0 42 Thousand/? ??L  0 17-1 22   EOSINOPHILS ABSOLUTE COUNT 0 08 Thousand/? ??L  0 00-0 61   BASOPHILS ABSOLUTE COUNT 0 02 Thousands/? ??L  0 00-0 10     (1) COMPREHENSIVE METABOLIC PANEL 44PAV1037 17:[address] Epifanio     Test Name Result Flag Reference   GLUCOSE,RANDM 78 mg/dL     If the patient is fasting, the ADA then defines impaired fasting glucose as > 100 mg/dL and diabetes as > or equal to 123 mg/dL  Specimen collection should occur prior to Sulfasalazine administration due to the potential for falsely depressed results  Specimen collection should occur prior to Sulfapyridine administration due to the potential for falsely elevated results     SODIUM 143 mmol/L  136-145   POTASSIUM 3 2 mmol/L L 3 5-5 3   CHLORIDE 113 mmol/L H 100-108   CARBON DIOXIDE 20 mmol/L L 21-32   ANION GAP (CALC) 10 mmol/L  4-13   BLOOD UREA NITROGEN 14 mg/dL  5-25   CREATININE 0 92 mg/dL  0 60-1 30   Standardized to IDMS reference method   CALCIUM 7 8 mg/dL L 8 3-10 1   BILI, TOTAL 1 40 mg/dL H 0 20-1 00   ALK PHOSPHATAS 109 U/L     ALT (SGPT) 46 U/L  12-78   Specimen collection should occur prior to Sulfasalazine administration due to the potential for falsely depressed results  AST(SGOT) 56 U/L H 5-45   Specimen collection should occur prior to Sulfasalazine administration due to the potential for falsely depressed results  ALBUMIN 2 2 g/dL L 3 5-5 0   TOTAL PROTEIN 6 5 g/dL  6 4-8 2   eGFR 69 ml/min/1 73sq m     National Kidney Disease Education Program recommendations are as follows:  GFR calculation is accurate only with a steady state creatinine  Chronic Kidney disease less than 60 ml/min/1 73 sq  meters  Kidney failure less than 15 ml/min/1 73 sq  meters  (1) FERRITIN 56Ouy4056 12:24PM Lesly Christianson Epifanio     Test Name Result Flag Reference   FERRITIN 19 ng/mL  8-388     (1) IRON SATURATION %, TIBC 21Nch3130 12:24PM Terrance Pozo Epifanio     Test Name Result Flag Reference   IRON SATURATION 7 %     TOTAL IRON BINDING CAPACITY 356 ug/dL  250-450   IRON 25 ug/dL L    Patients treated with metal-binding drugs (ie  Deferoxamine) may have depressed iron values  (1) METHYLMALONIC ACID,BLOOD 73PAQ7084 12:24PM Lolis Esters   TW Order Number: [id number]     Test Name Result Flag Reference   METHYLMALONIC ACID 163 nmol/L  0 - 378   Performed at:  [address]  [id number]  : Chinmay Baum MD, Phone:  [phone]     (1) TISSUE EXAM 14Sep2017 10:32AM Neo Cantor     Test Name Result Flag Reference   LAB AP CASE REPORT (Report)     Surgical Pathology Report             Case: [id number]                   Authorizing Provider: Meghna Whyte MD      Collected:      09/14/2017 1032        Ordering Location:   [facility]    Received:      09/14/2017 [facility] Endoscopy                              Pathologist:      Paolo Manzanares DO                               Specimens:  A) - Jejunum, Jejunum bx -dx anemia                                  B) - Large Intestine, Transverse Colon, Transverse colon polyps   LAB AP FINAL DIAGNOSIS (Report)     A   Jejunum, biopsy:  - Small bowel mucosa with no significant pathologic abnormalities  B  Colon, transverse polyps, biopsy:  - Tubular adenoma, negative for high-grade dysplasia  Interpretation performed at [facility] [employer]  Electronically signed by Radha Ruiz DO on 9/18/2017 at 9:11 AM   LAB AP SURGICAL ADDITIONAL INFORMATION (Report)     All controls performed with the immunohistochemical stains reported above   reacted appropriately  These tests were developed and their performance   characteristics determined by [facility] or   [employer]  They may not be cleared or approved by the [place]  Food and Drug Administration  The FDA has determined that such clearance   or approval is not necessary  These tests are used for clinical purposes  They should not be regarded as investigational or for research  This   laboratory has been approved by [place] [address], designated as a high-complexity   laboratory and is qualified to perform these tests  LAB AP GROSS DESCRIPTION (Report)     A  The specimen is received in formalin, labeled with the patient's name   and hospital number, and is designated 2 jejunum biopsy  The specimen   consists of 2 tan soft tissue fragments measuring 0 3 cm each  Entirely   submitted  One cassette  B  The specimen is received in formalin, labeled with the patient's name   and hospital number, and is designated Transverse colon polyps  The   specimen consists of a tan soft tissue fragment measuring 0 2 cm  Entirely   submitted  One cassette  Note: The estimated total formalin fixation time based upon information   provided by the submitting clinician and the standard processing schedule   is less than 72 hours  MAC     COLONOSCOPY (GI, SURG) 78Gzm2018 12:00AM Wendi Warren     Test Name Result Flag Reference   Colonoscopy [id number]       Health Management  Anemia   COLONOSCOPY (GI, SURG); every 5 years;  Last 52VYL6469; Next Due: 44FWH3936; Active  Cirrhosis   EGD; every 1 year; Last 02WJX0684; Next Due: 32Kdf9500;  Active    Future Appointments    Date/Time Provider Specialty Site   12/28/2017 01:15 PM RENÉ Taveras Pain Management [facility] SPINE   10/11/2017 01:30 PM Mika Oliveira MD Gastroenterology Adult [facility]   10/24/2017 11:00 AM Kaur Barriga DO Pulmonary Medicine [facility] [place]   Electronically signed by : MAICO Bateman ; Oct  2 2017  3:27PM EST                       (Author)

## 2017-10-03 NOTE — PLAN OF CARE
Problem: Potential for Falls  Goal: Patient will remain free of falls  INTERVENTIONS:  - Assess patient frequently for physical needs  -  Identify cognitive and physical deficits and behaviors that affect risk of falls    -  [place] fall precautions as indicated by assessment   - Educate patient/family on patient safety including physical limitations  - Instruct patient to call for assistance with activity based on assessment  - Modify environment to reduce risk of injury  - Consider OT/PT consult to assist with strengthening/mobility   Outcome: Progressing

## 2017-10-03 NOTE — PROGRESS NOTES
Patient tolerating second unit of PRBC without any complications; no signs/symptoms of transfusion reaction reported; patient's vital signs remain stable

## 2017-10-03 NOTE — PROGRESS NOTES
Patient tolerated transfusion of 2 units of PRBC without any complications  Patient provided with AVS and aware of upcoming appointments

## 2017-10-04 LAB
ABO GROUP BLD BPU: NORMAL
ABO GROUP BLD BPU: NORMAL
BPU ID: NORMAL
BPU ID: NORMAL
CROSSMATCH: NORMAL
CROSSMATCH: NORMAL
UNIT DISPENSE STATUS: NORMAL
UNIT DISPENSE STATUS: NORMAL
UNIT PRODUCT CODE: NORMAL
UNIT PRODUCT CODE: NORMAL
UNIT RH: NORMAL
UNIT RH: NORMAL

## 2017-10-05 LAB — METHYLMALONATE SERPL-SCNC: 126 NMOL/L (ref 0–378)

## 2017-10-05 RX ORDER — CYANOCOBALAMIN 1000 UG/ML
1000 INJECTION INTRAMUSCULAR; SUBCUTANEOUS ONCE
Status: COMPLETED | OUTPATIENT
Start: 2017-10-09 | End: 2017-10-09

## 2017-10-05 RX ORDER — SODIUM CHLORIDE 9 MG/ML
20 INJECTION, SOLUTION INTRAVENOUS CONTINUOUS
Status: DISPENSED | OUTPATIENT
Start: 2017-10-09 | End: 2017-10-10

## 2017-10-09 ENCOUNTER — HOSPITAL ENCOUNTER (OUTPATIENT)
Dept: INFUSION CENTER | Facility: HOSPITAL | Age: 57
Discharge: HOME | End: 2017-10-09
Payer: OTHER GOVERNMENT

## 2017-10-09 VITALS
OXYGEN SATURATION: 95 % | DIASTOLIC BLOOD PRESSURE: 61 MMHG | TEMPERATURE: 97.6 F | SYSTOLIC BLOOD PRESSURE: 100 MMHG | HEART RATE: 95 BPM | RESPIRATION RATE: 18 BRPM

## 2017-10-09 PROCEDURE — 96372 THER/PROPH/DIAG INJ SC/IM: CPT

## 2017-10-09 PROCEDURE — 96365 THER/PROPH/DIAG IV INF INIT: CPT

## 2017-10-09 RX ADMIN — SODIUM CHLORIDE 20 ML/HR: 9 INJECTION, SOLUTION INTRAVENOUS at 10:25

## 2017-10-09 RX ADMIN — CYANOCOBALAMIN 1000 MCG: 1000 INJECTION, SOLUTION INTRAMUSCULAR at 10:29

## 2017-10-09 RX ADMIN — IRON SUCROSE 200 MG: 20 INJECTION, SOLUTION INTRAVENOUS at 10:25

## 2017-10-09 NOTE — PROGRESS NOTES
Pt tolerated venofer and B12 with no adverse reactions   Pt then d/cd home ambulatory with steady gait

## 2017-10-11 ENCOUNTER — ALLSCRIPTS OFFICE VISIT (OUTPATIENT)
Dept: OTHER | Facility: OTHER | Age: 57
End: 2017-10-11

## 2017-10-11 RX ORDER — SODIUM CHLORIDE 9 MG/ML
20 INJECTION, SOLUTION INTRAVENOUS CONTINUOUS
Status: DISCONTINUED | OUTPATIENT
Start: 2017-10-13 | End: 2017-10-16 | Stop reason: HOSPADM

## 2017-10-13 ENCOUNTER — HOSPITAL ENCOUNTER (OUTPATIENT)
Dept: INFUSION CENTER | Facility: HOSPITAL | Age: 57
Discharge: HOME | End: 2017-10-13
Payer: OTHER GOVERNMENT

## 2017-10-13 VITALS
TEMPERATURE: 97.9 F | DIASTOLIC BLOOD PRESSURE: 64 MMHG | OXYGEN SATURATION: 98 % | RESPIRATION RATE: 18 BRPM | SYSTOLIC BLOOD PRESSURE: 108 MMHG | HEART RATE: 93 BPM

## 2017-10-13 PROCEDURE — 96365 THER/PROPH/DIAG IV INF INIT: CPT

## 2017-10-13 RX ORDER — SODIUM CHLORIDE 9 MG/ML
20 INJECTION, SOLUTION INTRAVENOUS CONTINUOUS
Status: DISCONTINUED | OUTPATIENT
Start: 2017-10-16 | End: 2017-10-19 | Stop reason: HOSPADM

## 2017-10-13 RX ADMIN — SODIUM CHLORIDE 20 ML/HR: 9 INJECTION, SOLUTION INTRAVENOUS at 14:52

## 2017-10-13 RX ADMIN — IRON SUCROSE 200 MG: 20 INJECTION, SOLUTION INTRAVENOUS at 14:52

## 2017-10-16 ENCOUNTER — HOSPITAL ENCOUNTER (OUTPATIENT)
Dept: INFUSION CENTER | Facility: HOSPITAL | Age: 57
Discharge: HOME | End: 2017-10-16
Payer: OTHER GOVERNMENT

## 2017-10-16 ENCOUNTER — TRANSCRIBE ORDERS (OUTPATIENT)
Dept: ADMINISTRATIVE | Facility: HOSPITAL | Age: 57
End: 2017-10-16

## 2017-10-16 VITALS
DIASTOLIC BLOOD PRESSURE: 64 MMHG | RESPIRATION RATE: 18 BRPM | HEART RATE: 100 BPM | OXYGEN SATURATION: 99 % | TEMPERATURE: 98.5 F | SYSTOLIC BLOOD PRESSURE: 117 MMHG

## 2017-10-16 DIAGNOSIS — Z01.818 PREOP EXAMINATION: ICD-10-CM

## 2017-10-16 DIAGNOSIS — Z85.3 PERSONAL HISTORY OF MALIGNANT NEOPLASM OF BREAST: ICD-10-CM

## 2017-10-16 DIAGNOSIS — D61.818 ACQUIRED PANCYTOPENIA (HCC): ICD-10-CM

## 2017-10-16 DIAGNOSIS — F45.8 ANXIETY HYPERVENTILATION: Primary | ICD-10-CM

## 2017-10-16 DIAGNOSIS — F41.9 ANXIETY HYPERVENTILATION: Primary | ICD-10-CM

## 2017-10-16 DIAGNOSIS — K70.31 ALCOHOLIC CIRRHOSIS OF LIVER WITH ASCITES (HCC): ICD-10-CM

## 2017-10-16 DIAGNOSIS — F32.89 ATYPICAL DEPRESSIVE DISORDER: ICD-10-CM

## 2017-10-16 DIAGNOSIS — I10 ESSENTIAL HYPERTENSION, MALIGNANT: ICD-10-CM

## 2017-10-16 PROCEDURE — 96365 THER/PROPH/DIAG IV INF INIT: CPT

## 2017-10-16 RX ADMIN — IRON SUCROSE 200 MG: 20 INJECTION, SOLUTION INTRAVENOUS at 15:10

## 2017-10-16 RX ADMIN — SODIUM CHLORIDE 20 ML/HR: 9 INJECTION, SOLUTION INTRAVENOUS at 15:10

## 2017-10-17 RX ORDER — SODIUM CHLORIDE 9 MG/ML
20 INJECTION, SOLUTION INTRAVENOUS CONTINUOUS
Status: DISCONTINUED | OUTPATIENT
Start: 2017-10-18 | End: 2017-10-21 | Stop reason: HOSPADM

## 2017-10-18 ENCOUNTER — APPOINTMENT (EMERGENCY)
Dept: ULTRASOUND IMAGING | Facility: HOSPITAL | Age: 57
End: 2017-10-18
Payer: OTHER GOVERNMENT

## 2017-10-18 ENCOUNTER — HOSPITAL ENCOUNTER (OUTPATIENT)
Dept: ULTRASOUND IMAGING | Facility: HOSPITAL | Age: 57
Discharge: HOME | End: 2017-10-18
Attending: INTERNAL MEDICINE
Payer: OTHER GOVERNMENT

## 2017-10-18 ENCOUNTER — APPOINTMENT (EMERGENCY)
Dept: RADIOLOGY | Facility: HOSPITAL | Age: 57
End: 2017-10-18
Payer: OTHER GOVERNMENT

## 2017-10-18 ENCOUNTER — HOSPITAL ENCOUNTER (INPATIENT)
Facility: HOSPITAL | Age: 57
LOS: 6 days | Discharge: HOME HEALTH | DRG: 871 | End: 2017-10-24
Attending: INTERNAL MEDICINE | Admitting: ANESTHESIOLOGY
Payer: OTHER GOVERNMENT

## 2017-10-18 ENCOUNTER — HOSPITAL ENCOUNTER (EMERGENCY)
Facility: HOSPITAL | Age: 57
End: 2017-10-18
Attending: EMERGENCY MEDICINE
Payer: OTHER GOVERNMENT

## 2017-10-18 ENCOUNTER — APPOINTMENT (EMERGENCY)
Dept: CT IMAGING | Facility: HOSPITAL | Age: 57
End: 2017-10-18
Payer: OTHER GOVERNMENT

## 2017-10-18 ENCOUNTER — HOSPITAL ENCOUNTER (OUTPATIENT)
Dept: INFUSION CENTER | Facility: HOSPITAL | Age: 57
Discharge: HOME | End: 2017-10-18
Payer: OTHER GOVERNMENT

## 2017-10-18 VITALS
TEMPERATURE: 101.1 F | HEART RATE: 94 BPM | RESPIRATION RATE: 16 BRPM | OXYGEN SATURATION: 98 % | SYSTOLIC BLOOD PRESSURE: 112 MMHG | BODY MASS INDEX: 38.79 KG/M2 | WEIGHT: 226 LBS | DIASTOLIC BLOOD PRESSURE: 55 MMHG

## 2017-10-18 DIAGNOSIS — K74.60 CIRRHOSIS OF LIVER (HCC): ICD-10-CM

## 2017-10-18 DIAGNOSIS — A41.9 SEPTIC SHOCK (HCC): Primary | ICD-10-CM

## 2017-10-18 DIAGNOSIS — K74.60 CIRRHOSIS (HCC): ICD-10-CM

## 2017-10-18 DIAGNOSIS — G93.40 ENCEPHALOPATHY ACUTE: ICD-10-CM

## 2017-10-18 DIAGNOSIS — K70.31 ALCOHOLIC CIRRHOSIS OF LIVER WITH ASCITES (HCC): Primary | ICD-10-CM

## 2017-10-18 DIAGNOSIS — E72.20 HYPERAMMONEMIA (HCC): ICD-10-CM

## 2017-10-18 DIAGNOSIS — R65.21 SEPTIC SHOCK (HCC): Primary | ICD-10-CM

## 2017-10-18 DIAGNOSIS — K52.9 COLITIS: ICD-10-CM

## 2017-10-18 LAB
ABO GROUP BLD: NORMAL
ALBUMIN SERPL BCP-MCNC: 1.9 G/DL (ref 3.5–5)
ALP SERPL-CCNC: 143 U/L (ref 46–116)
ALT SERPL W P-5'-P-CCNC: 38 U/L (ref 12–78)
AMMONIA PLAS-SCNC: 74 UMOL/L (ref 11–35)
ANION GAP SERPL CALCULATED.3IONS-SCNC: 9 MMOL/L (ref 4–13)
ANISOCYTOSIS BLD QL SMEAR: PRESENT
APTT PPP: 41 SECONDS (ref 23–35)
AST SERPL W P-5'-P-CCNC: 56 U/L (ref 5–45)
BACTERIA UR QL AUTO: ABNORMAL /HPF
BASOPHILS # BLD AUTO: 0.03 THOUSANDS/ΜL (ref 0–0.1)
BASOPHILS # BLD MANUAL: 0 THOUSAND/UL (ref 0–0.1)
BASOPHILS NFR BLD AUTO: 0 % (ref 0–1)
BASOPHILS NFR MAR MANUAL: 0 % (ref 0–1)
BILIRUB SERPL-MCNC: 2.9 MG/DL (ref 0.2–1)
BILIRUB UR QL STRIP: ABNORMAL
BLD GP AB SCN SERPL QL: NEGATIVE
BUN SERPL-MCNC: 9 MG/DL (ref 5–25)
BURR CELLS BLD QL SMEAR: PRESENT
CALCIUM SERPL-MCNC: 7.7 MG/DL (ref 8.3–10.1)
CHLORIDE SERPL-SCNC: 109 MMOL/L (ref 100–108)
CLARITY UR: CLEAR
CO2 SERPL-SCNC: 22 MMOL/L (ref 21–32)
COLOR UR: ABNORMAL
CREAT SERPL-MCNC: 1.32 MG/DL (ref 0.6–1.3)
EOSINOPHIL # BLD AUTO: 0 THOUSAND/ΜL (ref 0–0.61)
EOSINOPHIL # BLD MANUAL: 0 THOUSAND/UL (ref 0–0.4)
EOSINOPHIL NFR BLD AUTO: 0 % (ref 0–6)
EOSINOPHIL NFR BLD MANUAL: 0 % (ref 0–6)
ERYTHROCYTE [DISTWIDTH] IN BLOOD BY AUTOMATED COUNT: 25.7 % (ref 11.6–15.1)
ERYTHROCYTE [DISTWIDTH] IN BLOOD BY AUTOMATED COUNT: 26.2 % (ref 11.6–15.1)
GFR SERPL CREATININE-BSD FRML MDRD: 45 ML/MIN/1.73SQ M
GLUCOSE SERPL-MCNC: 104 MG/DL (ref 65–140)
GLUCOSE SERPL-MCNC: 129 MG/DL (ref 65–140)
GLUCOSE SERPL-MCNC: 87 MG/DL (ref 65–140)
GLUCOSE UR STRIP-MCNC: NEGATIVE MG/DL
HCT VFR BLD AUTO: 27.2 % (ref 34.8–46.1)
HCT VFR BLD AUTO: 30.7 % (ref 34.8–46.1)
HGB BLD-MCNC: 8.4 G/DL (ref 11.5–15.4)
HGB BLD-MCNC: 9.4 G/DL (ref 11.5–15.4)
HGB UR QL STRIP.AUTO: ABNORMAL
INR PPP: 1.91 (ref 0.86–1.16)
KETONES UR STRIP-MCNC: ABNORMAL MG/DL
LACTATE SERPL-SCNC: 2.8 MMOL/L (ref 0.5–2)
LACTATE SERPL-SCNC: 2.9 MMOL/L (ref 0.5–2)
LACTATE SERPL-SCNC: 3.2 MMOL/L (ref 0.5–2)
LACTATE SERPL-SCNC: 4.3 MMOL/L (ref 0.5–2)
LEUKOCYTE ESTERASE UR QL STRIP: NEGATIVE
LYMPHOCYTES # BLD AUTO: 0.52 THOUSAND/UL (ref 0.6–4.47)
LYMPHOCYTES # BLD AUTO: 1.03 THOUSANDS/ΜL (ref 0.6–4.47)
LYMPHOCYTES # BLD AUTO: 4 % (ref 14–44)
LYMPHOCYTES NFR BLD AUTO: 6 % (ref 14–44)
MCH RBC QN AUTO: 25.1 PG (ref 26.8–34.3)
MCH RBC QN AUTO: 25.3 PG (ref 26.8–34.3)
MCHC RBC AUTO-ENTMCNC: 30.6 G/DL (ref 31.4–37.4)
MCHC RBC AUTO-ENTMCNC: 30.9 G/DL (ref 31.4–37.4)
MCV RBC AUTO: 81 FL (ref 82–98)
MCV RBC AUTO: 83 FL (ref 82–98)
MICROCYTES BLD QL AUTO: PRESENT
MONOCYTES # BLD AUTO: 0 THOUSAND/UL (ref 0–1.22)
MONOCYTES # BLD AUTO: 0.96 THOUSAND/ΜL (ref 0.17–1.22)
MONOCYTES NFR BLD AUTO: 5 % (ref 4–12)
MONOCYTES NFR BLD: 0 % (ref 4–12)
MUCOUS THREADS UR QL AUTO: ABNORMAL
NEUTROPHILS # BLD AUTO: 15.74 THOUSANDS/ΜL (ref 1.85–7.62)
NEUTROPHILS # BLD MANUAL: 12.56 THOUSAND/UL (ref 1.85–7.62)
NEUTS SEG NFR BLD AUTO: 89 % (ref 43–75)
NEUTS SEG NFR BLD AUTO: 96 % (ref 43–75)
NITRITE UR QL STRIP: NEGATIVE
NON-SQ EPI CELLS URNS QL MICRO: ABNORMAL /HPF
NRBC BLD AUTO-RTO: 0 /100 WBCS
OVALOCYTES BLD QL SMEAR: PRESENT
PH UR STRIP.AUTO: 7 [PH] (ref 4.5–8)
PLATELET # BLD AUTO: 47 THOUSANDS/UL (ref 149–390)
PLATELET # BLD AUTO: 75 THOUSANDS/UL (ref 149–390)
PLATELET BLD QL SMEAR: ABNORMAL
POIKILOCYTOSIS BLD QL SMEAR: PRESENT
POLYCHROMASIA BLD QL SMEAR: PRESENT
POTASSIUM SERPL-SCNC: 3.7 MMOL/L (ref 3.5–5.3)
PROT SERPL-MCNC: 6.5 G/DL (ref 6.4–8.2)
PROT UR STRIP-MCNC: ABNORMAL MG/DL
PROTHROMBIN TIME: 21.7 SECONDS (ref 12.1–14.4)
RBC # BLD AUTO: 3.35 MILLION/UL (ref 3.81–5.12)
RBC # BLD AUTO: 3.71 MILLION/UL (ref 3.81–5.12)
RBC #/AREA URNS AUTO: ABNORMAL /HPF
RBC MORPH BLD: PRESENT
RH BLD: POSITIVE
SODIUM SERPL-SCNC: 140 MMOL/L (ref 136–145)
SP GR UR STRIP.AUTO: 1.02 (ref 1–1.03)
SPECIMEN EXPIRATION DATE: NORMAL
TOTAL CELLS COUNTED SPEC: 100
UROBILINOGEN UR QL STRIP.AUTO: 4 E.U./DL
WBC # BLD AUTO: 13.08 THOUSAND/UL (ref 4.31–10.16)
WBC # BLD AUTO: 17.93 THOUSAND/UL (ref 4.31–10.16)
WBC #/AREA URNS AUTO: ABNORMAL /HPF

## 2017-10-18 PROCEDURE — 71010 HB CHEST X-RAY 1 VIEW FRONTAL (PORTABLE): CPT

## 2017-10-18 PROCEDURE — 85027 COMPLETE CBC AUTOMATED: CPT | Performed by: EMERGENCY MEDICINE

## 2017-10-18 PROCEDURE — 36415 COLL VENOUS BLD VENIPUNCTURE: CPT | Performed by: EMERGENCY MEDICINE

## 2017-10-18 PROCEDURE — 93005 ELECTROCARDIOGRAM TRACING: CPT

## 2017-10-18 PROCEDURE — 85730 THROMBOPLASTIN TIME PARTIAL: CPT | Performed by: EMERGENCY MEDICINE

## 2017-10-18 PROCEDURE — 96365 THER/PROPH/DIAG IV INF INIT: CPT

## 2017-10-18 PROCEDURE — 85025 COMPLETE CBC W/AUTO DIFF WBC: CPT | Performed by: EMERGENCY MEDICINE

## 2017-10-18 PROCEDURE — 82948 REAGENT STRIP/BLOOD GLUCOSE: CPT

## 2017-10-18 PROCEDURE — 85007 BL SMEAR W/DIFF WBC COUNT: CPT | Performed by: EMERGENCY MEDICINE

## 2017-10-18 PROCEDURE — 80053 COMPREHEN METABOLIC PANEL: CPT | Performed by: EMERGENCY MEDICINE

## 2017-10-18 PROCEDURE — 99285 EMERGENCY DEPT VISIT HI MDM: CPT

## 2017-10-18 PROCEDURE — 86901 BLOOD TYPING SEROLOGIC RH(D): CPT | Performed by: EMERGENCY MEDICINE

## 2017-10-18 PROCEDURE — 96366 THER/PROPH/DIAG IV INF ADDON: CPT

## 2017-10-18 PROCEDURE — 74177 CT ABD & PELVIS W/CONTRAST: CPT

## 2017-10-18 PROCEDURE — 85610 PROTHROMBIN TIME: CPT | Performed by: EMERGENCY MEDICINE

## 2017-10-18 PROCEDURE — 83605 ASSAY OF LACTIC ACID: CPT | Performed by: EMERGENCY MEDICINE

## 2017-10-18 PROCEDURE — 96367 TX/PROPH/DG ADDL SEQ IV INF: CPT

## 2017-10-18 PROCEDURE — 81001 URINALYSIS AUTO W/SCOPE: CPT | Performed by: EMERGENCY MEDICINE

## 2017-10-18 PROCEDURE — 86850 RBC ANTIBODY SCREEN: CPT | Performed by: EMERGENCY MEDICINE

## 2017-10-18 PROCEDURE — 87798 DETECT AGENT NOS DNA AMP: CPT | Performed by: EMERGENCY MEDICINE

## 2017-10-18 PROCEDURE — 96361 HYDRATE IV INFUSION ADD-ON: CPT

## 2017-10-18 PROCEDURE — 82140 ASSAY OF AMMONIA: CPT | Performed by: EMERGENCY MEDICINE

## 2017-10-18 PROCEDURE — 86900 BLOOD TYPING SEROLOGIC ABO: CPT | Performed by: EMERGENCY MEDICINE

## 2017-10-18 PROCEDURE — 76705 ECHO EXAM OF ABDOMEN: CPT

## 2017-10-18 PROCEDURE — 87040 BLOOD CULTURE FOR BACTERIA: CPT | Performed by: EMERGENCY MEDICINE

## 2017-10-18 RX ORDER — PANTOPRAZOLE SODIUM 40 MG/1
40 TABLET, DELAYED RELEASE ORAL
Status: DISCONTINUED | OUTPATIENT
Start: 2017-10-19 | End: 2017-10-24 | Stop reason: HOSPADM

## 2017-10-18 RX ORDER — LACTULOSE 20 G/30ML
10 SOLUTION ORAL 3 TIMES DAILY PRN
Status: DISCONTINUED | OUTPATIENT
Start: 2017-10-18 | End: 2017-10-18

## 2017-10-18 RX ORDER — PREGABALIN 100 MG/1
300 CAPSULE ORAL 2 TIMES DAILY
Status: DISCONTINUED | OUTPATIENT
Start: 2017-10-18 | End: 2017-10-19

## 2017-10-18 RX ORDER — HEPARIN SODIUM 5000 [USP'U]/ML
5000 INJECTION, SOLUTION INTRAVENOUS; SUBCUTANEOUS EVERY 8 HOURS SCHEDULED
Status: DISCONTINUED | OUTPATIENT
Start: 2017-10-18 | End: 2017-10-22

## 2017-10-18 RX ORDER — CHLORHEXIDINE GLUCONATE 0.12 MG/ML
15 RINSE ORAL EVERY 12 HOURS SCHEDULED
Status: DISCONTINUED | OUTPATIENT
Start: 2017-10-18 | End: 2017-10-19

## 2017-10-18 RX ORDER — SODIUM CHLORIDE, SODIUM GLUCONATE, SODIUM ACETATE, POTASSIUM CHLORIDE, MAGNESIUM CHLORIDE, SODIUM PHOSPHATE, DIBASIC, AND POTASSIUM PHOSPHATE .53; .5; .37; .037; .03; .012; .00082 G/100ML; G/100ML; G/100ML; G/100ML; G/100ML; G/100ML; G/100ML
125 INJECTION, SOLUTION INTRAVENOUS CONTINUOUS
Status: DISCONTINUED | OUTPATIENT
Start: 2017-10-18 | End: 2017-10-19

## 2017-10-18 RX ORDER — SODIUM CHLORIDE 9 MG/ML
150 INJECTION, SOLUTION INTRAVENOUS CONTINUOUS
Status: DISCONTINUED | OUTPATIENT
Start: 2017-10-18 | End: 2017-10-18 | Stop reason: HOSPADM

## 2017-10-18 RX ORDER — ACETAMINOPHEN 650 MG/1
325 SUPPOSITORY RECTAL EVERY 4 HOURS PRN
Status: DISCONTINUED | OUTPATIENT
Start: 2017-10-18 | End: 2017-10-18 | Stop reason: HOSPADM

## 2017-10-18 RX ORDER — SODIUM CHLORIDE, SODIUM GLUCONATE, SODIUM ACETATE, POTASSIUM CHLORIDE, MAGNESIUM CHLORIDE, SODIUM PHOSPHATE, DIBASIC, AND POTASSIUM PHOSPHATE .53; .5; .37; .037; .03; .012; .00082 G/100ML; G/100ML; G/100ML; G/100ML; G/100ML; G/100ML; G/100ML
500 INJECTION, SOLUTION INTRAVENOUS ONCE
Status: COMPLETED | OUTPATIENT
Start: 2017-10-18 | End: 2017-10-19

## 2017-10-18 RX ORDER — LACTULOSE 20 G/30ML
10 SOLUTION ORAL 2 TIMES DAILY
Status: DISCONTINUED | OUTPATIENT
Start: 2017-10-19 | End: 2017-10-19

## 2017-10-18 RX ADMIN — METRONIDAZOLE 500 MG: 500 INJECTION, SOLUTION INTRAVENOUS at 23:50

## 2017-10-18 RX ADMIN — SODIUM CHLORIDE, SODIUM GLUCONATE, SODIUM ACETATE, POTASSIUM CHLORIDE, MAGNESIUM CHLORIDE, SODIUM PHOSPHATE, DIBASIC, AND POTASSIUM PHOSPHATE 125 ML/HR: .53; .5; .37; .037; .03; .012; .00082 INJECTION, SOLUTION INTRAVENOUS at 22:15

## 2017-10-18 RX ADMIN — LACTULOSE 10 G: 20 SOLUTION ORAL at 22:43

## 2017-10-18 RX ADMIN — SODIUM CHLORIDE 1000 ML: 0.9 INJECTION, SOLUTION INTRAVENOUS at 14:49

## 2017-10-18 RX ADMIN — ACETAMINOPHEN 325 MG: 650 SUPPOSITORY RECTAL at 14:46

## 2017-10-18 RX ADMIN — NOREPINEPHRINE BITARTRATE 5 MCG/MIN: 1 INJECTION INTRAVENOUS at 23:42

## 2017-10-18 RX ADMIN — CHLORHEXIDINE GLUCONATE 15 ML: 1.2 RINSE ORAL at 22:12

## 2017-10-18 RX ADMIN — SODIUM CHLORIDE 150 ML/HR: 0.9 INJECTION, SOLUTION INTRAVENOUS at 19:14

## 2017-10-18 RX ADMIN — SODIUM CHLORIDE, SODIUM GLUCONATE, SODIUM ACETATE, POTASSIUM CHLORIDE, MAGNESIUM CHLORIDE, SODIUM PHOSPHATE, DIBASIC, AND POTASSIUM PHOSPHATE 500 ML: .53; .5; .37; .037; .03; .012; .00082 INJECTION, SOLUTION INTRAVENOUS at 23:40

## 2017-10-18 RX ADMIN — PREGABALIN 300 MG: 100 CAPSULE ORAL at 22:43

## 2017-10-18 RX ADMIN — SODIUM CHLORIDE 1000 ML: 0.9 INJECTION, SOLUTION INTRAVENOUS at 16:21

## 2017-10-18 RX ADMIN — CEFEPIME 2000 MG: 2 INJECTION, POWDER, FOR SOLUTION INTRAMUSCULAR; INTRAVENOUS at 16:21

## 2017-10-18 RX ADMIN — SODIUM CHLORIDE 1000 ML: 0.9 INJECTION, SOLUTION INTRAVENOUS at 16:25

## 2017-10-18 RX ADMIN — IODIXANOL 75 ML: 320 INJECTION, SOLUTION INTRAVASCULAR at 18:45

## 2017-10-18 RX ADMIN — VANCOMYCIN HYDROCHLORIDE 1500 MG: 1 INJECTION, POWDER, LYOPHILIZED, FOR SOLUTION INTRAVENOUS at 17:30

## 2017-10-18 NOTE — ED PROVIDER NOTES
History  Chief Complaint   Patient presents with    Fever - 9 weeks to 74 years     To ED with staff from [address] for evaluation of increasing confusion  Pt was scheduled for US ABD/Aileen  Here with fever and confusion  Per  seemed okay yesterday during the day  Did have shaking chills last night  Went to have routine outpt ultrasound today and tech felt pt very confused so sent to the ED for eval  Noted to have a temp of 104 upon arrival    denies recent cough/congestion  Pt w/ ESLD due to alcoholic cirrhoisis        History provided by:  Patient and spouse  History limited by:  Mental status change   used: No    Fever - 9 weeks to 74 years   Max temp prior to arrival:  Shaking chills  Temp source:  Subjective  Severity:  Severe  Onset quality:  Sudden  Duration:  1 day  Timing:  Intermittent  Progression:  Waxing and waning  Chronicity:  New  Relieved by:  None tried  Worsened by:  Nothing  Ineffective treatments:  None tried  Associated symptoms: confusion and somnolence    Risk factors: hx of cancer    Risk factors: no sick contacts        Prior to Admission Medications   Prescriptions Last Dose Informant Patient Reported? Taking?    Thiamine HCl (VITAMIN B-1 PO)   Yes No   Sig: Take 200 mg by mouth daily     folic acid (FOLVITE) 1 mg tablet  Self No No   Sig: Take 1 tablet by mouth daily   furosemide (LASIX) 20 mg tablet   Yes No   Sig: Take 60 mg by mouth daily     lactulose 20 g/30 mL   No No   Sig: Take 15 mL by mouth daily   Patient taking differently: Take 10 g by mouth 3 (three) times a day as needed     pantoprazole (PROTONIX) 40 mg tablet   No No   Sig: Take 1 tablet by mouth 2 (two) times a day before meals   pregabalin (LYRICA) 300 MG capsule   Yes No   Sig: Take 300 mg by mouth 2 (two) times a day     rifaximin (XIFAXAN) 200 mg tablet   Yes No   Sig: Take 200 mg by mouth 2 (two) times a day   spironolactone (ALDACTONE) 100 mg tablet  Self Yes No   Sig: Take 25 mg by mouth 2 (two) times a day     sucralfate (CARAFATE) 1 g/10 mL suspension   Yes No   Sig: Take 1 g by mouth 3 (three) times a day      Facility-Administered Medications: None       Past Medical History:   Diagnosis Date    Anemia     Anxiety     Cancer [facility] [address] [facility]     breast    Chronic narcotic dependence (HCC)     Chronic pain disorder     Cirrhosis, alcoholic (HCC)     Depression     Esophagitis     History of bilateral mastectomy     History of gastric bypass     Hypertension     Liver disease     Morbid obesity [facility] [address] [facility]     [facility]     Peripheral neuropathy     Rosacea        Past Surgical History:   Procedure Laterality Date    ABDOMINAL SURGERY      abdominoplasty    BREAST SURGERY      mastectomy    CHOLECYSTECTOMY      ESOPHAGOGASTRODUODENOSCOPY N/A 8/2/2017    Procedure: ESOPHAGOGASTRODUODENOSCOPY (EGD); Surgeon: Dalia Cullen DO;  Location: [facility];  Service: Gastroenterology    GASTRIC BYPASS      HERNIA REPAIR      MASTECTOMY      DC ESOPHAGOGASTRODUODENOSCOPY TRANSORAL DIAGNOSTIC N/A 5/18/2017    Procedure: ESOPHAGOGASTRODUODENOSCOPY (EGD) with bx;  Surgeon: Brittney Richmond MD;  Location: [facility]; Service: Gastroenterology    DC ESOPHAGOGASTRODUODENOSCOPY TRANSORAL DIAGNOSTIC N/A 9/14/2017    Procedure: EGD with bx AND COLONOSCOPY with polypectomy;  Surgeon: Brittney Richmond MD;  [facility]: [facility]; Service: Gastroenterology    DECLAN-EN-Y PROCEDURE  2005       Family History   Problem Relation Age of Onset    Diabetes Mother     Alzheimer's disease Mother     Kidney disease Father     Hypertension Father     Obesity Sister     Fibrocystic breast disease Sister      I have reviewed and agree with the history as documented  Social History   Substance Use Topics    Smoking status: Never Smoker    Smokeless tobacco: Never Used    Alcohol use No      Comment: quit 08/02/2017        Review of Systems   Constitutional: Positive for fever  Psychiatric/Behavioral: Positive for confusion  All other systems reviewed and are negative  Physical Exam  ED Triage Vitals [10/18/17 1414]   Temperature Pulse Respirations Blood Pressure SpO2   (!) 104 6 °F (40 3 °C) (!) 124 20 123/74 98 %      Temp Source Heart Rate Source Patient Position - Orthostatic VS BP Location FiO2 (%)   Temporal Monitor Lying Right arm --      Pain Score       No Pain           Physical Exam   Constitutional: She appears listless  She appears distressed  Morbidly obese w/ BMI 54 7   HENT:   Head: Normocephalic and atraumatic  Right Ear: External ear normal    Left Ear: External ear normal    Nose: Nose normal    Mouth/Throat: Mucous membranes are dry  Eyes: Conjunctivae are normal  Pupils are equal, round, and reactive to light  Scleral icterus is present  Neck: Neck supple  No JVD present  Cardiovascular: Regular rhythm  Tachycardia present  Murmur heard  Pulmonary/Chest:   Poor effort w/ diminished breath sounds throughout  Abdominal: Soft  She exhibits no distension  There is no tenderness  There is no rebound and no guarding  A hernia (ventral hernia, reducible) is present  Musculoskeletal: She exhibits edema  She exhibits no deformity  Lymphadenopathy:     She has no cervical adenopathy  Neurological: She appears listless  She is disoriented  GCS eye subscore is 3  GCS verbal subscore is 3  GCS motor subscore is 5  Moves all extremities equally, no gross focal deficits noted   Skin: Skin is warm  Petechiae noted  She is not diaphoretic  Psychiatric: Her speech is delayed  She is actively hallucinating  She is inattentive  Nursing note and vitals reviewed        ED Medications  Medications   acetaminophen (TYLENOL) rectal suppository 325 mg (325 mg Rectal Given 10/18/17 1446)   sodium chloride 0 9 % infusion (150 mL/hr Intravenous New Bag 10/18/17 1914)   sodium chloride 0 9 % bolus 1,000 mL (0 mL Intravenous Stopped 10/18/17 1829)   sodium chloride 0 9 % bolus 2,090 mL (0 mL Intravenous Stopped 10/18/17 1829)   cefepime (MAXIPIME) 2 g/50 mL dextrose IVPB (0 mg Intravenous Stopped 10/18/17 1729)   vancomycin (VANCOCIN) 1,500 mg in sodium chloride 0 9 % 250 mL IVPB (0 mg/kg × 103 kg Intravenous Stopped 10/18/17 1911)   iodixanol (VISIPAQUE) 320 MG/ML injection 75 mL (75 mL Intravenous Given 10/18/17 1845)       Diagnostic Studies  Labs Reviewed   CBC AND DIFFERENTIAL - Abnormal        Result Value Ref Range Status    WBC 13 08 (*) 4 31 - 10 16 Thousand/uL Final    RBC 3 71 (*) 3 81 - 5 12 Million/uL Final    Hemoglobin 9 4 (*) 11 5 - 15 4 g/dL Final    Hematocrit 30 7 (*) 34 8 - 46 1 % Final    MCH 25 3 (*) 26 8 - 34 3 pg Final    MCHC 30 6 (*) 31 4 - 37 4 g/dL Final    RDW 26 2 (*) 11 6 - 15 1 % Final    Platelets 75 (*) 165 - 390 Thousands/uL Final    Comment: Manual Review of Smear Performed    MCV 83  82 - 98 fL Final    Narrative: This is an appended report  These results have been appended to a previously verified report  COMPREHENSIVE METABOLIC PANEL - Abnormal     Chloride 109 (*) 100 - 108 mmol/L Final    Creatinine 1 32 (*) 0 60 - 1 30 mg/dL Final    Comment: Standardized to IDMS reference method    Calcium 7 7 (*) 8 3 - 10 1 mg/dL Final    AST 56 (*) 5 - 45 U/L Final    Comment:   Specimen collection should occur prior to Sulfasalazine administration due to the potential for falsely depressed results  Alkaline Phosphatase 143 (*) 46 - 116 U/L Final    Albumin 1 9 (*) 3 5 - 5 0 g/dL Final    Total Bilirubin 2 90 (*) 0 20 - 1 00 mg/dL Final    Sodium 140  136 - 145 mmol/L Final    Potassium 3 7  3 5 - 5 3 mmol/L Final    CO2 22  21 - 32 mmol/L Final    Anion Gap 9  4 - 13 mmol/L Final    BUN 9  5 - 25 mg/dL Final    Glucose 104  65 - 140 mg/dL Final    Comment:   If the patient is fasting, the ADA then defines impaired fasting glucose as > 100 mg/dL and diabetes as > or equal to 123 mg/dL    Specimen collection should occur prior to Sulfasalazine administration due to the potential for falsely depressed results  Specimen collection should occur prior to Sulfapyridine administration due to the potential for falsely elevated results  ALT 38  12 - 78 U/L Final    Comment:   Specimen collection should occur prior to Sulfasalazine administration due to the potential for falsely depressed results  Total Protein 6 5  6 4 - 8 2 g/dL Final    eGFR 45  ml/min/1 73sq m Final    Narrative:     National Kidney Disease Education Program recommendations are as follows:  GFR calculation is accurate only with a steady state creatinine  Chronic Kidney disease less than 60 ml/min/1 73 sq  meters  Kidney failure less than 15 ml/min/1 73 sq  meters  LACTIC ACID, PLASMA - Abnormal     LACTIC ACID 3 2 (*) 0 5 - 2 0 mmol/L Final    Narrative:     Result may be elevated if tourniquet was used during collection  LACTIC ACID, PLASMA - Abnormal     LACTIC ACID 4 3 (*) 0 5 - 2 0 mmol/L Final    Narrative:     Result may be elevated if tourniquet was used during collection  PROTIME-INR - Abnormal     Protime 21 7 (*) 12 1 - 14 4 seconds Final    INR 1 91 (*) 0 86 - 1 16 Final   APTT - Abnormal     PTT 41 (*) 23 - 35 seconds Final    Narrative: Therapeutic Heparin Range = 60-90 seconds   UA W REFLEX TO MICROSCOPIC WITH REFLEX TO CULTURE - Abnormal     Protein, UA Trace (*) Negative mg/dl Final    Ketones, UA Trace (*) Negative mg/dl Final    Urobilinogen, UA 4 0 (*) 0 2, 1 0 E U /dl E U /dl Final    Bilirubin, UA Interference- unable to analyze (*) Negative Final    Comment: The dipstick result may be falsely positive do to interfering substances  We recommend reliance upon serum bilirubin, liver & kidney function tests to guide patient care if clinically indicated      Blood, UA Moderate (*) Negative Final    Color, UA Trinity   Final    Clarity, UA Clear   Final    Specific Gravity, UA 1 020  1 003 - 1 030 Final    pH, UA 7 0  4 5 - 8 0 Final    Leukocytes, UA Negative  Negative Final    Nitrite, UA Negative  Negative Final    Glucose, UA Negative  Negative mg/dl Final   AMMONIA - Abnormal     Ammonia 74 (*) 11 - 35 umol/L Final    Comment: Manually diluted  VMA  Specimen collection should occur prior to Sulfapyridine administration due to the potential for falsely depressed results  URINE MICROSCOPIC - Abnormal     RBC, UA 4-10 (*) None Seen, 0-5 /hpf Final    WBC, UA 0-1 (*) None Seen, 0-5, 5-55, 5-65 /hpf Final    Epithelial Cells Occasional  None Seen, Occasional /hpf Final    Bacteria, UA Occasional  None Seen, Occasional /hpf Final    MUCOUS THREADS Innumerable  Occasional, Moderate, Innumerable Final   LACTIC ACID, PLASMA - Abnormal     LACTIC ACID 2 8 (*) 0 5 - 2 0 mmol/L Final    Narrative:     Result may be elevated if tourniquet was used during collection     MANUAL DIFFERENTIAL(PHLEBS DO NOT ORDER) - Abnormal     Segmented % 96 (*) 43 - 75 % Final    Lymphocytes % 4 (*) 14 - 44 % Final    Monocytes % 0 (*) 4 - 12 % Final    Absolute Neutrophils 12 56 (*) 1 85 - 7 62 Thousand/uL Final    Lymphocytes Absolute 0 52 (*) 0 60 - 4 47 Thousand/uL Final    Platelet Estimate Borderline (*) Adequate Final    Eosinophils % 0  0 - 6 % Final    Basophils % 0  0 - 1 % Final    Monocytes Absolute 0 00  0 00 - 1 22 Thousand/uL Final    Eosinophils Absolute 0 00  0 00 - 0 40 Thousand/uL Final    Basophils Absolute 0 00  0 00 - 0 10 Thousand/uL Final    Total Counted 100   Final    RBC Morphology Present   Final    Anisocytosis Present   Final    Chrissy Cells Present   Final    Microcytes Present   Final    Ovalocytes Present   Final    Poikilocytes Present   Final    Polychromasia Present   Final   BLOOD CULTURE   BLOOD CULTURE   INFLUENZA A/B AND RSV, PCR   TYPE AND SCREEN    ABO Grouping A   Final    Rh Factor Positive   Final    Antibody Screen Negative   Final    Specimen Expiration Date [id number]   Final       CT abdomen pelvis with contrast   Final Result      Diffuse wall thickening and pericolic infiltration of the ascending colon to the level of the hepatic flexure consistent with colitis  Cirrhosis with signs of portal hypertension and anasarca  Trace abdominal ascites  Midline mesentery containing hernia  Workstation performed: [id number]         US abdomen limited   ED Interpretation    No evidence of ascites  2   Cirrhotic appearance to the liver  Hepatofugal flow now   identified in the main portal vein  Prominent recanalized   umbilical vein again identified, previously present  3   Right pleural effusion  Final Result      1  No evidence of ascites  2   Cirrhotic appearance to the liver  Hepatofugal flow now identified in the main portal vein  Prominent recanalized umbilical vein again identified, previously present  3   Right pleural effusion  Workstation performed: [id number]         XR chest portable - 1 view   ED Interpretation   See below      Final Result      Low lung volumes  No focal infiltrate or pleural effusion           Workstation performed: [id number]             Procedures  CriticalCare Time  Performed by: Austin Monroy  Authorized by: Austin Monroy     Critical care provider statement:     Critical care time (minutes):  60    Critical care time was exclusive of:  Separately billable procedures and treating other patients and teaching time    Critical care was necessary to treat or prevent imminent or life-threatening deterioration of the following conditions:  Sepsis and shock    Critical care was time spent personally by me on the following activities:  Blood draw for specimens, obtaining history from patient or surrogate, development of treatment plan with patient or surrogate, evaluation of patient's response to treatment, discussions with consultants, examination of patient, ordering and performing treatments and interventions, ordering and review of laboratory studies, ordering and review of radiographic studies, re-evaluation of patient's condition and review of old charts    I assumed direction of critical care for this patient from another provider in my specialty: no    Central Line  Date/Time: 10/18/2017 4:00 PM  Performed by: Lupe Padron  Authorized by: Lupe Padron     Patient location:  Bedside and ED  Other Assisting Provider: No    Consent:     Consent obtained:  Verbal    Consent given by:  Spouse    Risks discussed:  Arterial puncture, incorrect placement, bleeding and infection    Alternatives discussed:  No treatment  Universal protocol:     Patient identity confirmed:  Arm band  Pre-procedure details:     Hand hygiene: Hand hygiene performed prior to insertion      Sterile barrier technique: All elements of maximal sterile technique followed      Skin preparation:  2% chlorhexidine    Skin preparation agent: Skin preparation agent completely dried prior to procedure    Indications:     Central line indications: medications requiring central line      Central line indications comment:  Poor peripheral access,    Site selection rationale:  ESLD, thrombocytopenic w/ INR 1 9   need an easily compressible site  Anesthesia (see MAR for exact dosages): Anesthesia method:  Local infiltration    Local anesthetic:  Lidocaine 1% w/o epi  Procedure details:     Location:  Right femoral    Vessel type: vein      Laterality:  Right    Approach: percutaneous technique used      Patient position:  Flat    Catheter type:  Triple lumen 20cm    Catheter size:  7 Fr    Landmarks identified: yes      Ultrasound guidance: yes      Sterile ultrasound techniques: Sterile gel and sterile probe covers were used      Number of attempts:  1    Successful placement: yes    Post-procedure details:     Post-procedure:  Dressing applied and line sutured    Assessment:  Blood return through all ports and free fluid flow    Patient tolerance of procedure:   Tolerated well, no immediate complications  ECG 12 Lead Documentation  Date/Time: 10/18/2017 2:20 PM  Performed by: Regina Henson  Authorized by: Regina Henson     ECG reviewed by me, the ED Provider: yes    Previous ECG:     Previous ECG:  Compared to current    Similarity:  No change  Interpretation:     Interpretation: abnormal    Rate:     ECG rate:  124    ECG rate assessment: tachycardic    Rhythm:     Rhythm: sinus tachycardia    Ectopy:     Ectopy: PAC    QRS:     QRS axis:  Normal  ST segments:     ST segments:  Non-specific  T waves:     T waves: non-specific            Phone Contacts  ED Phone Contact    ED Course  ED Course as of Oct 18 1939   Wed Oct 18, 2017   [address], cxr, ua reviewed  No source at this time  Bedside ultrasound with no large pocket of ascites for ED paracentesis to r/o SBP  Pt confused and altered and should have an LP to r/o meningitis/encephalitis  Pt is higher risk for bleeding with INR 1 9 and throbocytopenia of 75  Will contact IR    1600 Left message for IR to discuss    89 Zoila Negrete w/ Dr Mathis Portal, IR  Pt would need platelets and FFP prior to any IR procedure and if needed emergently would need close monitoring in neuro ICU if LP required or general medical ICU if paracentesis required  Called PAC to d/w [facility]    1746 Pt more alert and appropriate now  D/w pt and  plan  MDM    The patient presented with a condition in which there was a high probability of imminent or life-threatening deterioration, and critical care services (excluding separately billable procedures) totalled 30-74 minutes          Disposition  Final diagnoses:   Septic shock ([facility] [address] )   Encephalopathy acute   Hyperammonemia ([facility] [address] )   Cirrhosis [facility])     ED Disposition     ED Disposition Condition Comment    Transfer to Another [address] [place] [address] Heraclio should be transferred out to [address] [facility]wal accepting at 46      MD [address] [facility] Most Recent Value   Patient Condition  The patient has been stabilized such that within reasonable medical probability, no material deterioration of the patient condition or the condition of the unborn child(tammie) is likely to result from the transfer   Reason for Transfer  Level of Care needed not available at this facility   Benefits of Transfer  Specialized equipment and/or services available at the receiving facility (Include comment)________________________ [facility] critical care, possible need for IR]   Risks of Transfer  Potential for delay in receiving treatment, Potential deterioration of medical condition, Loss of IV, Increased discomfort during transfer, Possible worsening of condition or death during transfer   Accepting Physician  Dr Ijeoma Preston Name, [address]    (Name & Tel number)  [facility] -3087   Transported by (Company and Unit #)  [employer]   Sending MD Dr Faith Buchanan   Provider Certification  General risk, such as traffic hazards, adverse weather conditions, rough terrain or turbulence, possible failure of equipment (including vehicle or aircraft), or consequences of actions of persons outside the control of the transport personnel, Unanticipated needs of medical equipment and personnel during transport, Risk of worsening condition, The possibility of a transport vehicle being unavailable      RN Documentation    Flowsheet Row Most [address] [facility] Name, [address]    (Name & Tel number)  [facility] -8937   Transported by (Company and Unit #)  [employer]      Follow-up Information    None       Patient's Medications   Discharge Prescriptions    No medications on file     No discharge procedures on file      ED Provider  Electronically Signed by       Mk Cazares DO  10/18/17 5767

## 2017-10-18 NOTE — EMTALA/ACUTE CARE TRANSFER
[facility] 1076  [facility] 65 [id number]  Dept: [address] [place] [address] CONSENT    NAME Davonte Arora 1960                              MRN [id number]    I have been informed of my rights regarding examination, treatment, and transfer   by Dr Shaniqua Kyle DO    Benefits: Specialized equipment and/or services available at the receiving facility (Include comment)________________________ (Medical critical care, possible need for IR)    Risks: Potential for delay in receiving treatment, Potential deterioration of medical condition, Loss of IV, Increased discomfort during transfer, Possible worsening of condition or death during transfer      Transfer Request   I acknowledge that my medical condition has been evaluated and explained to me by the emergency department physician or other qualified medical person and/or my attending physician who has recommended and offered to me further medical examination and treatment  I understand the Hospital's obligation with respect to the treatment and stabilization of my emergency medical condition  I nevertheless request to be transferred  I release the Hospital, the doctor, and any other persons caring for me from all responsibility or liability for any injury or ill effects that may result from my transfer and agree to accept all responsibility for the consequences of my choice to transfer, rather than receive stabilizing treatment at the Hospital  I understand that because the transfer is my request, my insurance may not provide reimbursement for the services  The Hospital will assist and direct me and my family in how to make arrangements for transfer, but the hospital is not liable for any fees charged by the transport service    In spite of this understanding, I refuse to consent to further medical examination and treatment which has been offered to me, and request transfer to [address] [place] [address] Rd Name, [address] : [address]  I authorize the performance of emergency medical procedures and treatments upon me in both transit and upon arrival at the receiving facility  Additionally, I authorize the release of any and all medical records to the receiving facility and request they be transported with me, if possible  I authorize the performance of emergency medical procedures and treatments upon me in both transit and upon arrival at the receiving facility  Additionally, I authorize the release of any and all medical records to the receiving facility and request they be transported with me, if possible  I understand that the safest mode of transportation during a medical emergency is an ambulance and that the Hospital advocates the use of this mode of transport  Risks of traveling to the receiving facility by car, including absence of medical control, life sustaining equipment, such as oxygen, and medical personnel has been explained to me and I fully understand them  (ERNESTINA CORRECT BOX BELOW)  [ Larry Bevel  I consent to the stated transfer and to be transported by ambulance/helicopter  [  ]  I consent to the stated transfer, but refuse transportation by ambulance and accept full responsibility for my transportation by car  I understand the risks of non-ambulance transfers and I exonerate the Hospital and its staff from any deterioration in my condition that results from this refusal     X___________________________________________    DATE  10/18/17  TIME________  Signature of patient or legally responsible individual signing on patient behalf           RELATIONSHIP TO PATIENT_________________________          Provider Certification    NAME Nikki Mcclure                                         1960                              MRN [id number]    A medical screening exam was performed on the above named patient    Based on the examination:    Condition Necessitating Transfer The encounter diagnosis was Septic shock ([address] )  Patient Condition: The patient has been stabilized such that within reasonable medical probability, no material deterioration of the patient condition or the condition of the unborn child(tammie) is likely to result from the transfer    Reason for Transfer: Level of Care needed not available at this facility    Transfer Requirements: [address]   · Space available and qualified personnel available for treatment as acknowledged by [facility] [phone]  · Agreed to accept transfer and to provide appropriate medical treatment as acknowledged by       Dr Kobe Murphy  · Appropriate medical records of the examination and treatment of the patient are provided at the time of transfer   [address] _______  · Transfer will be performed by qualified personnel from [facility]  and appropriate transfer equipment as required, including the use of necessary and appropriate life support measures      Provider Certification: I have examined the patient and explained the following risks and benefits of being transferred/refusing transfer to the patient/family:  General risk, such as traffic hazards, adverse weather conditions, rough terrain or turbulence, possible failure of equipment (including vehicle or aircraft), or consequences of actions of persons outside the control of the transport personnel, Unanticipated needs of medical equipment and personnel during transport, Risk of worsening condition, The possibility of a transport vehicle being unavailable      Based on these reasonable risks and benefits to the patient and/or the unborn child(tammie), and based upon the information available at the time of the patients examination, I certify that the medical benefits reasonably to be expected from the provision of appropriate medical treatments at another medical facility outweigh the increasing risks, if any, to the individuals medical condition, and in the case of labor to the unborn child, from effecting the transfer      X____________________________________________ DATE 10/18/17        TIME_______      ORIGINAL - SEND TO MEDICAL RECORDS   COPY - SEND WITH PATIENT DURING TRANSFER

## 2017-10-18 NOTE — ED NOTES
Patient transported to [address] [place] [address] [address]       Kary Hermosillo RN  10/18/17 9743

## 2017-10-19 ENCOUNTER — APPOINTMENT (INPATIENT)
Dept: NON INVASIVE DIAGNOSTICS | Facility: HOSPITAL | Age: 57
DRG: 871 | End: 2017-10-19
Payer: OTHER GOVERNMENT

## 2017-10-19 PROBLEM — E87.2 LACTIC ACIDOSIS: Status: ACTIVE | Noted: 2017-10-19

## 2017-10-19 PROBLEM — R65.21 SEPTIC SHOCK DUE TO UNDETERMINED ORGANISM (HCC): Status: ACTIVE | Noted: 2017-10-18

## 2017-10-19 PROBLEM — E83.51 HYPOCALCEMIA: Status: ACTIVE | Noted: 2017-10-19

## 2017-10-19 PROBLEM — E83.42 HYPOMAGNESEMIA: Status: ACTIVE | Noted: 2017-10-19

## 2017-10-19 LAB
ALBUMIN SERPL BCP-MCNC: 1.9 G/DL (ref 3.5–5)
ALP SERPL-CCNC: 119 U/L (ref 46–116)
ALT SERPL W P-5'-P-CCNC: 34 U/L (ref 12–78)
AMMONIA PLAS-SCNC: 40 UMOL/L (ref 11–35)
ANION GAP SERPL CALCULATED.3IONS-SCNC: 9 MMOL/L (ref 4–13)
APTT PPP: 47 SECONDS (ref 23–35)
AST SERPL W P-5'-P-CCNC: 90 U/L (ref 5–45)
ATRIAL RATE: 124 BPM
BASOPHILS # BLD AUTO: 0.03 THOUSANDS/ΜL (ref 0–0.1)
BASOPHILS NFR BLD AUTO: 0 % (ref 0–1)
BILIRUB SERPL-MCNC: 3.4 MG/DL (ref 0.2–1)
BUN SERPL-MCNC: 9 MG/DL (ref 5–25)
CA-I BLD-SCNC: 1.02 MMOL/L (ref 1.12–1.32)
CALCIUM SERPL-MCNC: 7.4 MG/DL (ref 8.3–10.1)
CHLORIDE SERPL-SCNC: 116 MMOL/L (ref 100–108)
CO2 SERPL-SCNC: 20 MMOL/L (ref 21–32)
CREAT SERPL-MCNC: 1.05 MG/DL (ref 0.6–1.3)
EOSINOPHIL # BLD AUTO: 0.03 THOUSAND/ΜL (ref 0–0.61)
EOSINOPHIL NFR BLD AUTO: 0 % (ref 0–6)
ERYTHROCYTE [DISTWIDTH] IN BLOOD BY AUTOMATED COUNT: 25.8 % (ref 11.6–15.1)
FLUAV AG SPEC QL: NORMAL
FLUBV AG SPEC QL: NORMAL
GFR SERPL CREATININE-BSD FRML MDRD: 59 ML/MIN/1.73SQ M
GLUCOSE SERPL-MCNC: 102 MG/DL (ref 65–140)
GLUCOSE SERPL-MCNC: 108 MG/DL (ref 65–140)
GLUCOSE SERPL-MCNC: 112 MG/DL (ref 65–140)
GLUCOSE SERPL-MCNC: 118 MG/DL (ref 65–140)
HCT VFR BLD AUTO: 26.8 % (ref 34.8–46.1)
HGB BLD-MCNC: 8.2 G/DL (ref 11.5–15.4)
INR PPP: 2.58 (ref 0.86–1.16)
LACTATE SERPL-SCNC: 2.1 MMOL/L (ref 0.5–2)
LACTATE SERPL-SCNC: 2.3 MMOL/L (ref 0.5–2)
LACTATE SERPL-SCNC: 2.3 MMOL/L (ref 0.5–2)
LACTATE SERPL-SCNC: 2.6 MMOL/L (ref 0.5–2)
LYMPHOCYTES # BLD AUTO: 1.32 THOUSANDS/ΜL (ref 0.6–4.47)
LYMPHOCYTES NFR BLD AUTO: 7 % (ref 14–44)
MAGNESIUM SERPL-MCNC: 1.8 MG/DL (ref 1.6–2.6)
MCH RBC QN AUTO: 24.9 PG (ref 26.8–34.3)
MCHC RBC AUTO-ENTMCNC: 30.6 G/DL (ref 31.4–37.4)
MCV RBC AUTO: 82 FL (ref 82–98)
MONOCYTES # BLD AUTO: 1.06 THOUSAND/ΜL (ref 0.17–1.22)
MONOCYTES NFR BLD AUTO: 6 % (ref 4–12)
NEUTROPHILS # BLD AUTO: 15.73 THOUSANDS/ΜL (ref 1.85–7.62)
NEUTS SEG NFR BLD AUTO: 87 % (ref 43–75)
NRBC BLD AUTO-RTO: 0 /100 WBCS
P AXIS: 71 DEGREES
PHOSPHATE SERPL-MCNC: 3.1 MG/DL (ref 2.7–4.5)
PLATELET # BLD AUTO: 55 THOUSANDS/UL (ref 149–390)
POTASSIUM SERPL-SCNC: 3.7 MMOL/L (ref 3.5–5.3)
PR INTERVAL: 134 MS
PROT SERPL-MCNC: 6 G/DL (ref 6.4–8.2)
PROTHROMBIN TIME: 28 SECONDS (ref 12.1–14.4)
QRS AXIS: 50 DEGREES
QRSD INTERVAL: 74 MS
QT INTERVAL: 336 MS
QTC INTERVAL: 482 MS
RBC # BLD AUTO: 3.29 MILLION/UL (ref 3.81–5.12)
RSV B RNA SPEC QL NAA+PROBE: NORMAL
SODIUM SERPL-SCNC: 145 MMOL/L (ref 136–145)
T WAVE AXIS: 47 DEGREES
VENTRICULAR RATE: 124 BPM
WBC # BLD AUTO: 18.26 THOUSAND/UL (ref 4.31–10.16)

## 2017-10-19 PROCEDURE — 85730 THROMBOPLASTIN TIME PARTIAL: CPT | Performed by: EMERGENCY MEDICINE

## 2017-10-19 PROCEDURE — 82140 ASSAY OF AMMONIA: CPT | Performed by: EMERGENCY MEDICINE

## 2017-10-19 PROCEDURE — 82330 ASSAY OF CALCIUM: CPT | Performed by: EMERGENCY MEDICINE

## 2017-10-19 PROCEDURE — 85610 PROTHROMBIN TIME: CPT | Performed by: EMERGENCY MEDICINE

## 2017-10-19 PROCEDURE — 82948 REAGENT STRIP/BLOOD GLUCOSE: CPT

## 2017-10-19 PROCEDURE — 85025 COMPLETE CBC W/AUTO DIFF WBC: CPT | Performed by: EMERGENCY MEDICINE

## 2017-10-19 PROCEDURE — 83605 ASSAY OF LACTIC ACID: CPT | Performed by: EMERGENCY MEDICINE

## 2017-10-19 PROCEDURE — 83735 ASSAY OF MAGNESIUM: CPT | Performed by: EMERGENCY MEDICINE

## 2017-10-19 PROCEDURE — 80053 COMPREHEN METABOLIC PANEL: CPT | Performed by: EMERGENCY MEDICINE

## 2017-10-19 PROCEDURE — 84100 ASSAY OF PHOSPHORUS: CPT | Performed by: EMERGENCY MEDICINE

## 2017-10-19 PROCEDURE — 93306 TTE W/DOPPLER COMPLETE: CPT

## 2017-10-19 RX ORDER — PREGABALIN 100 MG/1
300 CAPSULE ORAL 2 TIMES DAILY
Status: DISCONTINUED | OUTPATIENT
Start: 2017-10-19 | End: 2017-10-24 | Stop reason: HOSPADM

## 2017-10-19 RX ORDER — LACTULOSE 20 G/30ML
20 SOLUTION ORAL 3 TIMES DAILY
Status: DISCONTINUED | OUTPATIENT
Start: 2017-10-19 | End: 2017-10-22

## 2017-10-19 RX ORDER — MAGNESIUM SULFATE HEPTAHYDRATE 40 MG/ML
2 INJECTION, SOLUTION INTRAVENOUS ONCE
Status: COMPLETED | OUTPATIENT
Start: 2017-10-19 | End: 2017-10-19

## 2017-10-19 RX ORDER — CIPROFLOXACIN 500 MG/1
500 TABLET, FILM COATED ORAL EVERY 12 HOURS SCHEDULED
Status: DISCONTINUED | OUTPATIENT
Start: 2017-10-19 | End: 2017-10-24 | Stop reason: HOSPADM

## 2017-10-19 RX ORDER — POTASSIUM CHLORIDE 20 MEQ/1
20 TABLET, EXTENDED RELEASE ORAL ONCE
Status: COMPLETED | OUTPATIENT
Start: 2017-10-19 | End: 2017-10-19

## 2017-10-19 RX ADMIN — CIPROFLOXACIN 500 MG: 500 TABLET, FILM COATED ORAL at 13:53

## 2017-10-19 RX ADMIN — MAGNESIUM SULFATE HEPTAHYDRATE 2 G: 40 INJECTION, SOLUTION INTRAVENOUS at 08:39

## 2017-10-19 RX ADMIN — LACTULOSE 20 G: 20 SOLUTION ORAL at 17:30

## 2017-10-19 RX ADMIN — CIPROFLOXACIN 500 MG: 500 TABLET, FILM COATED ORAL at 21:13

## 2017-10-19 RX ADMIN — SODIUM CHLORIDE, SODIUM GLUCONATE, SODIUM ACETATE, POTASSIUM CHLORIDE, MAGNESIUM CHLORIDE, SODIUM PHOSPHATE, DIBASIC, AND POTASSIUM PHOSPHATE 125 ML/HR: .53; .5; .37; .037; .03; .012; .00082 INJECTION, SOLUTION INTRAVENOUS at 02:45

## 2017-10-19 RX ADMIN — PREGABALIN 300 MG: 100 CAPSULE ORAL at 21:13

## 2017-10-19 RX ADMIN — PANTOPRAZOLE SODIUM 40 MG: 40 TABLET, DELAYED RELEASE ORAL at 17:31

## 2017-10-19 RX ADMIN — LACTULOSE 10 G: 20 SOLUTION ORAL at 08:39

## 2017-10-19 RX ADMIN — CEFEPIME HYDROCHLORIDE 2000 MG: 2 INJECTION, POWDER, FOR SOLUTION INTRAVENOUS at 08:43

## 2017-10-19 RX ADMIN — METRONIDAZOLE 500 MG: 500 INJECTION, SOLUTION INTRAVENOUS at 17:30

## 2017-10-19 RX ADMIN — METRONIDAZOLE 500 MG: 500 INJECTION, SOLUTION INTRAVENOUS at 08:39

## 2017-10-19 RX ADMIN — LACTULOSE 20 G: 20 SOLUTION ORAL at 21:13

## 2017-10-19 RX ADMIN — HEPARIN SODIUM 5000 UNITS: 5000 INJECTION, SOLUTION INTRAVENOUS; SUBCUTANEOUS at 13:53

## 2017-10-19 RX ADMIN — CALCIUM GLUCONATE 2 G: 94 INJECTION, SOLUTION INTRAVENOUS at 08:41

## 2017-10-19 RX ADMIN — HEPARIN SODIUM 5000 UNITS: 5000 INJECTION, SOLUTION INTRAVENOUS; SUBCUTANEOUS at 21:13

## 2017-10-19 RX ADMIN — HEPARIN SODIUM 5000 UNITS: 5000 INJECTION, SOLUTION INTRAVENOUS; SUBCUTANEOUS at 06:05

## 2017-10-19 RX ADMIN — PANTOPRAZOLE SODIUM 40 MG: 40 TABLET, DELAYED RELEASE ORAL at 06:11

## 2017-10-19 RX ADMIN — PREGABALIN 300 MG: 100 CAPSULE ORAL at 08:44

## 2017-10-19 RX ADMIN — POTASSIUM CHLORIDE 20 MEQ: 1500 TABLET, EXTENDED RELEASE ORAL at 08:39

## 2017-10-19 RX ADMIN — METRONIDAZOLE 500 MG: 500 INJECTION, SOLUTION INTRAVENOUS at 23:45

## 2017-10-19 NOTE — PLAN OF CARE
CARDIOVASCULAR - ADULT     Maintains optimal cardiac output and hemodynamic stability Progressing     Absence of cardiac dysrhythmias or at baseline rhythm Progressing        DISCHARGE PLANNING     Discharge to home or other facility with appropriate resources Progressing        GASTROINTESTINAL - ADULT     Minimal or absence of nausea and/or vomiting Progressing     Maintains or returns to baseline bowel function Progressing     Maintains adequate nutritional intake Progressing     Establish and maintain optimal ostomy function Progressing        HEMATOLOGIC - ADULT     Maintains hematologic stability Progressing        INFECTION - ADULT     Absence or prevention of progression during hospitalization Progressing     Absence of fever/infection during neutropenic period Progressing        Knowledge Deficit     Patient/family/caregiver demonstrates understanding of disease process, treatment plan, medications, and discharge instructions Progressing        MUSCULOSKELETAL - ADULT     Maintain or return mobility to safest level of function Progressing     Maintain proper alignment of affected body part Progressing        PAIN - ADULT     Verbalizes/displays adequate comfort level or baseline comfort level Progressing        Prexisting or High Potential for Compromised Skin Integrity     Skin integrity is maintained or improved Progressing        RESPIRATORY - ADULT     Achieves optimal ventilation and oxygenation Progressing        SAFETY ADULT     Patient will remain free of falls Progressing     Maintain or return to baseline ADL function Progressing     Maintain or return mobility status to optimal level Progressing        SKIN/TISSUE INTEGRITY - ADULT     Skin integrity remains intact Progressing     Incision(s), wounds(s) or drain site(s) healing without S/S of infection Progressing     Oral mucous membranes remain intact Progressing

## 2017-10-19 NOTE — PLAN OF CARE
Problem: DISCHARGE PLANNING - CARE MANAGEMENT  Goal: Discharge to post-acute care or home with appropriate resources  INTERVENTIONS:  - Conduct assessment to determine patient/family and health care team treatment goals, and need for post-acute services based on payer coverage, community resources, and patient preferences, and barriers to discharge  - Address psychosocial, clinical, and financial barriers to discharge as identified in assessment in conjunction with the patient/family and health care team  - Arrange appropriate level of post-acute services according to patient's   needs and preference and payer coverage in collaboration with the physician and health care team  - Communicate with and update the patient/family, physician, and health care team regarding progress on the discharge plan  - Arrange appropriate transportation to post-acute venues   Assist pt with referrals to [place] [address], [address] in pt acute rehab and other appropriate facilities  Outcome: Progressing

## 2017-10-19 NOTE — CASE MANAGEMENT
[address] [place] [address] in the [facility] by [address] for 2017  Network Utilization Review Department  Phone: [phone]; Fax [phone]  ATTENTION: The Network Utilization Review Department is now centralized for our 7 Facilities  Make a note that we have a new phone and fax numbers for our Department  Please call with any questions or concerns to [phone] and carefully follow the prompts so that you are directed to the right person  All voicemails are confidential  Fax any determinations, approvals, denials, and requests for initial or continue stay review clinical to [phone]  Due to HIGH CALL volume, it would be easier if you could please send faxed requests to expedite your requests and in part, help us provide discharge notifications faster  Initial Clinical Review    Admission: Date/Time/Statement: 10/18/17 @ 2123     Orders Placed This Encounter   Procedures    Inpatient Admission     Standing Status:   Standing     Number of Occurrences:   1     Order Specific Question:   Admitting Physician     Answer:   Mila Dawson     Order Specific Question:   Level of Care     Answer:   Critical Care [15]     Order Specific Question:   Estimated length of stay     Answer:   More than 2 Midnights     Order Specific Question:   Certification     Answer:   I certify that inpatient services are medically necessary for this patient for a duration of greater than two midnights  See H&P and MD Progress Notes for additional information about the patient's course of treatment  ED: Date/Time/Mode of Arrival:   ED Arrival Information     Patient not seen in ED                     History of Illness: Sena Hayes is a 62 y o  female who presents as a transfer from [address] [place] [address] for septic shock of unknown etiology  Patient initially presented after going for an outpatient ultrasound of her abdomen to assess for ascites    Patient was reportedly confused and altered  On initial presentation, patient was found to be febrile with a temperature of 104 6° and a lactate of 4 3  Patient received broad-spectrum antibiotics cefepime and vancomycin and 30 cc/kg bolus of fluids  No infectious source was identified in the chest, urinalysis  Blood cultures are pending  CT abdomen pelvis displayed colitis of the ascending colon  During my evaluation, patient states she is significantly improved since initial presentation to [facility] 29  She currently has no complaints  Her temperature is now 100 3 rectally  She denies any recent cough, congestion  Her only initial symptom was chills and feeling fatigued  She denies any headaches, changes in vision, neck pain or stiffness      ED Vital Signs:   ED Triage Vitals [10/18/17 2125]   Temperature Pulse Respirations Blood Pressure SpO2   100 3 °F (37 9 °C) 98 18 107/50 99 %      Temp Source Heart Rate Source Patient Position - Orthostatic VS BP Location FiO2 (%)   Oral Monitor Lying Left arm --      Pain Score       No Pain        Wt Readings from Last 1 Encounters:   10/19/17 90 8 kg (200 lb 2 8 oz)     Vital Signs (abnormal):                      10/18/17 2325  --  88  15   88/45  60  97 %  None (Room air)  Lying   10/18/17 2315  --  92  15   91/45  61  97 %  --  Lying   10/18/17 2245  --  94  16   93/47  59  99 %  --  Lying   10/18/17 2225  --  92  15   91/48  63  100 %  None (Room air)  Lying     Abnormal Labs:   10/18 10/19   Chloride 109 116   CO2 22 20   Creatinine 1 32 1 05   Calcium 7 7 7 4   CALCIUM IONIZED  1 02   AST 56 90   Alkaline Phosphatase 143 119   Total Protein 6 5 6 0   Albumin 1 9 1 9   Total Bilirubin 2 90 3 40   Ammonia  40     LACTIC ACID 4 3 3 2 2 8 2 9 2 6 2 3 2 1 2 3      10/18 10/19   WBC 17 93 18 26   RBC 3 35 3 29   Hemoglobin 8 4 8 2   Hematocrit 27 2 26 8   MCV 81 82   MCH 25 1 24 9   MCHC 30 9 30 6   RDW 25 7 25 8   Platelets 47 55     Protime 21 7 28 0   INR 1 91 2 58     Ketones, UA Trace   Blood, UA Moderate   Protein, UA Trace   Bilirubin, UA Interf    Urobilinogen, UA 4 0   RBC, UA 4-10   WBC, UA 0-1   Bacteria, UA Occasi    Blood cultures pending    Diagnostic Test Results:     US abd - 1  No evidence of ascites  2   Cirrhotic appearance to the liver  Hepatofugal flow now identified in the main portal vein  Prominent recanalized umbilical vein again identified, previously present  3   Right pleural effusion  CT abd/pelvis - Diffuse wall thickening and pericolic infiltration of the ascending colon to the level of the hepatic flexure consistent with colitis  Cirrhosis with signs of portal hypertension and anasarca  Trace abdominal ascites  Midline mesentery containing hernia  Past Medical/Surgical History: Active Ambulatory Problems     Diagnosis Date Noted    Pancytopenia ([facility] )     Hepatic encephalopathy ([facility] ) 86/97/8897    Alcoholic cirrhosis of liver with ascites ([facility] ) 08/02/2017    Alcohol abuse 08/02/2017    Elevated troponin 08/02/2017    GI bleed 08/02/2017    History of gastric bypass 08/02/2017    Acute blood loss anemia 08/02/2017    Thrombocytopenic ([facility] ) 08/05/2017     Resolved Ambulatory Problems     Diagnosis Date Noted    Hypernatremia 08/02/2017     Past Medical History:   Diagnosis Date    Anemia     Anxiety     Cancer ([facility] )     Chronic narcotic dependence (HCC)     Chronic pain disorder     Cirrhosis, alcoholic ([address] )     Depression     Esophagitis     History of bilateral mastectomy     History of gastric bypass     Hypertension     Liver disease     Morbid obesity ([address] )     Pancytopenia ([address] )     Peripheral neuropathy     Rosacea      Admitting Diagnosis: Septic shock ([facility] ) [A41 9, R65 21]    Age/Sex: 62 y o  female    Assessment/Plan:   Imp  1  Sepsis  2  Diffuse Ascending Colitis  3  Cirrhotic Liver Dz  4  Pancytopenia (baseline)  5  Coagulopathy  6  BALDO POA     Plan  1   Cont Sepsis Pathway,  30 ml/kg given, Cx Pending  2  Started low dose Norepi to keep MAP >60  3  Follow Lactates but may be delayed clearance in setting of Liver Failure  4  Cont Lactulose/Xifanaxan/Spironolactone/Thiamine  5  Albumin as needed for Volume replacement    Assessment:  68-year-old female presenting with septic shock secondary to ?colitis      Plan:                Neuro:  CAM ICU delirium precautions  Lyrica for peripheral neuropathy                  CV:  Hypotension - start Levophed low-dose  Continue peripherally  Central line if pressor use greater than 12 hours  Troponin negative in the emergency department  Echo tomorrow morning                   Lung:  No acute issues  Maintain O2 sat greater than 92%                 GI:  Colitis seen on CT scan  Abdomen nontender  No issues with bowel movements  Alcoholic cirrhosis  Ammonia 74, previously 90, 108  History of upper GI bleed  EGD/colonoscopy on 9/14/17 report above  Continue home dose of lactulose and Protonix                 FEN:  Isolate 125, replete lytes p r n , NPO sips with meds                  :  Sanchez inserted in emergency department  Monitor urine output  Creatinine 1 32  Repeat robyn Carey Urinalysis negative for infection                  ID:  Initial temperature 104 5°, leukocytosis 17 93  Chest x-ray and clinical suspicion for pulmonary source unlikely  Urinalysis clean  Blood cultures pending  Suspicion for meningitis low given clinical picture  Echocardiogram pending to assess for vegetations  Abdomen is nontender without colitis symptoms but given CT results, will start empirically on cefepime and Flagyl for likely intra-abdominal source of infection  Continue to trend vitals and WBC count                   Heme:  Subcutaneous heparin for DVT prophylaxis  History of pancytopenia likely from iron deficiency                 Endo:  No history of diabetes  Will check hemoglobin A1c    Sliding scale insulin as needed                  Msk/Skin:  Pressure ulcer prophylaxis  Frequent offloading  Out of bed as tolerated                  Disposition:  Transfer to [facility] tomorrow      VTE Pharmacologic Prophylaxis: Heparin  VTE Mechanical Prophylaxis: sequential compression device     Invasive lines and devices: Invasive Devices            Central Venous Catheter Line                     CVC Central Lines 10/18/17 Triple Right Femoral less than 1 day                    Peripheral Intravenous Line               Peripheral IV 10/18/17 Left Antecubital less than 1 day                    Drain                     Urethral Catheter Latex 18 Fr  less than 1 day                    Code Status: Level 1 - Full Code  Given critical illness, patient length of stay will require greater than two midnights  Admission Orders:  Scheduled Meds:   cefepime 2,000 mg Intravenous Q12H   heparin (porcine) 5,000 Units Subcutaneous Q8H [address]   lactulose 10 g Oral BID   magnesium sulfate 2 g Intravenous Once   metroNIDAZOLE 500 mg Intravenous Q8H   pantoprazole 40 mg Oral BID AC   pregabalin 300 mg Oral BID     Continuous Infusions:   multi-electrolyte 125 mL/hr Last Rate: 125 mL/hr (10/19/17 0600)   norepinephrine 1-30 mcg/min Last Rate: 4 mcg/min (10/19/17 0436)     PRN Meds:   Lactulose 20 g/30 ml oral x 1  Acetaminophen po x 1 - now d/c     MICU  Cardiac echo  Diet NPO w sips  CAM assess daily   SDCs  Early mobilization   I/O q shift  Up w assist  Incent spirom q 1 hr [facility]  _____________________________________________________  10/19 Critical Care Progress Note  Assessment and Plan:   Principal Problem:    Sepsis ([address] )  Active Problems:    Lactic acidosis    Pancytopenia (HCC)    Alcoholic cirrhosis of liver with ascites (HCC)    Hypomagnesemia    Hypocalcemia  Resolved Problems:    * No resolved hospital problems  *      Neuro: Continue routine neuro checks and delirium precautions  Continue home lyrica   MS improved since admission and at baseline now       CV: Wean Levophed for MAP > 65, pt with continued mild LA likely secondary to cirrhosis, will trend LAs Q6H now       Pulm: Tolerating RA, mobilize OOB, IS     GI: Will start to advance diet today  Continue home lactulose and protonix  Concern for colitis on CT scan on admission, abd exam is benign,  Continue serial abd exams       : Continue to monitor U/O with huber for another 24 hrs given that she is still on pressors  Replacing mag today  Will stop isolyte IVFs if she starts taking in adequate orals       ID: 24 hr Tmax 104  6F on admission  Increased leukocytosis today to 18 2  Pt with Negative Flu screen this admission  F/U BCx 2 from 10/18  Continue on cefepime and flagyl for possible colitis  F/U ECHO from this AM to assess for vegetation      Heme: Heparin SQ for DVT prophylaxis     Endo: No acute issues                          Msk/Skin: Mobilize OOB today     Disposition: Keep in MICU     Code Status: Level 1 - Full Code     ______________________________________________________________________     Chief Complaint: C/O chills and feeling tired, denies N/V, SOB, and pain     24 Hour Events: Admitted yesterday evening with AMS and fever of 104 6F   Was a sepsis alert at [facility] and transferred to [facility] after being resuscitated, MS has improved to baseline    ______________________________________________________________________

## 2017-10-19 NOTE — H&P
History and Physical - Critical Care  Yolis Phelan 62 y o  female MRN: [id number]  Unit/Bed#: MICU 08 Encounter: [id number]     Reason for Admission / Chief Complaint: Septic Shock  History of Present Illness:  Yolis Phelan is a 62 y o  female who presents as a transfer from [address] [place] [address] for septic shock of unknown etiology  Patient initially presented after going for an outpatient ultrasound of her abdomen to assess for ascites  Patient was reportedly confused and altered  On initial presentation, patient was found to be febrile with a temperature of 104 6° and a lactate of 4 3  Patient received broad-spectrum antibiotics cefepime and vancomycin and 30 cc/kg bolus of fluids  No infectious source was identified in the chest, urinalysis  Blood cultures are pending  CT abdomen pelvis displayed colitis of the ascending colon  During my evaluation, patient states she is significantly improved since initial presentation to [facility]  She currently has no complaints  Her temperature is now 100 3 rectally  She denies any recent cough, congestion  Her only initial symptom was chills and feeling fatigued  She denies any headaches, changes in vision, neck pain or stiffness  PMH: Alcoholic hepatitis/cirrhosis, pancytopenia 2/2 iron deficiency, GI bleed  PSH: Gastric bypass, R breast mastectomy for CA, no chemo or radiation  10/18 CTAP: Diffuse wall thickening and pericolic infiltration of the ascending colon to the level of the hepatic flexure consistent with colitis  Cirrhosis with signs of portal hypertension and anasarca  Trace abdominal ascites  Midline mesentery containing hernia  10/18 US abd: 1  No evidence of ascites  2   Cirrhotic appearance to the liver  Hepatofugal flow now identified in the main portal vein  Prominent recanalized umbilical vein again identified, previously present  3   Right pleural effusion  10/18 CXR: Low lung volumes    No focal infiltrate or pleural effusion  9/14/17 EGD/colonoscopy: #1  Esophagus-normal esophagus without evidence of esophageal varices  #2  Stomach-consistent previous gastric bypass  The anastomotic ulcers have healed  There was evidence of suture that was seen at 6 o'clock and 3 o'clock  #3   Jejunum-within normal limits status post biopsy for further evaluation of celiac disease  Only 2 biopsies were done due to low platelets and high INR  History obtained from chart review and the patient  Past Medical History:  Past Medical History:   Diagnosis Date    Anemia     Anxiety     Cancer ([facility] [address]     breast    Chronic narcotic dependence (HCC)     Chronic pain disorder     Cirrhosis, alcoholic (HCC)     Depression     Esophagitis     History of bilateral mastectomy     History of gastric bypass     Hypertension     Liver disease     Morbid obesity ([facility] [address]     Pancytopenia ([facility] [address]     Peripheral neuropathy     Rosacea         Past Surgical History:  Past Surgical History:   Procedure Laterality Date    ABDOMINAL SURGERY      abdominoplasty    BREAST SURGERY      mastectomy    CHOLECYSTECTOMY      ESOPHAGOGASTRODUODENOSCOPY N/A 8/2/2017    Procedure: ESOPHAGOGASTRODUODENOSCOPY (EGD); Surgeon: Mannie Carcamo DO;  Location: [facility] OR;  Service: Gastroenterology    GASTRIC BYPASS      HERNIA REPAIR      MASTECTOMY      VA ESOPHAGOGASTRODUODENOSCOPY TRANSORAL DIAGNOSTIC N/A 5/18/2017    Procedure: ESOPHAGOGASTRODUODENOSCOPY (EGD) with bx;  Surgeon: El Muniz MD;  Location: [facility]; Service: Gastroenterology    VA ESOPHAGOGASTRODUODENOSCOPY TRANSORAL DIAGNOSTIC N/A 9/14/2017    Procedure: EGD with bx AND COLONOSCOPY with polypectomy;  Surgeon: El Muniz MD;  Location: [facility];   Service: Gastroenterology    DECLAN-EN-Y PROCEDURE  2005        Past Family History:  Family History   Problem Relation Age of Onset    Diabetes Mother     Alzheimer's disease Mother     Kidney disease Father    [facility] Hypertension Father     Obesity Sister     Fibrocystic breast disease Sister         Social History:  History   Smoking Status    Never Smoker   Smokeless Tobacco    Never Used     History   Alcohol Use No     Comment: quit 08/02/2017     History   Drug Use No     Marital Status: /Civil Union     Medications:  Current Facility-Administered Medications   Medication Dose Route Frequency    cefepime (MAXIPIME) 2,000 mg in dextrose 5 % 50 mL IVPB  2,000 mg Intravenous Q12H    chlorhexidine (PERIDEX) 0 12 % oral rinse 15 mL  15 mL Swish & Spit Q12H [address]    heparin (porcine) subcutaneous injection 5,000 Units  5,000 Units Subcutaneous Q8H [address]    lactulose 20 g/30 mL oral solution 10 g  10 g Oral BID    metroNIDAZOLE (FLAGYL) IVPB (premix) 500 mg  500 mg Intravenous Q8H    multi-electrolyte (ISOLYTE-S PH 7 4 equivalent) IV solution  125 mL/hr Intravenous Continuous    norepinephrine (LEVOPHED) 4 mg (STANDARD CONCENTRATION) IV in sodium chloride 0 9% 250 mL  1-30 mcg/min Intravenous Titrated    pantoprazole (PROTONIX) EC tablet 40 mg  40 mg Oral BID AC    pregabalin (LYRICA) capsule 300 mg  300 mg Oral BID     Facility-Administered Medications Ordered in Other Encounters   Medication Dose Route Frequency    iron sucrose (VENOFER) 200 mg in sodium chloride 0 9 % 100 mL IVPB  200 mg Intravenous Once    sodium chloride 0 9 % infusion  20 mL/hr Intravenous Continuous     Home medications:  Prior to Admission medications    Medication Sig Start Date End Date Taking?  Authorizing Provider   folic acid (FOLVITE) 1 mg tablet Take 1 tablet by mouth daily 8/9/17   Ronald Monge MD   furosemide (LASIX) 20 mg tablet Take 60 mg by mouth daily      Historical Provider, MD   lactulose 20 g/30 mL Take 15 mL by mouth daily  Patient taking differently: Take 10 g by mouth 3 (three) times a day as needed   8/9/17   Ronald Monge MD   pantoprazole (PROTONIX) 40 mg tablet Take 1 tablet by mouth 2 (two) times a day before meals 17   Carlos Erwin MD   pregabalin (LYRICA) 300 MG capsule Take 300 mg by mouth 2 (two) times a day      Historical Provider, MD   rifaximin (XIFAXAN) 200 mg tablet Take 200 mg by mouth 2 (two) times a day    Historical Provider, MD   spironolactone (ALDACTONE) 100 mg tablet Take 25 mg by mouth 2 (two) times a day      Historical Provider, MD   sucralfate (CARAFATE) 1 g/10 mL suspension Take 1 g by mouth 3 (three) times a day    Historical Provider, MD   Thiamine HCl (VITAMIN B-1 PO) Take 200 mg by mouth daily      Historical Provider, MD     Allergies: Allergies   Allergen Reactions    Acetaminophen Other (See Comments)     Liver function, s/p bariatric surgery    Cymbalta [Duloxetine Hcl] GI Intolerance        ROS:   Review of Systems   Constitutional: Positive for chills, fatigue and fever  Negative for diaphoresis  HENT: Negative for congestion and rhinorrhea  Eyes: Negative for pain and visual disturbance  Respiratory: Negative for cough, shortness of breath and wheezing  Cardiovascular: Negative for chest pain and leg swelling  Gastrointestinal: Negative for abdominal pain, diarrhea, nausea and vomiting  Genitourinary: Negative for difficulty urinating, dysuria, frequency and urgency  Musculoskeletal: Negative for back pain and neck pain  Skin: Negative for color change and rash  Neurological: Negative for syncope, numbness and headaches  All other systems reviewed and are negative  Vitals:  Vitals:    10/19/17 0205 10/19/17 0220 10/19/17 0235 10/19/17 0305   BP: 99/59 99/60 98/57 98/55   Pulse: 80 80 78 78   Resp: 14 15 15 14   Temp:       TempSrc:       SpO2: 97% 97% 97% 96%   Weight:       Height:         Temperature:   Temp (24hrs), Av 3 °F (38 5 °C), Min:98 4 °F (36 9 °C), Max:104 6 °F (40 3 °C)    Current: Temperature: 98 4 °F (36 9 °C)     Weights:   IBW: 54 7 kg  Body mass index is 34 28 kg/m²       Hemodynamic Monitoring:  N/A     Non-Invasive/Invasive Ventilation Settings:  Respiratory    Lab Data (Last 4 hours)    None         O2/Vent Data (Last 4 hours)    None              No results found for: PHART, DTQ6EFZ, PO2ART, EPQ5UDG, N9HBRRKF, BEART, SOURCE  SpO2: SpO2: 96 %     Physical Exam:  Physical Exam   Constitutional: She is oriented to person, place, and time  She appears well-developed and well-nourished  No distress  HENT:   Head: Normocephalic and atraumatic  Eyes: Conjunctivae and EOM are normal  Pupils are equal, round, and reactive to light  Neck: Normal range of motion  Neck supple  full range of motion with no neck stiffness or pain   Cardiovascular: Normal rate and regular rhythm  Murmur heard  Systolic ejection murmur   Pulmonary/Chest: Effort normal  No respiratory distress  Abdominal: Soft  There is no tenderness  There is no guarding  Musculoskeletal: Normal range of motion  She exhibits edema  +1 bilateral lower extremity edema   Neurological: She is alert and oriented to person, place, and time  No cranial nerve deficit  Skin: Skin is warm  There is erythema  Petechiae noted in right lower quadrant along the T10 distribution  1 cm eschar noted along midline belt line  Psychiatric: She has a normal mood and affect  Her behavior is normal    Nursing note and vitals reviewed         Labs:    Results from last 7 days  Lab Units 10/18/17  2223 10/18/17  1441   WBC Thousand/uL 17 93* 13 08*   HEMOGLOBIN g/dL 8 4* 9 4*   HEMATOCRIT % 27 2* 30 7*   PLATELETS Thousands/uL 47* 75*   NEUTROS PCT % 89*  --    MONOS PCT % 5  --    MONO PCT MAN %  --  0*        Results from last 7 days  Lab Units 10/18/17  1551   SODIUM mmol/L 140   POTASSIUM mmol/L 3 7   CHLORIDE mmol/L 109*   CO2 mmol/L 22   BUN mg/dL 9   CREATININE mg/dL 1 32*   CALCIUM mg/dL 7 7*   TOTAL PROTEIN g/dL 6 5   BILIRUBIN TOTAL mg/dL 2 90*   ALK PHOS U/L 143*   ALT U/L 38   AST U/L 56*   GLUCOSE RANDOM mg/dL 104                Results from last 7 days  Lab Units 10/18/17  1443   INR  1 91*   PTT seconds 41*       Results from last 7 days  Lab Units 10/19/17  0242   LACTIC ACID mmol/L 2 3*       0  Lab Value Date/Time   TROPONINI 0 13 (H) 08/02/2017 0436   TROPONINI 0 13 (H) 08/02/2017 0329   TROPONINI 0 12 (H) 08/01/2017 2141        Imaging: CTAP, US, CXR I have personally reviewed pertinent reports  and I have personally reviewed pertinent films in PACS    Micro:  No results found for: Edinson Esquivel, SPUTUMCULTUR    Assessment:  51-year-old female presenting with septic shock secondary to ?colitis        Plan:                  Neuro:  CAM ICU delirium precautions  Lyrica for peripheral neuropathy  CV:  Hypotension - start Levophed low-dose  Continue peripherally  Central line if pressor use greater than 12 hours  Troponin negative in the emergency department  Echo tomorrow morning  Lung:  No acute issues  Maintain O2 sat greater than 92%  GI:  Colitis seen on CT scan  Abdomen nontender  No issues with bowel movements  Alcoholic cirrhosis  Ammonia 74, previously 90, 108  History of upper GI bleed  EGD/colonoscopy on 9/14/17 report above  Continue home dose of lactulose and Protonix  FEN:  Isolate 125, replete lytes p r n , NPO sips with meds  :  Sanchez inserted in emergency department  Monitor urine output  Creatinine 1 32  Repeat a m  Maral Cutting Urinalysis negative for infection  ID:  Initial temperature 104 5°, leukocytosis 17 93  Chest x-ray and clinical suspicion for pulmonary source unlikely  Urinalysis clean  Blood cultures pending  Suspicion for meningitis low given clinical picture  Echocardiogram pending to assess for vegetations  Abdomen is nontender without colitis symptoms but given CT results, will start empirically on cefepime and Flagyl for likely intra-abdominal source of infection  Continue to trend vitals and WBC count  Heme:  Subcutaneous heparin for DVT prophylaxis  History of pancytopenia likely from iron deficiency  Endo:  No history of diabetes  Will check hemoglobin A1c  Sliding scale insulin as needed  Msk/Skin:  Pressure ulcer prophylaxis  Frequent offloading  Out of bed as tolerated  Disposition:  Transfer to [facility] tomorrow  VTE Pharmacologic Prophylaxis: Heparin  VTE Mechanical Prophylaxis: sequential compression device     Invasive lines and devices: Invasive Devices     Central Venous Catheter Line            CVC Central Lines 10/18/17 Triple Right Femoral less than 1 day          Peripheral Intravenous Line            Peripheral IV 10/18/17 Left Antecubital less than 1 day          Drain            Urethral Catheter Latex 18 Fr  less than 1 day                 Code Status: Level 1 - Full Code  POA:    POLST:       Given critical illness, patient length of stay will require greater than two midnights  Counseling / Coordination of Care     Portions of the record may have been created with voice recognition software  Occasional wrong word or "sound a like" substitutions may have occurred due to the inherent limitations of voice recognition software  Read the chart carefully and recognize, using context, where substitutions have occurred          Iveth Elaine,

## 2017-10-19 NOTE — PROGRESS NOTES
Progress Note - Critical Care   Joselito Greco 62 y o  female MRN: [id number]  Unit/Bed#: [facility] 08 Encounter: [id number]    Attending Physician: Tyron Pallas, MD  ______________________________________________________________________  Assessment and Plan:   Principal Problem:    Sepsis [facility])  Active Problems:    Lactic acidosis    Pancytopenia ([facility] [address] )    Alcoholic cirrhosis of liver with ascites ([facility] [address] )    Hypomagnesemia    Hypocalcemia  Resolved Problems:    * No resolved hospital problems  *      Neuro: Continue routine neuro checks and delirium precautions  Continue home lyrica  MS improved since admission and at baseline now  CV: Wean Levophed for MAP > 65, pt with continued mild LA likely secondary to cirrhosis, will trend LAs Q6H now  Pulm: Tolerating RA, mobilize OOB, IS    GI: Will start to advance diet today  Continue home lactulose and protonix  Concern for colitis on CT scan on admission, abd exam is benign,  Continue serial abd exams  : Continue to monitor U/O with huber for another 24 hrs given that she is still on pressors  Replacing mag today  Will stop isolyte IVFs if she starts taking in adequate orals  ID: 24 hr Tmax 104  6F on admission  Increased leukocytosis today to 18 2  Pt with Negative Flu screen this admission  F/U BCx 2 from 10/18  Continue on cefepime and flagyl for possible colitis  F/U ECHO from this AM to assess for vegetation     Heme: Heparin SQ for DVT prophylaxis    Endo: No acute issues     Msk/Skin: Mobilize OOB today    [facility]    Code Status: Level 1 - Full Code    ______________________________________________________________________    Chief Complaint: C/O chills and feeling tired, denies N/V, SOB, and pain    24 Hour Events: Admitted yesterday evening with AMS and fever of 104 6F  Was a sepsis alert at [facility] and transferred to [facility] after being resuscitated, MS has improved to baseline  ______________________________________________________________________    Physical Exam:   Physical Exam   Constitutional: She is oriented to person, place, and time  She appears well-developed and well-nourished  No distress  HENT:   Head: Normocephalic and atraumatic  Eyes: Conjunctivae and EOM are normal    Neck: Normal range of motion  Neck supple  No tracheal deviation present  Cardiovascular: Normal rate, regular rhythm and intact distal pulses  Exam reveals no gallop and no friction rub  Murmur heard  Pulmonary/Chest: Effort normal and breath sounds normal  No respiratory distress  She has no wheezes  On RA with SpO2 98%  LS CTA b/L   Abdominal: Soft  Bowel sounds are normal  She exhibits no distension  There is no tenderness  Musculoskeletal: Normal range of motion  She exhibits edema  She exhibits no deformity    (+)1 pedal edema b/l   Neurological: She is alert and oriented to person, place, and time  She has normal reflexes  Skin: Skin is warm and dry  She is not diaphoretic  Psychiatric: She has a normal mood and affect      ______________________________________________________________________  Vitals:    10/19/17 0405 10/19/17 0435 10/19/17 0505 10/19/17 0600   BP: 101/59 100/51 93/52    Pulse: 78 76 76    Resp: 14 13 13    Temp:  98 5 °F (36 9 °C)     TempSrc:  Oral     SpO2: 96% 98% 97%    Weight:    90 8 kg (200 lb 2 8 oz)   Height:           Temperature:   Temp (24hrs), Av 9 °F (38 3 °C), Min:98 4 °F (36 9 °C), Max:104 6 °F (40 3 °C)    Current Temperature: 98 5 °F (36 9 °C)  Weights:   IBW: 54 7 kg    Body mass index is 34 36 kg/m²    Weight (last 2 days)     Date/Time   Weight    10/19/17 0600  90 8 (200 18)    10/18/17 2125  90 6 (199 74)            SpO2: SpO2: 97 %  Intake and Outputs:  I/O       10/17 0701 - 10/18 0700 10/18 0701 - 10/19 0700    I V  (mL/kg)  1498 5 (16 5)    Total Intake(mL/kg)  1498 5 (16 5)    Urine (mL/kg/hr)  709    Total Output   709    Net +789 5              UOP:  ml/hour   Nutrition:        Diet Orders            Start     Ordered    10/18/17 2201  Diet NPO; Sips with meds  Diet effective now     Question Answer Comment   Diet Type NPO    NPO Except: Sips with meds    RD to adjust diet per protocol? No        10/18/17 2204        Labs:     Results from last 7 days  Lab Units 10/19/17  0431 10/18/17  2223 10/18/17  1441   WBC Thousand/uL 18 26* 17 93* 13 08*   HEMOGLOBIN g/dL 8 2* 8 4* 9 4*   HEMATOCRIT % 26 8* 27 2* 30 7*   PLATELETS Thousands/uL 55* 47* 75*   NEUTROS PCT % 87* 89*  --    MONOS PCT % 6 5  --    MONO PCT MAN %  --   --  0*       Results from last 7 days  Lab Units 10/19/17  0431 10/18/17  1551   SODIUM mmol/L 145 140   POTASSIUM mmol/L 3 7 3 7   CHLORIDE mmol/L 116* 109*   CO2 mmol/L 20* 22   BUN mg/dL 9 9   CREATININE mg/dL 1 05 1 32*   CALCIUM mg/dL 7 4* 7 7*   TOTAL PROTEIN g/dL 6 0* 6 5   BILIRUBIN TOTAL mg/dL 3 40* 2 90*   ALK PHOS U/L 119* 143*   ALT U/L 34 38   AST U/L 90* 56*   GLUCOSE RANDOM mg/dL 118 104       Results from last 7 days  Lab Units 10/19/17  0431   MAGNESIUM mg/dL 1 8     Lab Results   Component Value Date    PHOS 3 1 10/19/2017    PHOS 2 1 (L) 08/02/2017        Results from last 7 days  Lab Units 10/19/17  0431 10/18/17  1443   INR  2 58* 1 91*   PTT seconds 47* 41*       0  Lab Value Date/Time   TROPONINI 0 13 (H) 08/02/2017 0436   TROPONINI 0 13 (H) 08/02/2017 0329   TROPONINI 0 12 (H) 08/01/2017 2141       Results from last 7 days  Lab Units 10/19/17  0431 10/19/17  0242 10/19/17  0041   LACTIC ACID mmol/L 2 1* 2 3* 2 6*     ABG:No results found for: PHART, UOY9MTU, PO2ART, MBJ2KPI, E6IEDMZF, BEART, SOURCE  Imaging:  I have personally reviewed pertinent reports  Micro:  No results found for: Angel Mast, SPUTUMCULTUR  Allergies:    Allergies   Allergen Reactions    Acetaminophen Other (See Comments)     Liver function, s/p bariatric surgery    Cymbalta [Duloxetine Hcl] GI Intolerance     Medications:   Scheduled Meds:  cefepime 2,000 mg Intravenous Q12H   chlorhexidine 15 mL Swish & Spit Q12H [address]   heparin (porcine) 5,000 Units Subcutaneous Q8H [address]   lactulose 10 g Oral BID   metroNIDAZOLE 500 mg Intravenous Q8H   pantoprazole 40 mg Oral BID AC   pregabalin 300 mg Oral BID     Continuous Infusions:  multi-electrolyte 125 mL/hr Last Rate: 125 mL/hr (10/19/17 0600)   norepinephrine 1-30 mcg/min Last Rate: 4 mcg/min (10/19/17 0436)     PRN Meds:     VTE Pharmacolo gic Prophylaxis: Heparin  VTE Mechanical Prophylaxis: sequential compression device  Invasive lines and devices: Invasive Devices     Central Venous Catheter Line            CVC Central Lines 10/18/17 Triple Right Femoral less than 1 day          Peripheral Intravenous Line            Peripheral IV 10/18/17 Left Antecubital less than 1 day          Drain            Urethral Catheter Latex 18 Fr  less than 1 day                  Portions of the record may have been created with voice recognition software  Occasional wrong word or "sound a like" substitutions may have occurred due to the inherent limitations of voice recognition software  Read the chart carefully and recognize, using context, where substitutions have occurred      Nohemy Worthy

## 2017-10-19 NOTE — SOCIAL WORK
Pt is <90 days readmission  Pt's last admission dx was GI bleed  CM met pt and  Viky Pool at bedside and made aware of CM role at discharge  Pt reported that she lives in a 2 story hose with her  and brother  Thre are 3 CARLOTTA and 15 steps to the 2nd floor where the full bath is at  Pt reported that she sleeps on the living room couch and there is a half bath available in the first floor  Pt's LW is noted in EPIC  CM reminded the pt to give the hospital a copy of her POA papers  Pt stated that Viky Pool her  is her POA  Pt was IPTA with all ADL's, drive but not employed  Pt's only  DME is a [place]  Pt denies hx with HHC, STR, drug or psych tx  Pt reported that she had hx with AA related to Etho abuse  Pt reported that she stopped drinking alcoholic drinks August 1, 2017  Pt stated that she is interested with ETOH rehab when d/c  Preferred pharmacy is [facility] in [employer]  Pt's family will transport pt home when medically clear for discharge  CM reviewed d/c planning process including the following: identifying help at home, patient preference for d/c planning needs, Discharge Lounge, [facility] Meds to Bed program, availability of treatment team to discuss questions or concerns patient and/or family may have regarding understanding medications and recognizing signs and symptoms once discharged  CM also encouraged patient to follow up with all recommended appointments after discharge  Patient advised of importance for patient and family to participate in managing patients medical well being

## 2017-10-20 PROBLEM — E83.42 HYPOMAGNESEMIA: Status: RESOLVED | Noted: 2017-10-19 | Resolved: 2017-10-20

## 2017-10-20 PROBLEM — E87.2 LACTIC ACIDOSIS: Status: RESOLVED | Noted: 2017-10-19 | Resolved: 2017-10-20

## 2017-10-20 PROBLEM — E83.39 HYPOPHOSPHATEMIA: Status: ACTIVE | Noted: 2017-10-20

## 2017-10-20 PROBLEM — E83.51 HYPOCALCEMIA: Status: RESOLVED | Noted: 2017-10-19 | Resolved: 2017-10-20

## 2017-10-20 LAB
ALBUMIN SERPL BCP-MCNC: 1.6 G/DL (ref 3.5–5)
ALP SERPL-CCNC: 106 U/L (ref 46–116)
ALT SERPL W P-5'-P-CCNC: 34 U/L (ref 12–78)
ANION GAP SERPL CALCULATED.3IONS-SCNC: 6 MMOL/L (ref 4–13)
AST SERPL W P-5'-P-CCNC: 98 U/L (ref 5–45)
BILIRUB SERPL-MCNC: 2.55 MG/DL (ref 0.2–1)
BUN SERPL-MCNC: 7 MG/DL (ref 5–25)
CA-I BLD-SCNC: 1.13 MMOL/L (ref 1.12–1.32)
CALCIUM SERPL-MCNC: 7.8 MG/DL (ref 8.3–10.1)
CHLORIDE SERPL-SCNC: 114 MMOL/L (ref 100–108)
CO2 SERPL-SCNC: 23 MMOL/L (ref 21–32)
CREAT SERPL-MCNC: 1.04 MG/DL (ref 0.6–1.3)
ERYTHROCYTE [DISTWIDTH] IN BLOOD BY AUTOMATED COUNT: 25.9 % (ref 11.6–15.1)
GFR SERPL CREATININE-BSD FRML MDRD: 60 ML/MIN/1.73SQ M
GLUCOSE SERPL-MCNC: 85 MG/DL (ref 65–140)
HCT VFR BLD AUTO: 25.2 % (ref 34.8–46.1)
HGB BLD-MCNC: 7.7 G/DL (ref 11.5–15.4)
MAGNESIUM SERPL-MCNC: 2 MG/DL (ref 1.6–2.6)
MCH RBC QN AUTO: 25.1 PG (ref 26.8–34.3)
MCHC RBC AUTO-ENTMCNC: 30.6 G/DL (ref 31.4–37.4)
MCV RBC AUTO: 82 FL (ref 82–98)
PHOSPHATE SERPL-MCNC: 1.7 MG/DL (ref 2.7–4.5)
PLATELET # BLD AUTO: 39 THOUSANDS/UL (ref 149–390)
POTASSIUM SERPL-SCNC: 3.7 MMOL/L (ref 3.5–5.3)
PROT SERPL-MCNC: 5.6 G/DL (ref 6.4–8.2)
RBC # BLD AUTO: 3.07 MILLION/UL (ref 3.81–5.12)
SODIUM SERPL-SCNC: 143 MMOL/L (ref 136–145)
WBC # BLD AUTO: 5.97 THOUSAND/UL (ref 4.31–10.16)
WBC STL QL MICRO: NORMAL

## 2017-10-20 PROCEDURE — 84100 ASSAY OF PHOSPHORUS: CPT | Performed by: NURSE PRACTITIONER

## 2017-10-20 PROCEDURE — 82330 ASSAY OF CALCIUM: CPT | Performed by: NURSE PRACTITIONER

## 2017-10-20 PROCEDURE — 87505 NFCT AGENT DETECTION GI: CPT | Performed by: NURSE PRACTITIONER

## 2017-10-20 PROCEDURE — 97163 PT EVAL HIGH COMPLEX 45 MIN: CPT

## 2017-10-20 PROCEDURE — 87205 SMEAR GRAM STAIN: CPT | Performed by: NURSE PRACTITIONER

## 2017-10-20 PROCEDURE — 80053 COMPREHEN METABOLIC PANEL: CPT | Performed by: NURSE PRACTITIONER

## 2017-10-20 PROCEDURE — 87329 GIARDIA AG IA: CPT | Performed by: NURSE PRACTITIONER

## 2017-10-20 PROCEDURE — G8979 MOBILITY GOAL STATUS: HCPCS

## 2017-10-20 PROCEDURE — 83735 ASSAY OF MAGNESIUM: CPT | Performed by: NURSE PRACTITIONER

## 2017-10-20 PROCEDURE — 85027 COMPLETE CBC AUTOMATED: CPT | Performed by: NURSE PRACTITIONER

## 2017-10-20 PROCEDURE — G8978 MOBILITY CURRENT STATUS: HCPCS

## 2017-10-20 RX ORDER — FUROSEMIDE 20 MG/1
20 TABLET ORAL DAILY
Status: DISCONTINUED | OUTPATIENT
Start: 2017-10-20 | End: 2017-10-22

## 2017-10-20 RX ORDER — METRONIDAZOLE 500 MG/1
500 TABLET ORAL EVERY 8 HOURS SCHEDULED
Status: DISCONTINUED | OUTPATIENT
Start: 2017-10-20 | End: 2017-10-24 | Stop reason: HOSPADM

## 2017-10-20 RX ORDER — FUROSEMIDE 20 MG/1
20 TABLET ORAL DAILY
COMMUNITY
End: 2018-10-08 | Stop reason: SDUPTHER

## 2017-10-20 RX ORDER — FOLIC ACID 1 MG/1
1 TABLET ORAL DAILY
Status: DISCONTINUED | OUTPATIENT
Start: 2017-10-20 | End: 2017-10-24 | Stop reason: HOSPADM

## 2017-10-20 RX ORDER — SPIRONOLACTONE 50 MG/1
50 TABLET, FILM COATED ORAL DAILY
Status: DISCONTINUED | OUTPATIENT
Start: 2017-10-20 | End: 2017-10-23

## 2017-10-20 RX ORDER — THIAMINE MONONITRATE (VIT B1) 100 MG
200 TABLET ORAL DAILY
Status: DISCONTINUED | OUTPATIENT
Start: 2017-10-20 | End: 2017-10-24 | Stop reason: HOSPADM

## 2017-10-20 RX ORDER — SODIUM CHLORIDE 9 MG/ML
20 INJECTION, SOLUTION INTRAVENOUS CONTINUOUS
Status: DISCONTINUED | OUTPATIENT
Start: 2017-10-23 | End: 2017-10-23

## 2017-10-20 RX ADMIN — METRONIDAZOLE 500 MG: 500 TABLET ORAL at 16:24

## 2017-10-20 RX ADMIN — RIFAXIMIN 200 MG: 200 TABLET ORAL at 18:20

## 2017-10-20 RX ADMIN — CIPROFLOXACIN 500 MG: 500 TABLET, FILM COATED ORAL at 21:35

## 2017-10-20 RX ADMIN — HEPARIN SODIUM 5000 UNITS: 5000 INJECTION, SOLUTION INTRAVENOUS; SUBCUTANEOUS at 21:36

## 2017-10-20 RX ADMIN — FUROSEMIDE 20 MG: 20 TABLET ORAL at 16:26

## 2017-10-20 RX ADMIN — METRONIDAZOLE 500 MG: 500 TABLET ORAL at 21:35

## 2017-10-20 RX ADMIN — Medication 200 MG: at 09:10

## 2017-10-20 RX ADMIN — PANTOPRAZOLE SODIUM 40 MG: 40 TABLET, DELAYED RELEASE ORAL at 06:02

## 2017-10-20 RX ADMIN — HEPARIN SODIUM 5000 UNITS: 5000 INJECTION, SOLUTION INTRAVENOUS; SUBCUTANEOUS at 16:24

## 2017-10-20 RX ADMIN — RIFAXIMIN 200 MG: 200 TABLET ORAL at 09:10

## 2017-10-20 RX ADMIN — CIPROFLOXACIN 500 MG: 500 TABLET, FILM COATED ORAL at 09:10

## 2017-10-20 RX ADMIN — PREGABALIN 300 MG: 100 CAPSULE ORAL at 09:09

## 2017-10-20 RX ADMIN — PANTOPRAZOLE SODIUM 40 MG: 40 TABLET, DELAYED RELEASE ORAL at 16:24

## 2017-10-20 RX ADMIN — METRONIDAZOLE 500 MG: 500 TABLET ORAL at 09:10

## 2017-10-20 RX ADMIN — FOLIC ACID 1 MG: 1 TABLET ORAL at 09:09

## 2017-10-20 RX ADMIN — LACTULOSE 20 G: 20 SOLUTION ORAL at 16:24

## 2017-10-20 RX ADMIN — LACTULOSE 20 G: 20 SOLUTION ORAL at 21:36

## 2017-10-20 RX ADMIN — LACTULOSE 20 G: 20 SOLUTION ORAL at 09:09

## 2017-10-20 RX ADMIN — HEPARIN SODIUM 5000 UNITS: 5000 INJECTION, SOLUTION INTRAVENOUS; SUBCUTANEOUS at 06:02

## 2017-10-20 RX ADMIN — PREGABALIN 300 MG: 100 CAPSULE ORAL at 21:35

## 2017-10-20 RX ADMIN — IRON SUCROSE 200 MG: 20 INJECTION, SOLUTION INTRAVENOUS at 08:11

## 2017-10-20 RX ADMIN — POTASSIUM PHOSPHATE, MONOBASIC AND POTASSIUM PHOSPHATE, DIBASIC 30 MMOL: 224; 236 INJECTION, SOLUTION INTRAVENOUS at 08:12

## 2017-10-20 NOTE — PROGRESS NOTES
Report called to Roderick Camp on p7   Patient to be transported via wheel chair with IV, by transport team

## 2017-10-20 NOTE — CONSULTS
Consultation - [facility]  Cody Lino 62 y o  female MRN: [id number]  Unit/Bed#: [facility] 726-01 Encounter: [id number]        Inpatient consult to gastroenterology  Consult performed by: Stephanie Lott ordered by: Winter Garcia          Reason for Consult / Principal Problem:  Alcoholic cirrhosis of liver with ascites, colitis    HPI:  59-year-old female with alcoholic cirrhosis complicated by ascites and hepatic encephalopathy transferred from [facility] 10/18 with septic shock of unknown etiology  Patient went to [address] [place] [address] for outpatient ultrasound and was noted to have shaking chills and confusion  Upon arrival to the ED, she was found to have fever of 104 degrees fahrenheit and lactic acidosis  She was started on IV antibiotics and fluids  She was transferred to [facility] for intensive care  Infectious workup negative including chest x-ray, urinalysis, blood cultures  CT abdomen pelvis showed ascending colitis and only trace ascites  She was transferred out of the ICU this morning to [facility]  She is alert and oriented x3  She denies nausea, vomiting, abdominal pain, diarrhea, constipation, melena, hematochezia  Stool studies were ordered however she has not had a bowel movement since admission  Of note, patient was admitted in August 2017 with GI bleeding secondary to anastomotic ulcer from prior gastric bypass  Most recent EGD and colonoscopy were September 14, 2017  EGD showed no evidence of esophageal varices  The anastomotic ulcers healed  Colonoscopy revealed excellent bowel prep with 2 small adenomatous polyps and internal hemorrhoids  She has been sober from alcohol since 8/2/2017  She was evaluated by Dr Sariah Khalil with [facility]  She is not yet listed for transplant  She is supposed to have DEXA and cardiac work-up next month  REVIEW OF SYSTEMS:    CONSTITUTIONAL: Denies any fever, chills   Good appetite, and no recent weight loss  HEENT: No earache or tinnitus  Denies hearing loss or visual disturbances  CARDIOVASCULAR: No chest pain or palpitations  RESPIRATORY: Denies any cough, hemoptysis, shortness of breath or dyspnea on exertion  GASTROINTESTINAL: As noted in the History of Present Illness  GENITOURINARY: No problems with urination  Denies any hematuria or dysuria  NEUROLOGIC: No dizziness or vertigo, denies headaches  MUSCULOSKELETAL: Bilateral leg discomfort  SKIN: Denies skin rashes or itching  ENDOCRINE: Denies excessive thirst  Denies intolerance to heat or cold  PSYCHOSOCIAL: Denies depression or anxiety  Denies any recent memory loss  Historical Information   Past Medical History:   Diagnosis Date    Anemia     Anxiety     Cancer [facility] [address]     breast    Chronic narcotic dependence (HCC)     Chronic pain disorder     Cirrhosis, alcoholic (HCC)     Depression     Esophagitis     History of bilateral mastectomy     History of gastric bypass     Hypertension     Liver disease     Morbid obesity ([facility] [address] )     [facility]     Peripheral neuropathy     Rosacea      Past Surgical History:   Procedure Laterality Date    ABDOMINAL SURGERY      abdominoplasty    BREAST SURGERY      mastectomy    CHOLECYSTECTOMY      ESOPHAGOGASTRODUODENOSCOPY N/A 8/2/2017    Procedure: ESOPHAGOGASTRODUODENOSCOPY (EGD); Surgeon: Trina Davey DO;  Location: [facility] OR;  Service: Gastroenterology    GASTRIC BYPASS      HERNIA REPAIR      MASTECTOMY      NC ESOPHAGOGASTRODUODENOSCOPY TRANSORAL DIAGNOSTIC N/A 5/18/2017    Procedure: ESOPHAGOGASTRODUODENOSCOPY (EGD) with bx;  Surgeon: Basim Taylor MD;  Location: [facility]; Service: Gastroenterology    NC ESOPHAGOGASTRODUODENOSCOPY TRANSORAL DIAGNOSTIC N/A 9/14/2017    Procedure: EGD with bx AND COLONOSCOPY with polypectomy;  Surgeon: Basim Taylor MD;  Location: [facility];   Service: Gastroenterology    DECLAN-EN-Y PROCEDURE 2005     Social History   History   Alcohol Use No     Comment: quit 08/02/2017     History   Drug Use No     History   Smoking Status    Never Smoker   Smokeless Tobacco    Never Used     Family History   Problem Relation Age of Onset    Diabetes Mother     Alzheimer's disease Mother     Kidney disease Father     Hypertension Father     Obesity Sister     Fibrocystic breast disease Sister        Meds/Allergies     Prescriptions Prior to Admission   Medication    furosemide (LASIX) 20 mg tablet    folic acid (FOLVITE) 1 mg tablet    furosemide (LASIX) 20 mg tablet    lactulose 20 g/30 mL    pantoprazole (PROTONIX) 40 mg tablet    pregabalin (LYRICA) 300 MG capsule    rifaximin (XIFAXAN) 200 mg tablet    spironolactone (ALDACTONE) 100 mg tablet    sucralfate (CARAFATE) 1 g/10 mL suspension    Thiamine HCl (VITAMIN B-1 PO)     Current Facility-Administered Medications   Medication Dose Route Frequency    ciprofloxacin (CIPRO) tablet 500 mg  500 mg Oral [id number] [address]    folic acid (FOLVITE) tablet 1 mg  1 mg Oral Daily    heparin (porcine) subcutaneous injection 5,000 Units  5,000 Units Subcutaneous Q8H [address]    lactulose 20 g/30 mL oral solution 20 g  20 g Oral TID    metroNIDAZOLE (FLAGYL) tablet 500 mg  500 mg Oral Q8H [address]    pantoprazole (PROTONIX) EC tablet 40 mg  40 mg Oral BID AC    pregabalin (LYRICA) capsule 300 mg  300 mg Oral BID    rifaximin (XIFAXAN) tablet 200 mg  200 mg Oral BID    thiamine (VITAMIN B1) tablet 200 mg  200 mg Oral Daily     Facility-Administered Medications Ordered in Other Encounters   Medication Dose Route Frequency    iron sucrose (VENOFER) 200 mg in sodium chloride 0 9 % 100 mL IVPB  200 mg Intravenous Once    [START ON 10/23/2017] iron sucrose (VENOFER) 200 mg in sodium chloride 0 9 % 100 mL IVPB  200 mg Intravenous Once    sodium chloride 0 9 % infusion  20 mL/hr Intravenous Continuous    [START ON 10/23/2017] sodium chloride 0 9 % infusion  20 mL/hr Intravenous Continuous       Allergies   Allergen Reactions    Acetaminophen Other (See Comments)     Liver function, s/p bariatric surgery    Cymbalta [Duloxetine Hcl] GI Intolerance           Objective     Blood pressure 103/62, pulse 88, temperature 98 °F (36 7 °C), temperature source Oral, resp  rate 16, height 5' 4" (1 626 m), weight 90 8 kg (200 lb 2 8 oz), SpO2 99 %, not currently breastfeeding  Intake/Output Summary (Last 24 hours) at 10/20/17 1454  Last data filed at 10/20/17 1320   Gross per 24 hour   Intake          2313 85 ml   Output             1965 ml   Net           348 85 ml         PHYSICAL EXAM:      General Appearance:   Alert, obese female, cooperative, no distress, appears stated age    HEENT:   Normocephalic, atraumatic, anicteric      Neck:  Supple, symmetrical, trachea midline, no adenopathy   Lungs:   Clear to auscultation bilaterally; no rales, rhonchi or wheezing; respirations unlabored    Heart[de-identified]   S1 and S2 normal; regular rate and rhythm; no murmur, rub, or gallop  Abdomen:   Obese  Normal bowel sounds  Soft  Mild right upper quadrant tenderness to palpation   No rebound or guarding      Genitalia:   Deferred    Rectal:   Deferred    Extremities:  No cyanosis, clubbing or edema    Pulses:  2+ and symmetric all extremities    Skin:  Skin color, texture, turgor normal, no rashes or lesions    Lymph nodes:  No palpable cervical lymphadenopathy        Lab Results:     Results from last 7 days  Lab Units 10/20/17  0434 10/19/17  0431   WBC Thousand/uL 5 97 18 26*   HEMOGLOBIN g/dL 7 7* 8 2*   HEMATOCRIT % 25 2* 26 8*   PLATELETS Thousands/uL 39* 55*   NEUTROS PCT %  --  87*   LYMPHS PCT %  --  7*   MONOS PCT %  --  6   EOS PCT %  --  0       Results from last 7 days  Lab Units 10/20/17  0434   SODIUM mmol/L 143   POTASSIUM mmol/L 3 7   CHLORIDE mmol/L 114*   CO2 mmol/L 23   BUN mg/dL 7   CREATININE mg/dL 1 04   CALCIUM mg/dL 7 8*   TOTAL PROTEIN g/dL 5 6*   BILIRUBIN TOTAL mg/dL 2 55*   ALK PHOS U/L 106   ALT U/L 34   AST U/L 98*   GLUCOSE RANDOM mg/dL 85       Results from last 7 days  Lab Units 10/19/17  0431   INR  2 58*           Imaging Studies: I have personally reviewed pertinent imaging studies  Xr Chest Portable - 1 View    Result Date: 10/18/2017  Impression: Low lung volumes  No focal infiltrate or pleural effusion  Workstation performed: [id number]     Us Abdomen Limited    Result Date: 10/18/2017  Impression: 1  No evidence of ascites  2   Cirrhotic appearance to the liver  Hepatofugal flow now identified in the main portal vein  Prominent recanalized umbilical vein again identified, previously present  3   Right pleural effusion  Workstation performed: [id number]     Ct Abdomen Pelvis With Contrast    Result Date: 10/18/2017  Impression: Diffuse wall thickening and pericolic infiltration of the ascending colon to the level of the hepatic flexure consistent with colitis  Cirrhosis with signs of portal hypertension and anasarca  Trace abdominal ascites  Midline mesentery containing hernia  Workstation performed: [id number]       ASSESSMENT and PLAN:      15-year-old female with history of alcoholic cirrhosis admitted with septic shock found to have colitis on CT scan  1) Ascending colitis: CT scan shows diffuse wall thickening of the ascending colon to the level of the hepatic flexure  She is asymptomatic and denies abdominal pain, diarrhea, and bleeding  This may have been the source for her septic shock as her infectious work-up was negative otherwise  Recent colonoscopy last month revealed normal appearing colon with 2 small polyps removed  She is now clinically improved  No leukocytosis or fevers  Her abdomen is soft and only mildly tender in the right upper quadrant     -Continue IV antibiotics  -Diet as tolerated  -We will hold off on repeat colonoscopy since patient is clinically improving     2) Decompensated alcoholic cirrhosis: Current MELD 22   Her cirrhosis is likely decompensated from septic shock  She has been sober from alcohol for the past two months     -Monitor MELD labs daily   -Follow-up with [facility] for liver transplant evaluation  -If her MELD score continues to deteriorate, we will contact [facility]     3) Acute on chronic hepatic encephalopathy:  She is currently alert and oriented x 3     -Continue lactulose with a goal of 2-3 BMs daily  -Continue Xifaxan twice daily    4) Ascites: CT abdomen pelvis shows anasarca with trace abdominal ascites  Diuretics currently being held  BUN/creatinine within normal limits  -May restart low dose diuretics  -Monitor BMP closely  -2 gram sodium diet    Patient was seen and examined by Dr Hailee Jackson  All deras medical decisions were made by Dr Hailee Jackson  Thank you for allowing us to participate in the care of this present patient  We will follow-up with you closely

## 2017-10-20 NOTE — PROGRESS NOTES
Progress Note - Critical Care   Anthony Coppola 62 y o  female MRN: [id number]  Unit/Bed#: MICU 08 Encounter: [id number]    Attending Physician: Getachew Matthews MD    ______________________________________________________________________  Assessment and Plan:   Principal Problem:    Septic shock due to undetermined organism [facility])  Active Problems:    Pancytopenia ([facility] [address] )    Alcoholic cirrhosis of liver with ascites ([facility] [address] )    Hypophosphatemia  Resolved Problems:    Lactic acidosis    Hypomagnesemia    Hypocalcemia    Neuro: Continue delirium precautions, Continue home lyrica  CV: Remains off pressors  BPs soft with SBP 89 to low 90s, seems that she's normally high 90s to low 100s  Will hold on restarting lasix and spirolactone, consider restarting some tomorrow  Per pt she takes Lasix 40 mg in AM and 20 mg a night, spirolactone 50 mg BID  Pulm: Tolerating RA, mobilize OOB, IS    GI: Advance diet  Pt with ESLD and following with [facility] for possible transplant  Continue lactulose and protonix    : Replacing potassium and phos today  D/C huber catheter    ID: Afebrile, leukocytosis resolved  BC from admission negative x 24hrs, stool cultures pending because of no BMs yet  Continue cipro BID and flagyl for colitis on CT  Plan to complete 7 days of abx, currently day 3  Heme: Continue heparin SQ for DVT prophylaxis  Will hold off on venofer dose today for pt's iron deficiency anemia (she follows with Dr Angelique Dunn)  But consider resuming next week  Close monitoring of Hgb daily  Restarting home thiamine and folic acid  Endo: No acute issues     Msk/Skin: PT/OT following, OOB today  Disposition: Transfer to [facility]    Code Status: Level 1 - Full Code    ______________________________________________________________________    Chief Complaint: Denies pain or SOB, wants to get oob today  24 Hour Events: No events overnight, Levophed stopped at 1730 yesterday and she remained off overnight  ______________________________________________________________________    Physical Exam:   Physical Exam   Constitutional: She is oriented to person, place, and time  She appears well-developed and well-nourished  HENT:   Head: Normocephalic and atraumatic  Eyes: Conjunctivae and EOM are normal  Pupils are equal, round, and reactive to light  Neck: Normal range of motion  Neck supple  No tracheal deviation present  Cardiovascular: Normal rate, regular rhythm and intact distal pulses  Exam reveals no gallop and no friction rub  Murmur heard  Pulmonary/Chest: Effort normal and breath sounds normal  No respiratory distress  She has no wheezes  On RA with SpO2 98%  LS CTA B/L   Abdominal: Soft  Bowel sounds are normal  She exhibits no distension  There is no tenderness  Musculoskeletal: She exhibits edema  Some pedal edema b/l   Neurological: She is alert and oriented to person, place, and time  She has normal reflexes  Skin: Skin is warm and dry  Psychiatric: She has a normal mood and affect      ______________________________________________________________________  Vitals:    10/20/17 0205 10/20/17 0305 10/20/17 0405 10/20/17 0505   BP: 91/52 94/54 95/57 (!) 89/50   Pulse: 78 80 78 76   Resp: 14 15 12 13   Temp:       TempSrc:       SpO2: 97% 96% 93% 95%   Weight:       Height:           Temperature:   Temp (24hrs), Av 6 °F (37 °C), Min:98 3 °F (36 8 °C), Max:99 °F (37 2 °C)    Current Temperature: 99 °F (37 2 °C)  Weights:   IBW: 54 7 kg    Body mass index is 34 36 kg/m²  Weight (last 2 days)     Date/Time   Weight    10/19/17 0600  90 8 (200 18)    10/18/17 2125  90 6 (199 74)            SpO2: SpO2: 96 %  Intake and Outputs:  I/O       10/18 0701 - 10/19 0700 10/19 0701 - 10/20 0700    P  O   1645    I V  (mL/kg) 1498 5 (16 5) 851 5 (9 4)    IV Piggyback  630    Total Intake(mL/kg) 1498 5 (16 5) 3126 5 (34 4)    Urine (mL/kg/hr) 709 2025 (0 9)    Total Output 702025    Net +789 5 +1101 5              Nutrition:        Diet Orders            Start     Ordered    10/19/17 1154  Diet Clear Liquid  Diet effective now     Question Answer Comment   Diet Type Clear Liquid    RD to adjust diet per protocol? No        10/19/17 1154          Labs:     Results from last 7 days  Lab Units 10/20/17  0434 10/19/17  0431 10/18/17  2223 10/18/17  1441   WBC Thousand/uL 5 97 18 26* 17 93* 13 08*   HEMOGLOBIN g/dL 7 7* 8 2* 8 4* 9 4*   HEMATOCRIT % 25 2* 26 8* 27 2* 30 7*   PLATELETS Thousands/uL  --  55* 47* 75*   NEUTROS PCT %  --  87* 89*  --    MONOS PCT %  --  6 5  --    MONO PCT MAN %  --   --   --  0*       Results from last 7 days  Lab Units 10/20/17  0434 10/19/17  0431 10/18/17  1551   SODIUM mmol/L 143 145 140   POTASSIUM mmol/L 3 7 3 7 3 7   CHLORIDE mmol/L 114* 116* 109*   CO2 mmol/L 23 20* 22   BUN mg/dL 7 9 9   CREATININE mg/dL 1 04 1 05 1 32*   CALCIUM mg/dL 7 8* 7 4* 7 7*   TOTAL PROTEIN g/dL 5 6* 6 0* 6 5   BILIRUBIN TOTAL mg/dL 2 55* 3 40* 2 90*   ALK PHOS U/L 106 119* 143*   ALT U/L 34 34 38   AST U/L 98* 90* 56*   GLUCOSE RANDOM mg/dL 85 118 104       Results from last 7 days  Lab Units 10/20/17  0434 10/19/17  0431   MAGNESIUM mg/dL 2 0 1 8     Lab Results   Component Value Date    PHOS 1 7 (L) 10/20/2017    PHOS 3 1 10/19/2017    PHOS 2 1 (L) 08/02/2017        Results from last 7 days  Lab Units 10/19/17  0431 10/18/17  1443   INR  2 58* 1 91*   PTT seconds 47* 41*       0  Lab Value Date/Time   TROPONINI 0 13 (H) 08/02/2017 0436   TROPONINI 0 13 (H) 08/02/2017 0329   TROPONINI 0 12 (H) 08/01/2017 2141       Results from last 7 days  Lab Units 10/19/17  0608 10/19/17  0431 10/19/17  0242   LACTIC ACID mmol/L 2 3* 2 1* 2 3*     ABG:No results found for: PHART, QMR0WPA, PO2ART, AZU4WDJ, C6LSKRSZ, BEART, SOURCE  Imaging:  I have personally reviewed pertinent reports  Micro:  Lab Results   Component Value Date    BLOODCX No Growth at 24 hrs  10/18/2017    BLOODCX No Growth at 24 hrs  10/18/2017     Allergies: Allergies   Allergen Reactions    Acetaminophen Other (See Comments)     Liver function, s/p bariatric surgery    Cymbalta [Duloxetine Hcl] GI Intolerance     Medications:   Scheduled Meds:  ciprofloxacin 500 mg Oral Q12H [address]   heparin (porcine) 5,000 Units Subcutaneous Q8H [address]   lactulose 20 g Oral TID   metroNIDAZOLE 500 mg Intravenous Q8H   pantoprazole 40 mg Oral BID AC   potassium phosphate 30 mmol Intravenous Once   pregabalin 300 mg Oral BID     Continuous Infusions:  norepinephrine 1-30 mcg/min Last Rate: Stopped (10/19/17 1730)     PRN Meds:     VTE Pharmacologic Prophylaxis: Heparin  VTE Mechanical Prophylaxis: sequential compression device  Invasive lines and devices: Invasive Devices     Central Venous Catheter Line            CVC Central Lines 10/18/17 Triple Right Femoral 1 day          Peripheral Intravenous Line            Peripheral IV 10/18/17 Left Antecubital 1 day          Drain            Urethral Catheter Latex 18 Fr  1 day                     Portions of the record may have been created with voice recognition software  Occasional wrong word or "sound a like" substitutions may have occurred due to the inherent limitations of voice recognition software  Read the chart carefully and recognize, using context, where substitutions have occurred      Abdiel Ellis

## 2017-10-20 NOTE — PHYSICAL THERAPY NOTE
Physical Therapy Evaluation    Patient's Name: Yolis Phelan    Admitting Diagnosis  Septic shock ([facility] ) [A41 9, R65 21]    Problem List  Patient Active Problem List   Diagnosis    Pancytopenia ([facility] )    Hepatic encephalopathy ([facility] )    Alcoholic cirrhosis of liver with ascites ([facility]    Alcohol abuse    Elevated troponin    GI bleed    History of gastric bypass    Acute blood loss anemia    Thrombocytopenic ([facility] )    Septic shock due to undetermined organism ([facility] )    Hypophosphatemia       Past Medical History  Past Medical History:   Diagnosis Date    Anemia     Anxiety     Cancer ([facility] )     breast    Chronic narcotic dependence (HCC)     Chronic pain disorder     Cirrhosis, alcoholic ([facility] )     Depression     Esophagitis     History of bilateral mastectomy     History of gastric bypass     Hypertension     Liver disease     Morbid obesity ([facility]     Pancytopenia ([facility] )     Peripheral neuropathy     Rosacea        Past Surgical History  Past Surgical History:   Procedure Laterality Date    ABDOMINAL SURGERY      abdominoplasty    BREAST SURGERY      mastectomy    CHOLECYSTECTOMY      ESOPHAGOGASTRODUODENOSCOPY N/A 8/2/2017    Procedure: ESOPHAGOGASTRODUODENOSCOPY (EGD); Surgeon: Alix Das DO;  Location:  MAIN OR;  Service: Gastroenterology    GASTRIC BYPASS      HERNIA REPAIR      MASTECTOMY      MS ESOPHAGOGASTRODUODENOSCOPY TRANSORAL DIAGNOSTIC N/A 5/18/2017    Procedure: ESOPHAGOGASTRODUODENOSCOPY (EGD) with bx;  Surgeon: Abraham Donald MD;  [facility]; Service: Gastroenterology    MS ESOPHAGOGASTRODUODENOSCOPY TRANSORAL DIAGNOSTIC N/A 9/14/2017    Procedure: EGD with bx AND COLONOSCOPY with polypectomy;  Surgeon: Abraham Donald MD;  [facility];   Service: Gastroenterology    DECLAN-EN-Y PROCEDURE  2005      10/20/17 1030   Note Type   Note type Eval only   Pain Assessment   Pain Assessment 0-10   Pain Score 5   Pain Type Acute pain   Pain Location Leg Pain Orientation Right   Hospital Pain Intervention(s) Repositioned; Ambulation/increased activity; Elevated; Emotional support; Environmental changes; Rest  (recent removal of RLE line)   Home Living   Type of [facility]; Able to live on main level with bedroom/bathroom  (2 CARLOTTA->LANDING->1 CARLOTTA, A with CARLOTTA)   Home Equipment (no use of DME PTA)   Additional Comments pt reports needing A from  PTA for CARLOTTA and daily needs,no use of DME PTA,no recent falls per pt   Prior Function   Level of [place] Needs assistance with ADLs and functional mobility  (per pt PTA,needs A for "daily needs")   Lives With Spouse   Receives Help From Family  (as needed per pt PTA)   ADL Assistance Independent   IADLs Needs assistance   Falls in the last 6 months 0  (pt reports no recent falls)   Restrictions/Precautions   Other Precautions Pain; Fall Risk;Telemetry;Multiple lines   General   Additional Pertinent History septic shock;transfer from [employer]   Family/Caregiver Present No   Cognition   Overall Cognitive Status WFL   Arousal/Participation Cooperative   Orientation Level Oriented X4   Following Commands Follows one step commands with increased time or repetition  (2* slow mobility and inc pain RLE)   RLE Assessment   RLE Assessment (3/5 grossly throughout,no resistance given 2* inc pain)   LLE Assessment   LLE Assessment (4/5 grossly throughout)   Coordination   Movements are Fluid and Coordinated 0   Coordination and Movement Description ataxic and unsteady,weakness,pain,dec BLE step length,dec WB RLE   Sensation WFL  (pt reports neuropathy PTA)   Light Touch   RLE Light Touch Grossly intact   LLE Light Touch Grossly intact   Bed Mobility   Rolling L 3  Moderate assistance   Additional items Assist x 1;HOB elevated; Bedrails; Increased time required;Verbal cues;LE management   Supine to Sit 3  Moderate assistance   Additional items Assist x 1;HOB elevated; Bedrails; Increased time required;Verbal cues;LE management   Transfers   Sit to Stand 3  Moderate assistance   Additional items Assist x 2;Bedrails; Increased time required;Verbal cues   Stand to Sit 3  Moderate assistance   Additional items Assist x 2;Armrests; Increased time required;Verbal cues  (for safety,education and control descent)   Ambulation/Elevation   Gait pattern Poor UE support; Improper Weight shift; Antalgic;Narrow BURKE; Forward Flexion;Decreased foot clearance;Decreased R stance;Decreased L stance;Shuffling; Inconsistent cha; Foward flexed; Short stride; Ataxia; Step to;Excessively slow   Gait Assistance 3  Moderate assist   Additional items Assist x 2;Verbal cues; Tactile cues   Assistive Device Standard walker   Distance 5 steps bed->recliner with use of SW on tile surface   Balance   Static Sitting (good in chair,fair at EOB)   Dynamic Sitting (zero)   Static Standing Zero   Dynamic Standing (zero)   Ambulatory Zero   Endurance Deficit   Endurance Deficit Yes   Endurance Deficit Description hospital deconditioning,currently being treated in [address] [place] [address]   Activity Tolerance   Activity Tolerance Patient limited by fatigue;Patient limited by pain;Treatment limited secondary to medical complications (Comment)   Nurse Made Aware yes Rebeca Daily)   Assessment   Prognosis Good   Problem List Decreased strength;Decreased endurance; Impaired balance;Decreased mobility; Decreased coordination;Obesity; Decreased skin integrity;Pain   Assessment pt is a 61 y/o female admitted to [facility] 2* septic shock  Pt was transfered from [facility] to [facility]  Pt lives with spouse in 2 story home with 1st floor setup,2 [place]->LANDING->1 CARLOTTA with A needed by  for CARLOTAT  Pt reports no use of DME PTA and no recent falls  Pt currently being treated in [facility] lines for medicine and fluids and continuous telemetry monitoring   Pt demonstrates moderate deficits during functional mobility and gait including dec endurance,dec balance,inc RLE pain,ataxic and unsteady gait and needs modAX1 for BM, modAx2 transfers and gait with use of SW on tile surface bed->recliner  Pt would cont to benefit from skilled inpt PT services to maximize functional independence  Pt currently not at functional mobility baseline  Barriers to Discharge Inaccessible home environment  (CARLOTTA)   Goals   Patient Goals to get moving and dec RLE pain   STG Expiration Date 11/03/17   Short Term Goal #1 10-14 days:pt will be able to ambulate >150 feet with use of appropriate DME on various surfaces minAx1->S with chair follow as needed,activity tolerance:45mins/45mins,inc balance 1 grade,BM and transfers minAx1->S to and from various surfaces consistently,up and down 3 steps with minAx1 following 100 feet of mobility as needed prior to D/C   Treatment Day 0   Plan   Treatment/Interventions Functional transfer training; Endurance training;Patient/family training;Equipment eval/education; Bed mobility;Gait training;Spoke to nursing   PT Frequency 5x/wk   Recommendation   Recommendation (inpt rhb vs home with family support,needs RW,HHPT)   Equipment Recommended Walker   Barthel Index   Feeding 10   Bathing 0   Grooming Score 0   Dressing Score 5   Bladder Score 10   Bowels Score 10   Toilet Use Score 5   Transfers (Bed/Chair) Score 5   Mobility (Level Surface) Score 0   Stairs Score 0   Barthel Index Score 45   Skilled PT recommended while in hospital and upon DC to progress pt toward treatment goals           Sandoval Rene, PT

## 2017-10-20 NOTE — SOCIAL WORK
CM followed up regarding HOST program and insurance related issues  CM spoke with Jaime Bustamante from [place] [address] who informed me since pt has VA insurance and lives in [facility] she would need to go through [facility] [phone] for funding of ETOH tx  She reports that they do their own assessment of pt and if pt insurance will not fund treatment they will fund for the pt  Jaime Bustamante also reports that they provided pt with the [address] to pt when they did her initial assessment  CM added to f/u providers for pt  CM spoke with Kellen Castaneda from [facility] and reported same  She reports CM should speak with someone from pt insurance first to see if they would fund treatment for pt  If they are not willing to do so CM can completed assessment packet with pt and fax to Kellen Castaneda for review and funding of tx  Pt is considered a "priority population" according to Kellen Lechuga emailed assessment packet to [facility]

## 2017-10-20 NOTE — SOCIAL WORK
CM met with pt at bedside and updated her with information regarding ETOH tx with insurance vs [facility] funding through [facility]  CM also reviewed that there is no guarantee of IP ETOH placement at d/c  Pt shows understanding  She reports she has attended AA and would be willing to continue to attend the meetings  Update: CM spoke with [facility] representative Loetta Aschoff who reports non emergent IP ETOH tx would need prior authorization  Loetta Aschoff did a search of facilities near pt home  No facility in network within [id number] [place] [address]  As per Loetta Aschoff CM needed to contact [facility] line [phone]  CM spoke with pt and updated her as same  Pt now reporting she would like OP Tx  CM spoke with Constanza Cody on the CM line  [facility] does not offer OP services for ETOH tx  Pt would need to go to a partial hospitalization program  As per Constanza Cody [facility] facility for pt is [facility]  Constanza Cody reports it is a daily program and would require authorization  CM updated Alexandrea with [facility]  She requested pt complete paperwork and contact their office so they are able to assist with funding an OP program  CM provided the pt with assessment packet to complete  CM informed her to contact [facility] office when she is d/c so they are able to assist her with OP tx options  CM updated f/u providers with contact information as well as resource list with phone number was provided to pt

## 2017-10-20 NOTE — PROGRESS NOTES
Transfer Note - ICU Transfer to SD/MS cristin Heaton 62 y o  female MRN: [id number]  [place] [address]   Unit/Bed#: MICU 08 Encounter: [id number]    Code Status: Level 1 - Full Code  POA:    POLST:      Reason for ICU adm: Septic shock    Active problems:   Principal Problem:    Septic shock due to undetermined organism ([facility] )  Active Problems:    Pancytopenia ([facility] )    Alcoholic cirrhosis of liver with ascites ([facility] )    Hypophosphatemia  Resolved Problems:    Lactic acidosis    Hypomagnesemia    Hypocalcemia      Consultants: PT/OT    History of Present Illness: 62 y o  female who presents as a transfer from [address] [place] [address] for septic shock of unknown etiology  Patient initially presented after going for an outpatient ultrasound of her abdomen to assess for ascites  Patient was reportedly confused and altered  On initial presentation, patient was found to be febrile with a temperature of 104 6° and a lactate of 4 3  Patient received broad-spectrum antibiotics cefepime and vancomycin and 30 cc/kg bolus of fluids  No infectious source was identified in the chest, urinalysis  Blood cultures are pending  CT abdomen pelvis displayed colitis of the ascending colon        During my evaluation, patient states she is significantly improved since initial presentation to [place] [address]  She currently has no complaints  Her temperature is now 100 3 rectally  She denies any recent cough, congestion  Her only initial symptom was chills and feeling fatigued  She denies any headaches, changes in vision, neck pain or stiffness      PMH: Alcoholic hepatitis/cirrhosis, pancytopenia 2/2 iron deficiency, GI bleed  PSH: Gastric bypass, R breast mastectomy for CA, no chemo or radiation       10/18 CTAP: Diffuse wall thickening and pericolic infiltration of the ascending colon to the level of the hepatic flexure consistent with colitis   Cirrhosis with signs of portal hypertension and anasarca   Trace abdominal ascites  Midline mesentery containing hernia  10/18 US abd: 1     No evidence of ascites     2   Cirrhotic appearance to the liver   Hepatofugal flow now identified in the main portal vein   Prominent recanalized umbilical vein again identified, previously present  3   Right pleural effusion  10/18 CXR: Low lung volumes   No focal infiltrate or pleural effusion      9/14/17 EGD/colonoscopy: #1  Esophagus-normal esophagus without evidence of esophageal varices  #2  Stomach-consistent previous gastric bypass   The anastomotic ulcers have healed  Yunier Oates was evidence of suture that was seen at 6 o'clock and 3 o'clock  #3   Jejunum-within normal limits status post biopsy for further evaluation of celiac disease   Only 2 biopsies were done due to low platelets and high INR  Summary of clinical course: Transfer from [facility] to [facility]"" with septic shock from acute colitis and AMS, started on flagyl and cefepime and was fluid resuscitated  MS improved upon transfer to \[facility]\""  Abx were narrowed yesterday to cipro and flagyl  She was requiring levophed on admission for BP support, this was weaned off at 530 PM on 10/19/17  BPs are still soft today, so her diurectics were not resumed yet, may consider starting them tomorrow if BP allows  Plan for 7-10 days of Abx  Recent or scheduled procedures: 10/18 Rt fem TLC    Outstanding/pending diagnostics: F/U Cultures    Cultures: 10/18 BC x 2 pending negative x 24 hrs, Neg flu screen, Stool cultures pending due to no BM yet       Mobilization Plan: OOB with assistance, PT/OT    Nutrition Plan: Regular diet    Discharge Plan:   Patient should be ready for discharge to Home in 48-72 hrs       Initial Physical Therapy Recommendations: pending  Initial Occupational Therapy Recommendations: pending  Initial /Plan: Following for D/C needs     Specific Diagnosis Plan:    Sepsis: Source: Colitis, Antibiotics: Flagyl and Cipro, Length:  7 days    Need for Infectious Disease consult: Not currently      Spoke with Dr Cadence Villalta regarding transfer  Please call with any questions or concerns  Portions of the record may have been created with voice recognition software  Occasional wrong word or "sound a like" substitutions may have occurred due to the inherent limitations of voice recognition software  Read the chart carefully and recognize, using context, where substitutions have occurred      Ashley Bhagat

## 2017-10-20 NOTE — PLAN OF CARE
Problem: PHYSICAL THERAPY ADULT  Goal: Performs mobility at highest level of function for planned discharge setting  See evaluation for individualized goals  Treatment/Interventions: Functional transfer training, Endurance training, Patient/family training, Equipment eval/education, Bed mobility, Gait training, Spoke to nursing  Equipment Recommended: Eva Barron       See flowsheet documentation for full assessment, interventions and recommendations  Prognosis: Good  Problem List: Decreased strength, Decreased endurance, Impaired balance, Decreased mobility, Decreased coordination, Obesity, Decreased skin integrity, Pain  Assessment: pt is a 63 y/o female admitted to [facility] 2* septic shock  Pt was transfered from [facility] to [facility]  Pt lives with spouse in 2 story home with 1st floor setup,2 [place]->LANDING->1 CARLOTTA with A needed by  for CARLOTTA  Pt reports no use of DME PTA and no recent falls  Pt currently being treated in [facility] lines for medicine and fluids and continuous telemetry monitoring  Pt demonstrates moderate deficits during functional mobility and gait including dec endurance,dec balance,inc RLE pain,ataxic and unsteady gait and needs modAX1 for BM, modAx2 transfers and gait with use of SW on tile surface bed->recliner  Pt would cont to benefit from skilled inpt PT services to maximize functional independence  Pt currently not at functional mobility baseline  Barriers to Discharge: Inaccessible home environment (CARLOTTA)     Recommendation:  (inpt rhb vs home with family support,needs RW,HHPT)          See flowsheet documentation for full assessment

## 2017-10-21 PROBLEM — D69.6 THROMBOCYTOPENIC (HCC): Status: RESOLVED | Noted: 2017-08-05 | Resolved: 2017-10-21

## 2017-10-21 PROBLEM — R77.8 ELEVATED TROPONIN: Status: RESOLVED | Noted: 2017-08-02 | Resolved: 2017-10-21

## 2017-10-21 PROBLEM — E83.39 HYPOPHOSPHATEMIA: Status: RESOLVED | Noted: 2017-10-20 | Resolved: 2017-10-21

## 2017-10-21 PROBLEM — M79.604 RIGHT LEG PAIN: Status: ACTIVE | Noted: 2017-10-21

## 2017-10-21 PROBLEM — K52.9 COLITIS: Status: ACTIVE | Noted: 2017-10-21

## 2017-10-21 PROBLEM — F10.11 HISTORY OF ALCOHOL ABUSE: Chronic | Status: ACTIVE | Noted: 2017-08-02

## 2017-10-21 PROBLEM — R18.8 ASCITES: Chronic | Status: RESOLVED | Noted: 2017-10-21 | Resolved: 2017-10-21

## 2017-10-21 PROBLEM — K92.2 GI BLEED: Status: RESOLVED | Noted: 2017-08-02 | Resolved: 2017-10-21

## 2017-10-21 PROBLEM — R18.8 ASCITES: Chronic | Status: ACTIVE | Noted: 2017-10-21

## 2017-10-21 PROBLEM — D62 ACUTE BLOOD LOSS ANEMIA: Status: RESOLVED | Noted: 2017-08-02 | Resolved: 2017-10-21

## 2017-10-21 LAB
ALBUMIN SERPL BCP-MCNC: 1.7 G/DL (ref 3.5–5)
ALP SERPL-CCNC: 115 U/L (ref 46–116)
ALT SERPL W P-5'-P-CCNC: 34 U/L (ref 12–78)
ANION GAP SERPL CALCULATED.3IONS-SCNC: 7 MMOL/L (ref 4–13)
AST SERPL W P-5'-P-CCNC: 98 U/L (ref 5–45)
BILIRUB DIRECT SERPL-MCNC: 1.19 MG/DL (ref 0–0.2)
BILIRUB SERPL-MCNC: 2.08 MG/DL (ref 0.2–1)
BUN SERPL-MCNC: 8 MG/DL (ref 5–25)
CALCIUM SERPL-MCNC: 7.6 MG/DL (ref 8.3–10.1)
CAMPYLOBACTER DNA SPEC NAA+PROBE: NORMAL
CHLORIDE SERPL-SCNC: 113 MMOL/L (ref 100–108)
CO2 SERPL-SCNC: 21 MMOL/L (ref 21–32)
CREAT SERPL-MCNC: 1.02 MG/DL (ref 0.6–1.3)
ERYTHROCYTE [DISTWIDTH] IN BLOOD BY AUTOMATED COUNT: 26.2 % (ref 11.6–15.1)
GFR SERPL CREATININE-BSD FRML MDRD: 61 ML/MIN/1.73SQ M
GLUCOSE SERPL-MCNC: 78 MG/DL (ref 65–140)
HCT VFR BLD AUTO: 24.8 % (ref 34.8–46.1)
HGB BLD-MCNC: 7.7 G/DL (ref 11.5–15.4)
INR PPP: 2.14 (ref 0.86–1.16)
MAGNESIUM SERPL-MCNC: 1.8 MG/DL (ref 1.6–2.6)
MCH RBC QN AUTO: 25.7 PG (ref 26.8–34.3)
MCHC RBC AUTO-ENTMCNC: 31 G/DL (ref 31.4–37.4)
MCV RBC AUTO: 83 FL (ref 82–98)
PHOSPHATE SERPL-MCNC: 2 MG/DL (ref 2.7–4.5)
PLATELET # BLD AUTO: 42 THOUSANDS/UL (ref 149–390)
POTASSIUM SERPL-SCNC: 3.7 MMOL/L (ref 3.5–5.3)
PROT SERPL-MCNC: 5.6 G/DL (ref 6.4–8.2)
PROTHROMBIN TIME: 24.1 SECONDS (ref 12.1–14.4)
RBC # BLD AUTO: 3 MILLION/UL (ref 3.81–5.12)
SALMONELLA DNA SPEC QL NAA+PROBE: NORMAL
SHIGA TOXIN STX GENE SPEC NAA+PROBE: NORMAL
SHIGELLA DNA SPEC QL NAA+PROBE: NORMAL
SODIUM SERPL-SCNC: 141 MMOL/L (ref 136–145)
WBC # BLD AUTO: 4.94 THOUSAND/UL (ref 4.31–10.16)

## 2017-10-21 PROCEDURE — 85610 PROTHROMBIN TIME: CPT | Performed by: PHYSICIAN ASSISTANT

## 2017-10-21 PROCEDURE — 83735 ASSAY OF MAGNESIUM: CPT | Performed by: NURSE PRACTITIONER

## 2017-10-21 PROCEDURE — 85027 COMPLETE CBC AUTOMATED: CPT | Performed by: NURSE PRACTITIONER

## 2017-10-21 PROCEDURE — 84100 ASSAY OF PHOSPHORUS: CPT | Performed by: NURSE PRACTITIONER

## 2017-10-21 PROCEDURE — 80076 HEPATIC FUNCTION PANEL: CPT | Performed by: PHYSICIAN ASSISTANT

## 2017-10-21 PROCEDURE — 80048 BASIC METABOLIC PNL TOTAL CA: CPT | Performed by: NURSE PRACTITIONER

## 2017-10-21 RX ORDER — LIDOCAINE 50 MG/G
1 PATCH TOPICAL DAILY
Status: DISCONTINUED | OUTPATIENT
Start: 2017-10-21 | End: 2017-10-24 | Stop reason: HOSPADM

## 2017-10-21 RX ADMIN — METRONIDAZOLE 500 MG: 500 TABLET ORAL at 05:06

## 2017-10-21 RX ADMIN — CIPROFLOXACIN 500 MG: 500 TABLET, FILM COATED ORAL at 20:58

## 2017-10-21 RX ADMIN — FUROSEMIDE 20 MG: 20 TABLET ORAL at 09:29

## 2017-10-21 RX ADMIN — METRONIDAZOLE 500 MG: 500 TABLET ORAL at 22:37

## 2017-10-21 RX ADMIN — BENZOCAINE: 100 SOLUTION TOPICAL at 15:43

## 2017-10-21 RX ADMIN — PREGABALIN 300 MG: 100 CAPSULE ORAL at 09:29

## 2017-10-21 RX ADMIN — PANTOPRAZOLE SODIUM 40 MG: 40 TABLET, DELAYED RELEASE ORAL at 06:20

## 2017-10-21 RX ADMIN — RIFAXIMIN 200 MG: 200 TABLET ORAL at 16:27

## 2017-10-21 RX ADMIN — METRONIDAZOLE 500 MG: 500 TABLET ORAL at 12:57

## 2017-10-21 RX ADMIN — RIFAXIMIN 200 MG: 200 TABLET ORAL at 09:39

## 2017-10-21 RX ADMIN — FOLIC ACID 1 MG: 1 TABLET ORAL at 09:30

## 2017-10-21 RX ADMIN — SPIRONOLACTONE 50 MG: 50 TABLET ORAL at 09:30

## 2017-10-21 RX ADMIN — PANTOPRAZOLE SODIUM 40 MG: 40 TABLET, DELAYED RELEASE ORAL at 16:27

## 2017-10-21 RX ADMIN — LIDOCAINE 1 PATCH: 50 PATCH TOPICAL at 15:41

## 2017-10-21 RX ADMIN — HEPARIN SODIUM 5000 UNITS: 5000 INJECTION, SOLUTION INTRAVENOUS; SUBCUTANEOUS at 22:29

## 2017-10-21 RX ADMIN — LACTULOSE 20 G: 20 SOLUTION ORAL at 15:43

## 2017-10-21 RX ADMIN — HEPARIN SODIUM 5000 UNITS: 5000 INJECTION, SOLUTION INTRAVENOUS; SUBCUTANEOUS at 05:06

## 2017-10-21 RX ADMIN — PREGABALIN 300 MG: 100 CAPSULE ORAL at 20:58

## 2017-10-21 RX ADMIN — CIPROFLOXACIN 500 MG: 500 TABLET, FILM COATED ORAL at 09:29

## 2017-10-21 RX ADMIN — Medication 200 MG: at 09:29

## 2017-10-21 RX ADMIN — HEPARIN SODIUM 5000 UNITS: 5000 INJECTION, SOLUTION INTRAVENOUS; SUBCUTANEOUS at 12:56

## 2017-10-21 NOTE — ASSESSMENT & PLAN NOTE
· Sober since August  · Trend meld labs as above  · OP f/u with [facility] for possible transplant

## 2017-10-21 NOTE — ASSESSMENT & PLAN NOTE
· Noted on CT scan   Only source of sepsis identified at this point  · On day #3 cipro/flagyl  · GI following, on regular diet  · Is having some diarrhea, but could be related to lactulose use  · If worsens, consider colonoscopy

## 2017-10-21 NOTE — PLAN OF CARE
DISCHARGE PLANNING     Discharge to home or other facility with appropriate resources Progressing        DISCHARGE PLANNING - CARE MANAGEMENT     Discharge to post-acute care or home with appropriate resources Progressing        GASTROINTESTINAL - ADULT     Maintains or returns to baseline bowel function Progressing     Maintains adequate nutritional intake Progressing        HEMATOLOGIC - ADULT     Maintains hematologic stability Progressing        INFECTION - ADULT     Absence or prevention of progression during hospitalization Progressing     Absence of fever/infection during neutropenic period Progressing        Knowledge Deficit     Patient/family/caregiver demonstrates understanding of disease process, treatment plan, medications, and discharge instructions Progressing        MUSCULOSKELETAL - ADULT     Maintain or return mobility to safest level of function Progressing        PAIN - ADULT     Verbalizes/displays adequate comfort level or baseline comfort level Progressing        Potential for Falls     Patient will remain free of falls Progressing        Prexisting or High Potential for Compromised Skin Integrity     Skin integrity is maintained or improved Progressing        SAFETY ADULT     Patient will remain free of falls Progressing     Maintain or return to baseline ADL function Progressing     Maintain or return mobility status to optimal level Progressing        SKIN/TISSUE INTEGRITY - ADULT     Skin integrity remains intact Progressing     Incision(s), wounds(s) or drain site(s) healing without S/S of infection Progressing     Oral mucous membranes remain intact Progressing

## 2017-10-21 NOTE — PROGRESS NOTES
Progress Note - Cody Lino 62 y o  female MRN: [id number]    Unit/Bed#: [facility] 726-01 Encounter: [id number]       DOS: 10/21/2017      * Septic shock due to undetermined organism [facility])   Assessment & Plan    · POA, unknown etiology  Possibly colitis  · Tx out of ICU  · On day #3 cipro/flagyl  Off broad spectrum abx  · Has been afebrile, leukocytosis resolved  · Blood cx negative, flu/RSV negative, stool studies negative  · C diff not sent; if worsens clinically, would check  · Tolerating regular diet         Colitis   Assessment & Plan    · Noted on CT scan  Only source of sepsis identified at this point  · On day #3 cipro/flagyl  · GI following, on regular diet  · Is having some diarrhea, but could be related to lactulose use  · If worsens, consider colonoscopy         Alcoholic cirrhosis of liver with ascites ([facility] [address] )   Assessment & Plan    · Decompensated in setting of sepsis  · Sober since August  · F/U with [facility] for transplant candidacy   · Follow MELD labs daily, if worsening will require tx to [facility]  · Ascites minimal on CT scan  Diuretics restarted yesterday given inc edema  · 2 gm sodium diet        Hepatic encephalopathy (HCC)   Assessment & Plan    · Currently A&Ox3  · Continue lactulose/xifaxan with goal BM 2-3 daily  · Currently having some diarrhea, will titrate accordingly         Right leg pain   Assessment & Plan    · At site of previous central line removal  · No clinical evidence of phlebitis/dvt at this point  · Serial exams, close monitoring  · Add lido patch  · PT  · If worsening, consider duplex         History of alcohol abuse   Assessment & Plan    · Sober since August  · Trend meld labs as above  · OP f/u with [facility] for possible transplant         Anemia   Assessment & Plan    · Hgb slowly down trending  · Follows with hem/onc OP, gets IV venofer   Hx of gastric bypass  · Admitted to some bleeding yesterday  · Rectal exam did not reveal any stool in rectal vault; no gross blood noted   · Gi following           VTE Pharmacologic Prophylaxis:   Pharmacologic: Heparin  Mechanical VTE Prophylaxis in Place: Yes    Patient Centered Rounds: I have performed bedside rounds with nursing staff today  JUVE    Discussions with Specialists or Other Care Team Provider: GI  My attending, DR Liz Sheehan    Education and Discussions with Family / Patient: Patient  Time Spent for Care: 45 minutes  More than 50% of total time spent on counseling and coordination of care as described above  Current Length of Stay: 3 day(s)    Current Patient Status: Inpatient   Certification Statement: The patient will continue to require additional inpatient hospital stay due to close monitoring of labs, abx, PT/safe dispo    Discharge Plan: not yet stable  PT says home with support vs rehab  Will need to continue to monitor progress    Code Status: Level 1 - Full Code      Subjective:   Feeling okay  Her main complaints are fatigue and some R leg pain where they removed central line  She also feels like her edema is slowly improving with addition of diuretics back into regimen  Tolerating diet  No n/v/abdominal pain  Having some diarrhea  Reports some bright red blood with BM yesterday  Objective:     Vitals:   Temp (24hrs), Av 2 °F (36 8 °C), Min:98 °F (36 7 °C), Max:98 4 °F (36 9 °C)    HR:  [83-96] 83  Resp:  [16-18] 16  BP: ()/(43-58) 96/53  SpO2:  [95 %-99 %] 95 %  Body mass index is 34 28 kg/m²  Input and Output Summary (last 24 hours): Intake/Output Summary (Last 24 hours) at 10/21/17 1322  Last data filed at 10/21/17 0901   Gross per 24 hour   Intake              325 ml   Output              950 ml   Net             -625 ml       Physical Exam:     Physical Exam   Constitutional: She is oriented to person, place, and time  No distress  Eyes: Pupils are equal, round, and reactive to light  Cardiovascular: Normal rate and regular rhythm  Pulmonary/Chest: No respiratory distress  Abdominal: Soft  Bowel sounds are normal  She exhibits no distension  There is no tenderness  Genitourinary:   Genitourinary Comments: Hemorrhoid present  No stool able to be obtained on rectal  No gross blood noted    Musculoskeletal: She exhibits edema (R > L)  She exhibits no tenderness  Neurological: She is alert and oriented to person, place, and time  No asterixis    Skin: She is not diaphoretic  No erythema  Small puncture site R thigh at site of previous central line removal    Psychiatric: She has a normal mood and affect  Nursing note and vitals reviewed  Additional Data:     Labs:      Results from last 7 days  Lab Units 10/21/17  0500  10/19/17  0431   WBC Thousand/uL 4 94  < > 18 26*   HEMOGLOBIN g/dL 7 7*  < > 8 2*   HEMATOCRIT % 24 8*  < > 26 8*   PLATELETS Thousands/uL 42*  < > 55*   NEUTROS PCT %  --   --  87*   LYMPHS PCT %  --   --  7*   MONOS PCT %  --   --  6   EOS PCT %  --   --  0   < > = values in this interval not displayed  Results from last 7 days  Lab Units 10/21/17  0500   SODIUM mmol/L 141   POTASSIUM mmol/L 3 7   CHLORIDE mmol/L 113*   CO2 mmol/L 21   BUN mg/dL 8   CREATININE mg/dL 1 02   CALCIUM mg/dL 7 6*   TOTAL PROTEIN g/dL 5 6*   BILIRUBIN TOTAL mg/dL 2 08*   ALK PHOS U/L 115   ALT U/L 34   AST U/L 98*   GLUCOSE RANDOM mg/dL 78       Results from last 7 days  Lab Units 10/21/17  0500   INR  2 14*       * I Have Reviewed All Lab Data Listed Above  * Additional Pertinent Lab Tests Reviewed: All Labs For Current Hospital Admission Reviewed    Imaging:    Imaging Reports Reviewed Today Include: All  Imaging Personally Reviewed by Myself Includes:  none    Recent Cultures (last 7 days):       Results from last 7 days  Lab Units 10/18/17  1744 10/18/17  1443   BLOOD CULTURE   --  No Growth at 48 hrs  No Growth at 48 hrs     INFLUENZA A PCR  None Detected  --    INFLUENZA B PCR  None Detected  --    RSV PCR  None Detected  --        Last 24 Hours Medication List: ciprofloxacin 500 mg Oral Y62N CASTRO   folic acid 1 mg Oral Daily   furosemide 20 mg Oral Daily   heparin (porcine) 5,000 Units Subcutaneous Q8H [address]   lactulose 20 g Oral TID   lidocaine 1 patch Transdermal Daily   metroNIDAZOLE 500 mg Oral Q8H CASTRO   pantoprazole 40 mg Oral BID AC   pregabalin 300 mg Oral BID   rifaximin 200 mg Oral BID   spironolactone 50 mg Oral Daily   thiamine 200 mg Oral Daily        Today, Patient Was Seen By: Gordon Morales PA-C    ** Please Note: Dictation voice to text software may have been used in the creation of this document   **

## 2017-10-21 NOTE — ASSESSMENT & PLAN NOTE
· POA, unknown etiology  Possibly colitis  · Tx out of ICU  · On day #3 cipro/flagyl   Off broad spectrum abx  · Has been afebrile, leukocytosis resolved  · Blood cx negative, flu/RSV negative, stool studies negative  · C diff not sent; if worsens clinically, would check  · Tolerating regular diet

## 2017-10-21 NOTE — ASSESSMENT & PLAN NOTE
· Hgb slowly down trending  · Follows with hem/onc OP, gets IV venofer  Hx of gastric bypass  · Admitted to some bleeding yesterday  · Rectal exam did not reveal any stool in rectal vault; no gross blood noted    · Gi following

## 2017-10-21 NOTE — ASSESSMENT & PLAN NOTE
· At site of previous central line removal  · No clinical evidence of phlebitis/dvt at this point  · Serial exams, close monitoring  · Add lido patch  · PT  · If worsening, consider duplex

## 2017-10-21 NOTE — ASSESSMENT & PLAN NOTE
· Decompensated in setting of sepsis  · Sober since August  · F/U with [facility] for transplant candidacy   · Follow MELD labs daily, if worsening will require tx to [facility]  · Ascites minimal on CT scan   Diuretics restarted yesterday given inc edema  · 2 gm sodium diet

## 2017-10-21 NOTE — ASSESSMENT & PLAN NOTE
· Currently A&Ox3    · Continue lactulose/xifaxan with goal BM 2-3 daily  · Currently having some diarrhea, will titrate accordingly

## 2017-10-22 LAB
ALBUMIN SERPL BCP-MCNC: 1.7 G/DL (ref 3.5–5)
ALP SERPL-CCNC: 107 U/L (ref 46–116)
ALT SERPL W P-5'-P-CCNC: 33 U/L (ref 12–78)
ANION GAP SERPL CALCULATED.3IONS-SCNC: 7 MMOL/L (ref 4–13)
AST SERPL W P-5'-P-CCNC: 84 U/L (ref 5–45)
BASOPHILS # BLD AUTO: 0.03 THOUSANDS/ΜL (ref 0–0.1)
BASOPHILS NFR BLD AUTO: 1 % (ref 0–1)
BILIRUB SERPL-MCNC: 1.74 MG/DL (ref 0.2–1)
BUN SERPL-MCNC: 7 MG/DL (ref 5–25)
CALCIUM SERPL-MCNC: 7.7 MG/DL (ref 8.3–10.1)
CHLORIDE SERPL-SCNC: 115 MMOL/L (ref 100–108)
CO2 SERPL-SCNC: 22 MMOL/L (ref 21–32)
CREAT SERPL-MCNC: 0.9 MG/DL (ref 0.6–1.3)
EOSINOPHIL # BLD AUTO: 0.2 THOUSAND/ΜL (ref 0–0.61)
EOSINOPHIL NFR BLD AUTO: 4 % (ref 0–6)
ERYTHROCYTE [DISTWIDTH] IN BLOOD BY AUTOMATED COUNT: 26.7 % (ref 11.6–15.1)
GFR SERPL CREATININE-BSD FRML MDRD: 71 ML/MIN/1.73SQ M
GLUCOSE SERPL-MCNC: 72 MG/DL (ref 65–140)
HCT VFR BLD AUTO: 23.4 % (ref 34.8–46.1)
HEMOCCULT STL QL: POSITIVE
HGB BLD-MCNC: 7.2 G/DL (ref 11.5–15.4)
INR PPP: 2.08 (ref 0.86–1.16)
LYMPHOCYTES # BLD AUTO: 1.73 THOUSANDS/ΜL (ref 0.6–4.47)
LYMPHOCYTES NFR BLD AUTO: 37 % (ref 14–44)
MCH RBC QN AUTO: 25.2 PG (ref 26.8–34.3)
MCHC RBC AUTO-ENTMCNC: 30.8 G/DL (ref 31.4–37.4)
MCV RBC AUTO: 82 FL (ref 82–98)
MONOCYTES # BLD AUTO: 0.5 THOUSAND/ΜL (ref 0.17–1.22)
MONOCYTES NFR BLD AUTO: 11 % (ref 4–12)
NEUTROPHILS # BLD AUTO: 2.24 THOUSANDS/ΜL (ref 1.85–7.62)
NEUTS SEG NFR BLD AUTO: 47 % (ref 43–75)
NRBC BLD AUTO-RTO: 0 /100 WBCS
PLATELET # BLD AUTO: 42 THOUSANDS/UL (ref 149–390)
POTASSIUM SERPL-SCNC: 3.4 MMOL/L (ref 3.5–5.3)
PROT SERPL-MCNC: 5.5 G/DL (ref 6.4–8.2)
PROTHROMBIN TIME: 23.6 SECONDS (ref 12.1–14.4)
RBC # BLD AUTO: 2.86 MILLION/UL (ref 3.81–5.12)
SODIUM SERPL-SCNC: 144 MMOL/L (ref 136–145)
WBC # BLD AUTO: 4.72 THOUSAND/UL (ref 4.31–10.16)

## 2017-10-22 PROCEDURE — 97530 THERAPEUTIC ACTIVITIES: CPT

## 2017-10-22 PROCEDURE — 80053 COMPREHEN METABOLIC PANEL: CPT | Performed by: INTERNAL MEDICINE

## 2017-10-22 PROCEDURE — 97116 GAIT TRAINING THERAPY: CPT

## 2017-10-22 PROCEDURE — 85025 COMPLETE CBC W/AUTO DIFF WBC: CPT | Performed by: INTERNAL MEDICINE

## 2017-10-22 PROCEDURE — G8988 SELF CARE GOAL STATUS: HCPCS

## 2017-10-22 PROCEDURE — 85610 PROTHROMBIN TIME: CPT | Performed by: INTERNAL MEDICINE

## 2017-10-22 PROCEDURE — G8987 SELF CARE CURRENT STATUS: HCPCS

## 2017-10-22 PROCEDURE — 97167 OT EVAL HIGH COMPLEX 60 MIN: CPT

## 2017-10-22 PROCEDURE — 82272 OCCULT BLD FECES 1-3 TESTS: CPT | Performed by: INTERNAL MEDICINE

## 2017-10-22 RX ORDER — FUROSEMIDE 40 MG/1
40 TABLET ORAL DAILY
Status: DISCONTINUED | OUTPATIENT
Start: 2017-10-23 | End: 2017-10-22

## 2017-10-22 RX ORDER — FUROSEMIDE 40 MG/1
40 TABLET ORAL DAILY
Status: DISCONTINUED | OUTPATIENT
Start: 2017-10-22 | End: 2017-10-23

## 2017-10-22 RX ORDER — POTASSIUM CHLORIDE 20 MEQ/1
20 TABLET, EXTENDED RELEASE ORAL ONCE
Status: COMPLETED | OUTPATIENT
Start: 2017-10-22 | End: 2017-10-22

## 2017-10-22 RX ORDER — LACTULOSE 20 G/30ML
10 SOLUTION ORAL 2 TIMES DAILY
Status: DISCONTINUED | OUTPATIENT
Start: 2017-10-23 | End: 2017-10-23

## 2017-10-22 RX ADMIN — POTASSIUM CHLORIDE 20 MEQ: 1500 TABLET, EXTENDED RELEASE ORAL at 11:50

## 2017-10-22 RX ADMIN — METRONIDAZOLE 500 MG: 500 TABLET ORAL at 14:52

## 2017-10-22 RX ADMIN — METRONIDAZOLE 500 MG: 500 TABLET ORAL at 21:59

## 2017-10-22 RX ADMIN — PREGABALIN 300 MG: 100 CAPSULE ORAL at 20:56

## 2017-10-22 RX ADMIN — PANTOPRAZOLE SODIUM 40 MG: 40 TABLET, DELAYED RELEASE ORAL at 05:52

## 2017-10-22 RX ADMIN — SPIRONOLACTONE 50 MG: 50 TABLET ORAL at 11:53

## 2017-10-22 RX ADMIN — FUROSEMIDE 40 MG: 40 TABLET ORAL at 11:49

## 2017-10-22 RX ADMIN — PANTOPRAZOLE SODIUM 40 MG: 40 TABLET, DELAYED RELEASE ORAL at 16:11

## 2017-10-22 RX ADMIN — METRONIDAZOLE 500 MG: 500 TABLET ORAL at 05:51

## 2017-10-22 RX ADMIN — CIPROFLOXACIN 500 MG: 500 TABLET, FILM COATED ORAL at 08:32

## 2017-10-22 RX ADMIN — CIPROFLOXACIN 500 MG: 500 TABLET, FILM COATED ORAL at 20:56

## 2017-10-22 RX ADMIN — LACTULOSE 10 G: 20 SOLUTION ORAL at 16:15

## 2017-10-22 RX ADMIN — PREGABALIN 300 MG: 100 CAPSULE ORAL at 08:33

## 2017-10-22 RX ADMIN — RIFAXIMIN 200 MG: 200 TABLET ORAL at 16:11

## 2017-10-22 RX ADMIN — LIDOCAINE 1 PATCH: 50 PATCH TOPICAL at 09:19

## 2017-10-22 RX ADMIN — RIFAXIMIN 200 MG: 200 TABLET ORAL at 08:30

## 2017-10-22 RX ADMIN — Medication 200 MG: at 08:34

## 2017-10-22 RX ADMIN — FOLIC ACID 1 MG: 1 TABLET ORAL at 09:18

## 2017-10-22 RX ADMIN — HEPARIN SODIUM 5000 UNITS: 5000 INJECTION, SOLUTION INTRAVENOUS; SUBCUTANEOUS at 05:50

## 2017-10-22 NOTE — ASSESSMENT & PLAN NOTE
· Decompensated in setting of sepsis  · Sober since August  · F/U with \[facility]\"" for transplant candidacy, is currently undergoing w/u   · Follow MELD labs daily (17 today), if worsening will require tx to \[facility]\""  · Ascites minimal on CT scan  Diuretics restarted 10/20 given inc edema  · Will inc her lasix to 40 mg (home dose)   Spironolactone still only daily (home is BID)  · Limited in our ability to uptitrate by BPs  · 2 gm sodium diet

## 2017-10-22 NOTE — OCCUPATIONAL THERAPY NOTE
[address]  Evaluation     Patient Name: Megan Alexander  VOVPW'H Date: 10/22/2017  Problem List  Patient Active Problem List   Diagnosis    Anemia    Hepatic encephalopathy ([facility] [address] )    [facility]    History of alcohol abuse    History of gastric bypass    Thrombocytopenia (HCC)    Septic shock due to undetermined organism ([facility] [address] )    Colitis    Right leg pain     Past Medical History  Past Medical History:   Diagnosis Date    Anemia     Anxiety     Cancer ([facility]     breast    Chronic narcotic dependence (HCC)     Chronic pain disorder     Cirrhosis, alcoholic ([facility] [address] )     Depression     Esophagitis     History of bilateral mastectomy     History of gastric bypass     Hypertension     Liver disease     Morbid obesity ([facility] [address] )     Pancytopenia ([facility]     Peripheral neuropathy     Rosacea      Past Surgical History  Past Surgical History:   Procedure Laterality Date    ABDOMINAL SURGERY      abdominoplasty    BREAST SURGERY      mastectomy    CHOLECYSTECTOMY      ESOPHAGOGASTRODUODENOSCOPY N/A 8/2/2017    Procedure: ESOPHAGOGASTRODUODENOSCOPY (EGD); Surgeon: Salbador Medrano DO;  Location:  MAIN OR;  Service: Gastroenterology    GASTRIC BYPASS      HERNIA REPAIR      MASTECTOMY      GA ESOPHAGOGASTRODUODENOSCOPY TRANSORAL DIAGNOSTIC N/A 5/18/2017    Procedure: ESOPHAGOGASTRODUODENOSCOPY (EGD) with bx;  Surgeon: Skyler Wilde MD;  Location: [facility]; Service: Gastroenterology    GA ESOPHAGOGASTRODUODENOSCOPY TRANSORAL DIAGNOSTIC N/A 9/14/2017    Procedure: EGD with bx AND COLONOSCOPY with polypectomy;  Surgeon: Skyler Wilde MD;  Location: [facility];   Service: Gastroenterology    DECLAN-EN-Y PROCEDURE  2005         10/22/17 1419   Note Type   Note type Eval/Treat   Restrictions/Precautions   Weight Bearing Precautions Per Order No   Other Precautions Fall Risk;Pain   Pain Assessment   Pain Assessment 0-10   Pain Type Acute pain   Pain Location Leg Pain Orientation Right   [facility] Pain Intervention(s) Ambulation/increased activity;Repositioned   Response to Interventions tolerated   Pain Rating: FLACC (Rest) - Face 0   Pain Rating: FLACC (Rest) - Legs 0   Pain Rating: FLACC (Rest) - Activity 0   Pain Rating: FLACC (Rest) - Cry 0   Pain Rating: FLACC (Rest) - Consolability 0   Score: FLACC (Rest) 0   Pain Rating: FLACC (Activity) - Face 1   Pain Rating: FLACC (Activity) - Legs 0   Pain Rating: FLACC (Activity) - Activity 0   Pain Rating: FLACC (Activity) - Cry 0   Pain Rating: FLACC (Activity) - Consolability 0   Score: FLACC (Activity) 1   Home Living   Type of Home House   Home Layout Two level; Able to live on main level with bedroom/bathroom;1/2 bath on main level   Bathroom Shower/Tub Walk-in shower   Bathroom Toilet Standard   Bathroom Equipment Shower chair   Bathroom Accessibility Accessible   Additional Comments Pt lives with her  in a 2 story home with + CARLOTTA  Pt reports PTA, she was staying on the first floor because stairs became to challenging for her to do  She reports she would crawl up the stairs every other day to shower  Prior Function   Level of [place] Independent with ADLs and functional mobility; Needs assistance with IADLs   Lives With Spouse   Receives Help From Family   ADL Assistance Independent   IADLs Needs assistance   Falls in the last 6 months 0   Vocational Retired   Comments Pt was I w/ ADLS and IADLS, drove, & required no use of DME PTA     Lifestyle   Autonomy Pt was I with ADLs PTA however reports requiring A with IADLS 2* significant fatigue and weakness in B/L LE   Reciprocal Relationships Pt lives with her    Service to Others Pt is retired   Intrinsic Gratification Pt enjoys gardening but has not been able to do it 2* significant fatigue    Psychosocial   Psychosocial (WDL) WDL   Subjective   Subjective "I can't do much of anything anymore, I am just too weak and it is too hard to be on my feet "   ADL Eating Assistance 7  Independent   Grooming Assistance 6  Modified Independent   UB Bathing Assistance 5  Supervision/Setup   LB Bathing Assistance 4  Minimal Assistance   UB Dressing Assistance 5  Supervision/Setup   LB Dressing Assistance 3  Moderate Assistance   Toileting Assistance  5  Supervision/Setup   Bed Mobility   Supine to Sit 5  Supervision   Additional items Assist x 1;HOB elevated; Increased time required   Sit to Supine 5  Supervision   Additional items Assist x 1; Increased time required   Transfers   Sit to Stand 4  Minimal assistance   Additional items Assist x 1   Stand to Sit 4  Minimal assistance   Additional items Assist x 1   Toilet transfer 4  Minimal assistance   Additional items Assist x 1; Increased time required;Standard toilet   Additional Comments Pt performs functional transfers with min A for steadying/balance and increased time to complete 2* pain and decreased endurance  Functional Mobility   Functional Mobility 4  Minimal assistance   Additional Comments Pt performs functional mobility using rw with min A for steadying/balance  Pt with decreased mobility tolerance and presents with significant fatigue    Additional items Rolling walker   Balance   Static Sitting Fair +   Dynamic Sitting Fair   Static Standing Fair   Dynamic Standing Fair -   Ambulatory Fair -   Activity Tolerance   Activity Tolerance Patient limited by fatigue;Patient limited by pain   Nurse Made Aware LUIS ALBERTO Benson confirm pt appropriate for OT eval    RUE Assessment   RUE Assessment WFL   LUE Assessment   LUE Assessment WFL   Hand Function   Gross Motor Coordination Functional   Fine Motor Coordination Functional   Cognition   Overall Cognitive Status WFL   Arousal/Participation Alert; Responsive; Cooperative   Attention Within functional limits   Memory Within functional limits   Following Commands Follows all commands and directions without difficulty   Assessment   Limitation Decreased ADL status; Decreased UE strength;Decreased endurance;Decreased self-care trans;Decreased high-level ADLs   Prognosis Good   Assessment Pt is a 61 y/o female seen for OT eval s/p adm to B as a transfer from [facility] w/ septic shock, hepatic encephalopathy, anemia, colitis, alcoholic cirrhosis of liver with ascites, and R leg pain  Comorbidities include a h/o anemia, anxiety, breast cancer s/p B/L mastectomy, chronic pain, chronic narcotic dependence, cirrhosis, depression, esophagitis, HTN, liver disease, obesity, pancytopenia, peripheral neuropathy, and h/o gastric hypass  Pt with active OT orders and up with assistance orders  Pt lives with her  in a 2 story home with + CARLOTTA  Pt was I w/ ADLS, required A for IADLS, drove, & required no use of DME PTA  Pt is currently demonstrating the following occupational deficits: S UB ADLS, mod A LB ADLS, min A functional transfers, and min A functional mobility using rw  Pt is limited 2* pain, fatigue, limited ROM/strength in R LE, decreased ADL status, impaired balance, decreased endurance/activity tolerance, decreased mobility, and SOB  The following Occupational Performance Areas to address include: bathing/shower, toilet hygiene, dressing, functional mobility, community mobility, clothing management, meal prep and household maintenance  Pt scored overall 65/100 on the Barthel Index  Based on the aforementioned OT evaluation, functional performance deficits, and assessments, pt has been identified as a high complexity evaluation  Recommendation at this time is home vs  Short term rehabilitation pending progress  Pt to continue to benefit from acute immediate OT services to address the following goals 3-5x/wk to  w/in 7-10 days:   Goals   Patient Goals to get stronger and back to her activities    Plan   Treatment Interventions ADL retraining;Functional transfer training;UE strengthening/ROM; Endurance training;Patient/family training;Equipment evaluation/education; Compensatory technique education;Continued evaluation; Energy conservation; Activityengagement   Goal Expiration Date 11/01/17   OT Frequency 3-5x/wk   Recommendation   OT Discharge Recommendation Other (Comment)  (STR vs  home pending progress)   Equipment Recommended Bedside commode   OT - OK to Discharge No   Barthel Index   Feeding 10   Bathing 0   Grooming Score 5   Dressing Score 5   Bladder Score 10   Bowels Score 10   Toilet Use Score 5   Transfers (Bed/Chair) Score 10   Mobility (Level Surface) Score 10   Stairs Score 0   Barthel Index Score 65       GOALS    1) Pt will improve activity tolerance to G for min 30 min txment sessions  2) Pt will complete ADLs/self care w/ mod I using adaptive equipment and DME as needed w/ G hyiene/thoroughness w/ min cues fro cog support  3) Pt will complete toileting w/ mod I w/ G hygiene/thoroughness using DME as needed  4) Pt will improve functional transfers on/off all surfaces using DME as needed w/ G balance/safety including toileting w/ mod I  5) Pt will improve functional mobility during ADL/IADL/leisure tasks using DME as needed w/ g balance/safety w/ mod I  6) Pt will demonstrate G carryover of pt/caregiver education and training as appropriate w/ mod I w/o cues w/ G tolerance  7) Pt will demonstrate 100% carryover of learned E C  techniques s/p skilled education w/o cues t/o functional ADL/ IADL/leisure interest tasks w/ mod I  8) Pt will demonstrate G high level balance and tolerance t/o fx'l I/ADL/leisure tasks w/ mod I w/ DME PRN w/ G balance/safety w/o cues      Saurav Paul, MS, OTR/L

## 2017-10-22 NOTE — ASSESSMENT & PLAN NOTE
· Currently A&Ox3    · Continue lactulose/xifaxan with goal BM 2-3 daily  · Diarrhea from yesterday improving

## 2017-10-22 NOTE — PLAN OF CARE
DISCHARGE PLANNING     Discharge to home or other facility with appropriate resources Progressing        DISCHARGE PLANNING - CARE MANAGEMENT     Discharge to post-acute care or home with appropriate resources Progressing        GASTROINTESTINAL - ADULT     Maintains or returns to baseline bowel function Progressing     Maintains adequate nutritional intake Progressing        HEMATOLOGIC - ADULT     Maintains hematologic stability Progressing        INFECTION - ADULT     Absence or prevention of progression during hospitalization Progressing        Knowledge Deficit     Patient/family/caregiver demonstrates understanding of disease process, treatment plan, medications, and discharge instructions Progressing        MUSCULOSKELETAL - ADULT     Maintain or return mobility to safest level of function Progressing        PAIN - ADULT     Verbalizes/displays adequate comfort level or baseline comfort level Progressing        Potential for Falls     Patient will remain free of falls Progressing        Prexisting or High Potential for Compromised Skin Integrity     Skin integrity is maintained or improved Progressing        SAFETY ADULT     Patient will remain free of falls Progressing     Maintain or return to baseline ADL function Progressing     Maintain or return mobility status to optimal level Progressing        SKIN/TISSUE INTEGRITY - ADULT     Skin integrity remains intact Progressing     Incision(s), wounds(s) or drain site(s) healing without S/S of infection Progressing     Oral mucous membranes remain intact Progressing        SUBSTANCE USE/ABUSE     By discharge, will develop insight into their chemical dependency and sustain motivation to continue in recovery Progressing     By discharge, patient will have ongoing treatment plan addressing chemical dependency Progressing

## 2017-10-22 NOTE — ASSESSMENT & PLAN NOTE
· Hgb slowly down trending (7 2 today); prefers to avoid blood transfusions if possible given concern for ab exposure and future liver transplant  · Follows with hem/onc OP, gets IV venofer  Hx of gastric bypass  · Admitted to some bleeding 10/20  None since  · Rectal exam yesterday did not reveal any stool in rectal vault; no gross blood noted    · Gi following

## 2017-10-22 NOTE — PROGRESS NOTES
Progress Note - Juan Heaton 62 y o  female MRN: [id number]    Unit/Bed#: [facility] 726-01 Encounter: [id number]    Assessment and Plan:   Principal Problem:    Septic shock due to undetermined organism [facility])  Active Problems:    Anemia    Hepatic encephalopathy ([facility] [address] )    Alcoholic cirrhosis of liver with ascites ([facility] [address] )    History of alcohol abuse    Thrombocytopenia (HCC)    Colitis    Right leg pain    #1  Alcoholic cirrhosis complicated by ascites and encephalopathy:  Patient has been referred to [facility] liver Transplant Team   LFTs improved today with an AST of 84, ALT of 33, alk-phos of 107, and T bili of 1 74  Platelets are low at 42  INR is 2 08   -continue to monitor meld labs  -continue diuretics as tolerated with blood pressure  -Xifaxan b i d   -continue lactulose with a goal of about 3 soft bowel movements daily to prevent hepatic encephalopathy   -2 g sodium diet  -monitor kidney function  -outpatient follow-up with [facility] transplant team    #2  Ascending colitis on CT scan:  Patient is asymptomatic without any abdominal pain  Diarrhea is likely secondary to the lactulose use that she did not have any prior to the lactulose being increased  Had a recent colonoscopy 4 weeks ago   -finish course of oral antibiotics with Cipro and Flagyl  -continue diet as tolerated  -if patient's abdominal exam was stool output would clinically worsen, could consider checking C diff    #3  Rectal bleeding:  Patient had a few small drops of bright red blood in her stool yesterday  This was an isolated event that she has not had any further bleeding  Hemoglobin is stable at 7 2  Likely hemorrhoidal bleeding from irritation from frequent bowel movements   -continue to monitor hemoglobin  -continue to monitor stool output    I instructed the patient to let us know should she have any further blood in the stool    ----------------------------------------------------------------------------------------------------------------    Subjective:     Patient reports he had 1 episode of blood in stool yesterday  She reports it was bright red and a few drops mixed in with her stool  She reports that her stools are becoming more formed  She denies any abdominal pain  She denies any nausea or vomiting  She reports having about 4 bowel movements yesterday  She had 1 this morning  Objective:     Vitals: Blood pressure 100/60, pulse 93, temperature 98 2 °F (36 8 °C), temperature source Oral, resp  rate 18, height 5' 4" (1 626 m), weight 96 2 kg (212 lb 1 3 oz), SpO2 97 %, not currently breastfeeding  ,Body mass index is 36 4 kg/m²        Intake/Output Summary (Last 24 hours) at 10/22/17 1124  Last data filed at 10/22/17 0744   Gross per 24 hour   Intake             1060 ml   Output              901 ml   Net              159 ml       Physical Exam:     General Appearance: Alert, appears stated age and cooperative  Lungs: Clear to auscultation bilaterally, no rales or rhonchi, no labored breathing/accessory muscle use  Heart: Regular rate and rhythm, S1, S2 normal, no murmur, click, rub or gallop  Abdomen: Soft, non-tender, non-distended; bowel sounds normal; hernia present, no asterixis on exam  Extremities: No cyanosis, clubbing; b/l LE edema    Invasive Devices     Peripheral Intravenous Line            Peripheral IV 10/20/17 Left Forearm 2 days                Lab Results:    Results from last 7 days  Lab Units 10/22/17  0535   WBC Thousand/uL 4 72   HEMOGLOBIN g/dL 7 2*   HEMATOCRIT % 23 4*   PLATELETS Thousands/uL 42*   NEUTROS PCT % 47   LYMPHS PCT % 37   MONOS PCT % 11   EOS PCT % 4       Results from last 7 days  Lab Units 10/22/17  0535   SODIUM mmol/L 144   POTASSIUM mmol/L 3 4*   CHLORIDE mmol/L 115*   CO2 mmol/L 22   BUN mg/dL 7   CREATININE mg/dL 0 90   CALCIUM mg/dL 7 7*   TOTAL PROTEIN g/dL 5 5*   BILIRUBIN TOTAL mg/dL 1 74*   ALK PHOS U/L 107   ALT U/L 33   AST U/L 84*   GLUCOSE RANDOM mg/dL 72       Results from last 7 days  Lab Units 10/22/17  0535   INR  2 08*           Imaging Studies: I have personally reviewed pertinent imaging studies  Xr Chest Portable - 1 View    Result Date: 10/18/2017  Impression: Low lung volumes  No focal infiltrate or pleural effusion  Workstation performed: [id number]     Us Abdomen Limited    Result Date: 10/18/2017  Impression: 1  No evidence of ascites  2   Cirrhotic appearance to the liver  Hepatofugal flow now identified in the main portal vein  Prominent recanalized umbilical vein again identified, previously present  3   Right pleural effusion  Workstation performed: [id number]     Ct Abdomen Pelvis With Contrast    Result Date: 10/18/2017  Impression: Diffuse wall thickening and pericolic infiltration of the ascending colon to the level of the hepatic flexure consistent with colitis  Cirrhosis with signs of portal hypertension and anasarca  Trace abdominal ascites  Midline mesentery containing hernia   Workstation performed: [id number]

## 2017-10-22 NOTE — PLAN OF CARE
Problem: OCCUPATIONAL THERAPY ADULT  Goal: Performs self-care activities at highest level of function for planned discharge setting  See evaluation for individualized goals  Treatment Interventions: ADL retraining, Functional transfer training, UE strengthening/ROM, Endurance training, Patient/family training, Equipment evaluation/education, Compensatory technique education, Continued evaluation, Energy conservation, Activityengagement  Equipment Recommended: Bedside commode       See flowsheet documentation for full assessment, interventions and recommendations  Limitation: Decreased ADL status, Decreased UE strength, Decreased endurance, Decreased self-care trans, Decreased high-level ADLs  Prognosis: Good  Assessment: Pt is a 63 y/o female seen for OT eval s/p adm to [facility] as a transfer from [facility] w/ septic shock, hepatic encephalopathy, anemia, colitis, alcoholic cirrhosis of liver with ascites, and R leg pain  Comorbidities include a h/o anemia, anxiety, breast cancer s/p B/L mastectomy, chronic pain, chronic narcotic dependence, cirrhosis, depression, esophagitis, HTN, liver disease, obesity, pancytopenia, peripheral neuropathy, and h/o gastric hypass  Pt with active OT orders and activity orders  Pt lives with her  in a 2 story home with + [place]LOTTA  Pt was I w/ ADLS and IADLS, drove, & required no use of DME PTA  Pt is currently demonstrating the following occupational deficits: S UB ADLS, mod A LB ADLS, min A functional transfers, and min A functional mobility using rw  Pt is limited 2* pain, fatigue, limited ROM/strength in R LE, decreased ADL status, impaired balance, decreased endurance/activity tolerance, decreased mobility, and SOB  The following Occupational Performance Areas to address include: bathing/shower, toilet hygiene, dressing, functional mobility, community mobility, clothing management, meal prep and household maintenance  Pt scored overall 65/100 on the Barthel Index   Based on the aforementioned OT evaluation, functional performance deficits, and assessments, pt has been identified as a high complexity evaluation  Recommendation at this time is home vs  Short term rehabilitation pending progress   Pt to continue to benefit from acute immediate OT services to address the following goals 3-5x/wk to  w/in 7-10 days:     OT Discharge Recommendation: Other (Comment) (STR vs  home pending progress)  OT - OK to Discharge: No    Td Ornelas MS, OTR/L

## 2017-10-22 NOTE — PROGRESS NOTES
Progress Note - Jaimie Lujan 62 y o  female MRN: [id number]    Unit/Bed#: [facility] 726-01 Encounter: [id number]       DOS: 10/22/2017      * Septic shock due to undetermined organism [facility])   Assessment & Plan    · POA, unknown etiology  Possibly colitis, no other etiology found  · Tx out of ICU 10/21  · On day #4 cipro/flagyl  Off broad spectrum abx  · Has been afebrile, leukocytosis resolved  · Blood cx negative, flu/RSV negative, stool studies negative  · C diff not sent; if worsens clinically, would check  · Tolerating regular diet         Colitis   Assessment & Plan    · Noted on CT scan  Only source of sepsis identified at this point  · On day #4 cipro/flagyl  · GI following, on regular diet  · Diarrhea slightly better   · If worsens, consider colonoscopy kwadwo given anemia         Alcoholic cirrhosis of liver with ascites ([facility] [address] )   Assessment & Plan    · Decompensated in setting of sepsis  · Sober since August  · F/U with [facility] for transplant candidacy, is currently undergoing w/u   · Follow MELD labs daily (17 today), if worsening will require tx to [facility]  · Ascites minimal on CT scan  Diuretics restarted 10/20 given inc edema  · Will inc her lasix to 40 mg (home dose)  Spironolactone still only daily (home is BID)  · Limited in our ability to uptitrate by BPs  · 2 gm sodium diet        Hepatic encephalopathy (HCC)   Assessment & Plan    · Currently A&Ox3  · Continue lactulose/xifaxan with goal BM 2-3 daily  · Diarrhea from yesterday improving        Right leg pain   Assessment & Plan    · At site of previous central line removal  · No clinical evidence of phlebitis/dvt at this point-also may be related to generalized edema  · Serial exams, close monitoring  · Pain improving with lido patch  PT will need to continue to work with and eval patient   · If worsening, consider duplex         Thrombocytopenia (HCC)   Assessment & Plan    · Low but stable   Monitor for now  · Stop heparin SC  · Transfuse PRN History of alcohol abuse   Assessment & Plan    · Sober since August  · Trend meld labs as above (today 17)  · OP f/u [facility]         Anemia   Assessment & Plan    · Hgb slowly down trending (7 2 today); prefers to avoid blood transfusions if possible given concern for ab exposure and future liver transplant  · Follows with hem/onc OP, gets IV venofer  Hx of gastric bypass  · Admitted to some bleeding 10/20  None since  · Rectal exam yesterday did not reveal any stool in rectal vault; no gross blood noted  · Gi following          VTE Pharmacologic Prophylaxis:   Pharmacologic: Pharmacologic VTE Prophylaxis contraindicated due to liver dz, thrombocytopenia   Mechanical VTE Prophylaxis in Place: Yes    Patient Centered Rounds: I have evaluated patient without nursing staff present due to Discussed patient prior     Discussions with Specialists or Other Care Team Provider: will discuss with GI    Education and Discussions with Family / Patient:  Patient    Time Spent for Care: 45 minutes  More than 50% of total time spent on counseling and coordination of care as described above  Current Length of Stay: 4 day(s)    Current Patient Status: Inpatient   Certification Statement: The patient will continue to require additional inpatient hospital stay due to Need for IV antibiotics, monitoring of blood count    Discharge Plan:  Not yet stable  Patient will also need to Re work with PT and have them evaluate whether not she is safe for home    Code Status: Level 1 - Full Code      Subjective:   Patient reports feeling okay today  She is still very fatigued  She denies any exertional dyspnea or near-syncope  She still has some right leg pain secondary to probable edema and previous central line site  No chest pain, shortness of breath, diarrhea today  She also denies any blood in her stool today or yesterday       Objective:     Vitals:   Temp (24hrs), Av °F (36 7 °C), Min:97 9 °F (36 6 °C), Max:98 2 °F (36 8 °C)    HR:  [80-93] 93  Resp:  [16-18] 18  BP: ()/(54-60) 100/60  SpO2:  [97 %-100 %] 97 %  Body mass index is 36 4 kg/m²  Input and Output Summary (last 24 hours): Intake/Output Summary (Last 24 hours) at 10/22/17 1018  Last data filed at 10/22/17 0744   Gross per 24 hour   Intake             1060 ml   Output              901 ml   Net              159 ml       Physical Exam:     Physical Exam   Constitutional: She is oriented to person, place, and time  No distress  Eyes: Scleral icterus (very slight ) is present  Cardiovascular: Normal rate and regular rhythm  Pulmonary/Chest: Effort normal and breath sounds normal  No respiratory distress  Abdominal: Soft  Bowel sounds are normal  She exhibits no distension  There is no tenderness  Hernia present    Musculoskeletal: She exhibits edema (worse distally )  Neurological: She is alert and oriented to person, place, and time  No asterixis    Skin: Skin is warm and dry  She is not diaphoretic  Psychiatric: She has a normal mood and affect  Nursing note and vitals reviewed  Additional Data:     Labs:      Results from last 7 days  Lab Units 10/22/17  0535   WBC Thousand/uL 4 72   HEMOGLOBIN g/dL 7 2*   HEMATOCRIT % 23 4*   PLATELETS Thousands/uL 42*   NEUTROS PCT % 47   LYMPHS PCT % 37   MONOS PCT % 11   EOS PCT % 4       Results from last 7 days  Lab Units 10/22/17  0535   SODIUM mmol/L 144   POTASSIUM mmol/L 3 4*   CHLORIDE mmol/L 115*   CO2 mmol/L 22   BUN mg/dL 7   CREATININE mg/dL 0 90   CALCIUM mg/dL 7 7*   TOTAL PROTEIN g/dL 5 5*   BILIRUBIN TOTAL mg/dL 1 74*   ALK PHOS U/L 107   ALT U/L 33   AST U/L 84*   GLUCOSE RANDOM mg/dL 72       Results from last 7 days  Lab Units 10/22/17  0535   INR  2 08*       * I Have Reviewed All Lab Data Listed Above  * Additional Pertinent Lab Tests Reviewed:  All Labs Within Last 24 Hours Reviewed    Imaging:    Imaging Reports Reviewed Today Include: None  Imaging Personally Reviewed by Myself Includes:  None    Recent Cultures (last 7 days):       Results from last 7 days  Lab Units 10/18/17  1744 10/18/17  1443   BLOOD CULTURE   --  No Growth at 72 hrs  No Growth at 72 hrs  INFLUENZA A PCR  None Detected  --    INFLUENZA B PCR  None Detected  --    RSV PCR  None Detected  --        Last 24 Hours Medication List:     ciprofloxacin 500 mg Oral [address]   folic acid 1 mg Oral Daily   [START ON 10/23/2017] furosemide 40 mg Oral Daily   lactulose 20 g Oral TID   lidocaine 1 patch Transdermal Daily   metroNIDAZOLE 500 mg Oral Q8H CASTRO   pantoprazole 40 mg Oral BID AC   potassium chloride 20 mEq Oral Once   pregabalin 300 mg Oral BID   rifaximin 200 mg Oral BID   spironolactone 50 mg Oral Daily   thiamine 200 mg Oral Daily        Today, Patient Was Seen By: Swetha Smith PA-C    ** Please Note: Dictation voice to text software may have been used in the creation of this document   **

## 2017-10-22 NOTE — ASSESSMENT & PLAN NOTE
· Noted on CT scan   Only source of sepsis identified at this point  · On day #4 cipro/flagyl  · GI following, on regular diet  · Diarrhea slightly better   · If worsens, consider colonoscopy kwadwo given anemia

## 2017-10-22 NOTE — ASSESSMENT & PLAN NOTE
· At site of previous central line removal  · No clinical evidence of phlebitis/dvt at this point-also may be related to generalized edema  · Serial exams, close monitoring  · Pain improving with lido patch   PT will need to continue to work with and eval patient   · If worsening, consider duplex

## 2017-10-22 NOTE — PHYSICAL THERAPY NOTE
Physical Therapy Tx Session:       10/22/17 1410   Pain Assessment   Pain Assessment 0-10   Pain Score 4   Pain Type Acute pain   Pain Location Groin;Leg   Pain Orientation Right   Hospital Pain Intervention(s) Repositioned; Ambulation/increased activity; Elevated; Emotional support; Environmental changes; Rest   Restrictions/Precautions   Other Precautions Pain; Fall Risk;Multiple lines   General   Chart Reviewed Yes   Family/Caregiver Present Yes  ( at end of session)   Cognition   Overall Cognitive Status WFL   Arousal/Participation Cooperative  (very friendly and agreeable)   Attention Within functional limits   Orientation Level Oriented X4   Following Commands Follows one step commands without difficulty   Subjective   Subjective pt supine in bed resting comfortably with BLE elevated underneath pillow;pt willing and very agreeable to work with PT and to participate in therapy intervention;"I just want to get better and to get stronger to go home"   Bed Mobility   Supine to Sit 5  Supervision   Additional items Assist x 1;HOB elevated; Bedrails; Increased time required;Verbal cues   Sit to Supine 5  Supervision   Additional items Assist x 1;Bedrails; Increased time required;LE management   Additional Comments per pt's request return to bed following mobility;"I have been OOB all day and just want to rest"   Transfers   Sit to Stand 4  Minimal assistance   Additional items Assist x 1;HOB elevated; Bedrails; Increased time required;Verbal cues   Stand to Sit 4  Minimal assistance   Additional items Assist x 1;Bedrails; Increased time required;Verbal cues  (for safety,education and control descent)   Ambulation/Elevation   Gait pattern Antalgic;Narrow BURKE; Forward Flexion; Inconsistent cha; Foward flexed; Short stride; Ataxia  (slow mobility and cha)   Gait Assistance 4  Minimal assist   Additional items Assist x 1;Verbal cues; Tactile cues   Assistive Device Rolling walker   Distance 140 feet with use of RW on tile and carpet surface;slow cha without LOB noted and/or observed   Balance   Static Sitting Good  (at EOB)   Dynamic Sitting Poor +   Static Standing Fair   Dynamic Standing Poor +   Ambulatory Poor +   Endurance Deficit   Endurance Deficit Yes   Endurance Deficit Description hospital deconditioning,weakness,pain RLE during WB and mobility   Activity Tolerance   Activity Tolerance Patient limited by fatigue;Patient limited by pain  (good)   Assessment   Prognosis Good   Problem List Decreased strength;Decreased endurance; Impaired balance;Decreased mobility;Obesity; Decreased skin integrity;Pain   Assessment Pt able to inc ambulation distance to 140 feet with use of RW on tile and carpet surface minAx1,BM S and sit to stand transfers minAx1  Pt reports pain RLE during WB and mobility with inc edema observed RLE  Pt reports feeling stronger each day and wants to return home upon D/C  Inc fatigue and continued BLE dec strength throughout  Pt would cont to benefit from skilled inpt PT services to maximize functional independence   Barriers to Discharge Inaccessible home environment  (CARLOTTA and 2 story home)   Goals   Patient Goals to get stronger and to go home   STG Expiration Date 11/03/17   Treatment Day 1   Plan   Treatment/Interventions Functional transfer training; Endurance training;Patient/family training;Equipment eval/education; Bed mobility;Gait training;OT;Family   Progress Progressing toward goals   PT Frequency 5x/wk   Recommendation   Recommendation Home with family support;Home PT  (cont use of personal DME )   Equipment Recommended Walker  (cont use of RW for mobility)

## 2017-10-23 ENCOUNTER — HOSPITAL ENCOUNTER (OUTPATIENT)
Dept: INFUSION CENTER | Facility: HOSPITAL | Age: 57
Discharge: HOME | End: 2017-10-23
Payer: OTHER GOVERNMENT

## 2017-10-23 LAB
BACTERIA BLD CULT: NORMAL
BACTERIA BLD CULT: NORMAL
BASOPHILS # BLD AUTO: 0.02 THOUSANDS/ΜL (ref 0–0.1)
BASOPHILS NFR BLD AUTO: 0 % (ref 0–1)
EOSINOPHIL # BLD AUTO: 0.16 THOUSAND/ΜL (ref 0–0.61)
EOSINOPHIL NFR BLD AUTO: 3 % (ref 0–6)
ERYTHROCYTE [DISTWIDTH] IN BLOOD BY AUTOMATED COUNT: 27.1 % (ref 11.6–15.1)
G LAMBLIA AG STL QL IA: NEGATIVE
HCT VFR BLD AUTO: 25.1 % (ref 34.8–46.1)
HGB BLD-MCNC: 7.6 G/DL (ref 11.5–15.4)
LYMPHOCYTES # BLD AUTO: 1.43 THOUSANDS/ΜL (ref 0.6–4.47)
LYMPHOCYTES NFR BLD AUTO: 31 % (ref 14–44)
MCH RBC QN AUTO: 25.1 PG (ref 26.8–34.3)
MCHC RBC AUTO-ENTMCNC: 30.3 G/DL (ref 31.4–37.4)
MCV RBC AUTO: 83 FL (ref 82–98)
MONOCYTES # BLD AUTO: 0.59 THOUSAND/ΜL (ref 0.17–1.22)
MONOCYTES NFR BLD AUTO: 13 % (ref 4–12)
NEUTROPHILS # BLD AUTO: 2.46 THOUSANDS/ΜL (ref 1.85–7.62)
NEUTS SEG NFR BLD AUTO: 53 % (ref 43–75)
NRBC BLD AUTO-RTO: 0 /100 WBCS
PLATELET # BLD AUTO: 45 THOUSANDS/UL (ref 149–390)
RBC # BLD AUTO: 3.03 MILLION/UL (ref 3.81–5.12)
WBC # BLD AUTO: 4.69 THOUSAND/UL (ref 4.31–10.16)

## 2017-10-23 PROCEDURE — 85025 COMPLETE CBC W/AUTO DIFF WBC: CPT | Performed by: INTERNAL MEDICINE

## 2017-10-23 PROCEDURE — 97110 THERAPEUTIC EXERCISES: CPT

## 2017-10-23 PROCEDURE — 97116 GAIT TRAINING THERAPY: CPT

## 2017-10-23 PROCEDURE — 97535 SELF CARE MNGMENT TRAINING: CPT

## 2017-10-23 RX ORDER — FUROSEMIDE 40 MG/1
40 TABLET ORAL EVERY MORNING
Status: DISCONTINUED | OUTPATIENT
Start: 2017-10-24 | End: 2017-10-24 | Stop reason: HOSPADM

## 2017-10-23 RX ORDER — LACTULOSE 20 G/30ML
15 SOLUTION ORAL DAILY
Status: DISCONTINUED | OUTPATIENT
Start: 2017-10-24 | End: 2017-10-24 | Stop reason: HOSPADM

## 2017-10-23 RX ORDER — SPIRONOLACTONE 50 MG/1
50 TABLET, FILM COATED ORAL
Status: DISCONTINUED | OUTPATIENT
Start: 2017-10-23 | End: 2017-10-24 | Stop reason: HOSPADM

## 2017-10-23 RX ORDER — FUROSEMIDE 20 MG/1
20 TABLET ORAL
Status: DISCONTINUED | OUTPATIENT
Start: 2017-10-23 | End: 2017-10-24 | Stop reason: HOSPADM

## 2017-10-23 RX ORDER — FUROSEMIDE 20 MG/1
20 TABLET ORAL
Status: DISCONTINUED | OUTPATIENT
Start: 2017-10-23 | End: 2017-10-23

## 2017-10-23 RX ADMIN — FUROSEMIDE 20 MG: 20 TABLET ORAL at 17:53

## 2017-10-23 RX ADMIN — PANTOPRAZOLE SODIUM 40 MG: 40 TABLET, DELAYED RELEASE ORAL at 16:36

## 2017-10-23 RX ADMIN — Medication 200 MG: at 08:12

## 2017-10-23 RX ADMIN — IRON SUCROSE 200 MG: 20 INJECTION, SOLUTION INTRAVENOUS at 13:15

## 2017-10-23 RX ADMIN — METRONIDAZOLE 500 MG: 500 TABLET ORAL at 13:15

## 2017-10-23 RX ADMIN — SPIRONOLACTONE 50 MG: 50 TABLET ORAL at 17:52

## 2017-10-23 RX ADMIN — LACTULOSE 10 G: 20 SOLUTION ORAL at 08:14

## 2017-10-23 RX ADMIN — PREGABALIN 300 MG: 100 CAPSULE ORAL at 21:22

## 2017-10-23 RX ADMIN — RIFAXIMIN 200 MG: 200 TABLET ORAL at 08:18

## 2017-10-23 RX ADMIN — LIDOCAINE 1 PATCH: 50 PATCH TOPICAL at 08:16

## 2017-10-23 RX ADMIN — METRONIDAZOLE 500 MG: 500 TABLET ORAL at 21:22

## 2017-10-23 RX ADMIN — CIPROFLOXACIN 500 MG: 500 TABLET, FILM COATED ORAL at 21:22

## 2017-10-23 RX ADMIN — METRONIDAZOLE 500 MG: 500 TABLET ORAL at 06:08

## 2017-10-23 RX ADMIN — FUROSEMIDE 40 MG: 40 TABLET ORAL at 08:13

## 2017-10-23 RX ADMIN — PANTOPRAZOLE SODIUM 40 MG: 40 TABLET, DELAYED RELEASE ORAL at 06:09

## 2017-10-23 RX ADMIN — SPIRONOLACTONE 50 MG: 50 TABLET ORAL at 08:13

## 2017-10-23 RX ADMIN — CIPROFLOXACIN 500 MG: 500 TABLET, FILM COATED ORAL at 08:13

## 2017-10-23 RX ADMIN — FOLIC ACID 1 MG: 1 TABLET ORAL at 08:13

## 2017-10-23 RX ADMIN — RIFAXIMIN 200 MG: 200 TABLET ORAL at 16:36

## 2017-10-23 RX ADMIN — PREGABALIN 300 MG: 100 CAPSULE ORAL at 08:13

## 2017-10-23 NOTE — ASSESSMENT & PLAN NOTE
· Decompensated in setting of sepsis  · Sober since August  · F/U with [facility] for transplant candidacy, is currently undergoing w/u   · Follow MELD labs daily (17 yesterda), if worsening will require tx to [facility]  · Ascites minimal on CT scan  Diuretics restarted 10/20 given inc edema  · On lasix 40 mg (home dose)   Spironolactone still only daily (home is BID)  · Limited in our ability to uptitrate by BPs  · 2 gm sodium diet

## 2017-10-23 NOTE — PLAN OF CARE
Problem: PHYSICAL THERAPY ADULT  Goal: Performs mobility at highest level of function for planned discharge setting  See evaluation for individualized goals  Treatment/Interventions: Functional transfer training, Endurance training, Patient/family training, Equipment eval/education, Bed mobility, Gait training, Spoke to nursing  Equipment Recommended: Evelia Ragsdale       See flowsheet documentation for full assessment, interventions and recommendations  Outcome: Adequate for Discharge  Prognosis: Good  Problem List: Decreased strength, Decreased range of motion, Decreased endurance, Impaired balance, Decreased mobility, Pain  Assessment: PRESENTLY THE PT  PRESENTS WITH MULTIPLE ISSUES AND CONCERNS UPON ENTERING PT'S ROOM  EXTENDED TIME REQUIRED PRIOR TO ,DURING AND AFTER SESSION RELATED TO EDUCATION TO ALLIEVIATE PT  CONCERNS/QUESTIONS  OVERALL MOBILITY PROGRESSED WITH SEVERAL GAIT TRIALS WITH VARIOUS DME RESULTING IN ONGOING NEED FOR ROLLER WALKER OVER COMMUNITY DISTANCES AND SPC WITH STAIR NAVIGATION SECONDARY TO PORTION OF STAIRS W/O RAILING  NO OVERT LOSS O FBLANCE NOTED ON LEVELS AS WELL AS ELEVATIONS   WTITTEN HANDOUT ISSUED ALENA PT  RE: HOME EXERCISE PROGRAM AS WELL AS STAIR CLIMBING  IMPROVED RIGHT HIP FLEXION NOTED IN STANDING WITH DIFFICULTY REMAINING WHILE SEATED  AT THIS TIME,  THE PT  IS APPROPIATE TO D/C TO HOME SETTING WITH FAMILY ASSISTANCE/SUPERVISION,HOME PT,USE OF ROLLER WALKER FOR UPRIGHT MOBILITY IN CONJUNCTION WITH SPC DURING STAIR NAVIGATION  Barriers to Discharge: Inaccessible home environment     Recommendation: Home with family support, Home PT     PT - OK to Discharge: (S) Yes (ONCE MEDICALLY CLEARED FOR D/C )    See flowsheet documentation for full assessment     Unruly Ignacio, PTA

## 2017-10-23 NOTE — PROGRESS NOTES
Progress Note - Sena Hayes 62 y o  female MRN: [id number]    Unit/Bed#: [facility] 726-01 Encounter: [id number]        DOS: 10/23/2017      * Septic shock due to undetermined organism [facility])   Assessment & Plan    · POA, unknown etiology  Possibly colitis, no other etiology found  · Tx out of ICU 10/21  · On day #5 cipro/flagyl  Off broad spectrum abx  · Has been afebrile, leukocytosis resolved  · Blood cx negative, flu/RSV negative, stool studies negative  · C diff not sent; if worsens clinically, would check  · Tolerating regular diet         Colitis   Assessment & Plan    · Noted on CT scan  Only source of sepsis identified at this point  · On day #5 cipro/flagyl  · GI following, on regular diet  · Diarrhea slightly better   · If worsens, consider colonoscopy kwadwo given anemia         Alcoholic cirrhosis of liver with ascites ([facility] [address] )   Assessment & Plan    · Decompensated in setting of sepsis  · Sober since August  · F/U with [facility] for transplant candidacy, is currently undergoing w/u   · Follow MELD labs daily (17 yesterda), if worsening will require tx to [facility]  · Ascites minimal on CT scan  Diuretics restarted 10/20 given inc edema  · Restarted home dose diuretics today  · Lasix 40 mg AM, 20 mg PM and spironolactone 50 mg BID  · 2 gm sodium diet        Hepatic encephalopathy (HCC)   Assessment & Plan    · Currently A&Ox3  · Continue lactulose/xifaxan with goal BM 2-3 daily  · Diarrhea improving         Right leg pain   Assessment & Plan    · At site of previous central line removal  · No clinical evidence of phlebitis/dvt at this point-also may be related to generalized edema  · Serial exams, close monitoring  · Pain improving with lido patch  PT will need to continue to work with and eval patient   · If worsening, consider duplex         Thrombocytopenia (HCC)   Assessment & Plan    · Low but stable   Monitor for now  · NO SC Heparin   · Transfuse PRN        History of alcohol abuse   Assessment & Plan · Sober since August  · Trend meld labs as above (yesterday 16)  · OP f/u with [facility] for possible transplant         Anemia   Assessment & Plan    · Hgb slowly dec since admission but improved from yesterday (7 6 today); prefers to avoid blood transfusions if possible given concern for ab exposure and future liver transplant  · Follows with hem/onc OP, gets IV venofer  Hx of gastric bypass  · Will give IV venofer today   · Admitted to some bleeding 10/20  None since  · FOBT is positive  · Rectal exam 10/21 did not reveal any stool in rectal vault; no gross blood noted  · Gi following          VTE Pharmacologic Prophylaxis:   Pharmacologic: Pharmacologic VTE Prophylaxis contraindicated due to liver dz, thrombocytopenia, +FOBT, anemia  Mechanical VTE Prophylaxis in Place: Yes    Patient Centered Rounds: I have performed bedside rounds with nursing staff today  JUVE    Discussions with Specialists or Other Care Team Provider: Appreciate GI input     Education and Discussions with Family / Patient: Patient  Time Spent for Care: 45 minutes  More than 50% of total time spent on counseling and coordination of care as described above  Current Length of Stay: 5 day(s)    Current Patient Status: Inpatient   Certification Statement: The patient will continue to require additional inpatient hospital stay due to need or IV venofer, PT/OT to dispo     Discharge Plan: hopefully medically ready tomorrow  Code Status: Level 1 - Full Code      Subjective:   Pt reports feeling a lot better overall today  More energy  She has much less edema  She denies abdominal pain  Tolerating diet without n/v  She denies any rectal bleeding  Has already had 2 BMs today  Objective:     Vitals:   Temp (24hrs), Av 1 °F (36 7 °C), Min:98 °F (36 7 °C), Max:98 2 °F (36 8 °C)    HR:  [92-96] 95  Resp:  [16-18] 16  BP: (95-99)/(52-54) 96/54  SpO2:  [97 %-98 %] 98 %  Body mass index is 36 03 kg/m²       Input and Output Summary (last 24 hours): Intake/Output Summary (Last 24 hours) at 10/23/17 0925  Last data filed at 10/23/17 0810   Gross per 24 hour   Intake              525 ml   Output             2900 ml   Net            -2375 ml       Physical Exam:     Physical Exam   Constitutional: She is oriented to person, place, and time  No distress  Eyes: No scleral icterus  Cardiovascular: Normal rate and regular rhythm  Pulmonary/Chest: Effort normal and breath sounds normal  No respiratory distress  Abdominal: Soft  Bowel sounds are normal  She exhibits mass (hernia )  She exhibits no distension  There is no tenderness  Musculoskeletal: She exhibits edema  She exhibits no tenderness  Neurological: She is alert and oriented to person, place, and time  No asterixis    Skin: Skin is warm and dry  She is not diaphoretic  Psychiatric: She has a normal mood and affect  Nursing note and vitals reviewed  Additional Data:     Labs:      Results from last 7 days  Lab Units 10/23/17  0558   WBC Thousand/uL 4 69   HEMOGLOBIN g/dL 7 6*   HEMATOCRIT % 25 1*   PLATELETS Thousands/uL 45*   NEUTROS PCT % 53   LYMPHS PCT % 31   MONOS PCT % 13*   EOS PCT % 3       Results from last 7 days  Lab Units 10/22/17  0535   SODIUM mmol/L 144   POTASSIUM mmol/L 3 4*   CHLORIDE mmol/L 115*   CO2 mmol/L 22   BUN mg/dL 7   CREATININE mg/dL 0 90   CALCIUM mg/dL 7 7*   TOTAL PROTEIN g/dL 5 5*   BILIRUBIN TOTAL mg/dL 1 74*   ALK PHOS U/L 107   ALT U/L 33   AST U/L 84*   GLUCOSE RANDOM mg/dL 72       Results from last 7 days  Lab Units 10/22/17  0535   INR  2 08*       * I Have Reviewed All Lab Data Listed Above  * Additional Pertinent Lab Tests Reviewed:  All Labs Within Last 24 Hours Reviewed    Imaging:    Imaging Reports Reviewed Today Include: none  Imaging Personally Reviewed by Myself Includes:  none    Recent Cultures (last 7 days):       Results from last 7 days  Lab Units 10/18/17  1744 10/18/17  1443   BLOOD CULTURE   --  No Growth After 4 Days   No Growth After 4 Days  INFLUENZA A PCR  None Detected  --    INFLUENZA B PCR  None Detected  --    RSV PCR  None Detected  --        Last 24 Hours Medication List:     ciprofloxacin 500 mg Oral L07T CASTRO   folic acid 1 mg Oral Daily   furosemide 40 mg Oral Daily   iron sucrose 200 mg Intravenous Once   lactulose 10 g Oral BID   lidocaine 1 patch Transdermal Daily   metroNIDAZOLE 500 mg Oral Q8H CASTRO   pantoprazole 40 mg Oral BID AC   pregabalin 300 mg Oral BID   rifaximin 200 mg Oral BID   spironolactone 50 mg Oral Daily   thiamine 200 mg Oral Daily        Today, Patient Was Seen By: Leroy Briones PA-C    ** Please Note: Dictation voice to text software may have been used in the creation of this document   **

## 2017-10-23 NOTE — ASSESSMENT & PLAN NOTE
· Hgb slowly dec since admission but improved from yesterday (7 6 today); prefers to avoid blood transfusions if possible given concern for ab exposure and future liver transplant  · Follows with hem/onc OP, gets IV venofer  Hx of gastric bypass  · Will give IV venofer today   · Admitted to some bleeding 10/20  None since  · FOBT is positive  · Rectal exam 10/21 did not reveal any stool in rectal vault; no gross blood noted    · Gi following

## 2017-10-23 NOTE — SOCIAL WORK
Received ECIN message from Katelynn from [facility] that they do not contract with pt's insurance  CM informed pt of same  She would prefer a referral to [employer] [facility]   CM contacted [facility] and spoke with Swetha Billing to confirm if they accept pt's insurance  Per Swetha Billing, they accept pt's insurance  RW script faxed to [facility] at [phone]  Per Swetha Billing, they will deliver RW to pt's home  CM informed pt of same

## 2017-10-23 NOTE — PHYSICAL THERAPY NOTE
Physical Therapy Progress Note   10/23/17 1131   Pain Assessment   Pain Assessment 0-10   Pain Score 3   Pain Type Acute pain   Pain Location Hip   Pain Orientation Right; Anterior   Pain Radiating Towards GROIN   Pain Descriptors Aching;Dull   Pain Frequency Constant/continuous   Pain Onset Ongoing   Clinical Progression Gradually improving   Effect of Pain on Daily Activities DECREASED RIGHT HIP FLEXION  Patient's Stated Pain Goal No pain   Hospital Pain Intervention(s) Repositioned; Ambulation/increased activity   Response to Interventions DECREASED ANXIOUSNESS AND IMPROVED UNDERSTANDING  Multiple Pain Sites No   Pain Rating: FLACC (Rest) - Face 1   Pain Rating: FLACC (Rest) - Legs 0   Pain Rating: FLACC (Rest) - Activity 0   Pain Rating: FLACC (Rest) - Cry 1   Pain Rating: FLACC (Rest) - Consolability 1   Score: FLACC (Rest) 3   Pain Rating: FLACC (Activity) - Face 1   Pain Rating: FLACC (Activity) - Legs 0   Pain Rating: FLACC (Activity) - Activity 0   Pain Rating: FLACC (Activity) - Cry 1   Pain Rating: FLACC (Activity) - Consolability 1   Score: FLACC (Activity) 3   Restrictions/Precautions   Weight Bearing Precautions Per Order No   Other Precautions Fall Risk;Pain   General   Chart Reviewed Yes   Response to Previous Treatment Patient with no complaints from previous session  Family/Caregiver Present No   Cognition   Overall Cognitive Status WFL   Arousal/Participation Alert; Cooperative   Attention Within functional limits   Orientation Level Oriented X4   Memory Within functional limits   Following Commands Follows all commands and directions without difficulty   Subjective   Subjective THE PT  REPORTS FEELING MORE AT EASE AFTER EXTENSIVE EDUCATION/MOBILITY TRIALS  Transfers   Sit to Stand 5  Supervision   Additional items Assist x 1; Armrests; Verbal cues   Stand to Sit 5  Supervision   Additional items Assist x 1; Armrests; Verbal cues   Ambulation/Elevation   Gait pattern Antalgic; Inconsistent cha; Foward flexed; Short stride   Gait Assistance 5  Supervision  (SUPERVISION WITH RW MIN ASSIST WITH SPC TRIAL  )   Additional items Assist x 1;Verbal cues; Tactile cues  (POSTURE,PACING AND SEQUENCING )   Assistive Device Rolling walker;SPC  (RECCOMEND RW FOR DISTANCE SPC FOR STAIR NAVIGATION )   Distance 70'X2 WITH RW 70'X2 SPC  (APPROPIATE REST PERIODS BETWEEN WALKS )   Stair Management Assistance 5  Supervision  (SUPERVISON WITH RAIL/SPC MIN ASSIST WITH SPC/NO RAIL)   Additional items Assist x 1;Verbal cues; Tactile cues  (POSTURE,SEQUENCING OF DME AND FOOT PLACEMENT )   Stair Management Technique One rail R;Step to pattern; Foreward; Sideways;Nonreciprocal;With cane; No rails  (RIGHT RAIL/NO DME SIDEWAYS SPC NO RAIL)   Number of Stairs 7  (WITH EACH TRIAL (4))   Balance   Static Sitting Good   Static Standing Fair  (WITH ROLLER WALKER SUPPORT)   Ambulatory Fair  (FAIR WITH RW POOR + SPC)   Endurance Deficit   Endurance Deficit Yes   Endurance Deficit Description EXPECTED GENERALIZED DECONDITIONING  Activity Tolerance   Activity Tolerance Patient limited by fatigue;Patient limited by pain   Nurse Made Aware SPOKE WITH JUVE  (SPOKE WITH Castro Victor FROM CASE MANAGEMENT )   Exercises   THR Sitting;10 reps;AROM;AAROM; Bilateral  (AAROM SEATED RIGHT HIP FLEXION )   Assessment   Prognosis Good   Problem List Decreased strength;Decreased range of motion;Decreased endurance; Impaired balance;Decreased mobility;Pain   Assessment PRESENTLY THE PT  PRESENTS WITH MULTIPLE ISSUES AND CONCERNS UPON ENTERING PT'S ROOM  EXTENDED TIME REQUIRED PRIOR TO ,DURING AND AFTER SESSION RELATED TO EDUCATION TO ALLIEVIATE PT  CONCERNS/QUESTIONS  OVERALL MOBILITY PROGRESSED WITH SEVERAL GAIT TRIALS WITH VARIOUS DME RESULTING IN ONGOING NEED FOR ROLLER WALKER OVER COMMUNITY DISTANCES AND SPC WITH STAIR NAVIGATION SECONDARY TO PORTION OF STAIRS W/O RAILING   NO OVERT LOSS O FBLANCE NOTED ON LEVELS AS WELL AS ELEVATIONS   WTITTEN HANDOUT ISSUED ALENA PT  RE: HOME EXERCISE PROGRAM AS WELL AS STAIR CLIMBING  IMPROVED RIGHT HIP FLEXION NOTED IN STANDING WITH DIFFICULTY REMAINING WHILE SEATED  AT THIS TIME,  THE PT  IS APPROPIATE TO D/C TO HOME SETTING WITH FAMILY ASSISTANCE/SUPERVISION,HOME PT,USE OF ROLLER WALKER FOR UPRIGHT MOBILITY IN CONJUNCTION WITH SPC DURING STAIR NAVIGATION  Barriers to Discharge Inaccessible home environment   Goals   Patient Goals TO GET HER STRENGTH BACK IN RIGHT HIP  STG Expiration Date 11/03/17   Treatment Day 2   Plan   Treatment/Interventions Functional transfer training;LE strengthening/ROM; Elevations; Therapeutic exercise; Endurance training;Gait training; Compensatory technique education;Spoke to nursing;Spoke to case management   Progress Improving as expected   PT Frequency 5x/wk   Recommendation   Recommendation Home with family support;Home PT   Equipment Recommended Walker;Cane  ([address]   SPC ISSUED FOR STAIRS )   PT - OK to Discharge Yes  (ONCE MEDICALLY CLEARED FOR D/C )     Karime Bo, PTA

## 2017-10-23 NOTE — PROGRESS NOTES
[facility]  Progress Note - Mikayla Hugo 62 y o  female MRN: [id number]    [facility] Encounter: [id number]    Assessment/Plan:  1  Alcoholic cirrhosis complicated by ascites and encephalopathy   -She has been referred to [facility] liver Transplant Team      -LFTs improved yesterday with an AST of 84, ALT of 33, alk-phos of 107, and T bili of 1 74  Platelets are low at 42  INR is 2 08  No labs today    -continue to monitor meld labs   -continue diuretics as tolerated with blood pressure   -Xifaxan b i d    -continue lactulose with a goal of about 3 soft bowel movements daily to prevent hepatic encephalopathy  Currently she is taking 15mL once daily    -2 g sodium diet   -monitor kidney function   -outpatient follow-up with [facility] transplant team     2  Ascending colitis on CT scan   -She is asymptomatic without any abdominal pain  Diarrhea is likely secondary to the lactulose use that she did not have any prior to the lactulose being increased  Had a recent colonoscopy 4 weeks ago  Currently her diarrhea is improved with a lower dose of lactulose    -continue diet as tolerated   -if patient's abdominal exam was stool output would clinically worsen, could consider checking C diff but would hold off as she is improving      3   Rectal bleeding   -Resolved, likely secondary to acute irritation from frequent BMs    -Hgb stable but low at 7 2Patient had a few small drops of bright red blood in her stool yesterday  This was an isolated event that she has not had any further bleeding  Hemoglobin is stable at 7 6      -continue to monitor hemoglobin   -continue to monitor stool output  I instructed the patient to let us know should she have any further blood in the stool    4   Chronic anemia   -Likely secondary to chronic disease   -No signs of overt GI bleeding currently   -She is requesting to be checked for Celiac disease given her anemia and diarrhea and this is reasonable, will order Ttg IgA and total IgA  Subjective:   Patient seen and examined at bedside  She reports no new complaints and states that she feels well overall  She had physical therapy today and does feel tired from this however  She denies any further episodes of bleeding reports her bowel movements have decreased to about 3-4 per day  She states she would like to check for celiac disease as she is anemic  Objective:     Vitals: Blood pressure 96/54, pulse 95, temperature 98 °F (36 7 °C), temperature source Oral, resp  rate 16, height 5' 4" (1 626 m), weight 95 2 kg (209 lb 14 1 oz), SpO2 98 %, not currently breastfeeding  ,Body mass index is 36 03 kg/m²        Intake/Output Summary (Last 24 hours) at 10/23/17 1400  Last data filed at 10/23/17 0900   Gross per 24 hour   Intake              345 ml   Output             3150 ml   Net            -2805 ml       Review of Systems: as per HPI  Review of Systems    Physical Exam:     Physical Exam      Invasive Devices     Peripheral Intravenous Line            Peripheral IV 10/20/17 Left Forearm 3 days                        CBC: Lab Results   Component Value Date    WBC 4 69 10/23/2017    HGB 7 6 (L) 10/23/2017    HCT 25 1 (L) 10/23/2017    MCV 83 10/23/2017    PLT 45 (LL) 10/23/2017    MCH 25 1 (L) 10/23/2017    MCHC 30 3 (L) 10/23/2017    RDW 27 1 (H) 10/23/2017    NRBC 0 10/23/2017   ,

## 2017-10-23 NOTE — SOCIAL WORK
Met with pt to discuss her discharge plan  Informed her of therapy's recommendation for home therapy and a RW  Pt was agreeable to home therapy and preferred to use  VN  PT was agreeable to RW and was agreeable to obtain it from [facility]  Will obtain script from MD and send referral   Anupam Jj referral sent to [facility]

## 2017-10-23 NOTE — ASSESSMENT & PLAN NOTE
· Sober since August  · Trend meld labs as above (yesterday 16)  · OP f/u with [facility] for possible transplant

## 2017-10-23 NOTE — ASSESSMENT & PLAN NOTE
· Noted on CT scan   Only source of sepsis identified at this point  · On day #5 cipro/flagyl  · GI following, on regular diet  · Diarrhea slightly better   · If worsens, consider colonoscopy kwadwo given anemia

## 2017-10-24 VITALS
HEART RATE: 82 BPM | RESPIRATION RATE: 16 BRPM | TEMPERATURE: 98.1 F | BODY MASS INDEX: 35.83 KG/M2 | HEIGHT: 64 IN | OXYGEN SATURATION: 98 % | WEIGHT: 209.88 LBS | DIASTOLIC BLOOD PRESSURE: 53 MMHG | SYSTOLIC BLOOD PRESSURE: 93 MMHG

## 2017-10-24 PROBLEM — E87.6 HYPOKALEMIA: Status: ACTIVE | Noted: 2017-10-24

## 2017-10-24 LAB
ALBUMIN SERPL BCP-MCNC: 1.8 G/DL (ref 3.5–5)
ALP SERPL-CCNC: 110 U/L (ref 46–116)
ALT SERPL W P-5'-P-CCNC: 33 U/L (ref 12–78)
ANION GAP SERPL CALCULATED.3IONS-SCNC: 7 MMOL/L (ref 4–13)
AST SERPL W P-5'-P-CCNC: 77 U/L (ref 5–45)
BASOPHILS # BLD AUTO: 0.03 THOUSANDS/ΜL (ref 0–0.1)
BASOPHILS NFR BLD AUTO: 1 % (ref 0–1)
BILIRUB SERPL-MCNC: 2.1 MG/DL (ref 0.2–1)
BUN SERPL-MCNC: 5 MG/DL (ref 5–25)
CALCIUM SERPL-MCNC: 7.5 MG/DL (ref 8.3–10.1)
CHLORIDE SERPL-SCNC: 112 MMOL/L (ref 100–108)
CO2 SERPL-SCNC: 24 MMOL/L (ref 21–32)
CREAT SERPL-MCNC: 0.85 MG/DL (ref 0.6–1.3)
EOSINOPHIL # BLD AUTO: 0.15 THOUSAND/ΜL (ref 0–0.61)
EOSINOPHIL NFR BLD AUTO: 3 % (ref 0–6)
ERYTHROCYTE [DISTWIDTH] IN BLOOD BY AUTOMATED COUNT: 27.7 % (ref 11.6–15.1)
GFR SERPL CREATININE-BSD FRML MDRD: 76 ML/MIN/1.73SQ M
GLUCOSE SERPL-MCNC: 95 MG/DL (ref 65–140)
HCT VFR BLD AUTO: 25.8 % (ref 34.8–46.1)
HGB BLD-MCNC: 8 G/DL (ref 11.5–15.4)
IGA SERPL-MCNC: 958 MG/DL (ref 70–400)
LYMPHOCYTES # BLD AUTO: 1.37 THOUSANDS/ΜL (ref 0.6–4.47)
LYMPHOCYTES NFR BLD AUTO: 28 % (ref 14–44)
MCH RBC QN AUTO: 25.8 PG (ref 26.8–34.3)
MCHC RBC AUTO-ENTMCNC: 31 G/DL (ref 31.4–37.4)
MCV RBC AUTO: 83 FL (ref 82–98)
MONOCYTES # BLD AUTO: 0.77 THOUSAND/ΜL (ref 0.17–1.22)
MONOCYTES NFR BLD AUTO: 16 % (ref 4–12)
NEUTROPHILS # BLD AUTO: 2.48 THOUSANDS/ΜL (ref 1.85–7.62)
NEUTS SEG NFR BLD AUTO: 52 % (ref 43–75)
NRBC BLD AUTO-RTO: 0 /100 WBCS
PLATELET # BLD AUTO: 46 THOUSANDS/UL (ref 149–390)
POTASSIUM SERPL-SCNC: 3.1 MMOL/L (ref 3.5–5.3)
PROT SERPL-MCNC: 5.9 G/DL (ref 6.4–8.2)
RBC # BLD AUTO: 3.1 MILLION/UL (ref 3.81–5.12)
SODIUM SERPL-SCNC: 143 MMOL/L (ref 136–145)
WBC # BLD AUTO: 4.86 THOUSAND/UL (ref 4.31–10.16)

## 2017-10-24 PROCEDURE — 83516 IMMUNOASSAY NONANTIBODY: CPT | Performed by: PHYSICIAN ASSISTANT

## 2017-10-24 PROCEDURE — 82784 ASSAY IGA/IGD/IGG/IGM EACH: CPT | Performed by: PHYSICIAN ASSISTANT

## 2017-10-24 PROCEDURE — 85025 COMPLETE CBC W/AUTO DIFF WBC: CPT | Performed by: INTERNAL MEDICINE

## 2017-10-24 PROCEDURE — 80053 COMPREHEN METABOLIC PANEL: CPT | Performed by: INTERNAL MEDICINE

## 2017-10-24 RX ORDER — POTASSIUM CHLORIDE 20 MEQ/1
40 TABLET, EXTENDED RELEASE ORAL ONCE
Status: COMPLETED | OUTPATIENT
Start: 2017-10-24 | End: 2017-10-24

## 2017-10-24 RX ORDER — METRONIDAZOLE 500 MG/1
500 TABLET ORAL EVERY 8 HOURS SCHEDULED
Qty: 12 TABLET | Refills: 0 | Status: SHIPPED | OUTPATIENT
Start: 2017-10-24 | End: 2017-10-28

## 2017-10-24 RX ORDER — CIPROFLOXACIN 500 MG/1
500 TABLET, FILM COATED ORAL EVERY 12 HOURS SCHEDULED
Qty: 8 TABLET | Refills: 0 | Status: SHIPPED | OUTPATIENT
Start: 2017-10-24 | End: 2017-10-28

## 2017-10-24 RX ADMIN — LACTULOSE 15 G: 20 SOLUTION ORAL at 08:46

## 2017-10-24 RX ADMIN — LIDOCAINE 1 PATCH: 50 PATCH TOPICAL at 08:45

## 2017-10-24 RX ADMIN — PANTOPRAZOLE SODIUM 40 MG: 40 TABLET, DELAYED RELEASE ORAL at 07:08

## 2017-10-24 RX ADMIN — POTASSIUM CHLORIDE 40 MEQ: 1500 TABLET, EXTENDED RELEASE ORAL at 08:47

## 2017-10-24 RX ADMIN — METRONIDAZOLE 500 MG: 500 TABLET ORAL at 07:06

## 2017-10-24 RX ADMIN — FOLIC ACID 1 MG: 1 TABLET ORAL at 08:44

## 2017-10-24 RX ADMIN — CIPROFLOXACIN 500 MG: 500 TABLET, FILM COATED ORAL at 08:44

## 2017-10-24 RX ADMIN — Medication 200 MG: at 08:44

## 2017-10-24 RX ADMIN — RIFAXIMIN 200 MG: 200 TABLET ORAL at 08:46

## 2017-10-24 RX ADMIN — FUROSEMIDE 40 MG: 40 TABLET ORAL at 08:45

## 2017-10-24 RX ADMIN — PREGABALIN 300 MG: 100 CAPSULE ORAL at 08:45

## 2017-10-24 NOTE — SOCIAL WORK
[address] and spoke with [facility] Encarnacion who stated that they received script for RW  CM informed them of pt's discharge to home today and [facility] Encarnacion stated that they will have RW delivered to pt's home this morning  Pt informed pt of same and provided pt with original script to provide to medical supply company

## 2017-10-24 NOTE — DISCHARGE SUMMARY
Discharge Summary - [facility]    Patient Information: Anthony Coppola 62 y o  female MRN: [id number]  Unit/Bed#: [facility] 726-01 Encounter: [id number]    Discharging Physician / Practitioner: Luis Alfredo Bucio PA-C  PCP: Armando Balbuena MD  Admission Date: 10/18/2017  Discharge Date: 10/24/17    Reason for Admission:  Septic shock    Discharge Diagnoses:     Principal Problem:    Septic shock due to undetermined organism [facility]  Active Problems:    Hepatic encephalopathy ([facility] )    Alcoholic cirrhosis of liver with ascites ([facility] )    Colitis    Anemia    History of alcohol abuse    Thrombocytopenia (HCC)    Right leg pain    Hypokalemia  Resolved Problems:    Ascites    Hypomagnesemia    Lactic acidosis    Hypocalcemia    Hypophosphatemia      Consultations During Hospital Stay:  · Gastroenterology    Procedures Performed:     · Fecal occult blood:  Positive  · Fecal leukocytes:  No the PVCs  · Giardia antigen:  Negative  · Stool bacterial panel:  Negative  · Influenza a/B and RSV PCR:  None detected  · Blood cultures:  Negative x5 days  · CT abdomen pelvis with contrast: Diffuse wall thickening and pericolic infiltration of the ascending colon to the level of the hepatic flexure consistent with colitis  Cirrhosis with signs of portal hypertension and anasarca   Trace abdominal ascites  Midline mesentery containing hernia  · Ultrasound abdomen:  No evidence of ascites  Cirrhotic appearance to the liver  Hepatofugal flow now identified in the main portal vein  Prominent recanalized umbilical vein again identified, previously present  Right pleural effusion  · Chest x-ray:  Low lung volumes  No infiltrate or pleural effusion    Significant Findings / Test Results:     · Septic shock, POA  · Lactic acidosis 4 3 on admission    Incidental Findings:   · See above     Test Results Pending at Discharge (will require follow up):    · None     Outpatient Tests Requested:  · Follow-up with PCP this week   · Follow up with GI  · Follow up with [facility] for ongoing evaluation for liver transplant    Complications:  None    Hospital Course:     Stacey Peres is a 62 y o  female patient with past medical history of alcoholic cirrhosis (sober since 08/2017), ascites, hepatic encephalopathy, anemia, history of gastric bypass and thrombocytopenia who originally presented to the hospital on 10/18/2017 as a transfer from [address] [place] [address] due to septic shock of unknown origin  Patient went to [address] [place] [address] for an outpatient ultrasound and was noted to have shaking chills and confusion  Upon evaluation in the ER she was found to have a fever of 104 as well as lactic acidosis  She was started on broad-spectrum IV antibiotics and fluids and transferred [facility]  Infectious workup negative including a chest x-ray, UA, blood cultures, CT abdomen and pelvis  The only possible source appreciated was possible ascending colitis  Stool cultures were also negative  Patient continued to do well and was transferred out to to [facility]  She remained alert and oriented  Gastroenterology was consulted  Patient did have some positive fecal occult blood however does have hemorrhoids and it was felt that this was related to that  Her hemoglobin did dip as low as 7 2 however she frequently receives IV Venofer as an outpatient with Hematology for her anemia  This was given to her and improved her hemoglobin to 8 on discharge  Patient was admitted 08/2017 with GI bleeding secondary to anastomotic ulcer from prior gastric bypass  She had undergone EGD and colonoscopy 09/2017 which showed no varices and healed anastomotic ulcers  Colonoscopy showed 2 adenomatous polyps and internal hemorrhoids  Pt has been sober since 08/2017  Patient is currently following with [facility] for liver transplant  She has almost completed her workup and will be on the list shortly      She will be discharged from the hospital to complete 4 more days of p o  antibiotics for colitis  She will also be encouraged to take a daily multivitamin as her potassium was low during admission  I am hesitant to give her script for potassium as she is on spironolactone  She will continue to take lactulose and Xifaxan as previously directed  PT/OT evaluated patient and felt that she could go home with a rolling walker  She will be encouraged to follow up with all specialists as directed  Condition at Discharge: stable     Discharge Day Visit / Exam:     Subjective:  Patient reports feeling well and is ready to go home  She denies any fevers, chills, chest pain, shortness of breath, abdominal pain, nausea, vomiting, diarrhea  She has not had any further blood in her stool  Vitals: Blood Pressure: 93/53 (10/24/17 0720)  Pulse: 82 (10/24/17 0720)  Temperature: 98 1 °F (36 7 °C) (10/24/17 0720)  Temp Source: Oral (10/24/17 0720)  Respirations: 16 (10/24/17 0720)  Height: 5' 4" (162 6 cm) (10/18/17 2125)  Weight - Scale: 95 2 kg (209 lb 14 1 oz) (10/24/17 0600)  SpO2: 98 % (10/24/17 0720)  Exam:   Physical Exam   Constitutional: She is oriented to person, place, and time  She appears well-developed and well-nourished  No distress  HENT:   Poor dentition    Cardiovascular: Normal rate and regular rhythm  Pulmonary/Chest: Effort normal and breath sounds normal  No respiratory distress  She has no wheezes  Abdominal: Soft  Bowel sounds are normal  She exhibits mass (hernia )  She exhibits no distension  There is no tenderness  Musculoskeletal: She exhibits edema (trace)  Neurological: She is alert and oriented to person, place, and time  Skin: Skin is warm and dry  Psychiatric: She has a normal mood and affect  Nursing note and vitals reviewed  A/P:    1  Septic shock due to undetermined organism:  POA, unknown etiology  Possibly colitis, no other etiology found despite extensive workup  Transfer out of ICU on 10/21  Day 6 of Cipro/flagyl  Has been afebrile and leukocytosis resolved  2   Colitis:  Seen on CT scan  Only source of sepsis identified at this point  day 6 of Cipro/Flagyl  Complete 4 more days  Tolerating regular diet  Follow up with GI  3  Alcoholic cirrhosis of liver with ascites:  Decompensated in setting of sepsis  Sober since August   Follow-up with [facility] for transplant candidacy, is currently undergoing workup  Follow meld labs  Ascites minimal on CT scan and ultrasound  Diuretics were restarted on 10/20 with improvement  Continue 2 g sodium diet  4  Hepatic encephalopathy:  Alert oriented x3 currently  Continue lactulose/that accident with goal bowel movement 2-3 daily  5   Right leg pain:  Has improved  Continue supportive care  At site of previous central line removal   No clinical evidence of phlebitis/DVT at this point  Also may be related to generalized edema  Continue serial exams  6   Hypokalemia:  Replete  Take a multivitamin daily  Have labs checked in 1 week      Discussion with Family: patient  Discharge instructions/Information to patient and family:   See after visit summary for information provided to patient and family  Provisions for Follow-Up Care:  See after visit summary for information related to follow-up care and any pertinent home health orders  Disposition:     Home    For Discharges to [facility] SNF:   · Not Applicable to this Patient - Not Applicable to this Patient    Planned Readmission: no     Discharge Statement:  I spent 45 minutes discharging the patient  This time was spent on the day of discharge  I had direct contact with the patient on the day of discharge  Greater than 50% of the total time was spent examining patient, answering all patient questions, arranging and discussing plan of care with patient as well as directly providing post-discharge instructions    Additional time then spent on discharge activities  Discharge Medications:  See after visit summary for reconciled discharge medications provided to patient and family        ** Please Note: This note has been constructed using a voice recognition system **

## 2017-10-24 NOTE — PROGRESS NOTES
Discharge instructions reviewed with patient  No further questions at this time  Prescriptions given for PO antibiotics  Patient offered assistance with packing belongings and getting dressed  Waiting for   Will call for wheelchair when ride is available

## 2017-10-25 ENCOUNTER — HOSPITAL ENCOUNTER (OUTPATIENT)
Dept: INFUSION CENTER | Facility: HOSPITAL | Age: 57
Discharge: HOME | End: 2017-10-25
Payer: OTHER GOVERNMENT

## 2017-10-25 VITALS
HEART RATE: 95 BPM | SYSTOLIC BLOOD PRESSURE: 96 MMHG | TEMPERATURE: 98.2 F | OXYGEN SATURATION: 100 % | DIASTOLIC BLOOD PRESSURE: 58 MMHG | RESPIRATION RATE: 18 BRPM

## 2017-10-25 PROCEDURE — 96365 THER/PROPH/DIAG IV INF INIT: CPT

## 2017-10-25 RX ORDER — SODIUM CHLORIDE 9 MG/ML
40 INJECTION, SOLUTION INTRAVENOUS ONCE
Status: COMPLETED | OUTPATIENT
Start: 2017-10-25 | End: 2017-10-25

## 2017-10-25 RX ADMIN — IRON SUCROSE 200 MG: 20 INJECTION, SOLUTION INTRAVENOUS at 15:03

## 2017-10-25 RX ADMIN — SODIUM CHLORIDE 40 ML/HR: 9 INJECTION, SOLUTION INTRAVENOUS at 15:04

## 2017-10-26 LAB — TTG IGA SER-ACNC: <2 U/ML (ref 0–3)

## 2017-10-27 RX ORDER — SODIUM CHLORIDE 9 MG/ML
40 INJECTION, SOLUTION INTRAVENOUS ONCE
Status: COMPLETED | OUTPATIENT
Start: 2017-10-30 | End: 2017-10-30

## 2017-10-27 NOTE — PROGRESS NOTES
Assessment  1  Chronic hand pain (729 5,338 29) (M79 643,G89 29)   2  Chronic foot pain (729 5,338 29) (M79 673,G89 29)   3  Pain syndrome, chronic (338 4) (G89 4)   4  Peripheral neuropathy (356 9) (G62 9)   5  Alcoholic hepatitis (606 6) (K70 10)   6  Deconditioned low back (728 87) (R29 898)    Plan   Alcoholic hepatitis    · PredniSONE 20 MG Oral Tablet   Rx By: Pilar Santana; Dispense: 30 Days ; #:90 Tablet; Refill: 0;For: Alcoholic hepatitis; OSWALDO = N; Verified Transmission to [facility] ; Msg to Pharmacy: Please take 40mg daily until September 4th, then take 30mg daily for one week, then 20mg daily for one week, then 10mg daily for one week, then stop; Last Updated By: Nik Noel; 10/2/2017 1:37:23 PM  Anemia    · Suprep Bowel Prep Kit 17 5-3 13-1 6 GM/180ML Oral Solution   Rx By: Pilar Santana; Dispense: 0 Days ; #:1 ML; Refill: 0;For: Anemia; OSWALDO = N; Verified Transmission to [facility] ; Last Updated By: Nik Noel; 10/2/2017 1:37:38 PM  Chronic foot pain, Chronic hand pain, Deconditioned low back, Pain syndrome, chronic,  Peripheral neuropathy    · Fer Melgar PT, Arnaldo HAN (Physical Medicine and Rehabilitation) Co-Management  Eval for  [facility] 1-2 x week with eventual transition to land therapy, 8-12 weeks or  more  Status: Active  Requested for: 37LJK5909   Ordered; For: Chronic foot pain, Chronic hand pain, Deconditioned low back, Pain syndrome, chronic, Peripheral neuropathy; Ordered By: Whit Zavaleta Performed:  Due: 01VPZ1204  Care Summary provided  : Yes  Cirrhosis    · Spironolactone 100 MG Oral Tablet (Aldactone)   Rx By: Pilar Santana; Dispense: 30 Days ; #:60 Tablet; Refill: 0;For: Cirrhosis; OSWALDO = N; Verified Transmission to [facility] ; Last Updated By: Nik Noel; 10/2/2017 1:37:34 PM  Encephalopathy, hepatic    · Xifaxan 550 MG Oral Tablet   Rx By: Pilar Santana; Dispense: 30 Days ; #:60 Tablet;  Refill: 3;For: Encephalopathy, hepatic; OSWALDO = N; Verified Transmission to [facility] ; Last Updated By: Ashleigh Schmid; 10/2/2017 1:37:51 PM  Health Maintenance    · Lyrica 300 MG Oral Capsule; Take 1 po bid   8/25/17  per pt  2 tabs  bid   Rx By: Denia Gan; Dispense: 90 Days ; #:180 Capsule; Refill: 1;For: Health Maintenance; OSWALDO = N; Record  Liver disease    · Folic Acid 1 MG Oral Tablet   Rx By: Romana Medina; Dispense: 90 Days ; #:90 Tablet; Refill: 0;For: Liver disease; OSWALDO = N; Verified Transmission to [facility]; Last Updated By: Ashleigh Schmid; 10/2/2017 1:37:03 PM  Unlinked    · Lactulose SOLN   Dispense: 0 Days ; #:0 SOLN; Refill: 0; OSWALDO = N; Record; Last Updated By: Ashleigh Schmid; 10/2/2017 1:37:12 PM   · Thiamine HCl - 100 MG Oral Tablet   Dispense: 0 Days ; #:0 Tablet; Refill: 0; OSWALDO = N; Record; Last Updated By: Ashleigh Schmid; 10/2/2017 1:37:43 PM   · Vitamin B12 100 MCG Oral Tablet   Dispense: 0 Days ; #:0 Tablet; Refill: 0; OSWALDO = N; Record; Last Updated By: Ashleigh Schmid; 10/2/2017 1:39:22 PM    Follow-up visit in 3 months Evaluation and Treatment  Follow-up  Status: Hold For - Scheduling  Requested for: [id number]  Ordered; For: Alcoholic hepatitis; Ordered By: Denia Gan  Performed:   Due: 27WQV9119   Cirrhosis (571 5) (K74 60)          Discussion/Summary    While the patient and her  were in the office today, I did have a thorough and luís conversation with the patient regarding her medication regimen treatment plan  I explained to the patient that not only is it imperative for her overall health and well-being that she continue to abstain from alcohol, it is also important because she is on Lyrica  The patient reports that this point she understands that if she continues to drink or relapses, it could be the cause of her own demise  The patient feels that this recent incident has been a wake-up call and she is going to be diligent about remaining alcohol free    explained to the patient that overall I feel it is in her best interest to continue to follow-up with hematology and gastroenterology and their treatment plan as discussed  The patient was agreeable and verbalized an understanding  regards to physical therapy, I do feel that aqua therapy would be a great place for her starts she can slowly and steadily Re build her stamina and deconditioned status and eventually transition to regular land therapy  I did give her prescription to follow up with Travis Diaz at [facility]  The patient was agreeable and verbalized an understanding  regards to the Lyrica, at this point since she is on the maximum dosage but is providing moderate to significant relief, without side effects, for now, we will continue with the Lyrica as prescribed  patient was agreeable and verbalized an understanding  patient is to schedule a follow-up office visit in 3 months and at that point time we will regroup with regards to their medication regimen and treatment plan  The patient was agreeable and verbalized an understanding  The patient has the current Goals: To continue with at least a current level of pain relief and to start some aquatherapy and eventually transition to regular physical therapy to help slowly and steadily improve her deconditioned state  The patent has the current Barriers: Chronic pain syndrome and alcoholism  Patient is able to Self-Care  The treatment plan was reviewed with the patient/guardian  The patient/guardian understands and agrees with the treatment plan   The patient and patient's family was counseled regarding instructions for management,-- risk factor reductions,-- prognosis,-- patient and family education,-- risks and benefits of treatment options-- and-- importance of compliance with treatment  total time of encounter was 25 minutes  Chief Complaint  1  Pain    History of Present Illness  The patient presents today for a follow-up office visit   She is currently being treated for her chronic bilateral hand and foot pain and neuropathies, which she reports has remained stable and manageable since her last office visit  However, she does feel that because I had decreased her from 900 mg a day down to 600 mg a day, which is the maximum dosage of Lyrica, there is breakthrough pain, but overall her pain is at least somewhat manageable as she is reporting at least an 80% reduction in her pain symptoms as a result of the medication  However, since her last office visit she has had a lot of issues as she had a GI bleed with duodenal ulcer eruption and at this point after following up with Gastroenterology she is in liver failure to the point that they were putting her on the transplant list  She reports that she did relapse with her alcoholism and has been alcohol free that since August 1st  The patient does have an office visit this afternoon with Dr Bridgett Orourke because with the GI bleed she had lost a significant amount of blood and required 12 units of transfusion while she was in the hospital in August  The patient presents today with her  that she would like to discuss the possibility of Re trying some physical therapy to try to help with her deconditioned state as most that time she can only tolerate 5-10 minutes of activity and that is basically on the couch or in bed for the rest of the day  The patient and her  present today to discuss her medication regimen treatment plan  Heather Merino presents with complaints of constant episodes of moderate bilateral hand, bilateral lower leg, bilateral ankle and bilateral foot pain, described as sharp and burning, radiating to the bilateral upper extremity and bilateral lower extremity  On a scale of 1 to 10, the patient rates the pain as 7  Symptoms are unchanged  Review of Systems    Constitutional: no fever,-- no recent weight gain-- and-- no recent weight loss  Eyes: no double vision-- and-- no blurry vision  Cardiovascular: no chest pain,-- no palpitations-- and-- no lower extremity edema  Respiratory: shortness of breath, but-- no wheezing  Musculoskeletal: difficulty walking,-- muscle weakness,-- joint stiffness,-- limb swelling ,-- pain in extremity -- and-- decreased range of motion, but-- no joint swelling  Neurological: memory loss, but-- no dizziness,-- no difficulty swallowing,-- no loss of consciousness-- and-- no seizures  Gastrointestinal: no nausea,-- no vomiting,-- no constipation-- and-- no diarrhea  Genitourinary: no difficulty initiating urine stream,-- no genital pain-- and-- no frequent urination  Integumentary: no complaints of skin rash  Psychiatric: no depression  Endocrine: no excessive thirst,-- no adrenal disease,-- no hypothyroidism-- and-- no hyperthyroidism  Hematologic/Lymphatic: no tendency for easy bruising-- and-- no tendency for easy bleeding  Active Problems   1  Alcohol use disorder (305 00) (F10 99)   2  Alcoholic hepatitis (512 9) (K70 10)   3  Anastomotic ulcer (534 90) (K28 9)   4  Anemia (285 9) (D64 9)   5  Ascites (789 59) (R18 8)   6  Breast cancer (174 9) (C50 919)   7  Chronic anxiety (300 00) (F41 9)   8  Chronic depression (311) (F32 9)   9  Chronic foot pain (729 5,338 29) (M79 673,G89 29)   10  Chronic hand pain (729 5,338 29) (M79 643,G89 29)   11  Chronic narcotic dependence (304 91) (F11 20)   12  Dyspnea (786 09) (R06 00)   13  Encephalopathy, hepatic (572 2) (K72 90)   14  Encounter for long-term opiate analgesic use (V58 69) (Z79 891)   15  Fatty liver (571 8) (K76 0)   16  Foot fracture (825 20) (S92 909A)   17  Liver disease (573 9) (K76 9)   18  Liver lesion (573 8) (K76 9)   19  Pain syndrome, chronic (338 4) (G89 4)   20  Pancytopenia (284 19) (D61 818)   21  Peripheral neuropathy (356 9) (G62 9)   22  Pleural effusion, right (511 9) (J90)   23  Rosacea (695 3) (L71 9)    Cirrhosis (571 5) (K74 60)          Past Medical History  1  History of alcoholism (V11 3) (F10 21)   2  History of anxiety disorder (V11 8) (Z86 59)   3  History of breast cancer (V10 3) (Z85 3)   4  History of esophagitis (V12 79) (Z87 19)   5  History of hypertension (V12 59) (Z86 79)   6  History of liver disease (V12 79) (Z87 19)   7  History of Palpitations (785 1) (R00 2)    The active problems and past medical history were reviewed and updated today  Surgical History  1  History of Abdominoplasty   2  History of Breast Surgery Mastectomy   3  History of Gallbladder Surgery   4  History of Gastric Surgery For Morbid Obesity Bypass With Nicole-en-Y   5  History of Hernia Repair    The surgical history was reviewed and updated today  Family History  Mother    1  Denied: Family history of Colon cancer  Father    2  Denied: Family history of Colon cancer  Family History    3  Denied: Family history of Colon cancer   4  Family history of diabetes mellitus (V18 0) (Z83 3)   5  Family history of hypertension (V17 49) (Z82 49)   6  Family history of thyroid disease (V18 19) (Z83 49)    The family history was reviewed and updated today  Social History   · Alcohol use (V49 89) (Z78 9)   ·    · No alcohol use   · Non-smoker (V49 89) (Z78 9)  The social history was reviewed and updated today  The social history was reviewed and is unchanged  Current Meds   1  Aldactone 25 MG Oral Tablet Recorded   2  Carafate 1 GM/10ML Oral Suspension; TAKE 10 ML 3 TIMES DAILY; Therapy: 76Uyj2315 to (Evaluate:25Vox4685)  Requested for: 04ADK8997; Last   Rx:69Rkj0057 Ordered   3  Folic Acid 1 MG Oral Tablet; Take 1 tablet daily Recorded   4  Folic Acid 1 MG Oral Tablet; Take 1 tablet daily; Therapy: 97WHI5517 to (Evaluate:31Vvr3455)  Requested for: 93OHT3270; Last   Rx:51Vbk7655 Ordered   5  Furosemide 40 MG Oral Tablet; TAKE 2 TABLETS DAILY; Therapy: (Jerson Roles) to Recorded   6  GNP Vitamin B1 TABS Recorded   7   Lactulose 20 GM/30ML Oral Solution; SWALLOW 10 ML 3 times daily; Therapy: 56YEG7900 to (Evaluate:11Bla4947)  Requested for: 04TKR6313; Last   Rx:81Prf9276 Ordered   8  Lactulose SOLN; lactulose 20mg/45ml  swallow 15ml  tid; Therapy: (Conor Pretty) to Recorded   9  Lyrica 300 MG Oral Capsule; Take 1 po bid    8/25/17  per pt  2 tabs  bid; Therapy: (Conor Pretty) to Recorded   10  Pantoprazole Sodium 40 MG Oral Tablet Delayed Release; Take 1 tablet twice daily; Therapy: 36NHK4928 to (Evaluate:06Zsw5009)  Requested for: 25Pzz9447; Last    Rx:57Doj1362 Ordered   11  PredniSONE 20 MG Oral Tablet; TAKE 2 TABLETS DAILY WITH FOOD; Therapy: 20DGW4896 to (Evaluate:96Vkp5308)  Requested for: 87Gax4226; Last    Rx:84Dak0437 Ordered   12  Spironolactone 100 MG Oral Tablet; TAKE 1 TABLET TWICE DAILY; Therapy: 54Qii4946 to (Evaluate:16Ebq4302)  Requested for: 39Mad0131; Last    Rx:62Nez4942 Ordered   13  Suprep Bowel Prep Kit 17 5-3 13-1 6 GM/180ML Oral Solution; USE AS DIRECTED; Therapy: 81Sou0487 to (Last Sharad Chime)  Requested for: 98Yzz7242 Ordered   14  Thiamine HCl - 100 MG Oral Tablet; Therapy: 37WTE9803 to Recorded   15  Xifaxan 550 MG Oral Tablet; Take 1 tablet twice daily; Therapy: 72XBJ5797 to (Evaluate:84Yfs5160)  Requested for: 05Bem2802; Last    Rx:96Neh2710 Ordered    The medication list was reviewed and updated today  Allergies  1  Cymbalta    Vitals  Vital Signs    Recorded: 77PXL3050 01:36PM   Temperature 98 6 F   Heart Rate 88   Systolic 410   Diastolic 60   Height 5 ft 4 in   Weight 225 lb    BMI Calculated 38 62   BSA Calculated 2 07   Pain Scale 5     Physical Exam    Constitutional   General appearance: Well developed, well nourished, alert, in no distress, non-toxic and no overt pain behavior  Eyes   Sclera: anicteric   HEENT   Hearing grossly intact  Neck   Neck: Supple, symmetric, trachea midline, no masses  Pulmonary   Respiratory effort: Even and unlabored        Cardiovascular   Examination of extremities: No edema or pitting edema present  Abdomen   Abdomen: Soft, non-tender, non-distended  Skin   Skin and subcutaneous tissue: Normal without rashes or lesions, well hydrated  Psychiatric   Mood and affect: Mood and affect appropriate  Neurologic   Cranial nerves: Cranial nerves II-XII grossly intact  the muscle tone was normal   Musculoskeletal Tandem Gait: Intact      Future Appointments    Date/Time Provider Specialty Site   01/08/2018 03:00 PM MAICO Boyle   Hematology Oncology [facility]   12/28/2017 01:15 PM RENÉ Wooten Pain Management [facility] SPINE   10/11/2017 01:30 PM Neo Cantor MD Gastroenterology Adult [facility]   10/24/2017 11:00 AM Tatiana Jett DO Pulmonary Medicine [address] [facility]   Electronically signed by : Rhonda Sanchez [address] [place] [address]; Oct  3 2017  8:00AM EST                       (Author)    Electronically signed by : Wilber Corbin DO; Oct  3 2017 10:25AM EST

## 2017-10-30 ENCOUNTER — TRANSCRIBE ORDERS (OUTPATIENT)
Dept: ADMINISTRATIVE | Facility: HOSPITAL | Age: 57
End: 2017-10-30

## 2017-10-30 ENCOUNTER — HOSPITAL ENCOUNTER (OUTPATIENT)
Dept: INFUSION CENTER | Facility: HOSPITAL | Age: 57
Discharge: HOME | End: 2017-10-30
Payer: OTHER GOVERNMENT

## 2017-10-30 ENCOUNTER — HOSPITAL ENCOUNTER (OUTPATIENT)
Dept: RADIOLOGY | Facility: HOSPITAL | Age: 57
Discharge: HOME | End: 2017-10-30
Payer: OTHER GOVERNMENT

## 2017-10-30 VITALS
TEMPERATURE: 97.1 F | HEART RATE: 87 BPM | DIASTOLIC BLOOD PRESSURE: 57 MMHG | SYSTOLIC BLOOD PRESSURE: 100 MMHG | OXYGEN SATURATION: 99 % | RESPIRATION RATE: 16 BRPM

## 2017-10-30 DIAGNOSIS — D61.818 PANCYTOPENIA (HCC): ICD-10-CM

## 2017-10-30 DIAGNOSIS — I10 ESSENTIAL HYPERTENSION, BENIGN: ICD-10-CM

## 2017-10-30 DIAGNOSIS — Z85.3 PERSONAL HISTORY OF MALIGNANT NEOPLASM OF BREAST: ICD-10-CM

## 2017-10-30 DIAGNOSIS — Z01.818 PRE-TRANSPLANT EVALUATION FOR LIVER TRANSPLANT: ICD-10-CM

## 2017-10-30 DIAGNOSIS — K70.31 ALCOHOLIC CIRRHOSIS OF LIVER WITH ASCITES (HCC): ICD-10-CM

## 2017-10-30 DIAGNOSIS — F41.9 ANXIETY DISORDER, UNSPECIFIED TYPE: ICD-10-CM

## 2017-10-30 DIAGNOSIS — F41.9 ANXIETY DISORDER, UNSPECIFIED TYPE: Primary | ICD-10-CM

## 2017-10-30 DIAGNOSIS — F32.89 ATYPICAL DEPRESSIVE DISORDER: ICD-10-CM

## 2017-10-30 PROCEDURE — 96365 THER/PROPH/DIAG IV INF INIT: CPT

## 2017-10-30 PROCEDURE — 71020 HB CHEST X-RAY 2VW FRONTAL&LATL: CPT

## 2017-10-30 RX ADMIN — SODIUM CHLORIDE 40 ML/HR: 9 INJECTION, SOLUTION INTRAVENOUS at 14:52

## 2017-10-30 RX ADMIN — IRON SUCROSE 200 MG: 20 INJECTION, SOLUTION INTRAVENOUS at 14:52

## 2017-10-30 NOTE — PROGRESS NOTES
Pt in infusion center today for venofer infusion  VSS  Pt tolerated venofer through left FA PIV with no adverse reactions  Pt then d/c'd home ambulatory with steady gait    Next apt wed 11/1

## 2017-10-31 RX ORDER — SODIUM CHLORIDE 9 MG/ML
40 INJECTION, SOLUTION INTRAVENOUS ONCE
Status: DISCONTINUED | OUTPATIENT
Start: 2017-11-01 | End: 2017-11-04 | Stop reason: HOSPADM

## 2017-11-01 ENCOUNTER — HOSPITAL ENCOUNTER (OUTPATIENT)
Dept: INFUSION CENTER | Facility: HOSPITAL | Age: 57
Discharge: HOME | End: 2017-11-01
Payer: OTHER GOVERNMENT

## 2017-11-02 ENCOUNTER — HOSPITAL ENCOUNTER (OUTPATIENT)
Dept: NON INVASIVE DIAGNOSTICS | Facility: CLINIC | Age: 57
Discharge: HOME | End: 2017-11-02
Payer: OTHER GOVERNMENT

## 2017-11-02 DIAGNOSIS — Z01.818 PREOP EXAMINATION: ICD-10-CM

## 2017-11-02 DIAGNOSIS — K70.31 ALCOHOLIC CIRRHOSIS OF LIVER WITH ASCITES (HCC): ICD-10-CM

## 2017-11-02 DIAGNOSIS — F41.9 ANXIETY HYPERVENTILATION: ICD-10-CM

## 2017-11-02 DIAGNOSIS — F32.89 ATYPICAL DEPRESSIVE DISORDER: ICD-10-CM

## 2017-11-02 DIAGNOSIS — Z85.3 PERSONAL HISTORY OF MALIGNANT NEOPLASM OF BREAST: ICD-10-CM

## 2017-11-02 DIAGNOSIS — I10 ESSENTIAL HYPERTENSION, MALIGNANT: ICD-10-CM

## 2017-11-02 DIAGNOSIS — F45.8 ANXIETY HYPERVENTILATION: ICD-10-CM

## 2017-11-02 DIAGNOSIS — D61.818 ACQUIRED PANCYTOPENIA (HCC): ICD-10-CM

## 2017-11-02 PROCEDURE — 93880 EXTRACRANIAL BILAT STUDY: CPT

## 2017-11-03 ENCOUNTER — TRANSCRIBE ORDERS (OUTPATIENT)
Dept: ADMINISTRATIVE | Facility: HOSPITAL | Age: 57
End: 2017-11-03

## 2017-11-07 DIAGNOSIS — D64.9 ANEMIA: ICD-10-CM

## 2017-11-07 DIAGNOSIS — K74.60 CIRRHOSIS OF LIVER (HCC): ICD-10-CM

## 2017-11-08 RX ORDER — CYANOCOBALAMIN 1000 UG/ML
1000 INJECTION INTRAMUSCULAR; SUBCUTANEOUS ONCE
Status: COMPLETED | OUTPATIENT
Start: 2017-11-09 | End: 2017-11-09

## 2017-11-08 RX ORDER — SODIUM CHLORIDE 9 MG/ML
20 INJECTION, SOLUTION INTRAVENOUS CONTINUOUS
Status: DISPENSED | OUTPATIENT
Start: 2017-11-09 | End: 2017-11-10

## 2017-11-09 ENCOUNTER — HOSPITAL ENCOUNTER (OUTPATIENT)
Dept: NON INVASIVE DIAGNOSTICS | Facility: HOSPITAL | Age: 57
Discharge: HOME | End: 2017-11-09
Payer: OTHER GOVERNMENT

## 2017-11-09 ENCOUNTER — HOSPITAL ENCOUNTER (OUTPATIENT)
Dept: INFUSION CENTER | Facility: HOSPITAL | Age: 57
Discharge: HOME | End: 2017-11-09
Payer: OTHER GOVERNMENT

## 2017-11-09 VITALS
DIASTOLIC BLOOD PRESSURE: 62 MMHG | OXYGEN SATURATION: 100 % | HEART RATE: 80 BPM | TEMPERATURE: 97.2 F | RESPIRATION RATE: 18 BRPM | SYSTOLIC BLOOD PRESSURE: 100 MMHG

## 2017-11-09 DIAGNOSIS — F32.89 ATYPICAL DEPRESSIVE DISORDER: ICD-10-CM

## 2017-11-09 DIAGNOSIS — F45.8 ANXIETY HYPERVENTILATION: ICD-10-CM

## 2017-11-09 DIAGNOSIS — K70.31 ALCOHOLIC CIRRHOSIS OF LIVER WITH ASCITES (HCC): ICD-10-CM

## 2017-11-09 DIAGNOSIS — I10 ESSENTIAL HYPERTENSION, MALIGNANT: ICD-10-CM

## 2017-11-09 DIAGNOSIS — Z01.818 PREOP EXAMINATION: ICD-10-CM

## 2017-11-09 DIAGNOSIS — D61.818 ACQUIRED PANCYTOPENIA (HCC): ICD-10-CM

## 2017-11-09 DIAGNOSIS — F41.9 ANXIETY HYPERVENTILATION: ICD-10-CM

## 2017-11-09 DIAGNOSIS — Z85.3 PERSONAL HISTORY OF MALIGNANT NEOPLASM OF BREAST: ICD-10-CM

## 2017-11-09 PROCEDURE — 96372 THER/PROPH/DIAG INJ SC/IM: CPT

## 2017-11-09 PROCEDURE — 96365 THER/PROPH/DIAG IV INF INIT: CPT

## 2017-11-09 RX ADMIN — CYANOCOBALAMIN 1000 MCG: 1000 INJECTION, SOLUTION INTRAMUSCULAR at 12:41

## 2017-11-09 RX ADMIN — SODIUM CHLORIDE 20 ML/HR: 9 INJECTION, SOLUTION INTRAVENOUS at 12:40

## 2017-11-09 RX ADMIN — IRON SUCROSE 200 MG: 20 INJECTION, SOLUTION INTRAVENOUS at 12:39

## 2017-11-09 NOTE — PROGRESS NOTES
Pt in infusion center today for venofer and B12 pt tolerated B12 IM in left deltoid with no adverse reactions

## 2017-11-10 ENCOUNTER — GENERIC CONVERSION - ENCOUNTER (OUTPATIENT)
Dept: OTHER | Facility: OTHER | Age: 57
End: 2017-11-10

## 2017-11-10 PROCEDURE — G0145 SCR C/V CYTO,THINLAYER,RESCR: HCPCS | Performed by: OBSTETRICS & GYNECOLOGY

## 2017-11-10 PROCEDURE — 87624 HPV HI-RISK TYP POOLED RSLT: CPT | Performed by: OBSTETRICS & GYNECOLOGY

## 2017-11-13 ENCOUNTER — LAB REQUISITION (OUTPATIENT)
Dept: LAB | Facility: HOSPITAL | Age: 57
End: 2017-11-13
Payer: OTHER GOVERNMENT

## 2017-11-13 DIAGNOSIS — Z12.4 ENCOUNTER FOR SCREENING FOR MALIGNANT NEOPLASM OF CERVIX: ICD-10-CM

## 2017-11-13 DIAGNOSIS — Z11.51 ENCOUNTER FOR SCREENING FOR HUMAN PAPILLOMAVIRUS (HPV): ICD-10-CM

## 2017-11-15 ENCOUNTER — HOSPITAL ENCOUNTER (OUTPATIENT)
Dept: BONE DENSITY | Facility: IMAGING CENTER | Age: 57
Discharge: HOME | End: 2017-11-15
Payer: OTHER GOVERNMENT

## 2017-11-15 DIAGNOSIS — Z85.3 PERSONAL HISTORY OF MALIGNANT NEOPLASM OF BREAST: ICD-10-CM

## 2017-11-15 DIAGNOSIS — D61.818 ACQUIRED PANCYTOPENIA (HCC): ICD-10-CM

## 2017-11-15 DIAGNOSIS — I10 ESSENTIAL HYPERTENSION, MALIGNANT: ICD-10-CM

## 2017-11-15 DIAGNOSIS — F41.9 ANXIETY HYPERVENTILATION: ICD-10-CM

## 2017-11-15 DIAGNOSIS — F32.89 ATYPICAL DEPRESSIVE DISORDER: ICD-10-CM

## 2017-11-15 DIAGNOSIS — K70.31 ALCOHOLIC CIRRHOSIS OF LIVER WITH ASCITES (HCC): ICD-10-CM

## 2017-11-15 DIAGNOSIS — Z01.818 PREOP EXAMINATION: ICD-10-CM

## 2017-11-15 DIAGNOSIS — F45.8 ANXIETY HYPERVENTILATION: ICD-10-CM

## 2017-11-15 LAB — HPV RRNA GENITAL QL NAA+PROBE: NORMAL

## 2017-11-15 PROCEDURE — 77080 DXA BONE DENSITY AXIAL: CPT

## 2017-11-16 ENCOUNTER — HOSPITAL ENCOUNTER (OUTPATIENT)
Dept: NON INVASIVE DIAGNOSTICS | Facility: HOSPITAL | Age: 57
Discharge: HOME | End: 2017-11-16
Payer: OTHER GOVERNMENT

## 2017-11-16 DIAGNOSIS — Z01.818 PRE-OP EVALUATION: ICD-10-CM

## 2017-11-16 PROCEDURE — 93350 STRESS TTE ONLY: CPT

## 2017-11-16 RX ORDER — DOBUTAMINE HYDROCHLORIDE 200 MG/100ML
INJECTION INTRAVENOUS
Status: DISCONTINUED
Start: 2017-11-16 | End: 2017-11-17 | Stop reason: HOSPADM

## 2017-11-16 RX ORDER — ATROPINE SULFATE 0.1 MG/ML
INJECTION INTRAVENOUS
Status: DISCONTINUED
Start: 2017-11-16 | End: 2017-11-16 | Stop reason: WASHOUT

## 2017-11-16 RX ORDER — DOBUTAMINE HYDROCHLORIDE 200 MG/100ML
5-50 INJECTION INTRAVENOUS CONTINUOUS
Status: DISCONTINUED | OUTPATIENT
Start: 2017-11-16 | End: 2017-11-17 | Stop reason: HOSPADM

## 2017-11-17 VITALS — BODY MASS INDEX: 35.95 KG/M2 | WEIGHT: 209.44 LBS

## 2017-11-17 LAB
LAB AP GYN PRIMARY INTERPRETATION: NORMAL
Lab: NORMAL

## 2017-11-20 ENCOUNTER — APPOINTMENT (OUTPATIENT)
Dept: LAB | Facility: HOSPITAL | Age: 57
End: 2017-11-20
Attending: INTERNAL MEDICINE
Payer: OTHER GOVERNMENT

## 2017-11-20 ENCOUNTER — GENERIC CONVERSION - ENCOUNTER (OUTPATIENT)
Dept: OTHER | Facility: OTHER | Age: 57
End: 2017-11-20

## 2017-11-20 ENCOUNTER — TRANSCRIBE ORDERS (OUTPATIENT)
Dept: ADMINISTRATIVE | Facility: HOSPITAL | Age: 57
End: 2017-11-20

## 2017-11-20 DIAGNOSIS — D61.818 PANCYTOPENIA (HCC): ICD-10-CM

## 2017-11-20 DIAGNOSIS — Z85.3 PERSONAL HISTORY OF MALIGNANT NEOPLASM OF BREAST: ICD-10-CM

## 2017-11-20 DIAGNOSIS — K70.31 ALCOHOLIC CIRRHOSIS OF LIVER WITH ASCITES (HCC): ICD-10-CM

## 2017-11-20 DIAGNOSIS — Z01.818 PRE-TRANSPLANT EVALUATION FOR LIVER TRANSPLANT: ICD-10-CM

## 2017-11-20 DIAGNOSIS — K74.60 CIRRHOSIS OF LIVER (HCC): ICD-10-CM

## 2017-11-20 DIAGNOSIS — I10 ESSENTIAL HYPERTENSION: ICD-10-CM

## 2017-11-20 DIAGNOSIS — K70.31 ALCOHOLIC CIRRHOSIS OF LIVER WITH ASCITES (HCC): Primary | ICD-10-CM

## 2017-11-20 LAB
ABO GROUP BLD: NORMAL
ALBUMIN SERPL BCP-MCNC: 2.8 G/DL (ref 3.5–5)
ALP SERPL-CCNC: 187 U/L (ref 46–116)
ALT SERPL W P-5'-P-CCNC: 65 U/L (ref 12–78)
ANION GAP SERPL CALCULATED.3IONS-SCNC: 10 MMOL/L (ref 4–13)
AST SERPL W P-5'-P-CCNC: 104 U/L (ref 5–45)
BILIRUB SERPL-MCNC: 2 MG/DL (ref 0.2–1)
BLD GP AB SCN SERPL QL: NEGATIVE
BUN SERPL-MCNC: 15 MG/DL (ref 5–25)
CALCIUM SERPL-MCNC: 9 MG/DL (ref 8.3–10.1)
CHLORIDE SERPL-SCNC: 105 MMOL/L (ref 100–108)
CO2 SERPL-SCNC: 26 MMOL/L (ref 21–32)
CREAT SERPL-MCNC: 1.35 MG/DL (ref 0.6–1.3)
ETHANOL SERPL-MCNC: <3 MG/DL (ref 0–3)
GFR SERPL CREATININE-BSD FRML MDRD: 44 ML/MIN/1.73SQ M
GLUCOSE SERPL-MCNC: 73 MG/DL (ref 65–140)
INR PPP: 1.37 (ref 0.86–1.16)
MAGNESIUM SERPL-MCNC: 2.1 MG/DL (ref 1.6–2.6)
POTASSIUM SERPL-SCNC: 3.9 MMOL/L (ref 3.5–5.3)
PROT SERPL-MCNC: 8.3 G/DL (ref 6.4–8.2)
PROTHROMBIN TIME: 16.7 SECONDS (ref 12.1–14.4)
RH BLD: POSITIVE
SODIUM SERPL-SCNC: 141 MMOL/L (ref 136–145)
SPECIMEN EXPIRATION DATE: NORMAL

## 2017-11-20 PROCEDURE — 83735 ASSAY OF MAGNESIUM: CPT

## 2017-11-20 PROCEDURE — 80320 DRUG SCREEN QUANTALCOHOLS: CPT

## 2017-11-20 PROCEDURE — 86901 BLOOD TYPING SEROLOGIC RH(D): CPT

## 2017-11-20 PROCEDURE — 80053 COMPREHEN METABOLIC PANEL: CPT

## 2017-11-20 PROCEDURE — 36415 COLL VENOUS BLD VENIPUNCTURE: CPT

## 2017-11-20 PROCEDURE — 86900 BLOOD TYPING SEROLOGIC ABO: CPT

## 2017-11-20 PROCEDURE — 86850 RBC ANTIBODY SCREEN: CPT

## 2017-11-20 PROCEDURE — 85610 PROTHROMBIN TIME: CPT

## 2017-11-21 ENCOUNTER — ALLSCRIPTS OFFICE VISIT (OUTPATIENT)
Dept: OTHER | Facility: OTHER | Age: 57
End: 2017-11-21

## 2017-11-23 ENCOUNTER — GENERIC CONVERSION - ENCOUNTER (OUTPATIENT)
Dept: OTHER | Facility: OTHER | Age: 57
End: 2017-11-23

## 2017-11-30 ENCOUNTER — ALLSCRIPTS OFFICE VISIT (OUTPATIENT)
Dept: OTHER | Facility: OTHER | Age: 57
End: 2017-11-30

## 2017-12-05 NOTE — PROGRESS NOTES
Assessment    1  Anemia (285 9) (D64 9)   2  Cirrhosis (571 5) (K74 60)   3  Ascites (789 59) (R18 8)   4  Encephalopathy, hepatic (572 2) (K72 90)    Plan  Cirrhosis    · (1) CBC/PLT/DIFF; Status:Active; Requested for:30Nov2017;    Perform:[facility]; Due:30Nov2018; Ordered; For:Cirrhosis; Ordered By:Willa Dejesus;   · (1) COMPREHENSIVE METABOLIC PANEL; Status:Active; Requested for:30Nov2017;    Perform:[facility]; Due:30Nov2018; Ordered; For:Cirrhosis; Ordered By:Willa Dejesus;   · Follow-up visit in 2 months Evaluation and Treatment  Follow-up  Status: Complete   Done: [id number]   Ordered; For: Cirrhosis; Ordered By: Bolivar Rodriguez Performed:  Due: 52LEC8407; Last Updated By: Tamanna Segura; 11/30/2017 1:48:24 PM    Discussion/Summary  Discussion Summary:   Alcoholic cirrhosis - she has stopped drinking, last drink was in July  Her meld is 13 which is improved from previous  She is currently being worked up by [place] to be listed for transplant  Ascites - she does not have any ascites on exam  Her diuretics were decreased because of her increased creatinine  Would recommend rechecking her creatinine however she only has trace lower extremity edema on exam today  depending on her creatinine will possibly increase her diuretics  hepatic encephalopathy - there is no signs on exam today  She is on rifaximin as well as lactulose having 2-3 bowel movements daily  Would recommend continuing  Anemia - she has been followed by Hematology as well as her anemia  She had an EGD and colonoscopy in September for this and no source was found  Would recommend checking CBC  She states she has finished her iron infusions and is due for repeat iron blood work in January  Depending on these results we can consider capsule endoscopy  Will see her back in 2 months time  Counseling Documentation With Imm: The patient was counseled regarding diagnostic results, instructions for management  Patient's Capacity to Self-Care: Patient is able to Self-Care  Chief Complaint  Chief Complaint Free Text Note Form: f/u from labs and US  Pt states no active symptoms  History of Present Illness  HPI: This is a hospital follow-up  Patient is known to us for Alcoholic cirrhosis  her last Drink was in July  She was admitted to the hospital on 10/20 with fever of unknown origin  She had an extensive workup including chest x-ray, urine, blood cultures with no source found  She did have a CT scan at that time which showed colitis however she had no abdominal pain or diarrhea  She improved with antibiotics and ultimately discharged  We also saw her at that time for anemia  She has history of iron deficiency anemia and has been seen by Hematology  She was receiving IV Venofer and B12 as she has also had previous gastric bypass  She is due to have her iron studies repeated in January  She denies any sense of rectal bleeding  She denies any abdominal pain  she is on lactulose b i d  and states she typically has 2-3 bowel movements daily  She states she noticed scleral icterus  She denies any ascites  She has had lower extremity edema but feels this is improved  She is currently on Lasix 40 and Aldactone 50  Her last CMP on a 20th showed an elevated creatinine, And therefore she had reduced her diuretics to the current dose  Her bilirubin at that time had decreased to 2 she still had elevated LFTs  Her INR was 1 3  Currently her meld is 15  She has seen [facility] [place] transplant and is currently being worked up to be listed  She needs to have dental clearance which she is in the process of obtaining  She was told she has multiple caries and needs a few teeth removed  She had an EGD in September 2017 for anemia which showed no esophageal varices, stomach consistent with previous bypass, anastomotic ulcer had healed   Colonoscopy was done at the same time which showed to polyps in the transverse colon and diminutive internal hemorrhoids  Biopsy showed tubular adenoma  Recommend repeating in 5 years  History Reviewed: The history was obtained today from the patient and I agree with the documented history  Review of Systems  Complete-Female GI Adult:   Constitutional: No fever, no chills, feels well, no tiredness, no recent weight gain or weight loss  Eyes: No complaints of eye pain, no red eyes, no eyesight problems, no discharge, no dry eyes, no itching of eyes  ENT: no complaints of earache, no loss of hearing, no nose bleeds, no nasal discharge, no sore throat, no hoarseness  Cardiovascular: No complaints of slow heart rate, no fast heart rate, no chest pain, no palpitations, no leg claudication, no lower extremity edema  Respiratory: No complaints of shortness of breath, no wheezing, no cough, no SOB on exertion, no orthopnea, no PND  Gastrointestinal: No complaints of abdominal pain, no constipation, no nausea or vomiting, no diarrhea, no bloody stools  Genitourinary: No complaints of dysuria, no incontinence, no pelvic pain, no dysmenorrhea, no vaginal discharge or bleeding  Musculoskeletal: No complaints of arthralgias, no myalgias, no joint swelling or stiffness, no limb pain or swelling  Integumentary: No complaints of skin rash or lesions, no itching, no skin wounds, no breast pain or lump  Neurological: No complaints of headache, no confusion, no convulsions, no numbness, no dizziness or fainting, no tingling, no limb weakness, no difficulty walking  Psychiatric: Not suicidal, no sleep disturbance, no anxiety or depression, no change in personality, no emotional problems  Endocrine: No complaints of proptosis, no hot flashes, no muscle weakness, no deepening of the voice, no feelings of weakness  Hematologic/Lymphatic: No complaints of swollen glands, no swollen glands in the neck, does not bleed easily, does not bruise easily  ROS Reviewed:   ROS reviewed  Active Problems    1  Alcohol use disorder (305 00) (F10 99)   2  Alcoholic hepatitis (084 4) (K70 10)   3  Anastomotic ulcer (534 90) (K28 9)   4  Anemia (285 9) (D64 9)   5  Ascites (789 59) (R18 8)   6  Breast cancer (174 9) (C50 919)   7  Cervical cancer screening (V76 2) (Z12 4)   8  Chronic anxiety (300 00) (F41 9)   9  Chronic depression (311) (F32 9)   10  Chronic foot pain (729 5,338 29) (M79 673,G89 29)   11  Chronic hand pain (729 5,338 29) (M79 643,G89 29)   12  Chronic narcotic dependence (304 91) (F11 20)   13  Cirrhosis (571 5) (K74 60)   14  Deconditioned low back (728 87) (R29 898)   15  Dyspnea (786 09) (R06 00)   16  Encephalopathy, hepatic (572 2) (K72 90)   17  Encounter for long-term opiate analgesic use (V58 69) (Z79 891)   18  Encounter for routine gynecological examination with Papanicolaou smear of cervix    (V72 31,V76 2) (Z01 419)   19  Fatty liver (571 8) (K76 0)   20  Foot fracture (825 20) (S92 909A)   21  Iron (Fe) deficiency anemia (280 9) (D50 9)   22  Liver disease (573 9) (K76 9)   23  Liver lesion (573 8) (K76 9)   24  Pain syndrome, chronic (338 4) (G89 4)   25  Pancytopenia (284 19) (D61 818)   26  Peripheral neuropathy (356 9) (G62 9)   27  Pleural effusion, right (511 9) (J90)   28  Rosacea (695 3) (L71 9)   29  Screening for HPV (human papillomavirus) (V73 81) (Z11 51)   30  Ventral hernia without obstruction or gangrene (553 20) (K43 9)   31  Wound of abdomen (879 2) (S31 109A)    Past Medical History    1  History of alcoholism (V11 3) (F10 21)   2  History of anxiety disorder (V11 8) (Z86 59)   3  History of breast cancer (V10 3) (Z85 3)   4  History of esophagitis (V12 79) (Z87 19)   5  History of hypertension (V12 59) (Z86 79)   6  History of liver disease (V12 79) (Z87 19)   7  History of self breast exam   8  History of Palpitations (785 1) (R00 2)  Active Problems And Past Medical History Reviewed: The active problems and past medical history were reviewed and updated today  Surgical History    1  History of Abdominoplasty   2  History of Breast Surgery Mastectomy   3  History of Gallbladder Surgery   4  History of Gastric Surgery For Morbid Obesity Bypass With Nicole-en-Y   5  History of Hernia Repair  Surgical History Reviewed: The surgical history was reviewed and updated today  Family History  Mother    1  Denied: Family history of Colon cancer  Father    2  Denied: Family history of Colon cancer  Family History    3  Denied: Family history of Colon cancer   4  Family history of diabetes mellitus (V18 0) (Z83 3)   5  Family history of hypertension (V17 49) (Z82 49)   6  Family history of thyroid disease (V18 19) (Z83 49)  Family History Reviewed: The family history was reviewed and updated today  Social History    · Alcohol use (V49 89) (Z78 9)   · Always uses seat belt   · Has no children   ·    · No caffeine use   · Non-smoker (V49 89) (Z78 9)  Social History Reviewed: The social history was reviewed and updated today  Current Meds   1  Carafate 1 GM/10ML Oral Suspension; TAKE 10 ML 3 TIMES DAILY; Therapy: 34Hdj2924 to (Evaluate:08Pqj4906)  Requested for: 83UXW6857; Last   Rx:70Rge9915 Ordered   2  Folic Acid 1 MG Oral Tablet; Take 1 tablet daily Recorded   3  Furosemide 40 MG Oral Tablet; TAKE 2 TABLETS DAILY; Last Rx:11Oct2017 Ordered   4  GNP Vitamin B1 TABS Recorded   5  Lactulose 20 GM/30ML Oral Solution; SWALLOW 10 ML 3 times daily; Therapy: 70HAW1112 to (Evaluate:54Typ7894)  Requested for: 34SUF0926; Last   Rx:43Fyz1299 Ordered   6  Lyrica 300 MG Oral Capsule; Take 1 po bid    8/25/17  per pt  2 tabs  bid; Last Rx:02Oct2017 Ordered   7  Pantoprazole Sodium 40 MG Oral Tablet Delayed Release; Take 1 tablet twice daily; Therapy: 36EAB9844 to (Evaluate:73Wzq1980)  Requested for: 72JJA3364; Last   Rx:25Ckl9524 Ordered   8  Spironolactone 50 MG Oral Tablet; TAKE 4 TABLETS DAILY; Last Rx:11Oct2017 Ordered   9   Xifaxan 550 MG Oral Tablet; take 1 tablet every twelve hours; Therapy: 69FLH1277 to (Evaluate:10Mar2018)  Requested for: 90PWD4159; Last   Rx:11Oct2017 Ordered  Medication List Reviewed: The medication list was reviewed and updated today  Allergies    1  Cymbalta    Vitals  Vital Signs    Recorded: 19MCJ2583 01:13PM   Temperature 97 5 F, Tympanic   Heart Rate 90   Systolic 234, LUE, Sitting   Diastolic 68, LUE, Sitting   Height 5 ft 4 in   Weight 182 lb    BMI Calculated 31 24   BSA Calculated 1 88   O2 Saturation 93, RA     Physical Exam    Constitutional   General appearance: No acute distress, well appearing and well nourished  Eyes   Conjunctiva and lids: No swelling, erythema or discharge  Ears, Nose, Mouth, and Throat   External inspection of ears and nose: Normal     Pulmonary   Respiratory effort: No increased work of breathing or signs of respiratory distress  Auscultation of lungs: Clear to auscultation  Cardiovascular   Auscultation of heart: Normal rate and rhythm, normal S1 and S2, without murmurs  Trace lower extremity edema  Abdomen   Abdomen: Non-tender, no masses  Liver and spleen: No hepatomegaly or splenomegaly  Musculoskeletal   Gait and station: Normal     Skin   Skin and subcutaneous tissue: Normal without rashes or lesions  Psychiatric   Orientation to person, place, and time: Normal     Mood and affect: Normal          Health Management  Anemia   COLONOSCOPY (GI, SURG); every 5 years; Last 24FJU1906; Next Due: 80ZBU3321; Active  Cirrhosis   EGD; every 1 year; Last 97MKX4931; Next Due: 56Eea4235; Active    Future Appointments    Date/Time Provider Specialty Site   01/08/2018 03:00 PM MAICO Campbell   Hematology Oncology [facility]   12/28/2017 01:15 PM [facility]   01/22/2018 01:20 PM Hawa Terrell MD Gastroenterology Adult [facility] 9     Signatures   Electronically signed by : Gage Lucio, [facility]; Nov 30 2017 1:55PM EST                       (Author)    Electronically signed by : Laxmi Washington MD; Nov 30 2017  2:27PM EST                       (Author)

## 2017-12-08 ENCOUNTER — GENERIC CONVERSION - ENCOUNTER (OUTPATIENT)
Dept: OTHER | Facility: OTHER | Age: 57
End: 2017-12-08

## 2017-12-26 ENCOUNTER — TRANSCRIBE ORDERS (OUTPATIENT)
Dept: ADMINISTRATIVE | Facility: HOSPITAL | Age: 57
End: 2017-12-26

## 2017-12-26 ENCOUNTER — APPOINTMENT (OUTPATIENT)
Dept: LAB | Facility: HOSPITAL | Age: 57
End: 2017-12-26
Attending: INTERNAL MEDICINE
Payer: OTHER GOVERNMENT

## 2017-12-26 DIAGNOSIS — Z01.818 PREOP EXAMINATION: ICD-10-CM

## 2017-12-26 DIAGNOSIS — K70.31 ALCOHOLIC CIRRHOSIS OF LIVER WITH ASCITES (HCC): ICD-10-CM

## 2017-12-26 DIAGNOSIS — D64.9 ANEMIA: ICD-10-CM

## 2017-12-26 DIAGNOSIS — K74.60 CIRRHOSIS OF LIVER (HCC): ICD-10-CM

## 2017-12-26 DIAGNOSIS — Z90.13 STATUS POST BILATERAL MASTECTOMY: ICD-10-CM

## 2017-12-26 DIAGNOSIS — Z90.13 STATUS POST BILATERAL MASTECTOMY: Primary | ICD-10-CM

## 2017-12-26 DIAGNOSIS — D61.818 OTHER PANCYTOPENIA (HCC): ICD-10-CM

## 2017-12-26 LAB
ABO GROUP BLD: NORMAL
AFP-TM SERPL-MCNC: 12.9 NG/ML (ref 0.5–8)
ALBUMIN SERPL BCP-MCNC: 3 G/DL (ref 3.5–5)
ALP SERPL-CCNC: 187 U/L (ref 46–116)
ALT SERPL W P-5'-P-CCNC: 45 U/L (ref 12–78)
ANION GAP SERPL CALCULATED.3IONS-SCNC: 8 MMOL/L (ref 4–13)
AST SERPL W P-5'-P-CCNC: 64 U/L (ref 5–45)
BASOPHILS # BLD AUTO: 0.03 THOUSANDS/ΜL (ref 0–0.1)
BASOPHILS NFR BLD AUTO: 1 % (ref 0–1)
BILIRUB DIRECT SERPL-MCNC: 0.97 MG/DL (ref 0–0.2)
BILIRUB SERPL-MCNC: 1.7 MG/DL (ref 0.2–1)
BUN SERPL-MCNC: 9 MG/DL (ref 5–25)
CALCIUM SERPL-MCNC: 8.7 MG/DL (ref 8.3–10.1)
CHLORIDE SERPL-SCNC: 108 MMOL/L (ref 100–108)
CHOLEST SERPL-MCNC: 193 MG/DL (ref 50–200)
CO2 SERPL-SCNC: 25 MMOL/L (ref 21–32)
CREAT SERPL-MCNC: 1.54 MG/DL (ref 0.6–1.3)
EOSINOPHIL # BLD AUTO: 0.14 THOUSAND/ΜL (ref 0–0.61)
EOSINOPHIL NFR BLD AUTO: 3 % (ref 0–6)
ERYTHROCYTE [DISTWIDTH] IN BLOOD BY AUTOMATED COUNT: 15.7 % (ref 11.6–15.1)
EST. AVERAGE GLUCOSE BLD GHB EST-MCNC: 94 MG/DL
ETHANOL SERPL-MCNC: <3 MG/DL (ref 0–3)
FERRITIN SERPL-MCNC: 73 NG/ML (ref 8–388)
GFR SERPL CREATININE-BSD FRML MDRD: 37 ML/MIN/1.73SQ M
GLUCOSE SERPL-MCNC: 77 MG/DL (ref 65–140)
HBA1C MFR BLD: 4.9 % (ref 4.2–6.3)
HCT VFR BLD AUTO: 34.7 % (ref 34.8–46.1)
HDLC SERPL-MCNC: 90 MG/DL (ref 40–60)
HGB BLD-MCNC: 11.5 G/DL (ref 11.5–15.4)
INR PPP: 1.39 (ref 0.86–1.16)
IRON SATN MFR SERPL: 29 %
IRON SERPL-MCNC: 98 UG/DL (ref 50–170)
LDLC SERPL CALC-MCNC: 91 MG/DL (ref 0–100)
LYMPHOCYTES # BLD AUTO: 1.36 THOUSANDS/ΜL (ref 0.6–4.47)
LYMPHOCYTES NFR BLD AUTO: 30 % (ref 14–44)
MAGNESIUM SERPL-MCNC: 1.8 MG/DL (ref 1.6–2.6)
MCH RBC QN AUTO: 30.4 PG (ref 26.8–34.3)
MCHC RBC AUTO-ENTMCNC: 33.1 G/DL (ref 31.4–37.4)
MCV RBC AUTO: 92 FL (ref 82–98)
MONOCYTES # BLD AUTO: 0.48 THOUSAND/ΜL (ref 0.17–1.22)
MONOCYTES NFR BLD AUTO: 11 % (ref 4–12)
NEUTROPHILS # BLD AUTO: 2.49 THOUSANDS/ΜL (ref 1.85–7.62)
NEUTS SEG NFR BLD AUTO: 55 % (ref 43–75)
PHOSPHATE SERPL-MCNC: 2.7 MG/DL (ref 2.7–4.5)
PLATELET # BLD AUTO: 57 THOUSANDS/UL (ref 149–390)
PMV BLD AUTO: 10.6 FL (ref 8.9–12.7)
POTASSIUM SERPL-SCNC: 3.4 MMOL/L (ref 3.5–5.3)
PROT SERPL-MCNC: 7.5 G/DL (ref 6.4–8.2)
PROTHROMBIN TIME: 16.9 SECONDS (ref 12.1–14.4)
RBC # BLD AUTO: 3.78 MILLION/UL (ref 3.81–5.12)
RH BLD: POSITIVE
SODIUM SERPL-SCNC: 141 MMOL/L (ref 136–145)
TIBC SERPL-MCNC: 337 UG/DL (ref 250–450)
TRIGL SERPL-MCNC: 61 MG/DL
WBC # BLD AUTO: 4.5 THOUSAND/UL (ref 4.31–10.16)

## 2017-12-26 PROCEDURE — 84100 ASSAY OF PHOSPHORUS: CPT

## 2017-12-26 PROCEDURE — 80053 COMPREHEN METABOLIC PANEL: CPT

## 2017-12-26 PROCEDURE — 86695 HERPES SIMPLEX TYPE 1 TEST: CPT | Performed by: SURGERY

## 2017-12-26 PROCEDURE — 80061 LIPID PANEL: CPT

## 2017-12-26 PROCEDURE — 83540 ASSAY OF IRON: CPT

## 2017-12-26 PROCEDURE — 86644 CMV ANTIBODY: CPT | Performed by: SURGERY

## 2017-12-26 PROCEDURE — 85610 PROTHROMBIN TIME: CPT

## 2017-12-26 PROCEDURE — 86663 EPSTEIN-BARR ANTIBODY: CPT

## 2017-12-26 PROCEDURE — 86645 CMV ANTIBODY IGM: CPT | Performed by: SURGERY

## 2017-12-26 PROCEDURE — 87389 HIV-1 AG W/HIV-1&-2 AB AG IA: CPT

## 2017-12-26 PROCEDURE — 83735 ASSAY OF MAGNESIUM: CPT

## 2017-12-26 PROCEDURE — 82248 BILIRUBIN DIRECT: CPT

## 2017-12-26 PROCEDURE — 80307 DRUG TEST PRSMV CHEM ANLYZR: CPT

## 2017-12-26 PROCEDURE — 83036 HEMOGLOBIN GLYCOSYLATED A1C: CPT

## 2017-12-26 PROCEDURE — 86592 SYPHILIS TEST NON-TREP QUAL: CPT | Performed by: SURGERY

## 2017-12-26 PROCEDURE — 80074 ACUTE HEPATITIS PANEL: CPT

## 2017-12-26 PROCEDURE — 83918 ORGANIC ACIDS TOTAL QUANT: CPT

## 2017-12-26 PROCEDURE — 86778 TOXOPLASMA ANTIBODY IGM: CPT

## 2017-12-26 PROCEDURE — 86787 VARICELLA-ZOSTER ANTIBODY: CPT

## 2017-12-26 PROCEDURE — 36415 COLL VENOUS BLD VENIPUNCTURE: CPT | Performed by: SURGERY

## 2017-12-26 PROCEDURE — 82728 ASSAY OF FERRITIN: CPT

## 2017-12-26 PROCEDURE — 82105 ALPHA-FETOPROTEIN SERUM: CPT

## 2017-12-26 PROCEDURE — 80320 DRUG SCREEN QUANTALCOHOLS: CPT

## 2017-12-26 PROCEDURE — 86706 HEP B SURFACE ANTIBODY: CPT

## 2017-12-26 PROCEDURE — 86901 BLOOD TYPING SEROLOGIC RH(D): CPT

## 2017-12-26 PROCEDURE — 83550 IRON BINDING TEST: CPT

## 2017-12-26 PROCEDURE — 86480 TB TEST CELL IMMUN MEASURE: CPT

## 2017-12-26 PROCEDURE — 86900 BLOOD TYPING SEROLOGIC ABO: CPT

## 2017-12-26 PROCEDURE — 86664 EPSTEIN-BARR NUCLEAR ANTIGEN: CPT

## 2017-12-26 PROCEDURE — 86777 TOXOPLASMA ANTIBODY: CPT

## 2017-12-26 PROCEDURE — 85025 COMPLETE CBC W/AUTO DIFF WBC: CPT

## 2017-12-26 PROCEDURE — 86665 EPSTEIN-BARR CAPSID VCA: CPT

## 2017-12-26 PROCEDURE — 86696 HERPES SIMPLEX TYPE 2 TEST: CPT | Performed by: SURGERY

## 2017-12-27 LAB
CLINICAL COMMENT: NORMAL
CMV IGG SERPL IA-ACNC: >10 U/ML (ref 0–0.59)
CMV IGM SERPL IA-ACNC: <30 AU/ML (ref 0–29.9)
EBV EA IGG SER-ACNC: 16.7 U/ML (ref 0–8.9)
EBV NA IGG SER IA-ACNC: 131 U/ML (ref 0–17.9)
EBV PATRN SPEC IB-IMP: ABNORMAL
EBV VCA IGG SER IA-ACNC: 173 U/ML (ref 0–17.9)
EBV VCA IGM SER IA-ACNC: <36 U/ML (ref 0–35.9)
HAV IGM SER QL: NORMAL
HBV CORE IGM SER QL: NORMAL
HBV SURFACE AB SER-ACNC: >1000 MIU/ML
HBV SURFACE AG SER QL: NORMAL
HCV AB SER QL: NORMAL
HIV 1+2 AB+HIV1 P24 AG SERPL QL IA: NORMAL
HSV1 IGG SER IA-ACNC: >62.2 INDEX (ref 0–0.9)
HSV2 IGG SER IA-ACNC: <0.91 INDEX (ref 0–0.9)
RPR SER QL: NORMAL
T GONDII IGG SERPL IA-ACNC: <3 IU/ML (ref 0–7.1)
T GONDII IGM SER IA-ACNC: <3 AU/ML (ref 0–7.9)

## 2017-12-28 ENCOUNTER — ALLSCRIPTS OFFICE VISIT (OUTPATIENT)
Dept: OTHER | Facility: OTHER | Age: 57
End: 2017-12-28

## 2017-12-28 DIAGNOSIS — M79.643 PAIN OF HAND: ICD-10-CM

## 2017-12-28 DIAGNOSIS — G89.4 CHRONIC PAIN SYNDROME: ICD-10-CM

## 2017-12-28 DIAGNOSIS — M79.673 PAIN OF FOOT: ICD-10-CM

## 2017-12-28 DIAGNOSIS — G62.9 POLYNEUROPATHY: ICD-10-CM

## 2017-12-28 DIAGNOSIS — R29.898 OTHER SYMPTOMS AND SIGNS INVOLVING THE MUSCULOSKELETAL SYSTEM: ICD-10-CM

## 2017-12-28 LAB
ANNOTATION COMMENT IMP: NORMAL
GAMMA INTERFERON BACKGROUND BLD IA-ACNC: 0.06 IU/ML
M TB IFN-G BLD-IMP: NEGATIVE
M TB IFN-G CD4+ BCKGRND COR BLD-ACNC: <0.01 IU/ML
M TB IFN-G CD4+ T-CELLS BLD-ACNC: 0.05 IU/ML
METHYLMALONATE SERPL-SCNC: 393 NMOL/L (ref 0–378)
MITOGEN IGNF BLD-ACNC: 9.16 IU/ML
QUANTIFERON-TB GOLD IN TUBE: NORMAL
SERVICE CMNT-IMP: NORMAL
VZV IGG SER IA-ACNC: NORMAL

## 2017-12-29 LAB
AMPHETAMINES SERPL QL SCN: NEGATIVE NG/ML
BARBITURATES SERPL QL SCN: NEGATIVE UG/ML
BENZODIAZ SPEC QL: NEGATIVE NG/ML
CANNABINOIDS SERPL QL SCN: NEGATIVE NG/ML
COCAINE+BZE SERPL QL SCN: NEGATIVE NG/ML
OPIATES SERPL QL SCN: NEGATIVE NG/ML
OXYCODONE+OXYMORPHONE SERPLBLD QL SCN: NEGATIVE NG/ML
PCP SERPL QL SCN: NEGATIVE NG/ML

## 2017-12-29 NOTE — PROGRESS NOTES
Assessment   1  Chronic foot pain (729 5,338 29) (M79 673,G89 29)   2  Chronic hand pain (729 5,338 29) (M79 643,G89 29)   3  Deconditioned low back (728 87) (R29 898)   4  Pain syndrome, chronic (338 4) (G89 4)   5  Peripheral neuropathy (356 9) (G62 9)    Plan   Anastomotic ulcer    · Pantoprazole Sodium 40 MG Oral Tablet Delayed Release   Rx By: Barry Ramirez; Dispense: 90 Days ; #:180 Tablet Delayed Release; Refill: 3;For: Anastomotic ulcer; OSWALDO = N; Sent To: [facility]; Last Updated By: Beth Castellano; 12/28/2017 1:19:28 PM  Chronic foot pain, Chronic hand pain, Deconditioned low back, Pain syndrome, chronic,    Peripheral neuropathy    · Dominique Mock PTArnaldo (Physical Medicine and Rehabilitation) Co-Management  Eval for    [facility] because of her balanced and deconditioned state, 1-2 x week, 8-12 weeks,    geared towards [facility]   Status: Active  Requested for:    18Wmq1419   Ordered; For: Chronic foot pain, Chronic hand pain, Deconditioned low back, Pain syndrome, chronic, Peripheral neuropathy; Ordered By: Rebel Villalobos Performed:  Due: 01UID6290  Care Summary provided  : Yes  Cirrhosis    · Furosemide 40 MG Oral Tablet   Rx By: Barry Ramirez; Dispense: 30 Days ; #:60 Tablet; Refill: 4;For: Cirrhosis; OSWALDO = N; Record; Last Updated By: Beth Castellano; 12/28/2017 1:19:28 PM  Health Maintenance    · Lyrica 300 MG Oral Capsule; Take 1 po bid     8/25/17  per pt  2 tabs  bid   Rx By: Rebel Villalobos; Dispense: 90 Days ; #:180 Capsule; Refill: 1;For: Health Maintenance; OSWALDO = N; Print Rx  Pain syndrome, chronic    · Follow-up visit in 4 Months Evaluation and Treatment  Follow-up  Status: Hold For -    Scheduling  Requested for: 87Tvy6641   Ordered; For: Pain syndrome, chronic; Ordered By: Rebel Villalobos Performed:  Due: 63HUX0040    Discussion/Summary      While the patient was in the office today, I thoroughly appraised her and encouraged her for her sobriety and encouraged her to continue to work on staying sober  I encouraged her to follow-up with her gastroenterologist and the possibility of a liver transplant in the future  With regards to her hand and foot neuropathy, I explained to the patient that unfortunately with neuropathic pain it is unknown, however, in my experience typically once the patient has neuropathy or neuropathic damage as severe as her is, it typically will not improve or go way even if she would have the liver transplant  The patient verbalized understanding  this point time, explained to the patient that since she is noting moderate to significant relief and I feel that her medication regimen is reasonable appropriate and as long as she continues to abstain from alcohol, we will continue with the Lyrica as prescribed  The patient was agreeable and verbalized an understanding  encouraged the patient to proceed with the aqua therapy in gave her another prescription today as I feel can help with her balance and lower extremity strength  The patient was agreeable and verbalized an understanding  patient is schedule a follow-up office visit in 4 months and at that point time we will regroup with regards to their medication regimen and treatment plan  The patient was agreeable and verbalized an understanding  The patient has the current Goals: To continue with the current level of pain relief and medication regimen as prescribed for now  The patent has the current Barriers: Chronic pain syndrome and history of alcoholism with cirrhosis of the liver  Patient is able to Self-Care  The treatment plan was reviewed with the patient/guardian  The patient/guardian understands and agrees with the treatment plan    The patient was counseled regarding instructions for management,-- prognosis,-- patient and family education,-- risks and benefits of treatment options-- and-- importance of compliance with treatment   total time of encounter was 20 minutes  Chief Complaint   1  Pain  Chronic bilateral hand and foot pain/neuropathy, stable  History of Present Illness   The patient presents today for a follow-up office visit  She is currently being treated for her chronic bilateral hand and foot pain and neuropathies, which she reports has remained stable and manageable since her last office visit  She is currently managed her pain symptoms with Lyrica 600 mg a day and overall note approximately 60% relief of her pain symptoms, without any significant side effects or issues  She does report that she has continued to abstain from alcohol and has been alcohol free since August of this year  At this point she is meeting with her gastroenterologist and is discussing her eligibility for a liver transplant in the future  However, the patient is wondering if she does get the opportunity to proceed with the liver transplant, will this help her neuropathy pain  She presents today to discuss her medication regimen and treatment plan  Azalia Kumar presents with complaints of constant episodes of bilateral hand, bilateral thigh, bilateral knee, bilateral lower leg, bilateral ankle and bilateral foot pain, described as sharp, burning and cramping, radiating to the bilateral upper extremity and bilateral lower extremity  Symptoms are unchanged  Review of Systems        Constitutional: no fever,-- no recent weight gain-- and-- no recent weight loss  Eyes: no double vision-- and-- no blurry vision  Cardiovascular: no chest pain,-- no palpitations-- and-- no lower extremity edema  Respiratory: no complaints of shortness of breath-- and-- no wheezing  Musculoskeletal: difficulty walking,-- muscle weakness,-- joint stiffness,-- limb swelling ,-- pain in extremity -- and-- decreased range of motion, but-- no joint swelling        Neurological: dizziness, but-- no difficulty swallowing,-- no memory loss,-- no loss of consciousness-- and-- no seizures  Gastrointestinal: no nausea,-- no vomiting,-- no constipation-- and-- no diarrhea  Genitourinary: no difficulty initiating urine stream,-- no genital pain-- and-- no frequent urination  Integumentary: no complaints of skin rash  Psychiatric: no depression  Endocrine: no excessive thirst,-- no adrenal disease,-- no hypothyroidism-- and-- no hyperthyroidism  Hematologic/Lymphatic: no tendency for easy bruising-- and-- no tendency for easy bleeding  Active Problems   1  Alcohol use disorder (305 00) (F10 99)   2  Alcoholic hepatitis (533 9) (K70 10)   3  Anastomotic ulcer (534 90) (K28 9)   4  Anemia (285 9) (D64 9)   5  Ascites (789 59) (R18 8)   6  Breast cancer (174 9) (C50 919)   7  Cervical cancer screening (V76 2) (Z12 4)   8  Chronic anxiety (300 00) (F41 9)   9  Chronic depression (311) (F32 9)   10  Chronic foot pain (729 5,338 29) (M79 673,G89 29)   11  Chronic hand pain (729 5,338 29) (M79 643,G89 29)   12  Chronic narcotic dependence (304 91) (F11 20)   13  Cirrhosis (571 5) (K74 60)   14  Deconditioned low back (728 87) (R29 898)   15  Dyspnea (786 09) (R06 00)   16  Encephalopathy, hepatic (572 2) (K72 90)   17  Encounter for long-term opiate analgesic use (V58 69) (Z79 891)   18  Encounter for routine gynecological examination with Papanicolaou smear of cervix      (V72 31,V76 2) (Z01 419)   19  Fatty liver (571 8) (K76 0)   20  Foot fracture (825 20) (S92 909A)   21  Iron (Fe) deficiency anemia (280 9) (D50 9)   22  Liver disease (573 9) (K76 9)   23  Liver lesion (573 8) (K76 9)   24  Pain syndrome, chronic (338 4) (G89 4)   25  Pancytopenia (284 19) (D61 818)   26  Peripheral neuropathy (356 9) (G62 9)   27  Pleural effusion, right (511 9) (J90)   28  Rosacea (695 3) (L71 9)   29  Screening for HPV (human papillomavirus) (V73 81) (Z11 51)   30  Ventral hernia without obstruction or gangrene (553 20) (K43 9)   31   Wound of abdomen (879 2) (S31 109A)    Past Medical History   1  History of alcoholism (V11 3) (F10 21)   2  History of anxiety disorder (V11 8) (Z86 59)   3  History of breast cancer (V10 3) (Z85 3)   4  History of esophagitis (V12 79) (Z87 19)   5  History of hypertension (V12 59) (Z86 79)   6  History of liver disease (V12 79) (Z87 19)   7  History of self breast exam   8  History of Palpitations (785 1) (R00 2)     The active problems and past medical history were reviewed and updated today  Surgical History   1  History of Abdominoplasty   2  History of Breast Surgery Mastectomy   3  History of Gallbladder Surgery   4  History of Gastric Surgery For Morbid Obesity Bypass With Nicole-en-Y   5  History of Hernia Repair     The surgical history was reviewed and updated today  Family History   Mother    1  Denied: Family history of Colon cancer  Father    2  Denied: Family history of Colon cancer  Family History    3  Denied: Family history of Colon cancer   4  Family history of diabetes mellitus (V18 0) (Z83 3)   5  Family history of hypertension (V17 49) (Z82 49)   6  Family history of thyroid disease (V18 19) (Z83 49)     The family history was reviewed and updated today  Social History    · Alcohol use (V49 89) (Z78 9)   · Always uses seat belt   · Has no children   ·    · No caffeine use   · Non-smoker (V49 89) (Z78 9)  The social history was reviewed and updated today  The social history was reviewed and is unchanged  Current Meds    1  Folic Acid 1 MG Oral Tablet; Take 1 tablet daily Recorded   2  Furosemide 40 MG Oral Tablet; TAKE 2 TABLETS DAILY; Last Rx:11Oct2017 Ordered   3  Furosemide TABS; Therapy: (Clydia Ritika) to Recorded   4  GNP Vitamin B1 TABS Recorded   5  Lactulose 20 GM/30ML Oral Solution; SWALLOW 10 ML 3 times daily; Therapy: 23JDZ9983 to (Evaluate:99Ygl1313)  Requested for: 78KXD0816; Last     Rx:28Sep2017 Ordered   6  Lyrica 300 MG Oral Capsule;  Take 1 po bid      8/25/17 per pt  2 tabs  bid; Last Rx:02Oct2017 Ordered   7  Pantoprazole Sodium 40 MG Oral Tablet Delayed Release; Take 1 tablet twice daily; Therapy: 71BAM6518 to (Evaluate:42Zpc0812)  Requested for: 73DGC4982; Last     Rx:78Yiy1358 Ordered   8  Protonix TBEC; Therapy: (Ubaldo Early) to Recorded   9  Spironolactone 50 MG Oral Tablet; TAKE 4 TABLETS DAILY; Last Rx:11Oct2017 Ordered   10  Xifaxan 550 MG Oral Tablet; take 1 tablet every twelve hours; Therapy: 08RNP9886 to (Evaluate:10Mar2018)  Requested for: 75QOF1003; Last      Rx:11Oct2017 Ordered     The medication list was reviewed and updated today  Allergies   1  Cymbalta    Vitals   Vital Signs    Recorded: 02Bax0728 01:14PM   Temperature 97 9 F   Heart Rate 88   Systolic 696   Diastolic 70   Height 5 ft 4 in   Weight 175 lb    BMI Calculated 30 04   BSA Calculated 1 85   Pain Scale 4     Physical Exam        Constitutional      General appearance: Well developed, well nourished, alert, in no distress, non-toxic and no overt pain behavior  Eyes      Sclera: anicteric      HEENT      Hearing grossly intact  Neck      Neck: Supple, symmetric, trachea midline, no masses  Pulmonary      Respiratory effort: Even and unlabored  Cardiovascular      Examination of extremities: No edema or pitting edema present  Abdomen      Abdomen: Soft, non-tender, non-distended  Skin      Skin and subcutaneous tissue: Normal without rashes or lesions, well hydrated  Psychiatric      Mood and affect: Mood and affect appropriate  Neurologic      Cranial nerves: Cranial nerves II-XII grossly intact  the muscle tone was normal      Musculoskeletal Tandem Gait: Intact      Future Appointments      Date/Time Provider Specialty Site   01/08/2018 03:00 PM MAICO Davies   Hematology Oncology [facility]   01/22/2018 01:20 PM Chon Fiore MD Gastroenterology Adult [address] [place]     Signatures    Electronically signed by : Farrah Akhtar; Dec 28 2017  1:38PM EST                       (Author)     Electronically signed by : Brad Kaur DO; Dec 28 2017  1:46PM EST

## 2018-01-05 DIAGNOSIS — D50.9 IRON DEFICIENCY ANEMIA: ICD-10-CM

## 2018-01-08 ENCOUNTER — HOSPITAL ENCOUNTER (OUTPATIENT)
Dept: INFUSION CENTER | Facility: HOSPITAL | Age: 58
Discharge: HOME | End: 2018-01-10
Payer: OTHER GOVERNMENT

## 2018-01-08 ENCOUNTER — ALLSCRIPTS OFFICE VISIT (OUTPATIENT)
Dept: OTHER | Facility: OTHER | Age: 58
End: 2018-01-08

## 2018-01-08 VITALS
HEART RATE: 76 BPM | TEMPERATURE: 98.3 F | DIASTOLIC BLOOD PRESSURE: 64 MMHG | SYSTOLIC BLOOD PRESSURE: 118 MMHG | RESPIRATION RATE: 16 BRPM

## 2018-01-08 PROCEDURE — 96372 THER/PROPH/DIAG INJ SC/IM: CPT

## 2018-01-08 RX ORDER — CYANOCOBALAMIN 1000 UG/ML
1000 INJECTION INTRAMUSCULAR; SUBCUTANEOUS ONCE
Status: COMPLETED | OUTPATIENT
Start: 2018-01-08 | End: 2018-01-08

## 2018-01-08 RX ADMIN — CYANOCOBALAMIN 1000 MCG: 1000 INJECTION, SOLUTION INTRAMUSCULAR at 15:00

## 2018-01-08 NOTE — PROGRESS NOTES
Pt arrives on unit for B12  Offers no c/o at this time  Tolerated B12 im in the LD; bandaid dry and intact; pt d/c'd home ambulatory with steady gait

## 2018-01-09 NOTE — MISCELLANEOUS
Message   Recorded as Task   Date: 09/05/2017 12:51 PM, Created By: Xena Oliva   Task Name: Care Coordination   Assigned To: Hernesto Ramirez   Regarding Patient: Maribell Whitaker, Status: In Progress   Comment:    AlejandroAnia - 05 Sep 2017 12:51 PM     TASK CREATED  pt called stating that a medication prescribed to her is causing her to have severe abdominal pain  please call pt back @ [address] Sep 2017 1:08 PM     TASK IN PROGRESS   Hernesto Ramirez - 05 Sep 2017 1:10 PM     TASK EDITED  Spoke with patient  Patient c/o cramping at HS in her arms, and legs  She reports its painful 8/10  Denies abdominal pain  She reports taking spriololactone 100mg daily instead of BID and lasix 80mg daily  She would like to decrease the lasix  Any suggestions?b/w? Bubba Ty - 05 Sep 2017 8:38 PM     TASK REPLIED TO: Previously Assigned To Hernesto James  I think this is a great idea  She can decrease Lasix down to 40 mg and decrease spironolactone down to 50 mg  Less check electrolytes as well to make sure her potassium is not too high to low  I will also check magnesium with this  Active Problems    1  Alcohol use disorder (305 00) (F10 99)   2  Alcoholic hepatitis (413 7) (K70 10)   3  Anastomotic ulcer (534 90) (K28 9)   4  Anemia (285 9) (D64 9)   5  Ascites (789 59) (R18 8)   6  Breast cancer (174 9) (C50 919)   7  Chronic anxiety (300 00) (F41 9)   8  Chronic depression (311) (F32 9)   9  Chronic foot pain (729 5,338 29) (M79 673,G89 29)   10  Chronic hand pain (729 5,338 29) (M79 643,G89 29)   11  Chronic narcotic dependence (304 91) (F11 20)   12  Cirrhosis (571 5) (K74 60)   13  Dyspnea (786 09) (R06 00)   14  Encephalopathy, hepatic (572 2) (K72 90)   15  Encounter for long-term opiate analgesic use (V58 69) (Z79 891)   16  Fatty liver (571 8) (K76 0)   17  Foot fracture (825 20) (S92 909A)   18  Liver disease (573 9) (K76 9)   19  Liver lesion (573 8) (N11 7)   20  Pain syndrome, chronic (338 4) (G89 4)   21  Pancytopenia (284 19) (D61 818)   22  Peripheral neuropathy (356 9) (G62 9)   23  Pleural effusion, right (511 9) (J90)   24  Rosacea (695 3) (L71 9)    Current Meds   1  Carafate 1 GM/10ML Oral Suspension; TAKE 10 ML 3 TIMES DAILY; Therapy: 44Sek1837 to (Evaluate:22Jan2018)  Requested for: 93Ldx2703; Last   Rx:67Gru1870 Ordered   2  Folic Acid 1 MG Oral Tablet; Therapy: 27THN2833 to Recorded   3  Furosemide 40 MG Oral Tablet; TAKE 2 TABLETS DAILY; Therapy: (Barbara Ndiaye) to Recorded   4  Lactulose 20 GM/30ML Oral Solution; SWALLOW 10 ML 3 times daily; Therapy: 19SCX9863 to (Evaluate:08Dec2017)  Requested for: 64Wen4924; Last   Rx:23Aaz7279 Ordered   5  Lactulose SOLN; lactulose 20mg/45ml  swallow 15ml  tid; Therapy: (Barbara Ndiaye) to Recorded   6  Lyrica 300 MG Oral Capsule; Take 1 po bid    8/25/17  per pt  2 tabs  bid; Therapy: (Barbara Ndiaye) to Recorded   7  Pantoprazole Sodium 40 MG Oral Tablet Delayed Release; take 1 tablet by mouth 2   times per day; Therapy: 26BZS9471 to (Evaluate:04Nov2017); Last Rx:43Jih0299 Ordered   8  PredniSONE 20 MG Oral Tablet; TAKE 2 TABLETS DAILY WITH FOOD; Therapy: 32NZW7302 to (Evaluate:23Sep2017)  Requested for: 27Ozi1694; Last   Rx:78Lwx5784 Ordered   9  Spironolactone 100 MG Oral Tablet (Aldactone); TAKE 1 TABLET TWICE DAILY; Therapy: 99Bvi1624 to (Evaluate:23Sep2017)  Requested for: 02Ygj1247; Last   Rx:08Hcb4280 Ordered   10  Suprep Bowel Prep Kit 17 5-3 13-1 6 GM/180ML Oral Solution; USE AS DIRECTED; Therapy: 45Brp4069 to (Last Glennette Delay)  Requested for: 85Bjb4688 Ordered   11  Thiamine HCl - 100 MG Oral Tablet; Therapy: 53WFY9608 to Recorded   12  Xifaxan 550 MG Oral Tablet; Take 1 tablet twice daily; Therapy: 05CWT8057 to (Evaluate:48Wpj7245)  Requested for: 40Eny9070; Last    Rx:26Dca3000 Ordered    Allergies    1   Cymbalta    Plan  Cirrhosis    · (1) COMPREHENSIVE METABOLIC PANEL; Status:Active; Requested QXR:52YZR7527;    · (1) MAGNESIUM; Status:Active;  Requested XFY:92DFB4730;     Signatures   Electronically signed by : Herber Winters MD; Sep  5 2017  8:39PM EST                       (Author)

## 2018-01-09 NOTE — PROGRESS NOTES
Assessment   1  Alcoholic hepatitis (019 1) (K70 10)   2  Anemia (285 9) (D64 9)   3  Cirrhosis (571 5) (K74 60)   4  Iron (Fe) deficiency anemia (280 9) (D50 9)    Plan   Iron (Fe) deficiency anemia    · (1) CBC/PLT/DIFF; Status:Active; Requested for:04May2018; Perform:[facility]; AAV:01WXN8283;XWIXLPQ; For:Iron (Fe) deficiency anemia; Ordered By:Epifanio Hugo;   · (1) COMPREHENSIVE METABOLIC PANEL; Status:Active; Requested for:04May2018; Perform:[facility]; KUV:24QUK6581;JYDFSCN; For:Iron (Fe) deficiency anemia; Ordered By:Epifanio Hugo;   · (1) FERRITIN; Status:Active; Requested for:04May2018; Perform:[facility]; BZA:79RRN9130;TIBCDZL; For:Iron (Fe) deficiency anemia; Ordered By:Epifanio Hugo;   · (1) IRON SATURATION %, TIBC; Status:Active; Requested for:04May2018; Perform:[facility]; JYC:36NQX6345;SSUEZNW; For:Iron (Fe) deficiency anemia; Ordered By:Epifanio Hugo;   · (1) IRON; Status:Active; Requested for:04May2018; Perform:[facility]; JNX:53QXU2128;ENEWQWS; For:Iron (Fe) deficiency anemia; Ordered By:Epifanio Hugo;   · (1) METHYLMALONIC ACID,BLOOD; Status:Active; Requested for:04May2018; Perform:[facility]; PTH:08NUI6817;DWZHTMQ; For:Iron (Fe) deficiency anemia; Ordered By:Epifanio Hugo;   · (1) TIBC; Status:Active; Requested for:04May2018; Perform:[facility]; VCR:29NBO4972;BEDLWEG; For:Iron (Fe) deficiency anemia; Ordered By:Epifanio Hugo;   · Follow-up visit in 4 Months Evaluation and Treatment  Follow-up  Status: Hold For -    Scheduling  Requested for: 82YOA6828   Ordered; For: Iron (Fe) deficiency anemia; Ordered By: Viji Bonner Performed:  Due: 56BCY3703    Discussion/Summary   Discussion Summary:    The patient is a pleasant 80-year-old female with a past medical history of cirrhosis who presented with pancytopenia  This may be related to her cirrhosis   Because she was pancytopenic and all 3 cell lines were depressed I ordered a bone marrow biopsy  Bone marrow biopsy did not show any major abnormalities but did show iron deficiency  I gave her 4 doses of Venofer  She was then in the hospital with a GI bleed  I gave her 8 more doses of iron and more B12  Her hemoglobin has normalized and is now 11 5  White count 4 5 platelet count is 21,711 is obviously from alcoholism and liver disease  I advised her to abstain from alcohol  I'll see her back in 4 months  She will continue to get B12 once a month  This is because her MMA was still elevated  Counseling Documentation With Imm: The patient was counseled regarding diagnostic results,-- instructions for management,-- prognosis,-- patient and family education  Goals and Barriers: The patient has the current Goals: Workup of pancytopenia  The patent has the current Barriers: None  Patient's Capacity to Self-Care: Patient is able to Self-Care  Medication SE Review and Pt Understands Tx: The treatment plan was reviewed with the patient/guardian  The patient/guardian understands and agrees with the treatment plan      Chief Complaint   Chief Complaint Free Text Note Form: Pancytopenia      History of Present Illness   Previous Therapy:    Workup             Current Therapy: The patient finished up 8 doses of Venofer and is currently on B12 once a month             Interval History: The patient returns for follow-up visit  In the past Her bone marrow biopsy did not show any abnormalities but she did have absent storage iron  Her blood work also showed a low iron  I gave her 4 doses of Venofer  She felt much better but did not have blood work drawn  She was then in the hospital for GI bleed  He recovered and when she last saw him he has set her up for Venofer  We gave her a doses along with B12  Her hemoglobin has normalized  Her platelet count is lower breast suspect this is from her cirrhosis and alcoholic liver disease   Probably related to portal hypertension  As far as symptoms are concerned she is less fatigued  Denies any nausea denies any vomiting or diarrhea  SHe states she saw our colleagues in liver transplant and is now no longer drinking as of August 2017  The rest of her 14 point review of systems today was negative  Review of Systems   Complete-Female: As stated in the history of present illness otherwise her 14 point review of systems today was negative  Active Problems   1  Alcohol use disorder (305 00) (F10 99)   2  Alcoholic hepatitis (880 7) (K70 10)   3  Anastomotic ulcer (534 90) (K28 9)   4  Anemia (285 9) (D64 9)   5  Ascites (789 59) (R18 8)   6  Breast cancer (174 9) (C50 919)   7  Cervical cancer screening (V76 2) (Z12 4)   8  Chronic anxiety (300 00) (F41 9)   9  Chronic depression (311) (F32 9)   10  Chronic foot pain (729 5,338 29) (M79 673,G89 29)   11  Chronic hand pain (729 5,338 29) (M79 643,G89 29)   12  Chronic narcotic dependence (304 91) (F11 20)   13  Cirrhosis (571 5) (K74 60)   14  Deconditioned low back (728 87) (R29 898)   15  Dyspnea (786 09) (R06 00)   16  Encephalopathy, hepatic (572 2) (K72 90)   17  Encounter for long-term opiate analgesic use (V58 69) (Z79 891)   18  Encounter for routine gynecological examination with Papanicolaou smear of cervix      (V72 31,V76 2) (Z01 419)   19  Fatty liver (571 8) (K76 0)   20  Foot fracture (825 20) (S92 909A)   21  Iron (Fe) deficiency anemia (280 9) (D50 9)   22  Liver disease (573 9) (K76 9)   23  Liver lesion (573 8) (K76 9)   24  Pain syndrome, chronic (338 4) (G89 4)   25  Pancytopenia (284 19) (D61 818)   26  Peripheral neuropathy (356 9) (G62 9)   27  Pleural effusion, right (511 9) (J90)   28  Rosacea (695 3) (L71 9)   29  Screening for HPV (human papillomavirus) (V73 81) (Z11 51)   30  Ventral hernia without obstruction or gangrene (553 20) (K43 9)   31  Wound of abdomen (879 2) (S31 109A)    Past Medical History   1   History of alcoholism (V11 3) (F10 21)   2  History of anxiety disorder (V11 8) (Z86 59)   3  History of breast cancer (V10 3) (Z85 3)   4  History of esophagitis (V12 79) (Z87 19)   5  History of hypertension (V12 59) (Z86 79)   6  History of liver disease (V12 79) (Z87 19)   7  History of self breast exam   8  History of Palpitations (785 1) (R00 2)  Active Problems And Past Medical History Reviewed: The active problems and past medical history were reviewed and updated today  Positive for anxiety, cirrhosis, history of reactive surgery in 2005, rosacea, neuropathy      Surgical History   1  History of Abdominoplasty   2  History of Breast Surgery Mastectomy   3  History of Gallbladder Surgery   4  History of Gastric Surgery For Morbid Obesity Bypass With Nicole-en-Y   5  History of Hernia Repair  Surgical History Reviewed: The surgical history was reviewed and updated today  Multiple surgeries including abdominoplasty bariatric surgery gallbladder removal for fracture, 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 with reconstruction      Family History   Mother    1  Denied: Family history of Colon cancer  Father    2  Denied: Family history of Colon cancer  Family History    3  Denied: Family history of Colon cancer   4  Family history of diabetes mellitus (V18 0) (Z83 3)   5  Family history of hypertension (V17 49) (Z82 49)   6  Family history of thyroid disease (V18 19) (Z83 49)  Family History Reviewed: The family history was reviewed and updated today  Social History    · Alcohol use (V49 89) (Z78 9)   · Always uses seat belt   · Has no children   ·    · No caffeine use   · Non-smoker (V49 89) (Z78 9)  Social History Reviewed: The social history was reviewed and updated today  She even smells of alcohol today  She states she is to drink a lot and then had stopped but now is currently on a binge because of her stress levels  I explained this is very dangerous and she could die because of her drinking  She understands and states she will try to decrease her consumption      Current Meds    1  Folic Acid 1 MG Oral Tablet; Take 1 tablet daily Recorded   2  Furosemide TABS; Therapy: (Recorded:49Kip2115) to Recorded   3  GNP Vitamin B1 TABS Recorded   4  Lactulose 20 GM/30ML Oral Solution; SWALLOW 10 ML 3 times daily; Therapy: 54ZSX6226 to (Evaluate:23Sep2018)  Requested for: 48UVW0196; Last     Rx:28Sep2017 Ordered   5  Lyrica 300 MG Oral Capsule; Take 1 po bid      8/25/17  per pt  2 tabs  bid; Last Rx:28Dec2017 Ordered   6  Protonix TBEC; Therapy: (Deandre Virk) to Recorded   7  Spironolactone 50 MG Oral Tablet; TAKE 4 TABLETS DAILY; Last Rx:11Oct2017 Ordered   8  Xifaxan 550 MG Oral Tablet; take 1 tablet every twelve hours; Therapy: 26YZU0839 to (Evaluate:10Mar2018)  Requested for: 57RKI0859; Last     Rx:11Oct2017 Ordered  Medication List Reviewed: The medication list was reviewed and updated today  Allergies   1  Cymbalta    Vitals   Vital Signs    Recorded: 04YOG6096 02:41PM   Temperature 98 3 F   Heart Rate 76   Respiration 16   Systolic 813   Diastolic 64   Height 5 ft 4 in   Weight 183 lb 8 0 oz   BMI Calculated 31 5   BSA Calculated 1 89   O2 Saturation 98   Pain Scale 0     Physical Exam        Constitutional      General appearance: No acute distress, well appearing and well nourished  Eyes      Conjunctiva and lids: No swelling, erythema or discharge  Pupils and irises: Equal, round and reactive to light  Ears, Nose, Mouth, and Throat      External inspection of ears and nose: Normal        Nasal mucosa, septum, and turbinates: Normal without edema or erythema  Oropharynx: Normal with no erythema, edema, exudate or lesions  Pulmonary      Respiratory effort: No increased work of breathing or signs of respiratory distress  Auscultation of lungs: Clear to auscultation  Cardiovascular      Palpation of heart: Normal PMI, no thrills  Auscultation of heart: Normal rate and rhythm, normal S1 and S2, without murmurs  Examination of extremities for edema and/or varicosities: Normal        Carotid pulses: Normal        Abdomen      Abdomen: Non-tender, no masses  Liver and spleen: No hepatomegaly or splenomegaly  Lymphatic      Palpation of lymph nodes in neck: No lymphadenopathy  Musculoskeletal      Gait and station: Normal        Digits and nails: Normal without clubbing or cyanosis  Inspection/palpation of joints, bones, and muscles: Normal        Skin      Skin and subcutaneous tissue: Normal without rashes or lesions  Neurologic      Cranial nerves: Cranial nerves 2-12 intact  Reflexes: 2+ and symmetric  Sensation: No sensory loss  Psychiatric      Orientation to person, place, and time: Normal        Mood and affect: Normal                ECOG 1       Results/Data   (1) CBC/PLT/DIFF 17FQS4973 04:06PM NydiaEpifanio Nunez      Test Name Result Flag Reference   WBC COUNT 4 50 Thousand/uL  4 31-10 16   RBC COUNT 3 78 Million/uL L 3 81-5 12   HEMOGLOBIN 11 5 g/dL  11 5-15 4   HEMATOCRIT 34 7 % L 34 8-46  1   MCV 92 fL  82-98   MCH 30 4 pg  26 8-34 3   MCHC 33 1 g/dL  31 4-37 4   RDW 15 7 % H 11 6-15 1   MPV 10 6 fL  8 9-12 7   PLATELET COUNT 57 Thousands/uL L 149-390   NEUTROPHILS RELATIVE PERCENT 55 %  43-75   LYMPHOCYTES RELATIVE PERCENT 30 %  14-44   MONOCYTES RELATIVE PERCENT 11 %  4-12   EOSINOPHILS RELATIVE PERCENT 3 %  0-6   BASOPHILS RELATIVE PERCENT 1 %  0-1   NEUTROPHILS ABSOLUTE COUNT 2 49 Thousands/? ??L  1 85-7 62   LYMPHOCYTES ABSOLUTE COUNT 1 36 Thousands/? ??L  0 60-4 47   MONOCYTES ABSOLUTE COUNT 0 48 Thousand/? ??L  0 17-1 22   EOSINOPHILS ABSOLUTE COUNT 0 14 Thousand/? ??L  0 00-0 61   BASOPHILS ABSOLUTE COUNT 0 03 Thousands/? ??L  0 00-0 10   - Patient Instructions: This bloodwork is non-fasting  Please drink two glasses of water morning of bloodwork             No Clots      (1) COMPREHENSIVE METABOLIC PANEL 34MAF8653 50:89VL Renee Topete      Test Name Result Flag Reference   GLUCOSE,RANDM 77 mg/dL     If the patient is fasting, the ADA then defines impaired fasting glucose as > 100 mg/dL and diabetes as > or equal to 123 mg/dL  Specimen collection should occur prior to Sulfasalazine administration due to the potential for falsely depressed results  Specimen collection should occur prior to Sulfapyridine administration due to the potential for falsely elevated results  SODIUM 141 mmol/L  136-145   POTASSIUM 3 4 mmol/L L 3 5-5 3   CHLORIDE 108 mmol/L  100-108   CARBON DIOXIDE 25 mmol/L  21-32   ANION GAP (CALC) 8 mmol/L  4-13   BLOOD UREA NITROGEN 9 mg/dL  5-25   CREATININE 1 54 mg/dL H 0 60-1 30   Standardized to IDMS reference method   CALCIUM 8 7 mg/dL  8 3-10 1   BILI, TOTAL 1 70 mg/dL H 0 20-1 00   ALK PHOSPHATAS 187 U/L H    ALT (SGPT) 45 U/L  12-78   Specimen collection should occur prior to Sulfasalazine administration due to the potential for falsely depressed results  AST(SGOT) 64 U/L H 5-45   Specimen collection should occur prior to Sulfasalazine administration due to the potential for falsely depressed results  ALBUMIN 3 0 g/dL L 3 5-5 0   TOTAL PROTEIN 7 5 g/dL  6 4-8 2   eGFR 37 ml/min/1 73sq m     [facility] Disease Education Program recommendations are as follows:     GFR calculation is accurate only with a steady state creatinine     Chronic Kidney disease less than 60 ml/min/1 73 sq  meters     Kidney failure less than 15 ml/min/1 73 sq  meters  (1) FERRITIN 65RRD5262 04:06PM Renee Efrem      Test Name Result Flag Reference   FERRITIN 73 ng/mL  8-388      (1) IRON SATURATION %, TIBC 17UUZ2415 04:06PM Epifanio Reid      Test Name Result Flag Reference   IRON SATURATION 29 %     TOTAL IRON BINDING CAPACITY 337 ug/dL  250-450   IRON 98 ug/dL     Patients treated with metal-binding drugs (ie  Deferoxamine) may have depressed iron values        (1) METHYLMALONIC ACID,BLOOD 28LYZ5272 04:06PM Radha Ward    Order Number: [id number]      Test Name Result Flag Reference   METHYLMALONIC ACID 393 nmol/L H 0 - 378   Performed at:  [address]  [id number]     : Good Araiza MD, Phone:  [phone]      (1) MAGNESIUM 44LMD1282 04:06PM EPIC, Provider   Test ordered by: Drema Neither Name Result Flag Reference   MAGNESIUM 1 8 mg/dL  1 6-2 6      (1) PHOSPHORUS 61HKQ2691 04:06PM EPIC, Provider   Test ordered by: Drema Neither Name Result Flag Reference   PHOSPHORUS 2 7 mg/dL  2 7-4 5      (1) LIPID PANEL, FASTING 63GPF7400 04:06PM EPIC, Provider   Test ordered by: Drema Neither Name Result Flag Reference   CHOLESTEROL 193 mg/dL     HDL,DIRECT 90 mg/dL H 40-60   Specimen collection should occur prior to Metamizole administration due to the potential for falsley depressed results  LDL CHOLESTEROL CALCULATED 91 mg/dL  0-100   This is a patient instruction: This test requires patient fasting for 10-12 hours or longer  Drinking of black coffee or black tea is acceptable  Triglyceride:           Normal <150 mg/dl      Borderline High 150-199 mg/dl      High 200-499 mg/dl      Very High >499 mg/dl               Cholesterol:          Desirable <200 mg/dl       Borderline High 200-239 mg/dl       High >239 mg/dl               HDL Cholesterol:          High>59 mg/dL       Low <41 mg/dL               This screening LDL is a calculated result  It does not have the accuracy of the Direct Measured LDL in the monitoring of patients with hyperlipidemia and/or statin therapy  Direct Measure LDL (HAG309) must be ordered separately in these patients  TRIGLYCERIDES 61 mg/dL  <=150   Specimen collection should occur prior to N-Acetylcysteine or Metamizole administration due to the potential for falsely depressed results        (1) ALCOHOL,MEDICAL (SERUM) 77ZIB3313 04:06PM EPIC, Provider   Test ordered by: Karla Qureshi Name Result Flag Reference   ALCOHOL,MEDICAL (SERUM) <3 mg/dL  0-3      Health Management   Anemia   COLONOSCOPY (GI, SURG); every 5 years; Last 21KBR1076; Next Due: 76EHJ8168; Active  Cirrhosis   EGD; every 1 year; Last 06SBY0629; Next Due: 07Jyi8370;  Active    Future Appointments      Date/Time Provider Specialty Site   04/19/2018 01:00 PM RENÉ Holliday Pain Management [facility] SPINE   01/22/2018 01:20 PM Alissa Zavaleta MD Gastroenterology Adult [address]     Signatures    Electronically signed by : MAICO Forbes ; Jan 8 2018  3:03PM EST                       (Author)

## 2018-01-10 NOTE — RESULT NOTES
Discussion/Summary   Please let patient know the INR is 1 65     Verified Results  (1) PT WITH INR 58Rrs8089 12:24PM To Nolan Order Number: [id number]     Test Name Result Flag Reference   INR 1 65 H 0 86-1 16   PT 19 3 seconds H 12 1-14 4

## 2018-01-11 NOTE — MISCELLANEOUS
Message  Message Free Text Note Form: To clarify this the know after either procedure on Ms Vernon Clemens  Patient is adjusting getting dental work done which will be done under anesthesia  Discuss that due to history of cirrhosis there is a high risk of complication post dental procedure  I also reviewed her INR which was 1 6 and platelets were 68,134 which were checked last month  I advised her to get this recheck due to the low platelet count  She finished her steroids last night and was tapered off them very slowly  Currently overall doing well  Plan    1  (1) CBC/PLT/DIFF; Status:Active;  Requested UAB:23VGI0998;     Signatures   Electronically signed by : Susanna Moore MD; Sep 14 2017 11:16AM EST                       (Author)

## 2018-01-12 NOTE — RESULT NOTES
Discussion/Summary   No evidence of acosta's but repeat EGD is required in 1 y     Verified Results  (1) TISSUE EXAM 94FYR6787 10:27AM Achilles Cameron     Test Name Result Flag Reference   LAB AP CASE REPORT (Report)     Surgical Pathology Report             Case: [id number]                   Authorizing Provider: Benigno Medina MD      Collected:      05/18/2017 1027        Ordering Location:   [facility]    Received:      05/18/2017 [address] [place] [address] Endoscopy                              Pathologist:      Thomas Hollingsworth MD                             Specimen:  Esophagus, bx distal esophagus   LAB AP FINAL DIAGNOSIS (Report)     A  Distal esophagus, biopsy:    - Gastric cardia type mucosa with minimal chronic inactive   inflammation     - Squamous mucosa is not identified     - No intestinal metaplasia, dysplasia and malignancy  Electronically signed by Thomas Hollingsworth MD on 5/23/2017 at 8:36 AM   LAB AP NOTE      Interpretation performed at [facility], [address] [place] [address]   LAB AP SURGICAL ADDITIONAL INFORMATION (Report)     These tests were developed and their performance characteristics   determined by [facility] or [facility]  They may not be cleared or approved by the [place]  Food and   Drug Administration  The FDA has determined that such clearance or   approval is not necessary  These tests are used for clinical purposes  They should not be regarded as investigational or for research  This   laboratory has been approved by [place] 88, designated as a high-complexity   laboratory and is qualified to perform these tests  LAB AP GROSS DESCRIPTION (Report)     A  The specimen is received in formalin, labeled with the patient's name   and hospital number, and is designated biopsy distal esophagus, are   three irregularly shaped fragments of tan soft tissue measuring 0 5 x 0 3   x 0 1 cm in aggregate  Entirely submitted  One cassette  Note: The estimated total formalin fixation time based upon information   provided by the submitting clinician and the standard processing schedule   is 27 5 hours        RLR

## 2018-01-12 NOTE — MISCELLANEOUS
Message  Bertha Dick cancelled her appt for today, "I see a cancer specialist and follow with a pulmonary Doc there so actually I'm good " "I really don't see the need to come in right now but i will call if I have any issues " I told her I would note this on her chart  Active Problems    1  Alcohol use disorder (305 00) (F10 99)   2  Alcoholic hepatitis (700 5) (K70 10)   3  Anastomotic ulcer (534 90) (K28 9)   4  Anemia (285 9) (D64 9)   5  Ascites (789 59) (R18 8)   6  Breast cancer (174 9) (C50 919)   7  Cervical cancer screening (V76 2) (Z12 4)   8  Chronic anxiety (300 00) (F41 9)   9  Chronic depression (311) (F32 9)   10  Chronic foot pain (729 5,338 29) (M79 673,G89 29)   11  Chronic hand pain (729 5,338 29) (M79 643,G89 29)   12  Chronic narcotic dependence (304 91) (F11 20)   13  Cirrhosis (571 5) (K74 60)   14  Deconditioned low back (728 87) (R29 898)   15  Dyspnea (786 09) (R06 00)   16  Encephalopathy, hepatic (572 2) (K72 90)   17  Encounter for long-term opiate analgesic use (V58 69) (Z79 891)   18  Encounter for routine gynecological examination with Papanicolaou smear of cervix    (V72 31,V76 2) (Z01 419)   19  Fatty liver (571 8) (K76 0)   20  Foot fracture (825 20) (S92 909A)   21  Iron (Fe) deficiency anemia (280 9) (D50 9)   22  Liver disease (573 9) (K76 9)   23  Liver lesion (573 8) (K76 9)   24  Pain syndrome, chronic (338 4) (G89 4)   25  Pancytopenia (284 19) (D61 818)   26  Peripheral neuropathy (356 9) (G62 9)   27  Pleural effusion, right (511 9) (J90)   28  Rosacea (695 3) (L71 9)   29  Screening for HPV (human papillomavirus) (V73 81) (Z11 51)   30  Ventral hernia without obstruction or gangrene (553 20) (K43 9)   31  Wound of abdomen (879 2) (S31 109A)    Current Meds   1  Carafate 1 GM/10ML Oral Suspension; TAKE 10 ML 3 TIMES DAILY; Therapy: 78Qsn3212 to (Evaluate:94Uka0244)  Requested for: 07BBQ3700; Last   Rx:64Ywr5905 Ordered   2  Folic Acid 1 MG Oral Tablet;  Take 1 tablet daily Recorded   3  Furosemide 40 MG Oral Tablet; TAKE 2 TABLETS DAILY; Last Rx:11Oct2017 Ordered   4  GNP Vitamin B1 TABS Recorded   5  Lactulose 20 GM/30ML Oral Solution; SWALLOW 10 ML 3 times daily; Therapy: 78HXL7279 to (Evaluate:96Ytb0159)  Requested for: 51GKE1113; Last   Rx:23Koa7318 Ordered   6  Lyrica 300 MG Oral Capsule; Take 1 po bid    8/25/17  per pt  2 tabs  bid; Last Rx:02Oct2017 Ordered   7  Pantoprazole Sodium 40 MG Oral Tablet Delayed Release; Take 1 tablet twice daily; Therapy: 64TFW8121 to (Evaluate:27Nnz0231)  Requested for: 30EVE6939; Last   Rx:17Ska3106 Ordered   8  Spironolactone 50 MG Oral Tablet (Aldactone); TAKE 4 TABLETS DAILY; Last   Rx:11Oct2017 Ordered   9  Xifaxan 550 MG Oral Tablet; take 1 tablet every twelve hours; Therapy: 65WKZ6133 to (Evaluate:10Mar2018)  Requested for: 89QVW9407; Last   Rx:11Oct2017 Ordered    Allergies    1   Cymbalta    Signatures   Electronically signed by : Ivania Crane, ; Nov 21 2017  7:39AM EST                       (Author)

## 2018-01-13 VITALS
HEIGHT: 64 IN | HEART RATE: 90 BPM | OXYGEN SATURATION: 100 % | WEIGHT: 196 LBS | TEMPERATURE: 98.9 F | BODY MASS INDEX: 33.46 KG/M2 | SYSTOLIC BLOOD PRESSURE: 102 MMHG | RESPIRATION RATE: 16 BRPM | DIASTOLIC BLOOD PRESSURE: 58 MMHG

## 2018-01-13 VITALS
BODY MASS INDEX: 38.41 KG/M2 | DIASTOLIC BLOOD PRESSURE: 60 MMHG | SYSTOLIC BLOOD PRESSURE: 112 MMHG | WEIGHT: 225 LBS | TEMPERATURE: 98.6 F | HEIGHT: 64 IN | HEART RATE: 88 BPM

## 2018-01-13 VITALS
HEART RATE: 90 BPM | DIASTOLIC BLOOD PRESSURE: 68 MMHG | BODY MASS INDEX: 31.07 KG/M2 | OXYGEN SATURATION: 93 % | WEIGHT: 182 LBS | TEMPERATURE: 97.5 F | HEIGHT: 64 IN | SYSTOLIC BLOOD PRESSURE: 114 MMHG

## 2018-01-13 VITALS
HEIGHT: 64 IN | SYSTOLIC BLOOD PRESSURE: 118 MMHG | DIASTOLIC BLOOD PRESSURE: 62 MMHG | TEMPERATURE: 98.4 F | WEIGHT: 199.13 LBS | HEART RATE: 92 BPM | BODY MASS INDEX: 33.99 KG/M2

## 2018-01-13 VITALS
TEMPERATURE: 98.5 F | SYSTOLIC BLOOD PRESSURE: 102 MMHG | OXYGEN SATURATION: 98 % | HEART RATE: 109 BPM | HEIGHT: 64 IN | RESPIRATION RATE: 16 BRPM | DIASTOLIC BLOOD PRESSURE: 68 MMHG | WEIGHT: 224 LBS | BODY MASS INDEX: 38.24 KG/M2

## 2018-01-13 VITALS
HEART RATE: 81 BPM | SYSTOLIC BLOOD PRESSURE: 122 MMHG | TEMPERATURE: 97.1 F | HEIGHT: 64 IN | WEIGHT: 194 LBS | OXYGEN SATURATION: 99 % | DIASTOLIC BLOOD PRESSURE: 68 MMHG | BODY MASS INDEX: 33.12 KG/M2

## 2018-01-13 VITALS
SYSTOLIC BLOOD PRESSURE: 118 MMHG | TEMPERATURE: 98.6 F | HEART RATE: 88 BPM | WEIGHT: 192.5 LBS | BODY MASS INDEX: 32.87 KG/M2 | DIASTOLIC BLOOD PRESSURE: 72 MMHG | HEIGHT: 64 IN

## 2018-01-13 NOTE — MISCELLANEOUS
Message   Recorded as Task   Date: 07/18/2017 09:10 AM, Created By: Kirill Lopes   Task Name: Miscellaneous   Assigned To: [facility] [place] [facility],Team   Regarding Patient: Jonathan Liu, Status: Active   CommentLornirav Bacon - 18 Jul 2017 9:10 AM     TASK CREATED  Pt received Lyrica rx and [facility] but she hasn't received it yet  Wants to know if she can have an emergency script before she goes away today  Please call [phone]  Destiney Thakkar - 18 Jul 2017 1:48 PM     TASK EDITED  Pt called again stating she only has enough of Lyrica for today  She is requesting a vacation dose for 3 or 4 days  Pt uses [employer] and and is asking if she can get it in [facility] 27 May  Pls  call pt at [phone]  Yarelis Donahue - 18 Jul 2017 1:55 PM     TASK EDITED  s/w pt, states she has not received her rx from [facility]  Leaving for [facility] 27 May in 1/2 hr  States her rx is to be delivered today per the [facility]  Pt is requesting a small rx be called into her pharmacy or a sample be left at the desk for her to   Advised pt, a small rx will not be covered by ins - [facility] rx has already been processed  Advised pt to check the tracking service at [facility] for any delays  Will d/w DG and cb to advise  Pt verbalized understanding  stated that she is expecting her mail to be delivered in ~10 min  Will check her mail and cb if the medicaton is delivered  ***Lyrica sample? Matthew Bee - 18 Jul 2017 2:21 PM     TASK REPLIED TO: Previously Assigned To Matthew Bee  Yes we can supply samples if she doesn't get it  Yarelis Donahue - 18 Jul 2017 2:33 PM     TASK EDITED  S/w pt, advised of above  Confirmed Lyrica 300 mg, 1 tab po bid  Advised pt, will make DG aware and leave samples at the window for pu  Pt verbalized understanding and appreciatoin  S/w pt, advised not enough samples are on hand to cover her rx   Will call in a short script to pharmacy on file  pt verbalized understanding  States she has already discussed ths possibility w/ [facility]  Rx called in as discussed  Recorded in allscripts  Active Problems    1  Alcohol use disorder (305 00) (F10 99)   2  Anastomotic ulcer (534 90) (K28 9)   3  Anemia (285 9) (D64 9)   4  Breast cancer (174 9) (C50 919)   5  Chronic anxiety (300 00) (F41 9)   6  Chronic depression (311) (F32 9)   7  Chronic foot pain (729 5,338 29) (M79 673,G89 29)   8  Chronic hand pain (729 5,338 29) (M79 643,G89 29)   9  Chronic narcotic dependence (304 91) (F11 20)   10  Cirrhosis (571 5) (K74 60)   11  Encounter for long-term opiate analgesic use (V58 69) (Z79 891)   12  Fatty liver (571 8) (K76 0)   13  Foot fracture (825 20) (S92 909A)   14  Liver disease (573 9) (K76 9)   15  Liver lesion (573 8) (K76 9)   16  Pain syndrome, chronic (338 4) (G89 4)   17  Pancytopenia (284 19) (D61 818)   18  Peripheral neuropathy (356 9) (G62 9)   19  Rosacea (695 3) (L71 9)    Current Meds   1  Furosemide 20 MG Oral Tablet; TAKE 1 TABLET DAILY AS DIRECTED; Therapy: 02SXK8424 to (Evaluate:50Ubh1294)  Requested for: 14ZAY4552; Last   Rx:16Mar2017 Ordered   2  Lyrica 300 MG Oral Capsule; Take 1 po bid; Therapy: (Recorded:22Txe3049) to Recorded   3  Nortriptyline HCl - 25 MG Oral Capsule; Take 1 pill every other night x 1 week, then   STOP; Therapy: 14BYW4514 to (Evaluate:44Cwi6073)  Requested for: 50UYB3906; Last   Rx:03Lcv7137 Ordered   4  Spironolactone 50 MG Oral Tablet; TAKE 1 TABLET DAILY; Therapy: 46BRT3536 to (Evaluate:27Kih8579)  Requested for: 02MQM6974; Last   Rx:16Mar2017 Ordered   5  Sucralfate 1 GM Oral Tablet; TAKE 1 TABLET 4 TIMES DAILY; Therapy: 59PZY5301 to (Evaluate:05Jnd6608)  Requested for: 44SZB1424; Last   Rx:32Ghg3375 Ordered    Allergies    1   Cymbalta    Signatures   Electronically signed by : Kevin Lloyd, ; Jul 18 2017  2:36PM EST                       (Author)

## 2018-01-14 VITALS
HEART RATE: 92 BPM | TEMPERATURE: 98.8 F | WEIGHT: 193 LBS | HEIGHT: 64 IN | SYSTOLIC BLOOD PRESSURE: 120 MMHG | DIASTOLIC BLOOD PRESSURE: 64 MMHG | BODY MASS INDEX: 32.95 KG/M2

## 2018-01-14 VITALS
HEART RATE: 91 BPM | HEIGHT: 64 IN | SYSTOLIC BLOOD PRESSURE: 124 MMHG | WEIGHT: 192.8 LBS | BODY MASS INDEX: 32.91 KG/M2 | TEMPERATURE: 98 F | DIASTOLIC BLOOD PRESSURE: 67 MMHG

## 2018-01-14 VITALS
HEIGHT: 64 IN | WEIGHT: 190.5 LBS | TEMPERATURE: 98.8 F | RESPIRATION RATE: 16 BRPM | HEART RATE: 121 BPM | OXYGEN SATURATION: 98 % | DIASTOLIC BLOOD PRESSURE: 74 MMHG | SYSTOLIC BLOOD PRESSURE: 120 MMHG | BODY MASS INDEX: 32.52 KG/M2

## 2018-01-14 VITALS
HEIGHT: 64 IN | DIASTOLIC BLOOD PRESSURE: 60 MMHG | WEIGHT: 202 LBS | SYSTOLIC BLOOD PRESSURE: 110 MMHG | HEART RATE: 110 BPM | RESPIRATION RATE: 14 BRPM | OXYGEN SATURATION: 95 % | BODY MASS INDEX: 34.49 KG/M2 | TEMPERATURE: 98.2 F

## 2018-01-14 VITALS
DIASTOLIC BLOOD PRESSURE: 70 MMHG | WEIGHT: 220 LBS | OXYGEN SATURATION: 95 % | HEART RATE: 95 BPM | TEMPERATURE: 98.2 F | BODY MASS INDEX: 37.56 KG/M2 | HEIGHT: 64 IN | SYSTOLIC BLOOD PRESSURE: 130 MMHG

## 2018-01-14 VITALS
TEMPERATURE: 96.8 F | SYSTOLIC BLOOD PRESSURE: 100 MMHG | HEIGHT: 64 IN | HEART RATE: 80 BPM | WEIGHT: 220 LBS | DIASTOLIC BLOOD PRESSURE: 60 MMHG | OXYGEN SATURATION: 98 % | BODY MASS INDEX: 37.56 KG/M2

## 2018-01-14 NOTE — RESULT NOTES
Verified Results  US RIGHT UPPER QUADRANT [id number] 11:02AM Lora Dowling Order Number: [id number]    - Patient Instructions: To schedule this appointment, please contact Central Scheduling at [phone]  Test Name Result Flag Reference   US RIGHT UPPER QUADRANT (Report)     RIGHT UPPER QUADRANT ULTRASOUND     INDICATION: K74 60: Unspecified cirrhosis of liver  History taken directly from the electronic ordering system  COMPARISON: CT performed on 3/23/2017     TECHNIQUE:  Real-time ultrasound of the right upper quadrant was performed with a curvilinear transducer with both volumetric sweeps and still imaging techniques  FINDINGS:     PANCREAS: Visualized portions of the pancreas are within normal limits  AORTA AND IVC: Visualized portions are normal for patient age  LIVER:   Size: Within normal range  The liver measures 15 34 cm in the midclavicular line  Contour: Surface contour is lobulated  Parenchyma: There is diffuse coarsened heterogeneous echotexture suggesting underlying cirrhotic changes  No evidence of suspicious mass  Limited imaging of the main portal vein shows it to be patent and hepatopedal       BILIARY:   Gallbladder is surgically absent   CBD measures 4 27 mm  No choledocholithiasis  KIDNEY:    Right kidney measures 10 06 x 4 39 cm  Simple appearing cyst seen in the lower pole  Right kidney measuring 1 53 x 1 35 x 1 80 cm     ASCITES:  None  IMPRESSION:     Cirrhotic morphology  No focal lesion         Workstation performed: [id number]     Signed by:   Ning Nieves MD   8/22/17

## 2018-01-16 NOTE — PROGRESS NOTES
Assessment    1  Alcohol use disorder (305 00) (F10 99)   2  Alcoholic hepatitis (853 4) (K70 10)   3  Anastomotic ulcer (534 90) (K28 9)   4  Anemia (285 9) (D64 9)   5  Ascites (789 59) (R18 8)   6  Cirrhosis (571 5) (K74 60)    Plan  Alcoholic hepatitis    · PredniSONE 20 MG Oral Tablet; TAKE 2 TABLETS DAILY WITH FOOD   Rx By: Daniella Haskins; Dispense: 30 Days ; #:90 Tablet; Refill: 0; For: Alcoholic hepatitis; OSWALDO = N; Verified Transmission to [address] [place] [address]; Msg to Pharmacy: Please take 40mg daily until September 4th, then take 30mg daily for one week, then 20mg daily for one week, then 10mg daily for one week, then stop; Last Updated By: System, [employer]; 8/24/2017 9:37:38 AM  Anastomotic ulcer    · Carafate 1 GM/10ML Oral Suspension; TAKE 10 ML 3 TIMES DAILY   Rx By: Daniella Haskins; Dispense: 30 Days ; #:900 ML; Refill: 4; For: Anastomotic ulcer; OSWALDO = N; Verified Transmission to [place] [address]; Last Updated By: System, [employer]; 8/24/2017 9:41:44 AM   · Pantoprazole Sodium 40 MG Oral Tablet Delayed Release; take 1 tablet by  mouth 2 times per day   Rx By: Daniella Haskins; Dispense: 18 Days ; #:36 Tablet Delayed Release; Refill: 3; For: Anastomotic ulcer; OSWALDO = N; Record  Anemia    · Suprep Bowel Prep Kit 17 5-3 13-1 6 GM/180ML Oral Solution; USE AS DIRECTED   Rx By: Daniella Haskins; Dispense: 0 Days ; #:1 ML; Refill: 0; For: Anemia; OSWALDO = N; Verified Transmission to [place] [address]; Last Updated By: System, [employer]; 8/24/2017 9:39:45 AM   · COLONOSCOPY (GI, SURG); Status:Hold For - Scheduling; Requested for:82Puo4577;    Perform:[facility]; Due:01Lah2029; Ordered; Brittny Infante; Ordered By:Bubba Ty;  Ascites    · Furosemide 20 MG Oral Tablet; TAKE 3 TABLETS DAILY   Rx By: Daniella Haskins; Dispense: 30 Days ; #:90 Tablet;  Refill: 3; For: Ascites; OSWALDO = N; Verified Transmission to [facility] ; Last Updated By: System, [employer]; 8/24/2017 9:37:38 AM  Cirrhosis    · Spironolactone 100 MG Oral Tablet (Aldactone); TAKE 1 TABLET TWICE DAILY   Rx By: Lonnie Connors; Dispense: 30 Days ; #:60 Tablet; Refill: 0; For: Cirrhosis; OSWALDO = N; [facility]; Last Updated By: System, [employer]; 8/24/2017 9:38:38 AM   · *1 - SL LIVER DISEASE PROG TRANSPLANT HEPATOLOGIST Co-Management  *   Status: Active  Requested for: 45Drz8884   Ordered; For: Cirrhosis; Ordered By: Lonnie Connors Performed:  Due: 68VOM5206  Care Summary provided  : Yes   · Follow-up visit in 2 months Evaluation and Treatment  Follow-up  Status: Hold For -  Scheduling  Requested for: 34Kzd1995   Ordered; For: Cirrhosis; Ordered By: Lonnie Connors Performed:  Due: 51WUE2824  Cirrhosis, Encephalopathy, hepatic    · Lactulose 20 GM/30ML Oral Solution; SWALLOW 10 ML 3 times daily   Rx By: Lonnie Connors; Dispense: 15 Days ; #:450 ML; Refill: 6; For: Cirrhosis, Encephalopathy, hepatic; OSWALDO = N; Record; Msg to Pharmacy: Titrate number of daily doses to be having 2-3 bowel movements daily   · Xifaxan 550 MG Oral Tablet; Take 1 tablet twice daily   Rx By: Lonnie Connors; Dispense: 30 Days ; #:60 Tablet;  Refill: 3; For: Cirrhosis, Encephalopathy, hepatic; OSWALDO = N; [facility]; Last Updated By: System, [employer]; 8/24/2017 9:39:46 AM    Discussion/Summary  Discussion Summary:   Cirrhosis complicated by hepatic encephalopathy, anastomotic ulcer bleed, anemia, Ascites, shortness of breath with fluid overload a state  - Hemoglobin relatively stable 7 7 days ago  - Continue Aldactone at 200 mg and Lasix at 80 mg  - Discuss decreasing sodium intake to less than 2 g daily  - Discussed alcohol cessation last drink July 30, 2017  - Steroid should be weaned off now discuss weaning protocol over 2 weeks  - Plan for an EGD and colonoscopy  - Patient difficult for blood draw so we'll hold off on this at this time  - We'll have patient see transplant since meld is 32  - Follow-up in 2 months with us  - Added Carafate for anemia and anastomotic ulcer  No evidence of active bleeding at this time  - Reviewed ultrasound which shows no ascites  - No varices on EGD in the past so no need for beta blockers  - Up to date on [facility] 75  screening  - Had an extensive conversation regarding transplant evaluation and long-term prognosis  - Discuss increasing lactulose due to continue slow speech  Continue rifaximin twice a day     Chief Complaint  Chief Complaint Free Text Note Form: hospital f/u for GI bleed  Pt c/o diarrhea  History of Present Illness  HPI: Leonardo Josue is a 55-year-old lady with a history of alcohol-induced cirrhosis MELD of 26 Complicated by ascites, hepatic encephalopathy, upper GI bleed and anemia  She also history of gastric bypass Was recently admitted to the hospital for anastomotic bleeding from erosions at the site of the anastomosis  She had an EGD done in the recent past without any evidence of varices  She is not admitted to the [facility] with GI bleeding -hematemesis, melena and maroon-colored stools  She had an endoscopy done which showed possible source from the gastrojejunal anastomosis which had erosions  Epinephrine was injected  Since her blood requirements continued she also had a bleeding scan done which was negative  She was discharged with a hemoglobin of 8 8, she did require multiple blood transfusions at that time  She had a hemoglobin checked 3 days ago which showed hemoglobin of 7 7 without any signs of overt bleeding at this time clinically  She is not complaining of any nausea vomiting  No abdominal pain  She appears slightly lethargic  She has no fevers or chills  She was recently seen by our PA for ascites and fluid overload state  Her diuretics were titrated to 80 mg daily and 200 of spironolactone  She feels much better and we'll follow up also with pulmonologist for further evaluation of shortness of breath and fluid overload state   Currently she still has hepatic encephalopathy with slow speech  But no signs of overt bleeding fortunately  She has not had any transplant evaluation  Review of Systems  Complete-Female GI Adult:   Constitutional: No fever, no chills, feels well, no tiredness, no recent weight gain or weight loss  Eyes: No complaints of eye pain, no red eyes, no eyesight problems, no discharge, no dry eyes, no itching of eyes  ENT: no complaints of earache, no loss of hearing, no nose bleeds, no nasal discharge, no sore throat, no hoarseness  Cardiovascular: No complaints of slow heart rate, no fast heart rate, no chest pain, no palpitations, no leg claudication, no lower extremity edema  Respiratory: No complaints of shortness of breath, no wheezing, no cough, no SOB on exertion, no orthopnea, no PND  Gastrointestinal: No complaints of abdominal pain, no constipation, no nausea or vomiting, no diarrhea, no bloody stools  Genitourinary: No complaints of dysuria, no incontinence, no pelvic pain, no dysmenorrhea, no vaginal discharge or bleeding  Musculoskeletal: No complaints of arthralgias, no myalgias, no joint swelling or stiffness, no limb pain or swelling  Integumentary: No complaints of skin rash or lesions, no itching, no skin wounds, no breast pain or lump  Neurological: No complaints of headache, no confusion, no convulsions, no numbness, no dizziness or fainting, no tingling, no limb weakness, no difficulty walking  Psychiatric: Not suicidal, no sleep disturbance, no anxiety or depression, no change in personality, no emotional problems  Endocrine: No complaints of proptosis, no hot flashes, no muscle weakness, no deepening of the voice, no feelings of weakness  Hematologic/Lymphatic: No complaints of swollen glands, no swollen glands in the neck, does not bleed easily, does not bruise easily  ROS Reviewed:   ROS reviewed  Active Problems    1  Alcohol use disorder (305 00) (F10 99)   2   Alcoholic hepatitis (903 2) (K70 10)   3  Anastomotic ulcer (534 90) (K28 9)   4  Anemia (285 9) (D64 9)   5  Ascites (789 59) (R18 8)   6  Breast cancer (174 9) (C50 919)   7  Chronic anxiety (300 00) (F41 9)   8  Chronic depression (311) (F32 9)   9  Chronic foot pain (729 5,338 29) (M79 673,G89 29)   10  Chronic hand pain (729 5,338 29) (M79 643,G89 29)   11  Chronic narcotic dependence (304 91) (F11 20)   12  Cirrhosis (571 5) (K74 60)   13  Encephalopathy, hepatic (572 2) (K72 90)   14  Encounter for long-term opiate analgesic use (V58 69) (Z79 891)   15  Fatty liver (571 8) (K76 0)   16  Foot fracture (825 20) (S92 909A)   17  Liver disease (573 9) (K76 9)   18  Liver lesion (573 8) (K76 9)   19  Pain syndrome, chronic (338 4) (G89 4)   20  Pancytopenia (284 19) (D61 818)   21  Peripheral neuropathy (356 9) (G62 9)   22  Rosacea (695 3) (L71 9)    Past Medical History    1  History of alcoholism (V11 3) (F10 21)   2  History of anxiety disorder (V11 8) (Z86 59)   3  History of breast cancer (V10 3) (Z85 3)   4  History of esophagitis (V12 79) (Z87 19)   5  History of hypertension (V12 59) (Z86 79)   6  History of liver disease (V12 79) (Z87 19)   7  History of Palpitations (785 1) (R00 2)  Active Problems And Past Medical History Reviewed: The active problems and past medical history were reviewed and updated today  Surgical History    1  History of Abdominoplasty   2  History of Breast Surgery Mastectomy   3  History of Gallbladder Surgery   4  History of Gastric Surgery For Morbid Obesity Bypass With Nicole-en-Y   5  History of Hernia Repair  Surgical History Reviewed: The surgical history was reviewed and updated today  Family History  Mother    1  Denied: Family history of Colon cancer  Father    2  Denied: Family history of Colon cancer  Family History    3  Denied: Family history of Colon cancer   4  Family history of diabetes mellitus (V18 0) (Z83 3)   5  Family history of hypertension (V17 49) (Z82 49)   6   Family history of thyroid disease (V18 19) (Z83 49)  Family History Reviewed: The family history was reviewed and updated today  Social History    · Alcohol use (V49 89) (Z78 9)   ·    · Non-smoker (V49 89) (Z78 9)  Social History Reviewed: The social history was reviewed and updated today  Current Meds   1  Folic Acid 1 MG Oral Tablet; Therapy: 37HBG3591 to Recorded   2  Furosemide 20 MG Oral Tablet; TAKE 3 TABLETS DAILY; Therapy: 41NUB6746 to (Evaluate:32Kbh6223)  Requested for: 33PYK6330; Last   Rx:85Tvj4914 Ordered   3  Lactulose 20 GM/30ML Oral Solution; SWALLOW 10 ML 3 times daily; Therapy: 93AVA8555 to (Evaluate:27Nov2017); Last Rx:22Dvz8960 Ordered   4  Lyrica 300 MG Oral Capsule; Take 1 po bid; Therapy: (Recorded:10Zzz6312) to Recorded   5  Pantoprazole Sodium 40 MG Oral Tablet Delayed Release; take 1 tablet by mouth 2   times per day; Therapy: 44LKB4308 to (05 12 73 93 30); Last Rx:41Jpd9045 Ordered   6  PredniSONE 20 MG Oral Tablet; TAKE 2 TABLETS DAILY WITH FOOD; Therapy: 49MQI1668 to (Evaluate:79Xhz0997)  Requested for: 30CFV4651; Last   Rx:71Auu4837 Ordered   7  Spironolactone 50 MG Oral Tablet; TAKE 1 TABLET DAILY; Therapy: 33BEA7330 to (Evaluate:12Nov2017)  Requested for: 67PMC1569; Last   Rx:06Lon0016 Ordered   8  Thiamine HCl - 100 MG Oral Tablet; Therapy: 82HIP0623 to Recorded   9  Xifaxan 550 MG Oral Tablet; Take 1 tablet twice daily; Therapy: 58NMU2413 to (Evaluate:21Zuz9654)  Requested for: 18Bjr0332; Last   Rx:56Gkv4748 Ordered  Medication List Reviewed: The medication list was reviewed and updated today  Allergies    1   Cymbalta    Vitals  Vital Signs    Recorded: 24Aug2017 09:08AM   Temperature 98 F, Tympanic   Heart Rate 88   Systolic 707, LUE, Sitting   Diastolic 62, LUE, Sitting   Height 5 ft 4 in   Weight 202 lb 4 oz   BMI Calculated 34 72   BSA Calculated 1 97   O2 Saturation 97, RA     Physical Exam    Constitutional   General appearance: Abnormal   Ill-appearing with chronic illness but improved from previous office visits  Eyes   Conjunctiva and lids: No swelling, erythema or discharge  Ears, Nose, Mouth, and Throat   External inspection of ears and nose: Normal     Pulmonary   Respiratory effort: No increased work of breathing or signs of respiratory distress  Cardiovascular   Palpation of heart: Normal PMI, no thrills  Auscultation of heart: Normal rate and rhythm, normal S1 and S2, without murmurs  Abdomen   Abdomen: Non-tender, no masses  Liver and spleen: No hepatomegaly or splenomegaly  Musculoskeletal   Gait and station: Normal     Neurologic   Cranial nerves: Cranial nerves 2-12 intact  Psychiatric   Orientation to person, place, and time: Normal   Slow speech otherwise alert and oriented Ã3  No asterixis, no edema, no ascites  Health Management  Cirrhosis   EGD; every 1 year; Last 86OBZ6401; Next Due: 01CWP5916; Active    Future Appointments    Date/Time Provider Specialty Site   10/02/2017 02:40 PM MAICO Navarrete   Hematology Oncology [facility]   10/02/2017 01:30 PM RENÉ Ramirez Pain Management [facility] SPINE   09/14/2017 01:00 PM Awilda Antony MD [facility]astroenterology [facility]   08/25/2017 12:00 PM Kateryna Narayanan DO Pulmonary Medicine [place] [address]     Signatures   Electronically signed by : Cristopher Peres MD; Aug 24 2017  9:57AM EST                       (Author)    Electronically signed by : Cristopher Peres MD; Aug 24 2017  9:58AM EST                       (Author)

## 2018-01-17 NOTE — MISCELLANEOUS
Message  The patient had an xray today  It shows resolution of right sided pleural effusion with addition of diurectics and following a diet a little closer    I called her with results and she states that clinically she feels much better as well   No further dyspnea    She will follow up with me in the office in 2 months      Signatures   Electronically signed by : Wisam Hackett DO; Aug 29 2017  3:17PM EST                       (Author)

## 2018-01-17 NOTE — RESULT NOTES
Verified Results  * CT ABDOMEN PELVIS W CONTRAST [id number] 05:50PM Pilar Santana     Test Name Result Flag Reference   CT ABDOMEN PELVIS W CONTRAST (Report)     CT ABDOMEN AND PELVIS WITH IV CONTRAST     INDICATION: Cirrhosis  COMPARISON: None  TECHNIQUE: CT examination of the abdomen and pelvis was performed  Contrast was injected one time intravenously without immediate complication  Scanning through the abdomen was performed in arterial, venous and delayed phases according a protocol    spefically designed to evaluate upper abdominal viscera  Reformatted images were created in axial, sagittal, and coronal planes  Radiation dose length product (DLP) for this visit: 1938 83 mGy-cm   This examination, like all CT scans performed in the [facility], was performed utilizing techniques to minimize radiation dose exposure, including the use of    iterative reconstruction and automated exposure control  IV Contrast: 100 mL of iohexol (OMNIPAQUE)        Enteric Contrast: Enteric contrast was not administered  FINDINGS:     ABDOMEN     LOWER CHEST: No significant abnormalities identified in the lower chest      LIVER/BILIARY TREE: Hepatic contours are lobulated and there is portal hypertension as evidenced by recanalization of large caliber periumbilical vein as well as formation of extensive omental varices  Also noted a small paraesophageal varices  No    enhancing hepatic mass  No biliary dilatation  GALLBLADDER: Gallbladder is surgically absent  SPLEEN: Unremarkable  PANCREAS: Unremarkable  ADRENAL GLANDS: Unremarkable  KIDNEYS/URETERS: Small renal cysts and subcentimeter renal hypodensities which are too small to accurately characterize most likely representing cysts are noted  No solid renal mass or obstructive uropathy  STOMACH AND BOWEL: Postsurgical changes related to Nicole-en-Y gastric bypass surgery noted  Otherwise unremarkable       APPENDIX: No findings to suggest appendicitis  ABDOMINOPELVIC CAVITY: Varices as described  No significant ascites  No lymphadenopathy  VESSELS: There is series as described  No aortic aneurysm  PELVIS     REPRODUCTIVE ORGANS: Endometrium appears somewhat abnormal prominent for postmenopausal female measuring up to 15 mm on sagittal reconstructed images  URINARY BLADDER: There is a 5 mm calcific density in the dependent lumen of the urinary bladder to the right of midline either within or adjacent to the right ureterovesical junction with no associated right hydronephrosis or ureteral nephrosis  ABDOMINAL WALL/INGUINAL REGIONS: Body wall edema  Ventral hernias in the supraumbilical and epigastric regions over area of approximately 13 cm  A right breast implant is noted  OSSEOUS STRUCTURES: No acute fracture or destructive osseous lesion  IMPRESSION:     Cirrhosis with portal hypertension as evidenced by extensive omental and abdominal wall varices as well as smaller left gastric and paraesophageal varices  No suspicious hepatic mass  No ascites  Epigastric region and supraumbilical ventral abdominal wall hernias containing omental fat and varices but no bowel loops  Body wall edema  CT appearance suggesting abnormal endometrial thickening for a patient of this age  Follow-up pelvic ultrasound is recommended, if clinically appropriate         ##sigslh##sigslh       Workstation performed: [id number]     Signed by:   Gary Koo MD   3/24/17

## 2018-01-18 ENCOUNTER — TRANSCRIBE ORDERS (OUTPATIENT)
Dept: ADMINISTRATIVE | Facility: HOSPITAL | Age: 58
End: 2018-01-18

## 2018-01-18 DIAGNOSIS — Z98.890 PERSONAL HISTORY OF SURGERY TO HEART AND GREAT VESSELS, PRESENTING HAZARDS TO HEALTH: ICD-10-CM

## 2018-01-18 DIAGNOSIS — Z01.818 PREOP EXAMINATION: Primary | ICD-10-CM

## 2018-01-18 DIAGNOSIS — K72.90 HEPATIC ENCEPHALOPATHY (HCC): ICD-10-CM

## 2018-01-18 DIAGNOSIS — K70.31 ALCOHOLIC CIRRHOSIS OF LIVER WITH ASCITES (HCC): ICD-10-CM

## 2018-01-18 DIAGNOSIS — D69.6 ACQUIRED THROMBOCYTOPENIA (HCC): ICD-10-CM

## 2018-01-18 NOTE — RESULT NOTES
Discussion/Summary   Repeat colonoscopy in 5 years as removed polyps were adenomas     Verified Results  (1) TISSUE EXAM [id number] 10:32AM Wilma Alamin     Test Name Result Flag Reference   LAB AP CASE REPORT (Report)     Surgical Pathology Report             Case: [id number]                   Authorizing Provider: Eloise Wilcox MD      Collected:      09/14/2017 1032        Ordering Location:   [facility]    Received:      09/14/2017 [address] [place] [address] Endoscopy                              Pathologist:      Jesus Ernandez DO                               Specimens:  A) - Jejunum, Jejunum bx -dx anemia                                  B) - Large Intestine, Transverse Colon, Transverse colon polyps   LAB AP FINAL DIAGNOSIS (Report)     A  Jejunum, biopsy:  - Small bowel mucosa with no significant pathologic abnormalities  B  Colon, transverse polyps, biopsy:  - Tubular adenoma, negative for high-grade dysplasia  Interpretation performed at [facility]  Electronically signed by Jesus Ernandez DO on 9/18/2017 at 9:11 AM   LAB AP SURGICAL ADDITIONAL INFORMATION (Report)     All controls performed with the immunohistochemical stains reported above   reacted appropriately  These tests were developed and their performance   characteristics determined by [facility] or   State mental health facility  They may not be cleared or approved by the [place]  Food and Drug Administration  The FDA has determined that such clearance   or approval is not necessary  These tests are used for clinical purposes  They should not be regarded as investigational or for research  This   laboratory has been approved by [place], designated as a high-complexity   laboratory and is qualified to perform these tests  LAB AP GROSS DESCRIPTION (Report)     A   The specimen is received in formalin, labeled with the patient's name   and hospital number, and is designated 2 jejunum biopsy  The specimen   consists of 2 tan soft tissue fragments measuring 0 3 cm each  Entirely   submitted  One cassette  B  The specimen is received in formalin, labeled with the patient's name   and hospital number, and is designated Transverse colon polyps  The   specimen consists of a tan soft tissue fragment measuring 0 2 cm  Entirely   submitted  One cassette  Note: The estimated total formalin fixation time based upon information   provided by the submitting clinician and the standard processing schedule   is less than 72 hours      MAC

## 2018-01-22 ENCOUNTER — ALLSCRIPTS OFFICE VISIT (OUTPATIENT)
Dept: OTHER | Facility: OTHER | Age: 58
End: 2018-01-22

## 2018-01-22 VITALS
DIASTOLIC BLOOD PRESSURE: 64 MMHG | HEART RATE: 76 BPM | SYSTOLIC BLOOD PRESSURE: 118 MMHG | OXYGEN SATURATION: 98 % | WEIGHT: 183.5 LBS | TEMPERATURE: 98.3 F | HEIGHT: 64 IN | RESPIRATION RATE: 16 BRPM | BODY MASS INDEX: 31.33 KG/M2

## 2018-01-22 VITALS
DIASTOLIC BLOOD PRESSURE: 62 MMHG | HEIGHT: 64 IN | HEIGHT: 64 IN | DIASTOLIC BLOOD PRESSURE: 62 MMHG | TEMPERATURE: 98 F | BODY MASS INDEX: 34.53 KG/M2 | HEART RATE: 88 BPM | SYSTOLIC BLOOD PRESSURE: 120 MMHG | BODY MASS INDEX: 30.8 KG/M2 | WEIGHT: 202.25 LBS | OXYGEN SATURATION: 97 % | WEIGHT: 180.38 LBS | SYSTOLIC BLOOD PRESSURE: 102 MMHG

## 2018-01-23 ENCOUNTER — HOSPITAL ENCOUNTER (OUTPATIENT)
Dept: MRI IMAGING | Facility: HOSPITAL | Age: 58
Discharge: HOME | End: 2018-01-23
Payer: OTHER GOVERNMENT

## 2018-01-23 VITALS
TEMPERATURE: 97.9 F | BODY MASS INDEX: 29.88 KG/M2 | DIASTOLIC BLOOD PRESSURE: 70 MMHG | SYSTOLIC BLOOD PRESSURE: 112 MMHG | HEIGHT: 64 IN | HEART RATE: 88 BPM | WEIGHT: 175 LBS

## 2018-01-23 DIAGNOSIS — Z01.818 PREOP EXAMINATION: ICD-10-CM

## 2018-01-23 PROCEDURE — 74183 MRI ABD W/O CNTR FLWD CNTR: CPT

## 2018-01-23 PROCEDURE — A9585 GADOBUTROL INJECTION: HCPCS | Performed by: RADIOLOGY

## 2018-01-23 RX ADMIN — GADOBUTROL 8 ML: 604.72 INJECTION INTRAVENOUS at 13:52

## 2018-01-23 NOTE — PROGRESS NOTES
Assessment   1  Alcoholic hepatitis (552 8) (K70 10)   2  Anemia (285 9) (D64 9)   3  Encephalopathy, hepatic (572 2) (K72 90)   4  Cirrhosis (571 5) (K74 60)   5  Acute kidney injury (584 9) (N17 9)   6  Ascites (789 59) (R18 8)    Plan   Alcoholic hepatitis    · (1) AFP, SERUM; Status:Active; Requested YMC:[id number]; Perform:[facility]; VBO:29ZHI7997;QSYSJHT; For:Alcoholic hepatitis; Ordered By:Bubba Ty;  Cirrhosis    · Furosemide 20 MG Oral Tablet (Lasix); TAKE 1 TABLET DAILY AS DIRECTED   Rx By: Dewayne Herbert; Dispense: 90 Days ; #:90 Tablet; Refill: 3;For: Cirrhosis; OSWALDO = N; Verified Transmission to [employer] [facility]; Last Updated By: System, [employer]; 1/22/2018 2:00:24 PM   · Spironolactone 50 MG Oral Tablet (Aldactone); TAKE 4 TABLETS DAILY   Rx By: Dewayne Herbert; Dispense: 90 Days ; #:90 Tablet; Refill: 3;For: Cirrhosis; OSWALDO = N; Verified Transmission to [email or web address] [facility]; Last Updated By: System, [employer]; 1/22/2018 2:00:23 PM   · (1) CBC/PLT/DIFF; Status:Active; Requested HIU:[id number]; Perform:[facility]; BUF:[id number];HZUQEVH; For:Cirrhosis; Ordered By:Bubba Ty;   · (1) COMPREHENSIVE METABOLIC PANEL; Status:Active; Requested for:43Cpl2854; Perform:[facility]; TPC:[id number];QKHRUYI; For:Cirrhosis; Ordered By:Bubba Ty;   · (1) PT WITH INR; Status:Active; Requested for:40Dmd0026; Perform:[facility]; EKN:[id number];XNJNTAC; For:Cirrhosis; Ordered By:Bubba Ty;  Cirrhosis, Encephalopathy, hepatic    · Lactulose 20 GM/30ML Oral Solution; SWALLOW 10 ML 3 times daily   Rx By: Dewayne Herbert; Dispense: 90 Days ; #:2700 ML; Refill: 3;For: Cirrhosis, Encephalopathy, hepatic; OSWALDO = N; Verified Transmission to [facility]; Msg to Pharmacy: Titrate number of daily doses to be having 2-3 bowel movements daily;  Last Updated By: SystemDiana; 1/22/2018 1:55:04 PM   · Xifaxan 550 MG Oral Tablet; take 1 tablet every twelve hours   Rx By: Edwin Rdz; Dispense: 90 Days ; #:180 Tablet; Refill: 3;For: Cirrhosis, Encephalopathy, hepatic; OSWALDO = N; Verified Transmission to [facility]; Last Updated By: System, [employer]; 1/22/2018 1:55:11 PM  Unlinked    · Furosemide TABS   Dispense: 0 Days ; #: Sufficient TABS; Refill: 0; OSWALDO = N; Record; Last Updated By: Edwin Rdz; 1/22/2018 1:51:55 PM    Discussion/Summary   Discussion Summary:    Cirrhosis with low MELD clinically improving has been seen by [facility] [place], operated by [facility] for possible transfer evaluation  Transfer evaluation may be deferred due to improvement in her clinical status  Clinically she does not look like she has fluid overload state with ascites or lower extremity edema  Due to this I will lower her diuretics from Lasix of 40 mg to 20 mg  I will also continue her on Aldactone a 50 mg  I will check another set of kidney function for further evaluation  I discussed low-sodium diet with her  She needs to continue seeing us every 6 months for [facility] [address]  screening  I will check alpha fetoprotein and follow up on MRI which she is going to have in 2-4 days ordered by [facility] [place], operated by [facility]  She does not need EGD evaluation for other year unless she has decompensation  She has night colonoscopy evaluation until 2022      hepatic encephalopathy is well controlled with rifaximin and lactulose  Chief Complaint   Chief Complaint Free Text Note Form: follow up for cirrhosis and ascites      History of Present Illness   HPI: Patient is a 19-year-old female with past medical history of alcoholic cirrhosis has not had alcoholic beverage in several months now presents here for follow-up evaluation  She was being worked up at [facility] [place], operated by [facility] for liver transplant evaluation but due to significant improvement in her clinical status this may be deferred  Final decision has not been media   Cirrhosis has been complicated by hepatic encephalopathy, elevated INR, jaundice and fatigue  She also has had acute kidney injury from use of diuretics which we have been slowly weaning down  Currently she is taking 20 mg of Lasix twice daily and 50 mg of spironolactone  Fluid status is appropriate but her creatinine in December of 2017 showed levels of 1 5  She is planned for a MRI evaluation by [facility] later this week  she is up-to-date on esophageal varices screening and colorectal cancer screening  Overall she is doing well and has had significant clinical improvement  MELD score is around 10  she needs new MELD labs  Review of Systems   Complete-Female GI Adult:      Constitutional: No fever, no chills, feels well, no tiredness, no recent weight gain or weight loss  Eyes: No complaints of eye pain, no red eyes, no eyesight problems, no discharge, no dry eyes, no itching of eyes  ENT: no complaints of earache, no loss of hearing, no nose bleeds, no nasal discharge, no sore throat, no hoarseness  Cardiovascular: No complaints of slow heart rate, no fast heart rate, no chest pain, no palpitations, no leg claudication, no lower extremity edema  Respiratory: No complaints of shortness of breath, no wheezing, no cough, no SOB on exertion, no orthopnea, no PND  Gastrointestinal: No complaints of abdominal pain, no constipation, no nausea or vomiting, no diarrhea, no bloody stools-- and-- as noted in HPI  Genitourinary: No complaints of dysuria, no incontinence, no pelvic pain, no dysmenorrhea, no vaginal discharge or bleeding  Musculoskeletal: No complaints of arthralgias, no myalgias, no joint swelling or stiffness, no limb pain or swelling  Integumentary: No complaints of skin rash or lesions, no itching, no skin wounds, no breast pain or lump        Neurological: No complaints of headache, no confusion, no convulsions, no numbness, no dizziness or fainting, no tingling, no limb weakness, no difficulty walking  Psychiatric: Not suicidal, no sleep disturbance, no anxiety or depression, no change in personality, no emotional problems  Endocrine: No complaints of proptosis, no hot flashes, no muscle weakness, no deepening of the voice, no feelings of weakness  Hematologic/Lymphatic: No complaints of swollen glands, no swollen glands in the neck, does not bleed easily, does not bruise easily  ROS Reviewed:    ROS reviewed  Active Problems   1  Alcohol use disorder (305 00) (F10 99)   2  Alcoholic hepatitis (981 0) (K70 10)   3  Anastomotic ulcer (534 90) (K28 9)   4  Anemia (285 9) (D64 9)   5  Ascites (789 59) (R18 8)   6  Breast cancer (174 9) (C50 919)   7  Cervical cancer screening (V76 2) (Z12 4)   8  Chronic anxiety (300 00) (F41 9)   9  Chronic depression (311) (F32 9)   10  Chronic foot pain (729 5,338 29) (M79 673,G89 29)   11  Chronic hand pain (729 5,338 29) (M79 643,G89 29)   12  Chronic narcotic dependence (304 91) (F11 20)   13  Cirrhosis (571 5) (K74 60)   14  Deconditioned low back (728 87) (R29 898)   15  Dyspnea (786 09) (R06 00)   16  Encephalopathy, hepatic (572 2) (K72 90)   17  Encounter for long-term opiate analgesic use (V58 69) (Z79 891)   18  Encounter for routine gynecological examination with Papanicolaou smear of cervix      (V72 31,V76 2) (Z01 419)   19  Fatty liver (571 8) (K76 0)   20  Foot fracture (825 20) (S92 909A)   21  Iron (Fe) deficiency anemia (280 9) (D50 9)   22  Liver disease (573 9) (K76 9)   23  Liver lesion (573 8) (K76 9)   24  Pain syndrome, chronic (338 4) (G89 4)   25  Pancytopenia (284 19) (D61 818)   26  Peripheral neuropathy (356 9) (G62 9)   27  Pleural effusion, right (511 9) (J90)   28  Rosacea (695 3) (L71 9)   29  Screening for HPV (human papillomavirus) (V73 81) (Z11 51)   30  Ventral hernia without obstruction or gangrene (553 20) (K43 9)   31  Wound of abdomen (879 2) (S31 109A)    Past Medical History   1   History of alcoholism (V11 3) (F10 21)   2  History of anxiety disorder (V11 8) (Z86 59)   3  History of breast cancer (V10 3) (Z85 3)   4  History of esophagitis (V12 79) (Z87 19)   5  History of hypertension (V12 59) (Z86 79)   6  History of liver disease (V12 79) (Z87 19)   7  History of self breast exam   8  History of Palpitations (785 1) (R00 2)  Active Problems And Past Medical History Reviewed: The active problems and past medical history were reviewed and updated today  Surgical History   1  History of Abdominoplasty   2  History of Breast Surgery Mastectomy   3  History of Gallbladder Surgery   4  History of Gastric Surgery For Morbid Obesity Bypass With Nicole-en-Y   5  History of Hernia Repair    Family History   Mother    1  Denied: Family history of Colon cancer  Father    2  Denied: Family history of Colon cancer  Family History    3  Denied: Family history of Colon cancer   4  Family history of diabetes mellitus (V18 0) (Z83 3)   5  Family history of hypertension (V17 49) (Z82 49)   6  Family history of thyroid disease (V18 19) (Z83 49)  Family History Reviewed: The family history was reviewed and updated today  Social History    · Alcohol use (V49 89) (Z78 9)   · Always uses seat belt   · Has no children   ·    · No caffeine use   · Non-smoker (V49 89) (Z78 9)  Social History Reviewed: The social history was reviewed and updated today  Current Meds    1  Folic Acid 1 MG Oral Tablet; Take 1 tablet daily Recorded   2  Furosemide TABS; Therapy: (Recorded:12Oih6699) to Recorded   3  GNP Vitamin B1 TABS Recorded   4  Lactulose 20 GM/30ML Oral Solution; SWALLOW 10 ML 3 times daily; Therapy: 82YAA4068 to (Evaluate:00Uni1387)  Requested for: 21EWM9999; Last     Rx:34Cow4725 Ordered   5  Lyrica 300 MG Oral Capsule; Take 1 po bid      8/25/17  per pt  2 tabs  bid; Last Rx:20Bpl6602 Ordered   6  Protonix TBEC; Therapy: (Derian Given) to Recorded   7   Spironolactone 50 MG Oral Tablet; TAKE 4 TABLETS DAILY; Last Rx:11Oct2017 Ordered   8  Xifaxan 550 MG Oral Tablet; take 1 tablet every twelve hours; Therapy: 60IKF0306 to (Evaluate:10Mar2018)  Requested for: 67BFU8237; Last     Rx:11Oct2017 Ordered  Medication List Reviewed: The medication list was reviewed and updated today  Allergies   1  Cymbalta    Vitals   Vital Signs    Recorded: 47WMD0783 01:33PM   Temperature 98 5 F, Oral   Heart Rate 87   Systolic 330, LUE, Sitting   Diastolic 60, LUE, Sitting   Height 5 ft 4 in   Weight 178 lb    BMI Calculated 30 55   BSA Calculated 1 86   O2 Saturation 98     Physical Exam        Constitutional      General appearance: No acute distress, well appearing and well nourished  -- Alert oriented x3, no scleral icterus, no lower extremity edema  Eyes      Conjunctiva and lids: No swelling, erythema or discharge  Pupils and irises: Equal, round and reactive to light  Ears, Nose, Mouth, and Throat      External inspection of ears and nose: Normal        Oropharynx: Normal with no erythema, edema, exudate or lesions  Pulmonary      Respiratory effort: No increased work of breathing or signs of respiratory distress  Auscultation of lungs: Clear to auscultation  Cardiovascular      Palpation of heart: Normal PMI, no thrills  Auscultation of heart: Normal rate and rhythm, normal S1 and S2, without murmurs  Examination of extremities for edema and/or varicosities: Normal        Abdomen      Abdomen: Non-tender, no masses  -- abdominal ascites  Liver and spleen: No hepatomegaly or splenomegaly  Musculoskeletal      Gait and station: Normal        Neurologic      Cranial nerves: Cranial nerves 2-12 intact  Psychiatric      Orientation to person, place, and time: Normal           Health Management   Anemia   COLONOSCOPY (GI, SURG); every 5 years; Last 70XDX1317; Next Due: 11GKT0448; Active  Cirrhosis   EGD; every 1 year;  Last 54PYU8753; Next Due: 52Vyk7004;  Active    Future Appointments      Date/Time Provider Specialty Site   04/19/2018 01:00 PM [facility] [address]     Signatures    Electronically signed by : Junior Agee MD; Jan 22 2018  2:10PM EST                       (Author)

## 2018-01-23 NOTE — MISCELLANEOUS
Date: 12/09/2017   To whom it may concern: This is to certify that: Erin Soriano has been under my care for the following diagnosis:   Ascites, Encephalopathy hepatic     I feel that [facility] [address] [facility] this [facility] for the above mentioned medical reasons       Sincerely,   Dr Yannick De Jesus       Electronically signed by : Yannick De Jesus MD; Dec  9 2017 12:15AM EST                       (Author)

## 2018-01-24 ENCOUNTER — TELEPHONE (OUTPATIENT)
Dept: GASTROENTEROLOGY | Facility: MEDICAL CENTER | Age: 58
End: 2018-01-24

## 2018-01-25 NOTE — TELEPHONE ENCOUNTER
Spoke to Concepcion at [facility] and cleared it up it should be 50mg 4 tabs daily for a 90 day which would be 360 tabs in total

## 2018-01-26 ENCOUNTER — TELEPHONE (OUTPATIENT)
Dept: GASTROENTEROLOGY | Facility: MEDICAL CENTER | Age: 58
End: 2018-01-26

## 2018-01-26 NOTE — TELEPHONE ENCOUNTER
----- Message from Sammy Dc MD sent at 1/25/2018  9:47 PM EST -----  No change in imaging from previous imaging

## 2018-02-09 DIAGNOSIS — K74.60 CIRRHOSIS OF LIVER (HCC): ICD-10-CM

## 2018-02-09 DIAGNOSIS — K70.10 ALCOHOLIC HEPATITIS WITHOUT ASCITES: ICD-10-CM

## 2018-02-11 DIAGNOSIS — K74.60 CIRRHOSIS OF LIVER (HCC): ICD-10-CM

## 2018-02-12 ENCOUNTER — APPOINTMENT (OUTPATIENT)
Dept: LAB | Facility: HOSPITAL | Age: 58
End: 2018-02-12
Payer: OTHER GOVERNMENT

## 2018-02-12 DIAGNOSIS — Z01.818 PREOP EXAMINATION: ICD-10-CM

## 2018-02-12 DIAGNOSIS — K72.90 HEPATIC ENCEPHALOPATHY (HCC): ICD-10-CM

## 2018-02-12 DIAGNOSIS — Z98.890 PERSONAL HISTORY OF SURGERY TO HEART AND GREAT VESSELS, PRESENTING HAZARDS TO HEALTH: ICD-10-CM

## 2018-02-12 DIAGNOSIS — D69.6 ACQUIRED THROMBOCYTOPENIA (HCC): ICD-10-CM

## 2018-02-12 DIAGNOSIS — K70.31 ALCOHOLIC CIRRHOSIS OF LIVER WITH ASCITES (HCC): ICD-10-CM

## 2018-02-12 LAB
ALBUMIN SERPL BCP-MCNC: 2.7 G/DL (ref 3.5–5)
ALP SERPL-CCNC: 175 U/L (ref 46–116)
ALT SERPL W P-5'-P-CCNC: 43 U/L (ref 12–78)
ANION GAP SERPL CALCULATED.3IONS-SCNC: 7 MMOL/L (ref 4–13)
AST SERPL W P-5'-P-CCNC: 62 U/L (ref 5–45)
BASOPHILS # BLD AUTO: 0.01 THOUSANDS/ΜL (ref 0–0.1)
BASOPHILS NFR BLD AUTO: 0 % (ref 0–1)
BILIRUB DIRECT SERPL-MCNC: 1.07 MG/DL (ref 0–0.2)
BILIRUB SERPL-MCNC: 2 MG/DL (ref 0.2–1)
BUN SERPL-MCNC: 14 MG/DL (ref 5–25)
CALCIUM SERPL-MCNC: 9 MG/DL (ref 8.3–10.1)
CHLORIDE SERPL-SCNC: 109 MMOL/L (ref 100–108)
CO2 SERPL-SCNC: 27 MMOL/L (ref 21–32)
CREAT SERPL-MCNC: 1.16 MG/DL (ref 0.6–1.3)
EOSINOPHIL # BLD AUTO: 0.1 THOUSAND/ΜL (ref 0–0.61)
EOSINOPHIL NFR BLD AUTO: 4 % (ref 0–6)
ERYTHROCYTE [DISTWIDTH] IN BLOOD BY AUTOMATED COUNT: 17.8 % (ref 11.6–15.1)
ETHANOL SERPL-MCNC: <3 MG/DL (ref 0–3)
GFR SERPL CREATININE-BSD FRML MDRD: 52 ML/MIN/1.73SQ M
GLUCOSE P FAST SERPL-MCNC: 99 MG/DL (ref 65–99)
HCT VFR BLD AUTO: 34.7 % (ref 34.8–46.1)
HGB BLD-MCNC: 11.1 G/DL (ref 11.5–15.4)
INR PPP: 1.43 (ref 0.86–1.16)
LYMPHOCYTES # BLD AUTO: 0.97 THOUSANDS/ΜL (ref 0.6–4.47)
LYMPHOCYTES NFR BLD AUTO: 35 % (ref 14–44)
MCH RBC QN AUTO: 29.2 PG (ref 26.8–34.3)
MCHC RBC AUTO-ENTMCNC: 32 G/DL (ref 31.4–37.4)
MCV RBC AUTO: 91 FL (ref 82–98)
MONOCYTES # BLD AUTO: 0.3 THOUSAND/ΜL (ref 0.17–1.22)
MONOCYTES NFR BLD AUTO: 11 % (ref 4–12)
NEUTROPHILS # BLD AUTO: 1.41 THOUSANDS/ΜL (ref 1.85–7.62)
NEUTS SEG NFR BLD AUTO: 50 % (ref 43–75)
PLATELET # BLD AUTO: 54 THOUSANDS/UL (ref 149–390)
PMV BLD AUTO: 11.1 FL (ref 8.9–12.7)
POTASSIUM SERPL-SCNC: 3.9 MMOL/L (ref 3.5–5.3)
PROT SERPL-MCNC: 7.2 G/DL (ref 6.4–8.2)
PROTHROMBIN TIME: 17.3 SECONDS (ref 12.1–14.4)
RBC # BLD AUTO: 3.8 MILLION/UL (ref 3.81–5.12)
SODIUM SERPL-SCNC: 143 MMOL/L (ref 136–145)
WBC # BLD AUTO: 2.79 THOUSAND/UL (ref 4.31–10.16)

## 2018-02-12 PROCEDURE — 80076 HEPATIC FUNCTION PANEL: CPT

## 2018-02-12 PROCEDURE — 85025 COMPLETE CBC W/AUTO DIFF WBC: CPT

## 2018-02-12 PROCEDURE — 36415 COLL VENOUS BLD VENIPUNCTURE: CPT

## 2018-02-12 PROCEDURE — 80320 DRUG SCREEN QUANTALCOHOLS: CPT

## 2018-02-12 PROCEDURE — 80048 BASIC METABOLIC PNL TOTAL CA: CPT

## 2018-02-12 PROCEDURE — 85610 PROTHROMBIN TIME: CPT

## 2018-03-02 RX ORDER — CYANOCOBALAMIN 1000 UG/ML
1000 INJECTION INTRAMUSCULAR; SUBCUTANEOUS ONCE
Status: COMPLETED | OUTPATIENT
Start: 2018-03-05 | End: 2018-03-05

## 2018-03-05 ENCOUNTER — HOSPITAL ENCOUNTER (OUTPATIENT)
Dept: INFUSION CENTER | Facility: HOSPITAL | Age: 58
Discharge: HOME | End: 2018-03-05
Payer: OTHER GOVERNMENT

## 2018-03-05 VITALS
RESPIRATION RATE: 18 BRPM | HEART RATE: 86 BPM | OXYGEN SATURATION: 100 % | SYSTOLIC BLOOD PRESSURE: 116 MMHG | TEMPERATURE: 96.6 F | DIASTOLIC BLOOD PRESSURE: 70 MMHG

## 2018-03-05 PROCEDURE — 96372 THER/PROPH/DIAG INJ SC/IM: CPT

## 2018-03-05 RX ADMIN — CYANOCOBALAMIN 1000 MCG: 1000 INJECTION, SOLUTION INTRAMUSCULAR at 10:46

## 2018-03-05 NOTE — PROGRESS NOTES
Pt here for B12; VSS; given in the LD with no adverse reactions; bandaid dry and intact; pt d/c'd home ambulatory with steady gait

## 2018-03-12 ENCOUNTER — TELEPHONE (OUTPATIENT)
Dept: GASTROENTEROLOGY | Facility: CLINIC | Age: 58
End: 2018-03-12

## 2018-03-12 NOTE — TELEPHONE ENCOUNTER
Dr Randee Carroll patient---she called to touch base with dr Randee Carroll to see if he had a chance to go over her records that were sent over

## 2018-03-14 NOTE — TELEPHONE ENCOUNTER
I did not but I would think [facility] would clear her as they would be transplanting her and she is seeing them now

## 2018-03-15 NOTE — TELEPHONE ENCOUNTER
I spoke to Chacha Cabrera at [facility] and she agreed that this form should go to [facility] so I am resending it to them and they will take care of it

## 2018-03-30 RX ORDER — CYANOCOBALAMIN 1000 UG/ML
1000 INJECTION INTRAMUSCULAR; SUBCUTANEOUS ONCE
Status: COMPLETED | OUTPATIENT
Start: 2018-04-02 | End: 2018-04-02

## 2018-04-02 ENCOUNTER — HOSPITAL ENCOUNTER (OUTPATIENT)
Dept: INFUSION CENTER | Facility: HOSPITAL | Age: 58
Discharge: HOME | End: 2018-04-02
Payer: OTHER GOVERNMENT

## 2018-04-02 VITALS
OXYGEN SATURATION: 100 % | SYSTOLIC BLOOD PRESSURE: 119 MMHG | HEART RATE: 91 BPM | TEMPERATURE: 96.9 F | DIASTOLIC BLOOD PRESSURE: 68 MMHG

## 2018-04-02 PROCEDURE — 96372 THER/PROPH/DIAG INJ SC/IM: CPT

## 2018-04-02 RX ORDER — MULTIVIT-MIN/IRON FUM/FOLIC AC 7.5 MG-4
1 TABLET ORAL DAILY
COMMUNITY

## 2018-04-02 RX ADMIN — CYANOCOBALAMIN 1000 MCG: 1000 INJECTION, SOLUTION INTRAMUSCULAR at 15:23

## 2018-04-02 NOTE — PROGRESS NOTES
Rec'd pt for B12 inj, given in L deltoid and tolerated well  Pt then d/c'd to home with steady gait

## 2018-04-19 ENCOUNTER — OFFICE VISIT (OUTPATIENT)
Dept: PAIN MEDICINE | Facility: CLINIC | Age: 58
End: 2018-04-19
Payer: OTHER GOVERNMENT

## 2018-04-19 VITALS
HEIGHT: 64 IN | BODY MASS INDEX: 32.61 KG/M2 | SYSTOLIC BLOOD PRESSURE: 112 MMHG | DIASTOLIC BLOOD PRESSURE: 70 MMHG | HEART RATE: 64 BPM | WEIGHT: 191 LBS | TEMPERATURE: 98.1 F

## 2018-04-19 DIAGNOSIS — G89.29 CHRONIC FOOT PAIN, UNSPECIFIED LATERALITY: Primary | ICD-10-CM

## 2018-04-19 DIAGNOSIS — R29.898 DECONDITIONED LOW BACK: ICD-10-CM

## 2018-04-19 DIAGNOSIS — F10.11 HISTORY OF ALCOHOL ABUSE: Chronic | ICD-10-CM

## 2018-04-19 DIAGNOSIS — G89.29 CHRONIC HAND PAIN, UNSPECIFIED LATERALITY: ICD-10-CM

## 2018-04-19 DIAGNOSIS — Z98.84 HISTORY OF GASTRIC BYPASS: ICD-10-CM

## 2018-04-19 DIAGNOSIS — M79.673 CHRONIC FOOT PAIN, UNSPECIFIED LATERALITY: Primary | ICD-10-CM

## 2018-04-19 DIAGNOSIS — G89.4 PAIN SYNDROME, CHRONIC: ICD-10-CM

## 2018-04-19 DIAGNOSIS — M79.643 CHRONIC HAND PAIN, UNSPECIFIED LATERALITY: ICD-10-CM

## 2018-04-19 DIAGNOSIS — G62.1 ALCOHOL-INDUCED POLYNEUROPATHY (HCC): ICD-10-CM

## 2018-04-19 PROBLEM — L71.9 ROSACEA: Status: ACTIVE | Noted: 2017-03-16

## 2018-04-19 PROBLEM — K28.9 ANASTOMOTIC ULCER: Status: ACTIVE | Noted: 2017-05-18

## 2018-04-19 PROBLEM — C50.919 BREAST CANCER (HCC): Status: ACTIVE | Noted: 2017-03-16

## 2018-04-19 PROBLEM — IMO0002 ALCOHOL USE DISORDER: Status: ACTIVE | Noted: 2017-03-16

## 2018-04-19 PROBLEM — G62.9 PERIPHERAL NEUROPATHY: Status: ACTIVE | Noted: 2017-03-16

## 2018-04-19 PROBLEM — N17.9 ACUTE KIDNEY INJURY (HCC): Status: ACTIVE | Noted: 2018-01-22

## 2018-04-19 PROBLEM — S31.109A WOUND OF ABDOMEN: Status: ACTIVE | Noted: 2017-10-11

## 2018-04-19 PROBLEM — K28.9 ULCERS, MARGINAL: Status: ACTIVE | Noted: 2018-01-15

## 2018-04-19 PROBLEM — K76.9 LIVER DISEASE: Status: ACTIVE | Noted: 2017-03-16

## 2018-04-19 PROBLEM — F11.20 CHRONIC NARCOTIC DEPENDENCE (HCC): Status: RESOLVED | Noted: 2017-04-24 | Resolved: 2018-04-19

## 2018-04-19 PROBLEM — F11.20 CHRONIC NARCOTIC DEPENDENCE (HCC): Status: ACTIVE | Noted: 2017-04-24

## 2018-04-19 PROBLEM — K43.9 VENTRAL HERNIA WITHOUT OBSTRUCTION OR GANGRENE: Status: ACTIVE | Noted: 2017-11-10

## 2018-04-19 PROBLEM — F32.A CHRONIC DEPRESSION: Status: ACTIVE | Noted: 2017-04-24

## 2018-04-19 PROBLEM — K76.0 FATTY LIVER: Status: ACTIVE | Noted: 2017-03-16

## 2018-04-19 PROBLEM — S92.909A FOOT FRACTURE: Status: ACTIVE | Noted: 2017-03-16

## 2018-04-19 PROBLEM — K70.30 CIRRHOSIS WITH ALCOHOLISM (HCC): Status: ACTIVE | Noted: 2017-08-02

## 2018-04-19 PROBLEM — R06.00 DYSPNEA: Status: ACTIVE | Noted: 2017-08-25

## 2018-04-19 PROBLEM — F41.9 CHRONIC ANXIETY: Status: ACTIVE | Noted: 2017-04-24

## 2018-04-19 PROBLEM — D50.9 IRON (FE) DEFICIENCY ANEMIA: Status: ACTIVE | Noted: 2017-10-02

## 2018-04-19 PROBLEM — K70.10 ALCOHOLIC HEPATITIS: Status: ACTIVE | Noted: 2017-08-14

## 2018-04-19 PROBLEM — K76.9 LIVER LESION: Status: ACTIVE | Noted: 2017-03-16

## 2018-04-19 PROCEDURE — 99213 OFFICE O/P EST LOW 20 MIN: CPT | Performed by: NURSE PRACTITIONER

## 2018-04-19 RX ORDER — SPIRONOLACTONE 50 MG/1
4 TABLET, FILM COATED ORAL DAILY
COMMUNITY
End: 2018-10-08 | Stop reason: SDUPTHER

## 2018-04-19 RX ORDER — PREGABALIN 300 MG/1
300 CAPSULE ORAL 2 TIMES DAILY
Qty: 180 CAPSULE | Refills: 1 | Status: SHIPPED | OUTPATIENT
Start: 2018-04-19 | End: 2018-10-03 | Stop reason: SDUPTHER

## 2018-04-23 ENCOUNTER — TELEPHONE (OUTPATIENT)
Dept: HEMATOLOGY ONCOLOGY | Facility: HOSPITAL | Age: 58
End: 2018-04-23

## 2018-04-24 ENCOUNTER — APPOINTMENT (OUTPATIENT)
Dept: LAB | Facility: HOSPITAL | Age: 58
End: 2018-04-24
Attending: INTERNAL MEDICINE
Payer: OTHER GOVERNMENT

## 2018-04-24 DIAGNOSIS — D50.9 IRON DEFICIENCY ANEMIA: ICD-10-CM

## 2018-04-24 LAB
ALBUMIN SERPL BCP-MCNC: 2.7 G/DL (ref 3.5–5)
ALP SERPL-CCNC: 158 U/L (ref 46–116)
ALT SERPL W P-5'-P-CCNC: 37 U/L (ref 12–78)
ANION GAP SERPL CALCULATED.3IONS-SCNC: 11 MMOL/L (ref 4–13)
AST SERPL W P-5'-P-CCNC: 51 U/L (ref 5–45)
BASOPHILS # BLD AUTO: 0.03 THOUSANDS/ΜL (ref 0–0.1)
BASOPHILS NFR BLD AUTO: 1 % (ref 0–1)
BILIRUB SERPL-MCNC: 1.2 MG/DL (ref 0.2–1)
BUN SERPL-MCNC: 18 MG/DL (ref 5–25)
CALCIUM SERPL-MCNC: 8.7 MG/DL (ref 8.3–10.1)
CHLORIDE SERPL-SCNC: 110 MMOL/L (ref 100–108)
CO2 SERPL-SCNC: 22 MMOL/L (ref 21–32)
CREAT SERPL-MCNC: 1.04 MG/DL (ref 0.6–1.3)
EOSINOPHIL # BLD AUTO: 0.1 THOUSAND/ΜL (ref 0–0.61)
EOSINOPHIL NFR BLD AUTO: 3 % (ref 0–6)
ERYTHROCYTE [DISTWIDTH] IN BLOOD BY AUTOMATED COUNT: 15.8 % (ref 11.6–15.1)
FERRITIN SERPL-MCNC: 24 NG/ML (ref 8–388)
GFR SERPL CREATININE-BSD FRML MDRD: 59 ML/MIN/1.73SQ M
GLUCOSE SERPL-MCNC: 103 MG/DL (ref 65–140)
HCT VFR BLD AUTO: 30.4 % (ref 34.8–46.1)
HGB BLD-MCNC: 9.3 G/DL (ref 11.5–15.4)
IRON SATN MFR SERPL: 22 %
IRON SERPL-MCNC: 101 UG/DL (ref 50–170)
LYMPHOCYTES # BLD AUTO: 1.33 THOUSANDS/ΜL (ref 0.6–4.47)
LYMPHOCYTES NFR BLD AUTO: 33 % (ref 14–44)
MCH RBC QN AUTO: 29.2 PG (ref 26.8–34.3)
MCHC RBC AUTO-ENTMCNC: 30.6 G/DL (ref 31.4–37.4)
MCV RBC AUTO: 95 FL (ref 82–98)
MONOCYTES # BLD AUTO: 0.39 THOUSAND/ΜL (ref 0.17–1.22)
MONOCYTES NFR BLD AUTO: 10 % (ref 4–12)
NEUTROPHILS # BLD AUTO: 2.15 THOUSANDS/ΜL (ref 1.85–7.62)
NEUTS SEG NFR BLD AUTO: 53 % (ref 43–75)
PLATELET # BLD AUTO: 67 THOUSANDS/UL (ref 149–390)
PMV BLD AUTO: 11.8 FL (ref 8.9–12.7)
POTASSIUM SERPL-SCNC: 3.9 MMOL/L (ref 3.5–5.3)
PROT SERPL-MCNC: 6.6 G/DL (ref 6.4–8.2)
RBC # BLD AUTO: 3.19 MILLION/UL (ref 3.81–5.12)
SODIUM SERPL-SCNC: 143 MMOL/L (ref 136–145)
TIBC SERPL-MCNC: 460 UG/DL (ref 250–450)
WBC # BLD AUTO: 4 THOUSAND/UL (ref 4.31–10.16)

## 2018-04-24 PROCEDURE — 83540 ASSAY OF IRON: CPT

## 2018-04-24 PROCEDURE — 83918 ORGANIC ACIDS TOTAL QUANT: CPT

## 2018-04-24 PROCEDURE — 80053 COMPREHEN METABOLIC PANEL: CPT

## 2018-04-24 PROCEDURE — 82728 ASSAY OF FERRITIN: CPT

## 2018-04-24 PROCEDURE — 85025 COMPLETE CBC W/AUTO DIFF WBC: CPT

## 2018-04-24 PROCEDURE — 36415 COLL VENOUS BLD VENIPUNCTURE: CPT

## 2018-04-24 PROCEDURE — 83550 IRON BINDING TEST: CPT

## 2018-04-27 RX ORDER — CYANOCOBALAMIN 1000 UG/ML
1000 INJECTION INTRAMUSCULAR; SUBCUTANEOUS ONCE
Status: COMPLETED | OUTPATIENT
Start: 2018-04-30 | End: 2018-04-30

## 2018-04-30 ENCOUNTER — HOSPITAL ENCOUNTER (OUTPATIENT)
Dept: INFUSION CENTER | Facility: HOSPITAL | Age: 58
Discharge: HOME | End: 2018-04-30
Payer: OTHER GOVERNMENT

## 2018-04-30 ENCOUNTER — OFFICE VISIT (OUTPATIENT)
Dept: HEMATOLOGY ONCOLOGY | Facility: HOSPITAL | Age: 58
End: 2018-04-30
Payer: OTHER GOVERNMENT

## 2018-04-30 VITALS
OXYGEN SATURATION: 99 % | WEIGHT: 188.05 LBS | HEIGHT: 64 IN | RESPIRATION RATE: 18 BRPM | SYSTOLIC BLOOD PRESSURE: 120 MMHG | HEART RATE: 98 BPM | BODY MASS INDEX: 32.11 KG/M2 | DIASTOLIC BLOOD PRESSURE: 68 MMHG | TEMPERATURE: 98.2 F

## 2018-04-30 DIAGNOSIS — D50.9 IRON DEFICIENCY ANEMIA, UNSPECIFIED: Primary | ICD-10-CM

## 2018-04-30 LAB — METHYLMALONATE SERPL-SCNC: 403 NMOL/L (ref 0–378)

## 2018-04-30 PROCEDURE — 96372 THER/PROPH/DIAG INJ SC/IM: CPT

## 2018-04-30 PROCEDURE — 99214 OFFICE O/P EST MOD 30 MIN: CPT | Performed by: INTERNAL MEDICINE

## 2018-04-30 RX ADMIN — CYANOCOBALAMIN 1000 MCG: 1000 INJECTION, SOLUTION INTRAMUSCULAR at 14:26

## 2018-04-30 NOTE — PROGRESS NOTES
Hematology/Oncology Outpatient Follow- up Note  Gris Izquierdo 62 y o  female MRN: @ Encounter: [id number]        Date:  4/30/2018    Presenting Complaint/Diagnosis : Pancytopenia In the setting of cirrhosis  Also has a history of iron deficiency  Previous Hematologic/ Oncologic History:    Venofer    Current Hematologic/ Oncologic Treatment:    Patient gets B12 once a month    Interval History:    The patient returns for follow-up visit  In the past Her bone marrow biopsy did not show any abnormalities but she did have absent storage iron  I replaced her iron and her hemoglobin return to normal  She did drop again and was given more iron  Her most recent blood work is low again as far as her hemoglobin is concerned  She has an elevated TIBC indicating iron deficiency  I will set her up to receive 6 doses of Venofer  She will stay on her monthly B12  As far as symptoms are concerned she is tired  Denies any nausea denies any vomiting  Denies any focal neurological signs  Is following with our colleagues in GI and a liver transplant  The rest of her 14 point review of systems today was negative  Test Results:    Imaging: No results found      Labs:   Lab Results   Component Value Date    WBC 4 00 (L) 04/24/2018    HGB 9 3 (L) 04/24/2018    HCT 30 4 (L) 04/24/2018    MCV 95 04/24/2018    PLT 67 (L) 04/24/2018     Lab Results   Component Value Date     04/24/2018    K 3 9 04/24/2018     (H) 04/24/2018    CO2 22 04/24/2018    ANIONGAP 11 04/24/2018    BUN 18 04/24/2018    CREATININE 1 04 04/24/2018    GLUCOSE 103 04/24/2018    GLUF 99 02/12/2018    CALCIUM 8 7 04/24/2018    AST 51 (H) 04/24/2018    ALT 37 04/24/2018    ALKPHOS 158 (H) 04/24/2018    PROT 6 6 04/24/2018    BILITOT 1 20 (H) 04/24/2018    EGFR 59 04/24/2018         Lab Results   Component Value Date    IRON 101 04/24/2018    TIBC 460 (H) 04/24/2018    FERRITIN 24 04/24/2018       ROS: As stated in the history of present illness otherwise his 14 point review of systems today was negative  Active Problems:   Patient Active Problem List   Diagnosis    Anemia    Encephalopathy, portal systemic (HCC)    Cirrhosis with [facility]ism [facility] )    History of alcohol abuse    History of gastric bypass    Thrombocytopenia (HCC)    [facility]ic shock due to undetermined organism [facility]    Colitis    Ascites    Right leg pain    Hypokalemia    Acute kidney injury ([facility]    Alcohol use disorder ([facility] )    Alcoholic hepatitis    Anastomotic ulcer    Breast cancer (HCC)    Chronic anxiety    Chronic depression    Chronic foot pain    Chronic hand pain    Deconditioned low back    Dyspnea    Fatty liver    Foot fracture    Iron (Fe) deficiency anemia    Liver disease    Liver lesion    Pain syndrome, chronic    Peripheral neuropathy    Rosacea    Ulcers, marginal    Ventral hernia without obstruction or gangrene    Wound of abdomen       Past Medical History:   Past Medical History:   Diagnosis Date    Alcohol use disorder (HCC)     Alcoholic hepatitis     Anastomotic ulcer     Anemia     Anxiety     Ascites     Breast cancer (HCC)     Cancer (HCC)     breast    Chronic foot pain     Chronic narcotic dependence (HCC)     Chronic pain disorder     Cirrhosis, alcoholic (HCC)     Depression     Esophagitis     History of bilateral mastectomy     History of gastric bypass     Hypertension     Liver disease     Morbid obesity (HCC)     Palpitations     Pancytopenia (HCC)     Peripheral neuropathy     Rosacea        Surgical History:   Past Surgical History:   Procedure Laterality Date    ABDOMINAL SURGERY      abdominoplasty    BREAST SURGERY      mastectomy    CHOLECYSTECTOMY      ESOPHAGOGASTRODUODENOSCOPY N/A 8/2/2017    Procedure: ESOPHAGOGASTRODUODENOSCOPY (EGD);   Surgeon: Finis Goltz, DO;  Location:  MAIN OR;  Service: Gastroenterology    GASTRIC BYPASS      HERNIA REPAIR      MASTECTOMY      [place] ESOPHAGOGASTRODUODENOSCOPY TRANSORAL DIAGNOSTIC N/A 5/18/2017    Procedure: ESOPHAGOGASTRODUODENOSCOPY (EGD) with bx;  Surgeon: Stacy Morse MD;  [facility]; Service: Gastroenterology    NM ESOPHAGOGASTRODUODENOSCOPY TRANSORAL DIAGNOSTIC N/A 9/14/2017    Procedure: EGD with bx AND COLONOSCOPY with polypectomy;  Surgeon: Stacy Morse MD;  Location: [facility]; Service: Gastroenterology    DECLAN-EN-Y PROCEDURE  2005       Family History:    Family History   Problem Relation Age of Onset    Diabetes Mother     Alzheimer's disease Mother     Kidney disease Father     Hypertension Father     Obesity Sister     Fibrocystic breast disease Sister        Cancer-related family history is not on file  Social History:   Social History     Social History    Marital status: /Civil Union     Spouse name: N/A    Number of children: N/A    Years of education: N/A     Occupational History    Not on file       Social History Main Topics    Smoking status: Never Smoker    Smokeless tobacco: Never Used    Alcohol use No      Comment: quit 08/02/2017    Drug use: No    Sexual activity: Not on file     Other Topics Concern    Not on file     Social History Narrative    No narrative on file       Current Medications:   Current Outpatient Prescriptions   Medication Sig Dispense Refill    folic acid (FOLVITE) 1 mg tablet Take 1 tablet by mouth daily 30 tablet 0    furosemide (LASIX) 20 mg tablet Take 20 mg by mouth daily        lactulose 20 g/30 mL Take 15 mL by mouth daily (Patient taking differently: Take 10 g by mouth 2 (two) times a day  ) 1 Bottle 0    Multiple Vitamins-Minerals (MULTIVITAMIN WITH MINERALS) tablet Take 1 tablet by mouth daily      pantoprazole (PROTONIX) 40 mg tablet Take 1 tablet by mouth 2 (two) times a day before meals 60 tablet 0    pregabalin (LYRICA) 300 MG capsule Take 1 capsule (300 mg total) by mouth 2 (two) times a day 180 capsule 1    Riboflavin 100 MG CAPS Take 300 capsules by mouth Takes 300 mg every other day      rifaximin (XIFAXAN) 200 mg tablet Take 550 mg by mouth 2 (two) times a day        spironolactone (ALDACTONE) 50 mg tablet Take 4 tablets by mouth daily      Thiamine HCl (VITAMIN B-1 PO) Take 200 mg by mouth daily        spironolactone (ALDACTONE) 100 mg tablet Take 25 mg by mouth daily         No current facility-administered medications for this visit  Facility-Administered Medications Ordered in Other Visits   Medication Dose Route Frequency Provider Last Rate Last Dose    cyanocobalamin injection 1,000 mcg  1,000 mcg Intramuscular Once Krystyna Casiaon MD           Allergies: Allergies   Allergen Reactions    Acetaminophen Other (See Comments)     Liver function, s/p bariatric surgery    Cymbalta [Duloxetine Hcl] GI Intolerance       Physical Exam:    Body surface area is 1 91 meters squared  Wt Readings from Last 3 Encounters:   04/30/18 85 3 kg (188 lb 0 8 oz)   04/19/18 86 6 kg (191 lb)   01/23/18 83 kg (183 lb)        Temp Readings from Last 3 Encounters:   04/30/18 98 2 °F (36 8 °C) (Tympanic)   04/19/18 98 1 °F (36 7 °C)   04/02/18 (!) 96 9 °F (36 1 °C) (Tympanic)        BP Readings from Last 3 Encounters:   04/30/18 120/68   04/19/18 112/70   04/02/18 119/68         Pulse Readings from Last 3 Encounters:   04/30/18 98   04/19/18 64   04/02/18 91        Physical Exam     Constitutional   General appearance: No acute distress, well appearing and well nourished  Eyes   Conjunctiva and lids: No swelling, erythema or discharge  Pupils and irises: Equal, round and reactive to light  Ears, Nose, Mouth, and Throat   External inspection of ears and nose: Normal     Nasal mucosa, septum, and turbinates: Normal without edema or erythema  Oropharynx: Normal with no erythema, edema, exudate or lesions  Pulmonary   Respiratory effort: No increased work of breathing or signs of respiratory distress      Auscultation of lungs: Clear to auscultation  Cardiovascular   Palpation of heart: Normal PMI, no thrills  Auscultation of heart: Normal rate and rhythm, normal S1 and S2, without murmurs  Examination of extremities for edema and/or varicosities: Normal     Carotid pulses: Normal     Abdomen   Abdomen: Non-tender, no masses  Liver and spleen: No hepatomegaly or splenomegaly  Lymphatic   Palpation of lymph nodes in neck: No lymphadenopathy  Musculoskeletal   Gait and station: Normal     Digits and nails: Normal without clubbing or cyanosis  Inspection/palpation of joints, bones, and muscles: Normal     Skin   Skin and subcutaneous tissue: Normal without rashes or lesions  Neurologic   Cranial nerves: Cranial nerves 2-12 intact  Sensation: No sensory loss  Psychiatric   Orientation to person, place, and time: Normal     Mood and affect: Normal         Assessment / Plan:      The patient is a pleasant 49-year-old female with a past medical history of cirrhosis who presented with pancytopenia  This may be related to her cirrhosis  Because she was pancytopenic and all 3 cell lines were depressed I ordered a bone marrow biopsy  Bone marrow biopsy did not show any major abnormalities but did show iron deficiency  I gave her 4 doses of Venofer  She was then in the hospital with a GI bleed  I gave her 8 more doses of iron and more B12  Her hemoglobin normalized  It has now gone low again with an elevated TIBC indicating iron deficiency  The patient does have cirrhosis of bleeding as a possibility  Regardless I will give her 6 doses of Venofer  I'll also continue her on B12  I'll see her back in 4 months  Goals and Barriers:  Current Goal:  Prolong Survival from Iron deficiency and pancytopenia secondary to alcoholic cirrhosis  Barriers: None  Patient's Capacity to Self Care:  Patient  able to self care      Portions of the record may have been created with voice recognition software   Occasional wrong word or "sound a like" substitutions may have occurred due to the inherent limitations of voice recognition software   Read the chart carefully and recognize, using context, where substitutions have occurred

## 2018-05-02 ENCOUNTER — HOSPITAL ENCOUNTER (OUTPATIENT)
Dept: INFUSION CENTER | Facility: HOSPITAL | Age: 58
Discharge: HOME | End: 2018-05-02
Payer: OTHER GOVERNMENT

## 2018-05-02 VITALS
RESPIRATION RATE: 18 BRPM | DIASTOLIC BLOOD PRESSURE: 62 MMHG | TEMPERATURE: 98.1 F | HEART RATE: 82 BPM | OXYGEN SATURATION: 100 % | SYSTOLIC BLOOD PRESSURE: 102 MMHG

## 2018-05-02 PROCEDURE — 96365 THER/PROPH/DIAG IV INF INIT: CPT

## 2018-05-02 RX ORDER — SODIUM CHLORIDE 9 MG/ML
20 INJECTION, SOLUTION INTRAVENOUS CONTINUOUS
Status: DISPENSED | OUTPATIENT
Start: 2018-05-02 | End: 2018-05-03

## 2018-05-02 RX ADMIN — IRON SUCROSE 200 MG: 20 INJECTION, SOLUTION INTRAVENOUS at 08:46

## 2018-05-02 RX ADMIN — SODIUM CHLORIDE 20 ML/HR: 9 INJECTION, SOLUTION INTRAVENOUS at 08:47

## 2018-05-02 NOTE — PROGRESS NOTES
Pt here for Venofer infusion  c/o fatigue  Familiar with procedure  IV started Lt forearm, NSS to kvo, Venofer infused, pt yoan well  IV removed intact, presure dssg applied  Disch amb to home, steady gait

## 2018-05-04 DIAGNOSIS — D50.9 IRON DEFICIENCY ANEMIA: ICD-10-CM

## 2018-05-15 RX ORDER — SODIUM CHLORIDE 9 MG/ML
20 INJECTION, SOLUTION INTRAVENOUS CONTINUOUS
Status: DISPENSED | OUTPATIENT
Start: 2018-05-16 | End: 2018-05-17

## 2018-05-16 ENCOUNTER — APPOINTMENT (OUTPATIENT)
Dept: LAB | Facility: HOSPITAL | Age: 58
End: 2018-05-16
Attending: INTERNAL MEDICINE
Payer: OTHER GOVERNMENT

## 2018-05-16 ENCOUNTER — HOSPITAL ENCOUNTER (OUTPATIENT)
Dept: INFUSION CENTER | Facility: HOSPITAL | Age: 58
Discharge: HOME | End: 2018-05-16
Payer: OTHER GOVERNMENT

## 2018-05-16 VITALS
HEART RATE: 89 BPM | OXYGEN SATURATION: 99 % | RESPIRATION RATE: 18 BRPM | SYSTOLIC BLOOD PRESSURE: 111 MMHG | TEMPERATURE: 98.1 F | DIASTOLIC BLOOD PRESSURE: 67 MMHG

## 2018-05-16 DIAGNOSIS — K74.60 CIRRHOSIS OF LIVER (HCC): ICD-10-CM

## 2018-05-16 DIAGNOSIS — K70.10 ALCOHOLIC HEPATITIS WITHOUT ASCITES: ICD-10-CM

## 2018-05-16 LAB
AFP-TM SERPL-MCNC: 7.5 NG/ML (ref 0.5–8)
ALBUMIN SERPL BCP-MCNC: 2.9 G/DL (ref 3.5–5)
ALP SERPL-CCNC: 174 U/L (ref 46–116)
ALT SERPL W P-5'-P-CCNC: 34 U/L (ref 12–78)
ANION GAP SERPL CALCULATED.3IONS-SCNC: 9 MMOL/L (ref 4–13)
AST SERPL W P-5'-P-CCNC: 54 U/L (ref 5–45)
BASOPHILS # BLD AUTO: 0.01 THOUSANDS/ΜL (ref 0–0.1)
BASOPHILS NFR BLD AUTO: 0 % (ref 0–1)
BILIRUB SERPL-MCNC: 1.5 MG/DL (ref 0.2–1)
BUN SERPL-MCNC: 14 MG/DL (ref 5–25)
CALCIUM SERPL-MCNC: 9.2 MG/DL (ref 8.3–10.1)
CHLORIDE SERPL-SCNC: 109 MMOL/L (ref 100–108)
CO2 SERPL-SCNC: 25 MMOL/L (ref 21–32)
CREAT SERPL-MCNC: 0.99 MG/DL (ref 0.6–1.3)
EOSINOPHIL # BLD AUTO: 0.1 THOUSAND/ΜL (ref 0–0.61)
EOSINOPHIL NFR BLD AUTO: 3 % (ref 0–6)
ERYTHROCYTE [DISTWIDTH] IN BLOOD BY AUTOMATED COUNT: 16.4 % (ref 11.6–15.1)
GFR SERPL CREATININE-BSD FRML MDRD: 63 ML/MIN/1.73SQ M
GLUCOSE SERPL-MCNC: 86 MG/DL (ref 65–140)
HCT VFR BLD AUTO: 33.9 % (ref 34.8–46.1)
HGB BLD-MCNC: 10.6 G/DL (ref 11.5–15.4)
INR PPP: 1.37 (ref 0.86–1.16)
LYMPHOCYTES # BLD AUTO: 1.28 THOUSANDS/ΜL (ref 0.6–4.47)
LYMPHOCYTES NFR BLD AUTO: 38 % (ref 14–44)
MCH RBC QN AUTO: 29.4 PG (ref 26.8–34.3)
MCHC RBC AUTO-ENTMCNC: 31.3 G/DL (ref 31.4–37.4)
MCV RBC AUTO: 94 FL (ref 82–98)
MONOCYTES # BLD AUTO: 0.34 THOUSAND/ΜL (ref 0.17–1.22)
MONOCYTES NFR BLD AUTO: 10 % (ref 4–12)
NEUTROPHILS # BLD AUTO: 1.64 THOUSANDS/ΜL (ref 1.85–7.62)
NEUTS SEG NFR BLD AUTO: 49 % (ref 43–75)
PLATELET # BLD AUTO: 58 THOUSANDS/UL (ref 149–390)
PMV BLD AUTO: 11.2 FL (ref 8.9–12.7)
POTASSIUM SERPL-SCNC: 3.9 MMOL/L (ref 3.5–5.3)
PROT SERPL-MCNC: 7 G/DL (ref 6.4–8.2)
PROTHROMBIN TIME: 16.7 SECONDS (ref 11.8–14.2)
RBC # BLD AUTO: 3.6 MILLION/UL (ref 3.81–5.12)
SODIUM SERPL-SCNC: 143 MMOL/L (ref 136–145)
WBC # BLD AUTO: 3.37 THOUSAND/UL (ref 4.31–10.16)

## 2018-05-16 PROCEDURE — 80053 COMPREHEN METABOLIC PANEL: CPT

## 2018-05-16 PROCEDURE — 85025 COMPLETE CBC W/AUTO DIFF WBC: CPT

## 2018-05-16 PROCEDURE — 96365 THER/PROPH/DIAG IV INF INIT: CPT

## 2018-05-16 PROCEDURE — 36415 COLL VENOUS BLD VENIPUNCTURE: CPT

## 2018-05-16 PROCEDURE — 85610 PROTHROMBIN TIME: CPT

## 2018-05-16 PROCEDURE — 82105 ALPHA-FETOPROTEIN SERUM: CPT

## 2018-05-16 RX ADMIN — IRON SUCROSE 200 MG: 20 INJECTION, SOLUTION INTRAVENOUS at 14:26

## 2018-05-16 RX ADMIN — SODIUM CHLORIDE 20 ML/HR: 9 INJECTION, SOLUTION INTRAVENOUS at 14:25

## 2018-05-21 ENCOUNTER — OFFICE VISIT (OUTPATIENT)
Dept: GASTROENTEROLOGY | Facility: MEDICAL CENTER | Age: 58
End: 2018-05-21
Payer: OTHER GOVERNMENT

## 2018-05-21 VITALS
HEART RATE: 98 BPM | TEMPERATURE: 98.3 F | WEIGHT: 190 LBS | SYSTOLIC BLOOD PRESSURE: 102 MMHG | HEIGHT: 64 IN | DIASTOLIC BLOOD PRESSURE: 54 MMHG | BODY MASS INDEX: 32.44 KG/M2

## 2018-05-21 DIAGNOSIS — D50.9 IRON DEFICIENCY ANEMIA, UNSPECIFIED IRON DEFICIENCY ANEMIA TYPE: ICD-10-CM

## 2018-05-21 DIAGNOSIS — K72.90 ENCEPHALOPATHY, PORTAL SYSTEMIC (HCC): ICD-10-CM

## 2018-05-21 DIAGNOSIS — K75.9 HEPATITIS: Primary | ICD-10-CM

## 2018-05-21 DIAGNOSIS — F10.11 HISTORY OF ALCOHOL ABUSE: Chronic | ICD-10-CM

## 2018-05-21 DIAGNOSIS — K76.0 FATTY LIVER: ICD-10-CM

## 2018-05-21 DIAGNOSIS — K70.10 ALCOHOLIC HEPATITIS WITHOUT ASCITES: Primary | ICD-10-CM

## 2018-05-21 DIAGNOSIS — D69.6 THROMBOCYTOPENIA (HCC): ICD-10-CM

## 2018-05-21 DIAGNOSIS — K70.30 ALCOHOLIC CIRRHOSIS OF LIVER WITHOUT ASCITES (HCC): ICD-10-CM

## 2018-05-21 PROCEDURE — 99215 OFFICE O/P EST HI 40 MIN: CPT | Performed by: INTERNAL MEDICINE

## 2018-05-21 NOTE — PROGRESS NOTES
[facility] - Outpatient Follow-up Note  Erika Donaldson 62 y o  female MRN: [id number]  Encounter: [id number]          ASSESSMENT AND PLAN:        1  Alcoholic hepatitis without ascites  2  Alcoholic cirrhosis of liver without ascites   3  Fatty liver  - her MELD is 11  For major surgery, her probability of mortality within 7 days is 1 094%, within 30 days is 4 413%, and within 90 days is 7 03% teeth extraction would be a minor procedure so her risk would be lower than the but risk stated  After extensive discussion she can proceed with teeth extraction  I did recommend closely monitoring her liver function tests and INR after the procedure for 1 or 2 weeks  - change spironolactone from 50 mg 100 mg to help improve overall fluid status  Continue for Lasix 20 mg  -I will order a CT scan of the abdomen to monitor the progress of the disease  - I will start her on rifaximin 550 mg   - I will order labs to check her AFP level  Most recent labs showed that this was within normal limits  - Continue to follow a low-sodium diet, avoid NSAIDs and raw food  - Imaging study for [address]  screening  - repeat EGD for esophageal varices in 2-3 years     4  Iron deficiency anemia  - continue with IV iron     5  Thrombocytopenia ([address] )-  platelet transfusion prior to surgery will be determined by the or surgeon     6  Portosystemic encephalopathy ([address] )-  stable on rifaximin and lactulose  Titrated to 2-3 bowel movements daily  7  History of colon polyps  - She will be due for repeat colonoscopy in 2022     8  History of alcohol abuse  - she reports that she has not had a drink since 2017  She is doing well with this, and her cravings are minimal and have been well-controlled     She will follow up in 6 months   We will continue to monitor her medications and     _____________________________________________________________    SUBJECTIVE:      Erika Donaldson is a 62 y o  female with alcoholic liver cirrhosis here for follow-up  She is doing well, she is getting iron infusions again as her anemia has worsened  Hemoglobin is 10 6  She denies abdominal pain, change in bowel habits, change in stool caliber, melena, hematochezia, rectal bleeding, tenesmus, change in appetite, nausea, vomiting, diarrhea, GERD, dysphagia, odynophagia and unintentional weight loss  She is planning on getting teeth extracted  She is at a low risk due to her low meld score  We discussed risk and benefit of the procedure and proceeding with teeth extraction as long as she is able to understand risk of the procedure and possible complications  MRI showed cirrhosis in January  EGD last year did not show esophageal varices  She does have history of anemia with a hemoglobin approximately 9 to 10  She has had EGD and colonoscopy evaluation but not PillCam evaluation  Will hold off on PillCam evaluation due to previous history of Nicole-en-Y gastric bypass as anemia is not very severe  She is a nonsmoker, and denies recent alcohol use  Her last drink was August 2017  REVIEW OF SYSTEMS IS OTHERWISE NEGATIVE        Historical Information   Past Medical History:   Diagnosis Date    Alcohol use disorder ([facility] )     Alcoholic hepatitis     Anastomotic ulcer     Anemia     Anxiety     Ascites     Breast cancer (HCC)     Cancer (HCC)     breast    Chronic foot pain     Chronic narcotic dependence (HCC)     Chronic pain disorder     Cirrhosis, alcoholic (HCC)     Depression     Esophagitis     History of bilateral mastectomy     History of gastric bypass     Hypertension     Liver disease     Morbid obesity (HCC)     Palpitations     Pancytopenia (HCC)     Peripheral neuropathy     Rosacea      Past Surgical History:   Procedure Laterality Date    ABDOMINAL SURGERY      abdominoplasty    BREAST SURGERY      mastectomy    CHOLECYSTECTOMY      ESOPHAGOGASTRODUODENOSCOPY N/A 8/2/2017    Procedure: ESOPHAGOGASTRODUODENOSCOPY (EGD); Surgeon: Trina Davey DO;  Location: [facility];  Service: Gastroenterology    GASTRIC BYPASS      HERNIA REPAIR      MASTECTOMY      CT ESOPHAGOGASTRODUODENOSCOPY TRANSORAL DIAGNOSTIC N/A 5/18/2017    Procedure: ESOPHAGOGASTRODUODENOSCOPY (EGD) with bx;  Surgeon: Basim Taylor MD;  [facility]: [facility]; Service: Gastroenterology    CT ESOPHAGOGASTRODUODENOSCOPY TRANSORAL DIAGNOSTIC N/A 9/14/2017    Procedure: EGD with bx AND COLONOSCOPY with polypectomy;  Surgeon: Basim Taylor MD;  [facility]: [facility]; Service: Gastroenterology    DECLAN-EN-Y PROCEDURE  2005     Social History   History   Alcohol Use No     Comment: quit 08/02/2017     History   Drug Use No     History   Smoking Status    Never Smoker   Smokeless Tobacco    Never Used     Family History   Problem Relation Age of Onset    Diabetes Mother     Alzheimer's disease Mother     Kidney disease Father     Hypertension Father     Obesity Sister     Fibrocystic breast disease Sister        Meds/Allergies       Current Outpatient Prescriptions:     folic acid (FOLVITE) 1 mg tablet    furosemide (LASIX) 20 mg tablet    lactulose 20 g/30 mL    Multiple Vitamins-Minerals (MULTIVITAMIN WITH MINERALS) tablet    pantoprazole (PROTONIX) 40 mg tablet    pregabalin (LYRICA) 300 MG capsule    Riboflavin 100 MG CAPS    rifaximin (XIFAXAN) 200 mg tablet    spironolactone (ALDACTONE) 100 mg tablet    spironolactone (ALDACTONE) 50 mg tablet    Thiamine HCl (VITAMIN B-1 PO)    rifaximin (XIFAXAN) 550 mg tablet    Allergies   Allergen Reactions    Acetaminophen Other (See Comments)     Liver function, s/p bariatric surgery    Cymbalta [Duloxetine Hcl] GI Intolerance           Objective     Blood pressure 102/54, pulse 98, temperature 98 3 °F (36 8 °C), temperature source Tympanic, height 5' 4" (1 626 m), weight 86 2 kg (190 lb), not currently breastfeeding  Body mass index is 32 61 kg/m²        PHYSICAL EXAM: General Appearance:   Alert, cooperative, no distress   HEENT:   Normocephalic, atraumatic, anicteric      Neck:  Supple, symmetrical, trachea midline   Lungs:   Clear to auscultation bilaterally; no rales, rhonchi or wheezing; respirations unlabored    Heart[de-identified]   Regular rate and rhythm; no murmur, rub, or gallop  Abdomen:   Soft, non-tender, non-distended; normal bowel sounds; no masses, no organomegaly    Genitalia:   Deferred    Rectal:   Deferred    Extremities:  No cyanosis, clubbing or edema    Pulses:  2+ and symmetric    Skin:  No jaundice, rashes, or lesions    Lymph nodes:  No palpable cervical lymphadenopathy   No asterixis, no jaundice, no scleral icterus        Lab Results:   No visits with results within 1 Day(s) from this visit     Latest known visit with results is:   Appointment on 05/16/2018   Component Date Value    Sodium 05/16/2018 143     Potassium 05/16/2018 3 9     Chloride 05/16/2018 109*    CO2 05/16/2018 25     Anion Gap 05/16/2018 9     BUN 05/16/2018 14     Creatinine 05/16/2018 0 99     Glucose 05/16/2018 86     Calcium 05/16/2018 9 2     AST 05/16/2018 54*    ALT 05/16/2018 34     Alkaline Phosphatase 05/16/2018 174*    Total Protein 05/16/2018 7 0     Albumin 05/16/2018 2 9*    Total Bilirubin 05/16/2018 1 50*    eGFR 05/16/2018 63     AFP TUMOR MARKER 05/16/2018 7 5     Protime 05/16/2018 16 7*    INR 05/16/2018 1 37*    WBC 05/16/2018 3 37*    RBC 05/16/2018 3 60*    Hemoglobin 05/16/2018 10 6*    Hematocrit 05/16/2018 33 9*    MCV 05/16/2018 94     MCH 05/16/2018 29 4     MCHC 05/16/2018 31 3*    RDW 05/16/2018 16 4*    MPV 05/16/2018 11 2     Platelets 38/53/8108 58*    Neutrophils Relative 05/16/2018 49     Lymphocytes Relative 05/16/2018 38     Monocytes Relative 05/16/2018 10     Eosinophils Relative 05/16/2018 3     Basophils Relative 05/16/2018 0     Neutrophils Absolute 05/16/2018 1 64*    Lymphocytes Absolute 05/16/2018 1 28     Monocytes Absolute 05/16/2018 0 34     Eosinophils Absolute 05/16/2018 0 10     Basophils Absolute 05/16/2018 0 01        Attestation:   By signing my name below, I, Marianela Davila, attest that this documentation has been prepared under the direction and in the presence of El Muniz MD  Electronically Signed: Rg Kevin  05/21/2018  Ana Gaines, personally performed the services described in this documentation  All medical record entries made by the scribe were at my direction and in my presence  I have reviewed the chart and discharge instructions and agree    that the record reflects my personal performance and is accurate and complete  El Muniz MD  05/21/2018

## 2018-05-21 NOTE — LETTER
May 21, 2018     Senait Menjivar MD  [facility] [address]    Patient: Edy Han   YOB: 1960   Date of Visit: 5/21/2018       Dear Dr Vernell Wilcox: Thank you for referring Beth Aragon to me for evaluation  Below are my notes for this consultation  If you have questions, please do not hesitate to call me  I look forward to following your patient along with you  Sincerely,        Savage Bingham MD        CC: No Recipients  Savage Bingham MD  5/21/2018  1:55 PM  Sign at close encounter  [facility] - Outpatient Follow-up Note  Edy Han 62 y o  female MRN: [id number]  Encounter: [id number]          ASSESSMENT AND PLAN:        1  Alcoholic hepatitis without ascites  2  Alcoholic cirrhosis of liver without ascites   3  Fatty liver  - her MELD is 11  For major surgery, her probability of mortality within 7 days is 1 094%, within 30 days is 4 413%, and within 90 days is 7 03% teeth extraction would be a minor procedure so her risk would be lower than the but risk stated  After extensive discussion she can proceed with teeth extraction  I did recommend closely monitoring her liver function tests and INR after the procedure for 1 or 2 weeks  - change spironolactone from 50 mg 100 mg to help improve overall fluid status  Continue for Lasix 20 mg  -I will order a CT scan of the abdomen to monitor the progress of the disease  - I will start her on rifaximin 550 mg   - I will order labs to check her AFP level  Most recent labs showed that this was within normal limits  - Continue to follow a low-sodium diet, avoid NSAIDs and raw food  - Imaging study for [address]  screening  - repeat EGD for esophageal varices in 2-3 years     4  Iron deficiency anemia  - continue with IV iron     5  Thrombocytopenia ([address] )-  platelet transfusion prior to surgery will be determined by the or surgeon     6   Portosystemic encephalopathy ([address] )-  stable on rifaximin and lactulose  Titrated to 2-3 bowel movements daily  7  History of colon polyps  - She will be due for repeat colonoscopy in 2022     8  History of alcohol abuse  - she reports that she has not had a drink since 2017  She is doing well with this, and her cravings are minimal and have been well-controlled     She will follow up in 6 months  We will continue to monitor her medications and     _____________________________________________________________    SUBJECTIVE:      Olayinka Clemens is a 62 y o  female with alcoholic liver cirrhosis here for follow-up  She is doing well, she is getting iron infusions again as her anemia has worsened  Hemoglobin is 10 6  She denies abdominal pain, change in bowel habits, change in stool caliber, melena, hematochezia, rectal bleeding, tenesmus, change in appetite, nausea, vomiting, diarrhea, GERD, dysphagia, odynophagia and unintentional weight loss  She is planning on getting teeth extracted  She is at a low risk due to her low meld score  We discussed risk and benefit of the procedure and proceeding with teeth extraction as long as she is able to understand risk of the procedure and possible complications  MRI showed cirrhosis in January  EGD last year did not show esophageal varices  She does have history of anemia with a hemoglobin approximately 9 to 10  She has had EGD and colonoscopy evaluation but not PillCam evaluation  Will hold off on PillCam evaluation due to previous history of Nicole-en-Y gastric bypass as anemia is not very severe  She is a nonsmoker, and denies recent alcohol use  Her last drink was August 2017  REVIEW OF SYSTEMS IS OTHERWISE NEGATIVE        Historical Information   Past Medical History:   Diagnosis Date    [facility] use disorder [facility] [address] [facility]     Alcoholic hepatitis     Anastomotic ulcer     Anemia     Anxiety     Ascites     Breast cancer (HCC)     Cancer (HCC)     breast    Chronic foot pain     Chronic narcotic dependence (HCC)     Chronic pain disorder     Cirrhosis, alcoholic [facility] [address] )     Depression     Esophagitis     History of bilateral mastectomy     History of gastric bypass     Hypertension     Liver disease     Morbid obesity ([facility] )     Palpitations     Pancytopenia (HCC)     Peripheral neuropathy     Rosacea      Past Surgical History:   Procedure Laterality Date    ABDOMINAL SURGERY      abdominoplasty    BREAST SURGERY      mastectomy    CHOLECYSTECTOMY      ESOPHAGOGASTRODUODENOSCOPY N/A 8/2/2017    Procedure: ESOPHAGOGASTRODUODENOSCOPY (EGD); Surgeon: Alix Das DO;  [facility];  Service: Gastroenterology    GASTRIC BYPASS      HERNIA REPAIR      MASTECTOMY      [place] ESOPHAGOGASTRODUODENOSCOPY TRANSORAL DIAGNOSTIC N/A 5/18/2017    Procedure: ESOPHAGOGASTRODUODENOSCOPY (EGD) with bx;  Surgeon: Abraham Donald MD;  [facility]; Service: Gastroenterology    [place] ESOPHAGOGASTRODUODENOSCOPY TRANSORAL DIAGNOSTIC N/A 9/14/2017    Procedure: EGD with bx AND COLONOSCOPY with polypectomy;  Surgeon: Abraham Donald MD;  [facility];   Service: Gastroenterology    DECLAN-EN-Y PROCEDURE  2005     Social History   History   Alcohol Use No     Comment: quit 08/02/2017     History   Drug Use No     History   Smoking Status    Never Smoker   Smokeless Tobacco    Never Used     Family History   Problem Relation Age of Onset    Diabetes Mother     Alzheimer's disease Mother     Kidney disease Father     Hypertension Father     Obesity Sister     Fibrocystic breast disease Sister        Meds/Allergies       Current Outpatient Prescriptions:     folic acid (FOLVITE) 1 mg tablet    furosemide (LASIX) 20 mg tablet    lactulose 20 g/30 mL    Multiple Vitamins-Minerals (MULTIVITAMIN WITH MINERALS) tablet    pantoprazole (PROTONIX) 40 mg tablet    pregabalin (LYRICA) 300 MG capsule    Riboflavin 100 MG CAPS    rifaximin (XIFAXAN) 200 mg tablet    spironolactone (ALDACTONE) 100 mg tablet    spironolactone (ALDACTONE) 50 mg tablet    Thiamine HCl (VITAMIN B-1 PO)    rifaximin (XIFAXAN) 550 mg tablet    Allergies   Allergen Reactions    Acetaminophen Other (See Comments)     Liver function, s/p bariatric surgery    Cymbalta [Duloxetine Hcl] GI Intolerance           Objective     Blood pressure 102/54, pulse 98, temperature 98 3 °F (36 8 °C), temperature source Tympanic, height 5' 4" (1 626 m), weight 86 2 kg (190 lb), not currently breastfeeding  Body mass index is 32 61 kg/m²  PHYSICAL EXAM:      General Appearance:   Alert, cooperative, no distress   HEENT:   Normocephalic, atraumatic, anicteric      Neck:  Supple, symmetrical, trachea midline   Lungs:   Clear to auscultation bilaterally; no rales, rhonchi or wheezing; respirations unlabored    Heart[de-identified]   Regular rate and rhythm; no murmur, rub, or gallop  Abdomen:   Soft, non-tender, non-distended; normal bowel sounds; no masses, no organomegaly    Genitalia:   Deferred    Rectal:   Deferred    Extremities:  No cyanosis, clubbing or edema    Pulses:  2+ and symmetric    Skin:  No jaundice, rashes, or lesions    Lymph nodes:  No palpable cervical lymphadenopathy   No asterixis, no jaundice, no scleral icterus        Lab Results:   No visits with results within 1 Day(s) from this visit     Latest known visit with results is:   Appointment on 05/16/2018   Component Date Value    Sodium 05/16/2018 143     Potassium 05/16/2018 3 9     Chloride 05/16/2018 109*    CO2 05/16/2018 25     Anion Gap 05/16/2018 9     BUN 05/16/2018 14     Creatinine 05/16/2018 0 99     Glucose 05/16/2018 86     Calcium 05/16/2018 9 2     AST 05/16/2018 54*    ALT 05/16/2018 34     Alkaline Phosphatase 05/16/2018 174*    Total Protein 05/16/2018 7 0     Albumin 05/16/2018 2 9*    Total Bilirubin 05/16/2018 1 50*    eGFR 05/16/2018 63     AFP TUMOR MARKER 05/16/2018 7 5     Protime 05/16/2018 16 7*    INR 05/16/2018 1 37*    WBC 05/16/2018 3 37*    RBC 05/16/2018 3 60*    Hemoglobin 05/16/2018 10 6*    Hematocrit 05/16/2018 33 9*    MCV 05/16/2018 94     MCH 05/16/2018 29 4     MCHC 05/16/2018 31 3*    RDW 05/16/2018 16 4*    MPV 05/16/2018 11 2     Platelets 54/13/2192 58*    Neutrophils Relative 05/16/2018 49     Lymphocytes Relative 05/16/2018 38     Monocytes Relative 05/16/2018 10     Eosinophils Relative 05/16/2018 3     Basophils Relative 05/16/2018 0     Neutrophils Absolute 05/16/2018 1 64*    Lymphocytes Absolute 05/16/2018 1 28     Monocytes Absolute 05/16/2018 0 34     Eosinophils Absolute 05/16/2018 0 10     Basophils Absolute 05/16/2018 0 01        Attestation:   By signing my name below, Gracy Chapin, attest that this documentation has been prepared under the direction and in the presence of Maggie Christie MD  Electronically Signed: Rg Fernández  05/21/2018  Niko Mendoza, personally performed the services described in this documentation  All medical record entries made by the scribe were at my direction and in my presence  I have reviewed the chart and discharge instructions and agree    that the record reflects my personal performance and is accurate and complete  Maggie Christie MD  05/21/2018

## 2018-05-23 ENCOUNTER — DOCUMENTATION (OUTPATIENT)
Dept: GASTROENTEROLOGY | Facility: MEDICAL CENTER | Age: 58
End: 2018-05-23

## 2018-05-25 RX ORDER — SODIUM CHLORIDE 9 MG/ML
20 INJECTION, SOLUTION INTRAVENOUS CONTINUOUS
Status: DISPENSED | OUTPATIENT
Start: 2018-05-29 | End: 2018-05-30

## 2018-05-26 RX ORDER — CYANOCOBALAMIN 1000 UG/ML
1000 INJECTION INTRAMUSCULAR; SUBCUTANEOUS ONCE
Status: DISCONTINUED | OUTPATIENT
Start: 2018-05-29 | End: 2018-06-01 | Stop reason: HOSPADM

## 2018-05-29 ENCOUNTER — HOSPITAL ENCOUNTER (OUTPATIENT)
Dept: INFUSION CENTER | Facility: HOSPITAL | Age: 58
Discharge: HOME | End: 2018-05-29
Payer: OTHER GOVERNMENT

## 2018-05-29 ENCOUNTER — HOSPITAL ENCOUNTER (OUTPATIENT)
Dept: CT IMAGING | Facility: HOSPITAL | Age: 58
Discharge: HOME | End: 2018-05-29
Attending: INTERNAL MEDICINE
Payer: OTHER GOVERNMENT

## 2018-05-29 DIAGNOSIS — K70.30 ALCOHOLIC CIRRHOSIS OF LIVER WITHOUT ASCITES (HCC): ICD-10-CM

## 2018-05-29 PROCEDURE — 74150 CT ABDOMEN W/O CONTRAST: CPT

## 2018-05-29 RX ORDER — CYANOCOBALAMIN 1000 UG/ML
1000 INJECTION INTRAMUSCULAR; SUBCUTANEOUS ONCE
Status: COMPLETED | OUTPATIENT
Start: 2018-05-31 | End: 2018-05-31

## 2018-05-29 RX ORDER — SODIUM CHLORIDE 9 MG/ML
40 INJECTION, SOLUTION INTRAVENOUS ONCE
Status: COMPLETED | OUTPATIENT
Start: 2018-05-31 | End: 2018-05-31

## 2018-05-29 NOTE — PROGRESS NOTES
[facility] apt needed to be rescheduled  Unable to obtain IV access after several attemps by two nurses

## 2018-05-31 ENCOUNTER — HOSPITAL ENCOUNTER (OUTPATIENT)
Dept: INFUSION CENTER | Facility: HOSPITAL | Age: 58
Discharge: HOME | End: 2018-05-31
Payer: OTHER GOVERNMENT

## 2018-05-31 VITALS
OXYGEN SATURATION: 100 % | RESPIRATION RATE: 18 BRPM | DIASTOLIC BLOOD PRESSURE: 57 MMHG | HEART RATE: 71 BPM | TEMPERATURE: 97.3 F | SYSTOLIC BLOOD PRESSURE: 116 MMHG

## 2018-05-31 PROCEDURE — 96365 THER/PROPH/DIAG IV INF INIT: CPT

## 2018-05-31 PROCEDURE — 96372 THER/PROPH/DIAG INJ SC/IM: CPT

## 2018-05-31 RX ADMIN — CYANOCOBALAMIN 1000 MCG: 1000 INJECTION, SOLUTION INTRAMUSCULAR at 09:54

## 2018-05-31 RX ADMIN — SODIUM CHLORIDE 40 ML/HR: 9 INJECTION, SOLUTION INTRAVENOUS at 08:46

## 2018-05-31 RX ADMIN — IRON SUCROSE 200 MG: 20 INJECTION, SOLUTION INTRAVENOUS at 08:46

## 2018-06-11 RX ORDER — SODIUM CHLORIDE 9 MG/ML
20 INJECTION, SOLUTION INTRAVENOUS CONTINUOUS
Status: DISCONTINUED | OUTPATIENT
Start: 2018-06-12 | End: 2018-06-15 | Stop reason: HOSPADM

## 2018-06-11 RX ORDER — SODIUM CHLORIDE 9 MG/ML
20 INJECTION, SOLUTION INTRAVENOUS ONCE
Status: DISCONTINUED | OUTPATIENT
Start: 2018-06-12 | End: 2018-06-11

## 2018-06-12 ENCOUNTER — HOSPITAL ENCOUNTER (OUTPATIENT)
Dept: INFUSION CENTER | Facility: HOSPITAL | Age: 58
Discharge: HOME | End: 2018-06-12
Payer: OTHER GOVERNMENT

## 2018-06-12 VITALS
TEMPERATURE: 98.1 F | HEART RATE: 71 BPM | SYSTOLIC BLOOD PRESSURE: 111 MMHG | RESPIRATION RATE: 18 BRPM | DIASTOLIC BLOOD PRESSURE: 67 MMHG

## 2018-06-12 PROCEDURE — 96365 THER/PROPH/DIAG IV INF INIT: CPT

## 2018-06-12 RX ADMIN — IRON SUCROSE 200 MG: 20 INJECTION, SOLUTION INTRAVENOUS at 14:09

## 2018-06-12 RX ADMIN — SODIUM CHLORIDE 20 ML/HR: 9 INJECTION, SOLUTION INTRAVENOUS at 14:09

## 2018-06-15 ENCOUNTER — APPOINTMENT (OUTPATIENT)
Dept: LAB | Facility: HOSPITAL | Age: 58
End: 2018-06-15
Attending: INTERNAL MEDICINE
Payer: OTHER GOVERNMENT

## 2018-06-15 ENCOUNTER — TRANSCRIBE ORDERS (OUTPATIENT)
Dept: ADMINISTRATIVE | Facility: HOSPITAL | Age: 58
End: 2018-06-15

## 2018-06-15 DIAGNOSIS — K70.30 ALCOHOLIC CIRRHOSIS OF LIVER WITHOUT ASCITES (HCC): ICD-10-CM

## 2018-06-15 DIAGNOSIS — M79.10 MYALGIA: Primary | ICD-10-CM

## 2018-06-15 DIAGNOSIS — M79.10 MYALGIA: ICD-10-CM

## 2018-06-15 DIAGNOSIS — K70.30 ALCOHOLIC CIRRHOSIS, UNSPECIFIED WHETHER ASCITES PRESENT (HCC): ICD-10-CM

## 2018-06-15 LAB
AFP-TM SERPL-MCNC: 7.8 NG/ML (ref 0.5–8)
FOLATE SERPL-MCNC: >20 NG/ML (ref 3.1–17.5)
VIT B12 SERPL-MCNC: 992 PG/ML (ref 100–900)

## 2018-06-15 PROCEDURE — 86430 RHEUMATOID FACTOR TEST QUAL: CPT

## 2018-06-15 PROCEDURE — 86200 CCP ANTIBODY: CPT

## 2018-06-15 PROCEDURE — 82607 VITAMIN B-12: CPT

## 2018-06-15 PROCEDURE — 36415 COLL VENOUS BLD VENIPUNCTURE: CPT

## 2018-06-15 PROCEDURE — 82306 VITAMIN D 25 HYDROXY: CPT

## 2018-06-15 PROCEDURE — 82105 ALPHA-FETOPROTEIN SERUM: CPT

## 2018-06-15 PROCEDURE — 86618 LYME DISEASE ANTIBODY: CPT

## 2018-06-15 PROCEDURE — 86038 ANTINUCLEAR ANTIBODIES: CPT

## 2018-06-15 PROCEDURE — 82746 ASSAY OF FOLIC ACID SERUM: CPT

## 2018-06-18 LAB
B BURGDOR IGG SER IA-ACNC: 0.35
B BURGDOR IGM SER IA-ACNC: 0.16
CCP IGA+IGG SERPL IA-ACNC: 9 UNITS (ref 0–19)
RHEUMATOID FACT SER QL LA: NEGATIVE
RYE IGE QN: NEGATIVE

## 2018-06-19 LAB
25(OH)D2 SERPL-MCNC: 4.9 NG/ML
25(OH)D3 SERPL-MCNC: 18 NG/ML
25(OH)D3+25(OH)D2 SERPL-MCNC: 23 NG/ML

## 2018-06-25 RX ORDER — SODIUM CHLORIDE 9 MG/ML
20 INJECTION, SOLUTION INTRAVENOUS CONTINUOUS
Status: DISPENSED | OUTPATIENT
Start: 2018-06-26 | End: 2018-06-27

## 2018-06-25 RX ORDER — CYANOCOBALAMIN 1000 UG/ML
1000 INJECTION INTRAMUSCULAR; SUBCUTANEOUS ONCE
Status: COMPLETED | OUTPATIENT
Start: 2018-06-26 | End: 2018-06-26

## 2018-06-26 ENCOUNTER — HOSPITAL ENCOUNTER (OUTPATIENT)
Dept: INFUSION CENTER | Facility: HOSPITAL | Age: 58
Discharge: HOME | End: 2018-06-26
Payer: OTHER GOVERNMENT

## 2018-06-26 VITALS
DIASTOLIC BLOOD PRESSURE: 60 MMHG | OXYGEN SATURATION: 99 % | HEART RATE: 91 BPM | SYSTOLIC BLOOD PRESSURE: 103 MMHG | RESPIRATION RATE: 16 BRPM | TEMPERATURE: 98.9 F

## 2018-06-26 PROCEDURE — 96365 THER/PROPH/DIAG IV INF INIT: CPT

## 2018-06-26 PROCEDURE — 96372 THER/PROPH/DIAG INJ SC/IM: CPT

## 2018-06-26 RX ADMIN — SODIUM CHLORIDE 20 ML/HR: 9 INJECTION, SOLUTION INTRAVENOUS at 14:25

## 2018-06-26 RX ADMIN — CYANOCOBALAMIN 1000 MCG: 1000 INJECTION, SOLUTION INTRAMUSCULAR at 14:33

## 2018-06-26 RX ADMIN — IRON SUCROSE 200 MG: 20 INJECTION, SOLUTION INTRAVENOUS at 14:25

## 2018-06-26 NOTE — PROGRESS NOTES
Pt arrived amb for Venofer and B12  IV accessed Lt forearm, NSS to kvo, Venofer infusing  B12 given IM Lt deltoid

## 2018-07-09 RX ORDER — SODIUM CHLORIDE 9 MG/ML
20 INJECTION, SOLUTION INTRAVENOUS CONTINUOUS
Status: DISPENSED | OUTPATIENT
Start: 2018-07-10 | End: 2018-07-11

## 2018-07-10 ENCOUNTER — HOSPITAL ENCOUNTER (OUTPATIENT)
Dept: INFUSION CENTER | Facility: HOSPITAL | Age: 58
Discharge: HOME | End: 2018-07-10
Payer: OTHER GOVERNMENT

## 2018-07-10 VITALS
RESPIRATION RATE: 18 BRPM | TEMPERATURE: 99.1 F | OXYGEN SATURATION: 98 % | SYSTOLIC BLOOD PRESSURE: 114 MMHG | HEART RATE: 99 BPM | DIASTOLIC BLOOD PRESSURE: 60 MMHG

## 2018-07-10 PROCEDURE — 96365 THER/PROPH/DIAG IV INF INIT: CPT

## 2018-07-10 RX ADMIN — IRON SUCROSE 200 MG: 20 INJECTION, SOLUTION INTRAVENOUS at 14:03

## 2018-07-10 RX ADMIN — SODIUM CHLORIDE 20 ML/HR: 9 INJECTION, SOLUTION INTRAVENOUS at 14:01

## 2018-07-22 RX ORDER — CYANOCOBALAMIN 1000 UG/ML
1000 INJECTION INTRAMUSCULAR; SUBCUTANEOUS ONCE
Status: DISCONTINUED | OUTPATIENT
Start: 2018-07-24 | End: 2018-07-27 | Stop reason: HOSPADM

## 2018-07-24 ENCOUNTER — HOSPITAL ENCOUNTER (OUTPATIENT)
Dept: INFUSION CENTER | Facility: HOSPITAL | Age: 58
Discharge: HOME | End: 2018-07-24

## 2018-08-15 ENCOUNTER — TELEPHONE (OUTPATIENT)
Dept: GASTROENTEROLOGY | Facility: CLINIC | Age: 58
End: 2018-08-15

## 2018-08-15 DIAGNOSIS — K70.10 ALCOHOLIC HEPATITIS, UNSPECIFIED WHETHER ASCITES PRESENT: Primary | ICD-10-CM

## 2018-08-15 NOTE — TELEPHONE ENCOUNTER
Melony patient      She has office visit on 10-8-18 and wants to know if she needs labs done prior to that appt

## 2018-08-21 ENCOUNTER — TRANSCRIBE ORDERS (OUTPATIENT)
Dept: ADMINISTRATIVE | Facility: HOSPITAL | Age: 58
End: 2018-08-21

## 2018-08-21 ENCOUNTER — APPOINTMENT (OUTPATIENT)
Dept: LAB | Facility: HOSPITAL | Age: 58
End: 2018-08-21
Attending: INTERNAL MEDICINE
Payer: OTHER GOVERNMENT

## 2018-08-21 DIAGNOSIS — K72.90 HEPATIC ENCEPHALOPATHY (HCC): ICD-10-CM

## 2018-08-21 DIAGNOSIS — D69.6 THROMBOCYTOPENIA, UNSPECIFIED (HCC): ICD-10-CM

## 2018-08-21 DIAGNOSIS — K70.10 ALCOHOLIC HEPATITIS, UNSPECIFIED WHETHER ASCITES PRESENT: ICD-10-CM

## 2018-08-21 DIAGNOSIS — E87.6 HYPOPOTASSEMIA: ICD-10-CM

## 2018-08-21 DIAGNOSIS — K70.31 ALCOHOLIC CIRRHOSIS OF LIVER WITH ASCITES (HCC): Primary | ICD-10-CM

## 2018-08-21 DIAGNOSIS — D50.9 IRON DEFICIENCY ANEMIA, UNSPECIFIED: ICD-10-CM

## 2018-08-21 DIAGNOSIS — K70.31 ALCOHOLIC CIRRHOSIS OF LIVER WITH ASCITES (HCC): ICD-10-CM

## 2018-08-21 LAB
ALBUMIN SERPL BCP-MCNC: 2.9 G/DL (ref 3.5–5)
ALP SERPL-CCNC: 173 U/L (ref 46–116)
ALT SERPL W P-5'-P-CCNC: 37 U/L (ref 12–78)
ANION GAP SERPL CALCULATED.3IONS-SCNC: 5 MMOL/L (ref 4–13)
AST SERPL W P-5'-P-CCNC: 47 U/L (ref 5–45)
BASOPHILS # BLD AUTO: 0.02 THOUSANDS/ΜL (ref 0–0.1)
BASOPHILS NFR BLD AUTO: 1 % (ref 0–1)
BILIRUB DIRECT SERPL-MCNC: 0.41 MG/DL (ref 0–0.2)
BILIRUB SERPL-MCNC: 0.9 MG/DL (ref 0.2–1)
BUN SERPL-MCNC: 13 MG/DL (ref 5–25)
CALCIUM SERPL-MCNC: 9.6 MG/DL (ref 8.3–10.1)
CHLORIDE SERPL-SCNC: 107 MMOL/L (ref 100–108)
CO2 SERPL-SCNC: 26 MMOL/L (ref 21–32)
CREAT SERPL-MCNC: 0.93 MG/DL (ref 0.6–1.3)
EOSINOPHIL # BLD AUTO: 0.14 THOUSAND/ΜL (ref 0–0.61)
EOSINOPHIL NFR BLD AUTO: 4 % (ref 0–6)
ERYTHROCYTE [DISTWIDTH] IN BLOOD BY AUTOMATED COUNT: 15.4 % (ref 11.6–15.1)
FERRITIN SERPL-MCNC: 31 NG/ML (ref 8–388)
GFR SERPL CREATININE-BSD FRML MDRD: 68 ML/MIN/1.73SQ M
GLUCOSE SERPL-MCNC: 68 MG/DL (ref 65–140)
HCT VFR BLD AUTO: 34.7 % (ref 34.8–46.1)
HGB BLD-MCNC: 11.4 G/DL (ref 11.5–15.4)
IMM GRANULOCYTES # BLD AUTO: 0.01 THOUSAND/UL (ref 0–0.2)
IMM GRANULOCYTES NFR BLD AUTO: 0 % (ref 0–2)
INR PPP: 1.33 (ref 0.86–1.17)
IRON SATN MFR SERPL: 20 %
IRON SERPL-MCNC: 81 UG/DL (ref 50–170)
LYMPHOCYTES # BLD AUTO: 1.21 THOUSANDS/ΜL (ref 0.6–4.47)
LYMPHOCYTES NFR BLD AUTO: 30 % (ref 14–44)
MCH RBC QN AUTO: 29.6 PG (ref 26.8–34.3)
MCHC RBC AUTO-ENTMCNC: 32.9 G/DL (ref 31.4–37.4)
MCV RBC AUTO: 90 FL (ref 82–98)
MONOCYTES # BLD AUTO: 0.42 THOUSAND/ΜL (ref 0.17–1.22)
MONOCYTES NFR BLD AUTO: 10 % (ref 4–12)
NEUTROPHILS # BLD AUTO: 2.25 THOUSANDS/ΜL (ref 1.85–7.62)
NEUTS SEG NFR BLD AUTO: 55 % (ref 43–75)
NRBC BLD AUTO-RTO: 0 /100 WBCS
PLATELET # BLD AUTO: 74 THOUSANDS/UL (ref 149–390)
PMV BLD AUTO: 10.9 FL (ref 8.9–12.7)
POTASSIUM SERPL-SCNC: 4.2 MMOL/L (ref 3.5–5.3)
PROT SERPL-MCNC: 7 G/DL (ref 6.4–8.2)
PROTHROMBIN TIME: 15.7 SECONDS (ref 11.8–14.2)
RBC # BLD AUTO: 3.85 MILLION/UL (ref 3.81–5.12)
SODIUM SERPL-SCNC: 138 MMOL/L (ref 136–145)
TIBC SERPL-MCNC: 408 UG/DL (ref 250–450)
WBC # BLD AUTO: 4.05 THOUSAND/UL (ref 4.31–10.16)

## 2018-08-21 PROCEDURE — 85025 COMPLETE CBC W/AUTO DIFF WBC: CPT

## 2018-08-21 PROCEDURE — 82248 BILIRUBIN DIRECT: CPT

## 2018-08-21 PROCEDURE — 83918 ORGANIC ACIDS TOTAL QUANT: CPT

## 2018-08-21 PROCEDURE — 83540 ASSAY OF IRON: CPT

## 2018-08-21 PROCEDURE — 82728 ASSAY OF FERRITIN: CPT

## 2018-08-21 PROCEDURE — 80053 COMPREHEN METABOLIC PANEL: CPT

## 2018-08-21 PROCEDURE — 36415 COLL VENOUS BLD VENIPUNCTURE: CPT

## 2018-08-21 PROCEDURE — 85610 PROTHROMBIN TIME: CPT

## 2018-08-21 PROCEDURE — 83550 IRON BINDING TEST: CPT

## 2018-08-23 LAB
METHYLMALONATE SERPL-SCNC: 403 NMOL/L (ref 0–378)
SL AMB DISCLAIMER: ABNORMAL

## 2018-08-26 RX ORDER — CYANOCOBALAMIN 1000 UG/ML
1000 INJECTION INTRAMUSCULAR; SUBCUTANEOUS ONCE
Status: COMPLETED | OUTPATIENT
Start: 2018-08-27 | End: 2018-08-27

## 2018-08-27 ENCOUNTER — OFFICE VISIT (OUTPATIENT)
Dept: HEMATOLOGY ONCOLOGY | Facility: HOSPITAL | Age: 58
End: 2018-08-27
Payer: OTHER GOVERNMENT

## 2018-08-27 ENCOUNTER — HOSPITAL ENCOUNTER (OUTPATIENT)
Dept: INFUSION CENTER | Facility: HOSPITAL | Age: 58
Discharge: HOME | End: 2018-08-27
Payer: OTHER GOVERNMENT

## 2018-08-27 VITALS
RESPIRATION RATE: 16 BRPM | HEIGHT: 64 IN | SYSTOLIC BLOOD PRESSURE: 128 MMHG | WEIGHT: 194 LBS | TEMPERATURE: 97.7 F | DIASTOLIC BLOOD PRESSURE: 66 MMHG | OXYGEN SATURATION: 99 % | HEART RATE: 85 BPM | BODY MASS INDEX: 33.12 KG/M2

## 2018-08-27 DIAGNOSIS — D69.6 THROMBOCYTOPENIA (HCC): ICD-10-CM

## 2018-08-27 DIAGNOSIS — D50.9 IRON DEFICIENCY ANEMIA, UNSPECIFIED IRON DEFICIENCY ANEMIA TYPE: Primary | ICD-10-CM

## 2018-08-27 PROCEDURE — 96372 THER/PROPH/DIAG INJ SC/IM: CPT

## 2018-08-27 PROCEDURE — 99214 OFFICE O/P EST MOD 30 MIN: CPT | Performed by: INTERNAL MEDICINE

## 2018-08-27 RX ADMIN — CYANOCOBALAMIN 1000 MCG: 1000 INJECTION, SOLUTION INTRAMUSCULAR at 14:25

## 2018-08-27 NOTE — PROGRESS NOTES
Pt here for B12; given in the LD with no adverse reactions; bandaid dry and intact; d/c'd home ambulatory with steady gait

## 2018-08-27 NOTE — PROGRESS NOTES
Hematology/Oncology Outpatient Follow- up Note  Juan Heaton 62 y o  female MRN: @ Encounter: [id number]        Date:  8/27/2018    Presenting Complaint/Diagnosis :  Pancytopenia In the setting of cirrhosis  Also has a history of iron deficiency  Previous Hematologic/ Oncologic History:    Venofer    Current Hematologic/ Oncologic Treatment:    Patient gets B12 once a month    Interval History:    The patient returns for follow-up visit  In the past Her bone marrow biopsy did not show any abnormalities but she did have absent storage iron  I replaced her iron and her hemoglobin return to normal  She did drop again and was given more iron  She then did well and her last visit prior studies were low so we gave her further Venofer  Her hemoglobin is improved to 11 4  White count is still slightly low at 4 05 with a platelet count is improved at 74  MMA is elevated and she does get B12 every month  As far as symptoms are concerned she is tired  Denies any nausea denies any vomiting  Denies any focal neurological signs  Is following with our colleagues in GI and a liver transplant  The rest of her 14 point review of systems today was negative  Test Results:    Imaging: No results found      Labs:   Lab Results   Component Value Date    WBC 4 05 (L) 08/21/2018    HGB 11 4 (L) 08/21/2018    HCT 34 7 (L) 08/21/2018    MCV 90 08/21/2018    PLT 74 (L) 08/21/2018     Lab Results   Component Value Date     08/21/2018    K 4 2 08/21/2018     08/21/2018    CO2 26 08/21/2018    BUN 13 08/21/2018    CREATININE 0 93 08/21/2018    GLUF 99 02/12/2018    CALCIUM 9 6 08/21/2018    AST 47 (H) 08/21/2018    ALT 37 08/21/2018    ALKPHOS 173 (H) 08/21/2018    EGFR 68 08/21/2018       Lab Results   Component Value Date    IRON 81 08/21/2018    TIBC 408 08/21/2018    FERRITIN 31 08/21/2018       Lab Results   Component Value Date    KSDMFVEJ24 992 (H) 06/15/2018         ROS: As stated in the history of present illness otherwise his 14 point review of systems today was negative  Active Problems:   Patient Active Problem List   Diagnosis    Anemia    Encephalopathy, portal systemic (HCC)    [facility]osis with [facility]ism [facility] [address] )    History of alcohol abuse    History of gastric bypass    Thrombocytopenia (HCC)    [facility] to [facility]    Colitis    Ascites    Right leg pain    Hypokalemia    Acute kidney injury ([facility] )    Alcohol use disorder ([facility] )    Alcoholic hepatitis    Anastomotic ulcer    Breast cancer (HCC)    Chronic anxiety    Chronic depression    Chronic foot pain    Chronic hand pain    Deconditioned low back    Dyspnea    Fatty liver    Foot fracture    Iron (Fe) deficiency anemia    Liver disease    Liver lesion    Pain syndrome, chronic    Peripheral neuropathy    Rosacea    Ulcers, marginal    Ventral hernia without obstruction or gangrene    Wound of abdomen       Past Medical History:   Past Medical History:   Diagnosis Date    Alcohol use disorder (HCC)     Alcoholic hepatitis     Anastomotic ulcer     Anemia     Anxiety     Ascites     Breast cancer (HCC)     Cancer (HCC)     breast    Chronic foot pain     Chronic narcotic dependence (HCC)     Chronic pain disorder     Cirrhosis, alcoholic (HCC)     Depression     Esophagitis     History of bilateral mastectomy     History of gastric bypass     Hypertension     Liver disease     Morbid obesity (HCC)     Palpitations     Pancytopenia (HCC)     Peripheral neuropathy     Rosacea        Surgical History:   Past Surgical History:   Procedure Laterality Date    ABDOMINAL SURGERY      abdominoplasty    BREAST SURGERY      mastectomy    CHOLECYSTECTOMY      ESOPHAGOGASTRODUODENOSCOPY N/A 8/2/2017    Procedure: ESOPHAGOGASTRODUODENOSCOPY (EGD);   Surgeon: Bridgett Leon DO;  Location:  MAIN OR;  Service: Gastroenterology    GASTRIC BYPASS      HERNIA REPAIR      MASTECTOMY      [place] ESOPHAGOGASTRODUODENOSCOPY TRANSORAL DIAGNOSTIC N/A 5/18/2017    Procedure: ESOPHAGOGASTRODUODENOSCOPY (EGD) with bx;  Surgeon: Stacy Morse MD;  [facility]; Service: Gastroenterology    [place] ESOPHAGOGASTRODUODENOSCOPY TRANSORAL DIAGNOSTIC N/A 9/14/2017    Procedure: EGD with bx AND COLONOSCOPY with polypectomy;  Surgeon: Stacy Morse MD;  [facility]; Service: Gastroenterology    DECLAN-EN-Y PROCEDURE  2005       Family History:    Family History   Problem Relation Age of Onset    Diabetes Mother     Alzheimer's disease Mother     Kidney disease Father     Hypertension Father     Obesity Sister     Fibrocystic breast disease Sister        Cancer-related family history is not on file  Social History:   Social History     Social History    Marital status: /Civil Union     Spouse name: N/A    Number of children: N/A    Years of education: N/A     Occupational History    Not on file       Social History Main Topics    Smoking status: Never Smoker    Smokeless tobacco: Never Used    Alcohol use No      Comment: quit 08/02/2017    Drug use: No    Sexual activity: Not on file     Other Topics Concern    Not on file     Social History Narrative    No narrative on file       Current Medications:   Current Outpatient Prescriptions   Medication Sig Dispense Refill    folic acid (FOLVITE) 1 mg tablet Take 1 tablet by mouth daily 30 tablet 0    furosemide (LASIX) 20 mg tablet Take 20 mg by mouth daily        lactulose 20 g/30 mL Take 15 mL by mouth daily (Patient taking differently: Take 10 g by mouth 2 (two) times a day  ) 1 Bottle 0    Multiple Vitamins-Minerals (MULTIVITAMIN WITH MINERALS) tablet Take 1 tablet by mouth daily      pantoprazole (PROTONIX) 40 mg tablet Take 1 tablet by mouth 2 (two) times a day before meals 60 tablet 0    pregabalin (LYRICA) 300 MG capsule Take 1 capsule (300 mg total) by mouth 2 (two) times a day 180 capsule 1    Riboflavin 100 MG CAPS Take 300 capsules by mouth Takes 300 mg every other day      rifaximin (XIFAXAN) 200 mg tablet Take 550 mg by mouth 2 (two) times a day        spironolactone (ALDACTONE) 100 mg tablet Take 25 mg by mouth daily        spironolactone (ALDACTONE) 50 mg tablet Take 4 tablets by mouth daily      Thiamine HCl (VITAMIN B-1 PO) Take 200 mg by mouth daily         No current facility-administered medications for this visit  Facility-Administered Medications Ordered in Other Visits   Medication Dose Route Frequency Provider Last Rate Last Dose    cyanocobalamin injection 1,000 mcg  1,000 mcg Intramuscular Once Temple MD Bernardo           Allergies: Allergies   Allergen Reactions    Acetaminophen Other (See Comments)     Liver function, s/p bariatric surgery    Cymbalta [Duloxetine Hcl] GI Intolerance    Duloxetine GI Intolerance       Physical Exam:    Body surface area is 1 92 meters squared  Wt Readings from Last 3 Encounters:   08/27/18 88 kg (194 lb)   05/21/18 86 2 kg (190 lb)   04/30/18 85 3 kg (188 lb 0 8 oz)        Temp Readings from Last 3 Encounters:   08/27/18 97 7 °F (36 5 °C) (Tympanic)   07/10/18 99 1 °F (37 3 °C) (Tympanic)   06/26/18 98 9 °F (37 2 °C) (Tympanic)        BP Readings from Last 3 Encounters:   08/27/18 128/66   07/10/18 114/60   06/26/18 103/60         Pulse Readings from Last 3 Encounters:   08/27/18 85   07/10/18 99   06/26/18 91         Physical Exam     Constitutional   General appearance: No acute distress, well appearing and well nourished  Eyes   Conjunctiva and lids: No swelling, erythema or discharge  Pupils and irises: Equal, round and reactive to light  Ears, Nose, Mouth, and Throat   External inspection of ears and nose: Normal     Nasal mucosa, septum, and turbinates: Normal without edema or erythema  Oropharynx: Normal with no erythema, edema, exudate or lesions  Pulmonary   Respiratory effort: No increased work of breathing or signs of respiratory distress  Auscultation of lungs: Clear to auscultation  Cardiovascular   Palpation of heart: Normal PMI, no thrills  Auscultation of heart: Normal rate and rhythm, normal S1 and S2, without murmurs  Examination of extremities for edema and/or varicosities: Normal     Carotid pulses: Normal     Abdomen   Abdomen: Non-tender, no masses  Liver and spleen: No hepatomegaly or splenomegaly  Lymphatic   Palpation of lymph nodes in neck: No lymphadenopathy  Musculoskeletal   Gait and station: Normal     Digits and nails: Normal without clubbing or cyanosis  Inspection/palpation of joints, bones, and muscles: Normal     Skin   Skin and subcutaneous tissue: Normal without rashes or lesions  Neurologic   Cranial nerves: Cranial nerves 2-12 intact  Sensation: No sensory loss  Psychiatric   Orientation to person, place, and time: Normal     Mood and affect: Normal         Assessment / Plan:    The patient is a pleasant 60-year-old female with a past medical history of cirrhosis who presented with pancytopenia  This is probably related to her cirrhosis  A bone marrow biopsy was done in the past which did not show any major abnormalities but did show iron deficiency  She received 4 doses of Venofer initially but then had a GI bleed  She got 8 more doses and her hemoglobin normalized  At her last visit in April it was low again so we gave her another 6 doses of Venofer  Prior studies have normalized  Hemoglobin is above 11  Primary is elevated so I will continue her B12  I'll see her back in 4 months with repeat blood work  Until then if she has any questions she will call our office  Her most recent CT scan from May had shown hepatic cirrhosis with extensive varices and body wall edema  There was no non contrast CT evidence of hepatic mass or biliary dilatation  Goals and Barriers:  Current Goal:  Prolong Survival from Iron deficiency  Barriers: None        Patient's Capacity to Self Care:  Patient able to self care  Portions of the record may have been created with voice recognition software   Occasional wrong word or "sound a like" substitutions may have occurred due to the inherent limitations of voice recognition software   Read the chart carefully and recognize, using context, where substitutions have occurred

## 2018-10-03 ENCOUNTER — OFFICE VISIT (OUTPATIENT)
Dept: PAIN MEDICINE | Facility: CLINIC | Age: 58
End: 2018-10-03
Payer: OTHER GOVERNMENT

## 2018-10-03 VITALS
BODY MASS INDEX: 34.83 KG/M2 | SYSTOLIC BLOOD PRESSURE: 128 MMHG | WEIGHT: 204 LBS | HEART RATE: 80 BPM | DIASTOLIC BLOOD PRESSURE: 68 MMHG | HEIGHT: 64 IN

## 2018-10-03 DIAGNOSIS — G62.1 ALCOHOL-INDUCED POLYNEUROPATHY (HCC): ICD-10-CM

## 2018-10-03 DIAGNOSIS — R29.898 DECONDITIONED LOW BACK: ICD-10-CM

## 2018-10-03 DIAGNOSIS — G89.4 PAIN SYNDROME, CHRONIC: ICD-10-CM

## 2018-10-03 DIAGNOSIS — M79.673 CHRONIC FOOT PAIN, UNSPECIFIED LATERALITY: ICD-10-CM

## 2018-10-03 DIAGNOSIS — G89.29 CHRONIC FOOT PAIN, UNSPECIFIED LATERALITY: ICD-10-CM

## 2018-10-03 DIAGNOSIS — M79.643 CHRONIC HAND PAIN, UNSPECIFIED LATERALITY: Primary | ICD-10-CM

## 2018-10-03 DIAGNOSIS — G89.29 CHRONIC HAND PAIN, UNSPECIFIED LATERALITY: Primary | ICD-10-CM

## 2018-10-03 PROCEDURE — 99213 OFFICE O/P EST LOW 20 MIN: CPT | Performed by: NURSE PRACTITIONER

## 2018-10-03 RX ORDER — PREGABALIN 300 MG/1
300 CAPSULE ORAL 2 TIMES DAILY
Qty: 180 CAPSULE | Refills: 1 | Status: SHIPPED | OUTPATIENT
Start: 2018-10-03 | End: 2018-10-17 | Stop reason: SDUPTHER

## 2018-10-03 NOTE — PROGRESS NOTES
Assessment:  1  Chronic hand pain, unspecified laterality    2  Chronic foot pain, unspecified laterality    3  Pain syndrome, chronic    4  Deconditioned low back    5  Alcohol-induced polyneuropathy ([address] )        Plan:  While the patient was in the office today, I explained to the patient that I still feel it is in her best interest to follow up with Neurology and put in a new referral to neurology  I also gave her a new prescription for physical therapy  The patient was agreeable and verbalized an understanding  With regards to the Lyrica, I explained the patient at this point time we will continue with the Lyrica as prescribed, but I did review with her that she is on the maximum dosage that I feel comfortable prescribing  he patient was agreeable and verbalized an understanding  The patient will follow-up in 6 months for medication prescription refill and reevaluation  The patient was advised to contact the office should their symptoms worsen in the interim  The patient was agreeable and verbalized an understanding  History of Present Illness: The patient is a 62 y o  female last seen on 4/19/18 who presents for a follow up office visit in regards to chronic pain secondary to alcoholic peripheral neuropathy  The patient currently reports that at this point she has celebrated 1 year of being sober  She reports that since her last office visit she has not yet followed up with Neurology and needs a new referral as well as a new prescription for physical therapy  Current pain medications includes:   Lyrica 300 mg b i d   The patient reports that this regimen is providing 70% pain relief  The patient is reporting no side effects from this pain medication regimen  I have personally reviewed and/or updated the patient's past medical history, past surgical history, family history, social history, current medications, allergies, and vital signs today         Review of Systems:    Review of Systems   Respiratory: Negative for shortness of breath  Cardiovascular: Negative for chest pain  Gastrointestinal: Negative for constipation, diarrhea, nausea and vomiting  Musculoskeletal: Positive for gait problem and joint swelling (joint stiffness)  Negative for arthralgias and myalgias  Skin: Negative for rash  Neurological: Positive for dizziness and weakness  Negative for seizures  All other systems reviewed and are negative  Past Medical History:   Diagnosis Date    Alcohol use disorder ([address] )     Alcoholic hepatitis     Anastomotic ulcer     Anemia     Anxiety     Ascites     Breast cancer (HCC)     Cancer (HCC)     breast    Chronic foot pain     Chronic narcotic dependence (HCC)     Chronic pain disorder     Cirrhosis, alcoholic (HCC)     Depression     Esophagitis     History of bilateral mastectomy     History of gastric bypass     Hypertension     Liver disease     Morbid obesity (HCC)     Palpitations     Pancytopenia (HCC)     Peripheral neuropathy     Rosacea        Past Surgical History:   Procedure Laterality Date    ABDOMINAL SURGERY      abdominoplasty    BREAST SURGERY      mastectomy    CHOLECYSTECTOMY      ESOPHAGOGASTRODUODENOSCOPY N/A 8/2/2017    Procedure: ESOPHAGOGASTRODUODENOSCOPY (EGD); Surgeon: Hugh Pope DO;  Location:  MAIN OR;  Service: Gastroenterology    GASTRIC BYPASS      HERNIA REPAIR      MASTECTOMY      [place] ESOPHAGOGASTRODUODENOSCOPY TRANSORAL DIAGNOSTIC N/A 5/18/2017    Procedure: ESOPHAGOGASTRODUODENOSCOPY (EGD) with bx;  Surgeon: Leesa Taylor MD;  [facility]; Service: Gastroenterology    [place] ESOPHAGOGASTRODUODENOSCOPY TRANSORAL DIAGNOSTIC N/A 9/14/2017    Procedure: EGD with bx AND COLONOSCOPY with polypectomy;  Surgeon: Leesa Taylor MD;  [facility];   Service: Gastroenterology    DECLAN-EN-Y PROCEDURE  2005       Family History   Problem Relation Age of Onset    Diabetes Mother     Alzheimer's disease Mother     Kidney disease Father     Hypertension Father     Obesity Sister     Fibrocystic breast disease Sister        Social History     Occupational History    Not on file  Social History Main Topics    Smoking status: Never Smoker    Smokeless tobacco: Never Used    Alcohol use No      Comment: quit 08/02/2017    Drug use: No    Sexual activity: Not on file         Current Outpatient Prescriptions:     folic acid (FOLVITE) 1 mg tablet, Take 1 tablet by mouth daily, Disp: 30 tablet, Rfl: 0    furosemide (LASIX) 20 mg tablet, Take 20 mg by mouth daily  , Disp: , Rfl:     lactulose 20 g/30 mL, Take 15 mL by mouth daily (Patient taking differently: Take 10 g by mouth 2 (two) times a day  ), Disp: 1 Bottle, Rfl: 0    Multiple Vitamins-Minerals (MULTIVITAMIN WITH MINERALS) tablet, Take 1 tablet by mouth daily, Disp: , Rfl:     pantoprazole (PROTONIX) 40 mg tablet, Take 1 tablet by mouth 2 (two) times a day before meals, Disp: 60 tablet, Rfl: 0    pregabalin (LYRICA) 300 MG capsule, Take 1 capsule (300 mg total) by mouth 2 (two) times a day, Disp: 180 capsule, Rfl: 1    Riboflavin 100 MG CAPS, Take 300 capsules by mouth Takes 300 mg every other day, Disp: , Rfl:     rifaximin (XIFAXAN) 200 mg tablet, Take 550 mg by mouth 2 (two) times a day  , Disp: , Rfl:     spironolactone (ALDACTONE) 100 mg tablet, Take 25 mg by mouth daily  , Disp: , Rfl:     spironolactone (ALDACTONE) 50 mg tablet, Take 4 tablets by mouth daily, Disp: , Rfl:     Thiamine HCl (VITAMIN B-1 PO), Take 200 mg by mouth daily  , Disp: , Rfl:     Allergies   Allergen Reactions    Acetaminophen Other (See Comments)     Liver function, s/p bariatric surgery    Cymbalta [Duloxetine Hcl] GI Intolerance    Duloxetine GI Intolerance       Physical Exam:    There were no vitals taken for this visit      Constitutional:normal, well developed, well nourished, alert, in no distress and non-toxic and no overt pain behavior  and overweight  Eyes:anicteric  HEENT:grossly intact  Neck:supple, symmetric, trachea midline and no masses   Pulmonary:even and unlabored  Cardiovascular:No edema or pitting edema present  Skin:Normal without rashes or lesions and well hydrated  Psychiatric:Mood and affect appropriate  Neurologic:Cranial Nerves II-XII grossly intact  Musculoskeletal:Slightly antalgic, but steady gait without the use of any assistive devices  Imaging  No orders to display         No orders of the defined types were placed in this encounter

## 2018-10-08 ENCOUNTER — OFFICE VISIT (OUTPATIENT)
Dept: GASTROENTEROLOGY | Facility: MEDICAL CENTER | Age: 58
End: 2018-10-08
Payer: OTHER GOVERNMENT

## 2018-10-08 VITALS
WEIGHT: 206 LBS | SYSTOLIC BLOOD PRESSURE: 128 MMHG | DIASTOLIC BLOOD PRESSURE: 72 MMHG | TEMPERATURE: 97.8 F | HEIGHT: 64 IN | HEART RATE: 95 BPM | BODY MASS INDEX: 35.17 KG/M2

## 2018-10-08 DIAGNOSIS — K72.90 ENCEPHALOPATHY, PORTAL SYSTEMIC (HCC): ICD-10-CM

## 2018-10-08 DIAGNOSIS — K70.10 ALCOHOLIC HEPATITIS WITHOUT ASCITES: ICD-10-CM

## 2018-10-08 DIAGNOSIS — K74.60 CIRRHOSIS OF LIVER WITHOUT ASCITES, UNSPECIFIED HEPATIC CIRRHOSIS TYPE (HCC): Primary | ICD-10-CM

## 2018-10-08 PROCEDURE — 99214 OFFICE O/P EST MOD 30 MIN: CPT | Performed by: INTERNAL MEDICINE

## 2018-10-08 RX ORDER — LACTULOSE 20 G/30ML
10 SOLUTION ORAL 2 TIMES DAILY
Qty: 900 ML | Refills: 5 | Status: SHIPPED | OUTPATIENT
Start: 2018-10-08 | End: 2019-02-13 | Stop reason: SDUPTHER

## 2018-10-08 RX ORDER — SPIRONOLACTONE 50 MG/1
50 TABLET, FILM COATED ORAL DAILY
Qty: 30 TABLET | Refills: 4 | Status: ON HOLD | OUTPATIENT
Start: 2018-10-08 | End: 2019-07-11

## 2018-10-08 RX ORDER — FUROSEMIDE 20 MG/1
20 TABLET ORAL DAILY
Qty: 30 TABLET | Refills: 5 | Status: SHIPPED | OUTPATIENT
Start: 2018-10-08 | End: 2019-04-05 | Stop reason: SDUPTHER

## 2018-10-08 NOTE — PROGRESS NOTES
[facility] - Outpatient Follow-up Note  Lester Howell 62 y o  female MRN: [id number]  Encounter: [id number]          ASSESSMENT AND PLAN:        1  Cirrhosis of the liver without ascites  - I will order US of the liver  - Low sodium diet encouraged  - I recommend she avoid use of NSAID medications  However, she does have an upcoming dental procedure, I advised her to closely monitor her NSAID and other pain medication use during this time to avoid decompensation of her liver  - Refill of lactulose given to take 15 mL PO twice daily    -Refill of spironolactone 50 mg given to take once daily  - Refill of Lasix 20 mg given to take once daily  - I will order labs, including PT INR, CMP, CBC and iron panel  2  Alcoholic hepatitis without ascites  She has been sober for one year  She continues to avoid alcohol consumption  3  Encephalopathy, portal systemic ([address] )  -rifaximin and lactulose with a goal of 2-3 bowel movements daily which she has achieving at this time      She is overall improving in terms of her overall liver disease in her MELD is 9 from 11 in the past   She is planning for 2 surgery we have discussed risk and benefit of undergoing procedures in setting of cirrhosis  Overall doing well at this time and has been well compensated  She has gained weight over the last several weeks to discuss importance of weight loss as fatty liver disease will cause worsening of her liver disease  Fortunately she has stopped drinking for almost over 1 year now  Will update records regarding [address]  screening  Repeat EGD in 1 1/2 years for further evaluation  of history esophageal varices  _____________________________________________________    SUBJECTIVE:  Lester Howell is a 62 y o  female with alcoholic liver cirrhosis here for follow-up  She recently celebrated her one-year anniversary of being sober   Because of her sobriety, she has been eating more food as a phycological way to avoid urges to drink alcohol  Due to this, she has gained some weight, approximately 16 pounds, since we last saw her in May  He is currently taking lactulose rifaximin and spironolactone as prescribed  She hHer BM have been normal, and she has no other complaints at this time  MRI showed cirrhosis in January  EGD last year did not show esophageal varices  She has a history of anemia, and her hgb is increasing and is now 11 4  REVIEW OF SYSTEMS IS OTHERWISE NEGATIVE  Historical Information   Past Medical History:   Diagnosis Date    Alcohol use disorder     Alcoholic hepatitis     Anastomotic ulcer     Anemia     Anxiety     Ascites     [facility]     Cancer (HCC)     breast    Chronic foot pain     Chronic narcotic dependence (HCC)     Chronic pain disorder     Cirrhosis, alcoholic (HCC)     Depression     Esophagitis     History of bilateral mastectomy     History of gastric bypass     Hypertension     Liver disease     Morbid obesity (HCC)     Palpitations     Pancytopenia (HCC)     Peripheral neuropathy     Rosacea      Past Surgical History:   Procedure Laterality Date    ABDOMINAL SURGERY      abdominoplasty    BREAST SURGERY      mastectomy    CHOLECYSTECTOMY      ESOPHAGOGASTRODUODENOSCOPY N/A 8/2/2017    Procedure: ESOPHAGOGASTRODUODENOSCOPY (EGD); Surgeon: Hoang Bob DO;  Location:  MAIN OR;  Service: Gastroenterology    GASTRIC BYPASS      HERNIA REPAIR      MASTECTOMY      [place] ESOPHAGOGASTRODUODENOSCOPY TRANSORAL DIAGNOSTIC N/A 5/18/2017    Procedure: ESOPHAGOGASTRODUODENOSCOPY (EGD) with bx;  Surgeon: Yamilka Luna MD;  [facility]; Service: Gastroenterology    [place] ESOPHAGOGASTRODUODENOSCOPY TRANSORAL DIAGNOSTIC N/A 9/14/2017    Procedure: EGD with bx AND COLONOSCOPY with polypectomy;  Surgeon: Yamilka Luna MD;  [facility];   Service: Gastroenterology    DECLAN-EN-Y PROCEDURE  2005     Social History History   Alcohol Use No     Comment: quit 08/02/2017     History   Drug Use No     History   Smoking Status    Never Smoker   Smokeless Tobacco    Never Used     Family History   Problem Relation Age of Onset    Diabetes Mother     Alzheimer's disease Mother     Kidney disease Father     Hypertension Father     Obesity Sister     Fibrocystic breast disease Sister        Meds/Allergies       Current Outpatient Prescriptions:     Calcium-Magnesium-Vitamin D (CALCIUM 500 PO)    Cholecalciferol (VITAMIN D3) 5000 units TABS    folic acid (FOLVITE) 1 mg tablet    furosemide (LASIX) 20 mg tablet    lactulose 20 g/30 mL    Multiple Vitamins-Minerals (MULTIVITAMIN WITH MINERALS) tablet    pregabalin (LYRICA) 300 MG capsule    Riboflavin 100 MG CAPS    rifaximin (XIFAXAN) 200 mg tablet    spironolactone (ALDACTONE) 50 mg tablet    Thiamine HCl (VITAMIN B-1 PO)    Allergies   Allergen Reactions    Acetaminophen Other (See Comments)     Liver function, s/p bariatric surgery    Cymbalta [Duloxetine Hcl] GI Intolerance    Duloxetine GI Intolerance           Objective     Blood pressure 128/72, pulse 95, temperature 97 8 °F (36 6 °C), temperature source Tympanic, height 5' 3 5" (1 613 m), weight 93 4 kg (206 lb), not currently breastfeeding  Body mass index is 35 92 kg/m²  PHYSICAL EXAM:      General Appearance:   Alert, cooperative, no distress   HEENT:   Normocephalic, atraumatic, anicteric      Neck:  Supple, symmetrical, trachea midline   Lungs:   Clear to auscultation bilaterally; no rales, rhonchi or wheezing; respirations unlabored    Heart[de-identified]   Regular rate and rhythm; no murmur, rub, or gallop  Abdomen:   Soft, non-tender, non-distended; normal bowel sounds; no masses, no organomegaly  Midline scar  Genitalia:   Deferred    Rectal:   Deferred    Extremities:  No cyanosis, clubbing  Mild pitting edema to bilateral lower extremities      Pulses:  2+ and symmetric    Skin:  No jaundice, rashes, or lesions    Lymph nodes:  No palpable cervical lymphadenopathy        Lab Results:   No visits with results within 1 Day(s) from this visit  Latest known visit with results is:   Appointment on 08/21/2018   Component Date Value    WBC 08/21/2018 4 05*    RBC 08/21/2018 3 85     Hemoglobin 08/21/2018 11 4*    Hematocrit 08/21/2018 34 7*    MCV 08/21/2018 90     MCH 08/21/2018 29 6     MCHC 08/21/2018 32 9     RDW 08/21/2018 15 4*    MPV 08/21/2018 10 9     Platelets 12/29/2765 74*    nRBC 08/21/2018 0     Neutrophils Relative 08/21/2018 55     Immat GRANS % 08/21/2018 0     Lymphocytes Relative 08/21/2018 30     Monocytes Relative 08/21/2018 10     Eosinophils Relative 08/21/2018 4     Basophils Relative 08/21/2018 1     Neutrophils Absolute 08/21/2018 2 25     Immature Grans Absolute 08/21/2018 0 01     Lymphocytes Absolute 08/21/2018 1 21     Monocytes Absolute 08/21/2018 0 42     Eosinophils Absolute 08/21/2018 0 14     Basophils Absolute 08/21/2018 0 02     Sodium 08/21/2018 138     Potassium 08/21/2018 4 2     Chloride 08/21/2018 107     CO2 08/21/2018 26     ANION GAP 08/21/2018 5     BUN 08/21/2018 13     Creatinine 08/21/2018 0 93     Glucose 08/21/2018 68     Calcium 08/21/2018 9 6     AST 08/21/2018 47*    ALT 08/21/2018 37     Alkaline Phosphatase 08/21/2018 173*    Total Protein 08/21/2018 7 0     Albumin 08/21/2018 2 9*    Total Bilirubin 08/21/2018 0 90     eGFR 08/21/2018 68     Ferritin 08/21/2018 31     Iron Saturation 08/21/2018 20     TIBC 08/21/2018 408     Iron 08/21/2018 81     Methylmalonic Acid, S 08/21/2018 403*    Disclaimer: 08/21/2018 Comment     Protime 08/21/2018 15 7*    INR 08/21/2018 1 33*    Bilirubin, Direct 08/21/2018 0 41*         Radiology Results:   No results found          Attestation:   By signing my name below, Perry Pete, attest that this documentation has been prepared under the direction and in the presence of Jaspreet Justin MD Electronically Signed: Rg Rivera  10/8/2018    I, Jaspreet Justin, personally performed the services described in this documentation  All medical record entries made by the scribe were at my direction and in my presence  I have reviewed the chart and discharge instructions and agree that the record reflects my personal performance and is accurate and complete  Jaspreet Justin MD  10/8/2018

## 2018-10-08 NOTE — LETTER
October 8, 2018     Alena Cobb MD  [address] [facility] [address]    Patient: Anjel Hill   YOB: 1960   Date of Visit: 10/8/2018       Dear Dr Madison: Thank you for referring Zain Lynch to me for evaluation  Below are my notes for this consultation  If you have questions, please do not hesitate to call me  I look forward to following your patient along with you  Sincerely,        Celeste Flowers MD        CC: No Recipients  Celeste Flowers MD  10/8/2018  2:29 PM  Sign at close encounter  [facility] - Outpatient Follow-up Note  Anjel Hill 62 y o  female MRN: [id number]  Encounter: [id number]          ASSESSMENT AND PLAN:        1  Cirrhosis of the liver without ascites  - I will order US of the liver  - Low sodium diet encouraged  - I recommend she avoid use of NSAID medications  However, she does have an upcoming dental procedure, I advised her to closely monitor her NSAID and other pain medication use during this time to avoid decompensation of her liver  - Refill of lactulose given to take 15 mL PO twice daily    -Refill of spironolactone 50 mg given to take once daily  - Refill of Lasix 20 mg given to take once daily  - I will order labs, including PT INR, CMP, CBC and iron panel  2  Alcoholic hepatitis without ascites  She has been sober for one year  She continues to avoid alcohol consumption  3  Encephalopathy, portal systemic ([address] )  -rifaximin and lactulose with a goal of 2-3 bowel movements daily which she has achieving at this time      She is overall improving in terms of her overall liver disease in her MELD is 9 from 11 in the past   She is planning for 2 surgery we have discussed risk and benefit of undergoing procedures in setting of cirrhosis  Overall doing well at this time and has been well compensated    She has gained weight over the last several weeks to discuss importance of weight loss as fatty liver disease will cause worsening of her liver disease  Fortunately she has stopped drinking for almost over 1 year now  Will update records regarding [facility]  screening  Repeat EGD in 1 1/2 years for further evaluation  of history esophageal varices  _____________________________________________________    SUBJECTIVE:  Kamilla Pickens is a 62 y o  female with alcoholic liver cirrhosis here for follow-up  She recently celebrated her one-year anniversary of being sober  Because of her sobriety, she has been eating more food as a phycological way to avoid urges to drink alcohol  Due to this, she has gained some weight, approximately 16 pounds, since we last saw her in May  He is currently taking lactulose rifaximin and spironolactone as prescribed  She hHer BM have been normal, and she has no other complaints at this time  MRI showed cirrhosis in January  EGD last year did not show esophageal varices  She has a history of anemia, and her hgb is increasing and is now 11 4  REVIEW OF SYSTEMS IS OTHERWISE NEGATIVE  Historical Information   Past Medical History:   Diagnosis Date    Alcohol use disorder     Alcoholic hepatitis     Anastomotic ulcer     Anemia     Anxiety     Ascites     [facility] )     Cancer (HCC)     breast    Chronic foot pain     Chronic narcotic dependence (HCC)     Chronic pain disorder     Cirrhosis, alcoholic (HCC)     Depression     Esophagitis     History of bilateral mastectomy     History of gastric bypass     Hypertension     Liver disease     Morbid obesity (HCC)     Palpitations     Pancytopenia (HCC)     Peripheral neuropathy     Rosacea      Past Surgical History:   Procedure Laterality Date    ABDOMINAL SURGERY      abdominoplasty    BREAST SURGERY      mastectomy    CHOLECYSTECTOMY      ESOPHAGOGASTRODUODENOSCOPY N/A 8/2/2017    Procedure: ESOPHAGOGASTRODUODENOSCOPY (EGD);   Surgeon: Chilango Mcdonald DO;  Location:  MAIN OR;  Service: Gastroenterology    GASTRIC BYPASS      HERNIA REPAIR      MASTECTOMY      [place] ESOPHAGOGASTRODUODENOSCOPY TRANSORAL DIAGNOSTIC N/A 5/18/2017    Procedure: ESOPHAGOGASTRODUODENOSCOPY (EGD) with bx;  Surgeon: Chaz Jama MD;  [facility]; Service: Gastroenterology    [place] ESOPHAGOGASTRODUODENOSCOPY TRANSORAL DIAGNOSTIC N/A 9/14/2017    Procedure: EGD with bx AND COLONOSCOPY with polypectomy;  Surgeon: Chaz Jama MD;  [facility]; Service: Gastroenterology    DECLAN-EN-Y PROCEDURE  2005     Social History   History   Alcohol Use No     Comment: quit 08/02/2017     History   Drug Use No     History   Smoking Status    Never Smoker   Smokeless Tobacco    Never Used     Family History   Problem Relation Age of Onset    Diabetes Mother     Alzheimer's disease Mother     Kidney disease Father     Hypertension Father     Obesity Sister     Fibrocystic breast disease Sister        Meds/Allergies       Current Outpatient Prescriptions:     Calcium-Magnesium-Vitamin D (CALCIUM 500 PO)    Cholecalciferol (VITAMIN D3) 5000 units TABS    folic acid (FOLVITE) 1 mg tablet    furosemide (LASIX) 20 mg tablet    lactulose 20 g/30 mL    Multiple Vitamins-Minerals (MULTIVITAMIN WITH MINERALS) tablet    pregabalin (LYRICA) 300 MG capsule    Riboflavin 100 MG CAPS    rifaximin (XIFAXAN) 200 mg tablet    spironolactone (ALDACTONE) 50 mg tablet    Thiamine HCl (VITAMIN B-1 PO)    Allergies   Allergen Reactions    Acetaminophen Other (See Comments)     Liver function, s/p bariatric surgery    Cymbalta [Duloxetine Hcl] GI Intolerance    Duloxetine GI Intolerance           Objective     Blood pressure 128/72, pulse 95, temperature 97 8 °F (36 6 °C), temperature source Tympanic, height 5' 3 5" (1 613 m), weight 93 4 kg (206 lb), not currently breastfeeding  Body mass index is 35 92 kg/m²        PHYSICAL EXAM:      General Appearance:   Alert, cooperative, no distress   HEENT:   Normocephalic, atraumatic, anicteric      Neck:  Supple, symmetrical, trachea midline   Lungs:   Clear to auscultation bilaterally; no rales, rhonchi or wheezing; respirations unlabored    Heart[de-identified]   Regular rate and rhythm; no murmur, rub, or gallop  Abdomen:   Soft, non-tender, non-distended; normal bowel sounds; no masses, no organomegaly  Midline scar  Genitalia:   Deferred    Rectal:   Deferred    Extremities:  No cyanosis, clubbing  Mild pitting edema to bilateral lower extremities  Pulses:  2+ and symmetric    Skin:  No jaundice, rashes, or lesions    Lymph nodes:  No palpable cervical lymphadenopathy        Lab Results:   No visits with results within 1 Day(s) from this visit     Latest known visit with results is:   Appointment on 08/21/2018   Component Date Value    WBC 08/21/2018 4 05*    RBC 08/21/2018 3 85     Hemoglobin 08/21/2018 11 4*    Hematocrit 08/21/2018 34 7*    MCV 08/21/2018 90     MCH 08/21/2018 29 6     MCHC 08/21/2018 32 9     RDW 08/21/2018 15 4*    MPV 08/21/2018 10 9     Platelets 52/30/0962 74*    nRBC 08/21/2018 0     Neutrophils Relative 08/21/2018 55     Immat GRANS % 08/21/2018 0     Lymphocytes Relative 08/21/2018 30     Monocytes Relative 08/21/2018 10     Eosinophils Relative 08/21/2018 4     Basophils Relative 08/21/2018 1     Neutrophils Absolute 08/21/2018 2 25     Immature Grans Absolute 08/21/2018 0 01     Lymphocytes Absolute 08/21/2018 1 21     Monocytes Absolute 08/21/2018 0 42     Eosinophils Absolute 08/21/2018 0 14     Basophils Absolute 08/21/2018 0 02     Sodium 08/21/2018 138     Potassium 08/21/2018 4 2     Chloride 08/21/2018 107     CO2 08/21/2018 26     ANION GAP 08/21/2018 5     BUN 08/21/2018 13     Creatinine 08/21/2018 0 93     Glucose 08/21/2018 68     Calcium 08/21/2018 9 6     AST 08/21/2018 47*    ALT 08/21/2018 37     Alkaline Phosphatase 08/21/2018 173*    Total Protein 08/21/2018 7 0     Albumin 08/21/2018 2 9*    Total Bilirubin 08/21/2018 0 90     eGFR 08/21/2018 68     Ferritin 08/21/2018 31     Iron Saturation 08/21/2018 20     TIBC 08/21/2018 408     Iron 08/21/2018 81     Methylmalonic Acid, S 08/21/2018 403*    Disclaimer: 08/21/2018 Comment     Protime 08/21/2018 15 7*    INR 08/21/2018 1 33*    Bilirubin, Direct 08/21/2018 0 41*         Radiology Results:   No results found  Attestation:   By signing my name below, I, [facility], attest that this documentation has been prepared under the direction and in the presence of Jaspreet Justin MD Electronically Signed: [facility], [place]  10/8/2018    I, Jaspreet Justin, personally performed the services described in this documentation  All medical record entries made by the scribe were at my direction and in my presence  I have reviewed the chart and discharge instructions and agree that the record reflects my personal performance and is accurate and complete  Jaspreet Justin MD  10/8/2018

## 2018-10-17 ENCOUNTER — TELEPHONE (OUTPATIENT)
Dept: OBGYN CLINIC | Facility: HOSPITAL | Age: 58
End: 2018-10-17

## 2018-10-17 DIAGNOSIS — M79.643 CHRONIC HAND PAIN, UNSPECIFIED LATERALITY: ICD-10-CM

## 2018-10-17 DIAGNOSIS — G89.29 CHRONIC FOOT PAIN, UNSPECIFIED LATERALITY: ICD-10-CM

## 2018-10-17 DIAGNOSIS — M79.673 CHRONIC FOOT PAIN, UNSPECIFIED LATERALITY: ICD-10-CM

## 2018-10-17 DIAGNOSIS — G89.29 CHRONIC HAND PAIN, UNSPECIFIED LATERALITY: ICD-10-CM

## 2018-10-17 DIAGNOSIS — G62.1 ALCOHOL-INDUCED POLYNEUROPATHY (HCC): ICD-10-CM

## 2018-10-17 RX ORDER — PREGABALIN 300 MG/1
300 CAPSULE ORAL 2 TIMES DAILY
Qty: 180 CAPSULE | Refills: 1 | Status: SHIPPED | OUTPATIENT
Start: 2018-10-17 | End: 2018-10-17 | Stop reason: SDUPTHER

## 2018-10-17 RX ORDER — PREGABALIN 300 MG/1
300 CAPSULE ORAL 2 TIMES DAILY
Qty: 180 CAPSULE | Refills: 1 | Status: SHIPPED | OUTPATIENT
Start: 2018-10-17 | End: 2019-04-10 | Stop reason: SDUPTHER

## 2018-10-17 NOTE — TELEPHONE ENCOUNTER
Patient sees Sukumar Zapata   [phone]    Patient is calling because she is in need of refill of lyrica 300 mg  Patient is stating that she needs 3 months/180 tablets  Patient only has enough 2 weeks of pills remaining  Please send to mail order [facility]  Please call the patient when this is sent in

## 2018-10-17 NOTE — TELEPHONE ENCOUNTER
S/w pt, [facility] not have her rx of 10/3 for lyrica  Confirmed w/ pt, no changes in prescription coverage, pharmacy, health insurance  Advised pt, this office will fu  Please fu w/ [facility] as well later today and cb if there is any question or issue  Pt verbalized understanding and appreciation

## 2018-10-25 ENCOUNTER — TELEPHONE (OUTPATIENT)
Dept: NEUROLOGY | Facility: CLINIC | Age: 58
End: 2018-10-25

## 2018-11-13 ENCOUNTER — OFFICE VISIT (OUTPATIENT)
Dept: NEUROLOGY | Facility: CLINIC | Age: 58
End: 2018-11-13
Payer: OTHER GOVERNMENT

## 2018-11-13 VITALS
DIASTOLIC BLOOD PRESSURE: 64 MMHG | BODY MASS INDEX: 36.82 KG/M2 | WEIGHT: 215.7 LBS | SYSTOLIC BLOOD PRESSURE: 136 MMHG | HEIGHT: 64 IN | HEART RATE: 96 BPM

## 2018-11-13 DIAGNOSIS — M79.643 CHRONIC HAND PAIN, UNSPECIFIED LATERALITY: ICD-10-CM

## 2018-11-13 DIAGNOSIS — G89.4 PAIN SYNDROME, CHRONIC: ICD-10-CM

## 2018-11-13 DIAGNOSIS — G89.29 CHRONIC HAND PAIN, UNSPECIFIED LATERALITY: ICD-10-CM

## 2018-11-13 DIAGNOSIS — G62.1 ALCOHOL-INDUCED POLYNEUROPATHY (HCC): ICD-10-CM

## 2018-11-13 DIAGNOSIS — G89.29 CHRONIC FOOT PAIN, UNSPECIFIED LATERALITY: ICD-10-CM

## 2018-11-13 DIAGNOSIS — M79.673 CHRONIC FOOT PAIN, UNSPECIFIED LATERALITY: ICD-10-CM

## 2018-11-13 PROCEDURE — 99244 OFF/OP CNSLTJ NEW/EST MOD 40: CPT | Performed by: PSYCHIATRY & NEUROLOGY

## 2018-11-13 RX ORDER — RIFAXIMIN 550 MG/1
TABLET ORAL EVERY 12 HOURS SCHEDULED
COMMUNITY
Start: 2018-08-20 | End: 2019-08-29 | Stop reason: SDUPTHER

## 2018-11-13 RX ORDER — AMITRIPTYLINE HYDROCHLORIDE 25 MG/1
50 TABLET, FILM COATED ORAL
Qty: 60 TABLET | Refills: 2 | Status: SHIPPED | OUTPATIENT
Start: 2018-11-13 | End: 2019-04-10

## 2018-11-13 NOTE — PROGRESS NOTES
Patient ID: Wan Willson is a 62 y o  female  Assessment/Plan:    Peripheral neuropathy  I believe this patient has neuropathy affecting small more than the large number fibers, supported by the acral sensory loss up to ankles bilaterally with decreased ankle reflexes  The most likely etiology of her neuropathy is likely her longstanding history of alcohol use  We talked about other etiologies of neuropathy, and I have recommended some additional blood work as noted below  She already has had blood work for vitamin B12, vitamin-D, SAADIA, lyme, RF which has been unremarkable, thyroid has been normal in the past   There is no reason to suspect this to be of autoimmune origin and there is no role for immunotherapy at this time  From a symptom management, she is currently on Lyrica 300 mg twice a day which is essentially the maximum dose  I recommended we add amitriptyline, she will start at 25 mg at bedtime and increase to 50 mg if needed  The dose of this can be increased further  I also recommended compound topical ointment to be used for her feet, [facility]  If she is not able to tolerate the amitriptyline, we can consider Topamax  She has already tried Cymbalta, and unfortunately had side effects  I will see her back in 3-4 months for a follow-up visit, and she has my contact information for any questions or concerns in the interim  Diagnoses and all orders for this visit:    [facility]  -     Ambulatory referral to Neurology  -     Copper Level; Future  -     Glucose, Fasting + Postprandial 2 Hour; Future  -     EMG 1 Upper/1 Lower Neuropathy; Future  -     Sedimentation rate, automated; Future  -     Protein electrophoresis, serum; Future  -     amitriptyline (ELAVIL) 25 mg tablet;  Take 2 tablets (50 mg total) by mouth daily at bedtime    Thank you for allowing me to participate in her care, and please do not hesitate to contact me for any questions or concerns  Subjective:    HPI  She has had neuropathy symptoms since 2012, it started with burning sensation in her feet> With time, she feels the sensations are creeping up  In the last year, she has been getting muscle cramps in the legs, but also in her upper back, arms, hips, hamstrings, can be anywhere  Hands have been affected  The cramps are very painful, and can last for up to 5 minutes, helps to walk it off or massage the area  Has tingling sensations in the finger tips  Gets sharp/stabbing sensations in the legs  Does not have the buzzing sensation in the feet, as she had before  Some days are bad, it hurts to evben walk on them  Has some numbness in the feet  Balance is affected, cannot close her eyes in the shower  No major falls  Is unsteady on her feet, is very cautious, no tripping on a daily basis  No major weakness  Also has h/o bariatric surgery in 2004, (has gained 30 lbs since January of this year)  Has been on lyrica 300 mg bid  Has been on amitriptyline (did not help), cymbalta (did help, but become allergic, had severe nausea and even blood in vomiting, Did have liver issues at the time)  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  Has had blood work for hepatitis panel, which was neg  She has had blood work for vitamin B12, vitamin-D, Lyme, CCP, rheumatoid factor, SAADIA, CBC, comprehensive metabolic panel which were all unremarkable  The following portions of the patient's history were reviewed and updated as appropriate: allergies, current medications, past family history, past medical history, past social history, past surgical history and problem list     She has h/o alcohol abuse, quit a year or so ago  Objective:    Blood pressure 136/64, pulse 96, height 5' 3 5" (1 613 m), weight 97 8 kg (215 lb 11 2 oz), not currently breastfeeding  Physical Exam  General exam: Pt was awake, alert and oriented     HEENT: atraumatic, normocephalic  Normal oral mucosa, neck was supple, no lymphadenopathy  Normal peripheral pulses  Extremities did not show any edema or cyanosis  Neurological Exam  Neurologically, pt was awake and alert  Speech was normal, no dysarthria or aphasia  Cranial nerve exam showed normal extraocular movements, no nystagmus or diplopia  There was no ptosis at baseline or with sustained upward gaze  Strength of eye closure muscles was normal   Facial sensations were normal bilaterally  No facial weakness, able to blow out the cheeks and push the tongue in the cheeks well  No tongue atrophy or fasciculations  Motor exam revealed normal tone and muscle bulk  There was no atrophy, scapular winging,high arches, hammertoes, shortening of achilles tendons or any other features of neuromuscular disease  Muscle strength was normal in neck flexors and extensors, and all muscle groups in both upper and lower extremities  Reflexes were graded as 2+ in the upper extremities 1 at the knees, and absent at the ankles  Toes were downgoing  There was no exaggerated jaw jerk or rivera's sign  No ankle clonus  Sensory exam revealed length dependent, decreased sensation to pin and temp up to ankles to lower 1/3 of legs  Vibration was mildly reduced at toes  Proprioception was intact  Gait was normal and casual          ROS:  I reviewed the below ROS and what is mentioned in HPI, the remainder of ROS was negative  Review of Systems   Constitutional: Negative  Negative for appetite change and fever  HENT: Negative  Negative for hearing loss, tinnitus, trouble swallowing and voice change  Eyes: Negative  Negative for photophobia and pain  Respiratory: Negative  Negative for shortness of breath  Cardiovascular: Negative  Negative for palpitations  Gastrointestinal: Negative  Negative for nausea and vomiting  Endocrine: Negative  Negative for cold intolerance and heat intolerance  Genitourinary: Negative  Negative for dysuria, frequency and urgency  Musculoskeletal: Negative  Negative for myalgias and neck pain  Skin: Negative  Negative for rash  Neurological: Positive for numbness  Negative for dizziness, tremors, seizures, syncope, facial asymmetry, speech difficulty, weakness, light-headedness and headaches  Tingling in both her hands  No feeling in both her feet but the right one is worse   Hematological: Negative  Does not bruise/bleed easily  Psychiatric/Behavioral: Negative  Negative for confusion, hallucinations and sleep disturbance

## 2018-11-13 NOTE — ASSESSMENT & PLAN NOTE
I believe this patient has neuropathy affecting small more than the large number fibers, supported by the acral sensory loss up to ankles bilaterally with decreased ankle reflexes  The most likely etiology of her neuropathy is likely her longstanding history of alcohol use  We talked about other etiologies of neuropathy, and I have recommended some additional blood work as noted below  She already has had blood work for vitamin B12, vitamin-D, SAADIA, lyme, RF which has been unremarkable, thyroid has been normal in the past   There is no reason to suspect this to be of autoimmune origin and there is no role for immunotherapy at this time  From a symptom management, she is currently on Lyrica 300 mg twice a day which is essentially the maximum dose  I recommended we add amitriptyline, she will start at 25 mg at bedtime and increase to 50 mg if needed  The dose of this can be increased further  I also recommended compound topical ointment to be used for her feet, the [facility]  If she is not able to tolerate the amitriptyline, we can consider Topamax  She has already tried Cymbalta, and unfortunately had side effects  I will see her back in 3-4 months for a follow-up visit, and she has my contact information for any questions or concerns in the interim

## 2018-11-13 NOTE — LETTER
November 13, 2018     [address]  [address] [id number]    Patient: Kamilla Pickens   YOB: 1960   Date of Visit: 11/13/2018       Dear Dr Yolanda Raman:    Thank you for referring Deysi Coulter to me for evaluation  Below are my notes for this consultation  If you have questions, please do not hesitate to call me  I look forward to following your patient along with you  Sincerely,        Zee Oneill MD        CC: MD Zee Lockwood MD  11/13/2018  2:55 PM  Sign at close encounter  Patient ID: Kamilla Pickens is a 62 y o  female  Assessment/Plan:    Peripheral neuropathy  I believe this patient has neuropathy affecting small more than the large number fibers, supported by the acral sensory loss up to ankles bilaterally with decreased ankle reflexes  The most likely etiology of her neuropathy is likely her longstanding history of alcohol use  We talked about other etiologies of neuropathy, and I have recommended some additional blood work as noted below  She already has had blood work for vitamin B12, vitamin-D, SAADIA, lyme, RF which has been unremarkable, thyroid has been normal in the past   There is no reason to suspect this to be of autoimmune origin and there is no role for immunotherapy at this time  From a symptom management, she is currently on Lyrica 300 mg twice a day which is essentially the maximum dose  I recommended we add amitriptyline, she will start at 25 mg at bedtime and increase to 50 mg if needed  The dose of this can be increased further  I also recommended compound topical ointment to be used for her feet, the [facility]  If she is not able to tolerate the amitriptyline, we can consider Topamax  She has already tried Cymbalta, and unfortunately had side effects        I will see her back in 3-4 months for a follow-up visit, and she has my contact information for any questions or concerns in the interim  Diagnoses and all orders for this visit:    Alcohol-induced polyneuropathy ([facility] )  -     Ambulatory referral to Neurology  -     Copper Level; Future  -     Glucose, Fasting + Postprandial 2 Hour; Future  -     EMG 1 Upper/1 Lower Neuropathy; Future  -     Sedimentation rate, automated; Future  -     Protein electrophoresis, serum; Future  -     amitriptyline (ELAVIL) 25 mg tablet; Take 2 tablets (50 mg total) by mouth daily at bedtime    Thank you for allowing me to participate in her care, and please do not hesitate to contact me for any questions or concerns  Subjective:    HPI  She has had neuropathy symptoms since 2012, it started with burning sensation in her feet> With time, she feels the sensations are creeping up  In the last year, she has been getting muscle cramps in the legs, but also in her upper back, arms, hips, hamstrings, can be anywhere  Hands have been affected  The cramps are very painful, and can last for up to 5 minutes, helps to walk it off or massage the area  Has tingling sensations in the finger tips  Gets sharp/stabbing sensations in the legs  Does not have the buzzing sensation in the feet, as she had before  Some days are bad, it hurts to evben walk on them  Has some numbness in the feet  Balance is affected, cannot close her eyes in the shower  No major falls  Is unsteady on her feet, is very cautious, no tripping on a daily basis  No major weakness  Also has h/o bariatric surgery in 2004, (has gained 30 lbs since January of this year)  Has been on lyrica 300 mg bid  Has been on amitriptyline (did not help), cymbalta (did help, but become allergic, had severe nausea and even blood in vomiting, Did have liver issues at the time)  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  Has had blood work for hepatitis panel, which was neg       She has had blood work for vitamin B12, vitamin-D, Lyme, CCP, rheumatoid factor, SAADIA, CBC, comprehensive metabolic panel which were all unremarkable  The following portions of the patient's history were reviewed and updated as appropriate: allergies, current medications, past family history, past medical history, past social history, past surgical history and problem list     She has h/o alcohol abuse, quit a year or so ago  Objective:    Blood pressure 136/64, pulse 96, height 5' 3 5" (1 613 m), weight 97 8 kg (215 lb 11 2 oz), not currently breastfeeding  Physical Exam  General exam: Pt was awake, alert and oriented  HEENT: atraumatic, normocephalic  Normal oral mucosa, neck was supple, no lymphadenopathy  Normal peripheral pulses  Extremities did not show any edema or cyanosis  Neurological Exam  Neurologically, pt was awake and alert  Speech was normal, no dysarthria or aphasia  Cranial nerve exam showed normal extraocular movements, no nystagmus or diplopia  There was no ptosis at baseline or with sustained upward gaze  Strength of eye closure muscles was normal   Facial sensations were normal bilaterally  No facial weakness, able to blow out the cheeks and push the tongue in the cheeks well  No tongue atrophy or fasciculations  Motor exam revealed normal tone and muscle bulk  There was no atrophy, scapular winging,high arches, hammertoes, shortening of achilles tendons or any other features of neuromuscular disease  Muscle strength was normal in neck flexors and extensors, and all muscle groups in both upper and lower extremities  Reflexes were graded as 2+ in the upper extremities 1 at the knees, and absent at the ankles  Toes were downgoing  There was no exaggerated jaw jerk or rivera's sign  No ankle clonus  Sensory exam revealed length dependent, decreased sensation to pin and temp up to ankles to lower 1/3 of legs  Vibration was mildly reduced at toes  Proprioception was intact     Gait was normal and casual          ROS:  I reviewed the below ROS and what is mentioned in HPI, the remainder of ROS was negative  Review of Systems   Constitutional: Negative  Negative for appetite change and fever  HENT: Negative  Negative for hearing loss, tinnitus, trouble swallowing and voice change  Eyes: Negative  Negative for photophobia and pain  Respiratory: Negative  Negative for shortness of breath  Cardiovascular: Negative  Negative for palpitations  Gastrointestinal: Negative  Negative for nausea and vomiting  Endocrine: Negative  Negative for cold intolerance and heat intolerance  Genitourinary: Negative  Negative for dysuria, frequency and urgency  Musculoskeletal: Negative  Negative for myalgias and neck pain  Skin: Negative  Negative for rash  Neurological: Positive for numbness  Negative for dizziness, tremors, seizures, syncope, facial asymmetry, speech difficulty, weakness, light-headedness and headaches  Tingling in both her hands  No feeling in both her feet but the right one is worse   Hematological: Negative  Does not bruise/bleed easily  Psychiatric/Behavioral: Negative  Negative for confusion, hallucinations and sleep disturbance

## 2018-11-14 ENCOUNTER — TELEPHONE (OUTPATIENT)
Dept: HEMATOLOGY ONCOLOGY | Facility: HOSPITAL | Age: 58
End: 2018-11-14

## 2018-11-14 NOTE — TELEPHONE ENCOUNTER
PATIENT CALLED IF YOU ARE WORKING ON GETTING HER MORE INFUSION DATES ADDED , SHE CALLED THE INFUSION AND THEY SAID THERE IS NO ORDER

## 2018-11-15 ENCOUNTER — TRANSCRIBE ORDERS (OUTPATIENT)
Dept: ADMINISTRATIVE | Facility: HOSPITAL | Age: 58
End: 2018-11-15

## 2018-11-15 ENCOUNTER — HOSPITAL ENCOUNTER (OUTPATIENT)
Dept: ULTRASOUND IMAGING | Facility: HOSPITAL | Age: 58
Discharge: HOME | End: 2018-11-15
Attending: INTERNAL MEDICINE
Payer: OTHER GOVERNMENT

## 2018-11-15 ENCOUNTER — TELEPHONE (OUTPATIENT)
Dept: GASTROENTEROLOGY | Facility: AMBULARY SURGERY CENTER | Age: 58
End: 2018-11-15

## 2018-11-15 DIAGNOSIS — K74.60 CIRRHOSIS OF LIVER WITHOUT ASCITES, UNSPECIFIED HEPATIC CIRRHOSIS TYPE (HCC): ICD-10-CM

## 2018-11-15 PROCEDURE — 76705 ECHO EXAM OF ABDOMEN: CPT

## 2018-11-15 NOTE — TELEPHONE ENCOUNTER
DR Asaf Knapp radiology called requesting pt us liver order fax  Pt is currently at appt   [phone]

## 2018-11-16 DIAGNOSIS — K76.9 LIVER LESION: Primary | ICD-10-CM

## 2018-11-19 ENCOUNTER — TELEPHONE (OUTPATIENT)
Dept: GASTROENTEROLOGY | Facility: MEDICAL CENTER | Age: 58
End: 2018-11-19

## 2018-11-19 DIAGNOSIS — K76.9 LIVER LESION: Primary | ICD-10-CM

## 2018-11-19 NOTE — TELEPHONE ENCOUNTER
----- Message from Oksana Chandler MD sent at 11/19/2018  8:03 AM EST -----  Please have patient undergo follow-up MRI as recommended by possible liver lesion  Liver lesion is too small to be characterize on ultrasound and may be benign but we need to rule this out    Please also patient get labs thank

## 2018-11-19 NOTE — TELEPHONE ENCOUNTER
Pt called; gave results    - Pt will call central scheduling to schedule MRI   - Patient is aware of labs- I faxed these scripts to the lab at [phone]

## 2018-11-27 ENCOUNTER — HOSPITAL ENCOUNTER (OUTPATIENT)
Dept: INFUSION CENTER | Facility: HOSPITAL | Age: 58
Discharge: HOME | End: 2018-11-27

## 2018-11-28 ENCOUNTER — TELEPHONE (OUTPATIENT)
Dept: NEUROLOGY | Facility: CLINIC | Age: 58
End: 2018-11-28

## 2018-11-28 DIAGNOSIS — M79.2 NEUROPATHIC PAIN: Primary | ICD-10-CM

## 2018-11-28 RX ORDER — TOPIRAMATE 25 MG/1
25 CAPSULE, COATED PELLETS ORAL
Qty: 30 CAPSULE | Refills: 1 | Status: SHIPPED | OUTPATIENT
Start: 2018-11-28 | End: 2019-01-11 | Stop reason: SDUPTHER

## 2018-11-28 NOTE — TELEPHONE ENCOUNTER
Patient stated she cannot tolerate the amitriptyline (she just slept the entire time after taking it) and is requesting to try the topamax instead  She is requesting rx be sent to [facility] on file

## 2018-12-03 NOTE — TELEPHONE ENCOUNTER
Pt called to provide update  States doing well on Topamax 25 mg hs  Pt was able to walk for an hour yesterday  "It's a miracle" She also wanted to make you aware that compound cream was approved by insurance  Improvement in tingling noted with cream  Pt to call us if anything changes  Lastly, she wanted to thank you for helping her   States "she has done more for me than any other doctor "

## 2018-12-25 RX ORDER — CYANOCOBALAMIN 1000 UG/ML
1000 INJECTION INTRAMUSCULAR; SUBCUTANEOUS ONCE
Status: COMPLETED | OUTPATIENT
Start: 2018-12-27 | End: 2018-12-27

## 2018-12-27 ENCOUNTER — HOSPITAL ENCOUNTER (OUTPATIENT)
Dept: INFUSION CENTER | Facility: HOSPITAL | Age: 58
Discharge: HOME | End: 2018-12-27
Payer: OTHER GOVERNMENT

## 2018-12-27 PROCEDURE — 96372 THER/PROPH/DIAG INJ SC/IM: CPT

## 2018-12-27 RX ADMIN — CYANOCOBALAMIN 1000 MCG: 1000 INJECTION, SOLUTION INTRAMUSCULAR at 13:05

## 2018-12-27 NOTE — PROGRESS NOTES
Pt here for B12; given in the LD with no adverse reactions; bandaid dry and intact; pt d/c'd home ambulatory with steady gait

## 2019-01-11 DIAGNOSIS — M79.2 NEUROPATHIC PAIN: ICD-10-CM

## 2019-01-11 RX ORDER — TOPIRAMATE 25 MG/1
25 CAPSULE, COATED PELLETS ORAL
Qty: 30 CAPSULE | Refills: 3 | Status: SHIPPED | OUTPATIENT
Start: 2019-01-11 | End: 2019-06-12 | Stop reason: SDUPTHER

## 2019-01-11 NOTE — TELEPHONE ENCOUNTER
*Dr Brittani Landis patient*    Patient called requesting Topamax refill  Orders entered, please approve if appropriate  Patient is not taking Elavil any longer only using Topamax

## 2019-01-14 ENCOUNTER — TELEPHONE (OUTPATIENT)
Dept: HEMATOLOGY ONCOLOGY | Facility: HOSPITAL | Age: 59
End: 2019-01-14

## 2019-01-14 NOTE — TELEPHONE ENCOUNTER
FYI: Patient called to reschedule the appointment for 01/16 to 02/13/2019 for 2:20 pm due not having labs done and being out of town  Patient plans on having blood work done on 02/11/2019

## 2019-01-24 ENCOUNTER — TELEPHONE (OUTPATIENT)
Dept: GASTROENTEROLOGY | Facility: AMBULARY SURGERY CENTER | Age: 59
End: 2019-01-24

## 2019-01-29 NOTE — TELEPHONE ENCOUNTER
Patient states she is living in [facility] currently and it is hard for her to get to this area for testing  She will be in the area soon and will complete ordered blood work  She has 2/13/19 appt with Dr Margarita Tillman and will reschedule MRI  She is aware that [facility] will be contacting her to follow up and will return their call to update

## 2019-02-13 ENCOUNTER — HOSPITAL ENCOUNTER (OUTPATIENT)
Dept: MRI IMAGING | Facility: HOSPITAL | Age: 59
Discharge: HOME | End: 2019-02-13
Attending: INTERNAL MEDICINE
Payer: OTHER GOVERNMENT

## 2019-02-13 ENCOUNTER — OFFICE VISIT (OUTPATIENT)
Dept: HEMATOLOGY ONCOLOGY | Facility: HOSPITAL | Age: 59
End: 2019-02-13
Payer: OTHER GOVERNMENT

## 2019-02-13 VITALS
HEART RATE: 114 BPM | SYSTOLIC BLOOD PRESSURE: 116 MMHG | BODY MASS INDEX: 38.09 KG/M2 | OXYGEN SATURATION: 98 % | TEMPERATURE: 97.7 F | DIASTOLIC BLOOD PRESSURE: 72 MMHG | WEIGHT: 215 LBS | HEIGHT: 63 IN | RESPIRATION RATE: 16 BRPM

## 2019-02-13 DIAGNOSIS — D50.9 IRON DEFICIENCY ANEMIA, UNSPECIFIED IRON DEFICIENCY ANEMIA TYPE: Primary | ICD-10-CM

## 2019-02-13 DIAGNOSIS — K70.10 ALCOHOLIC HEPATITIS WITHOUT ASCITES: ICD-10-CM

## 2019-02-13 DIAGNOSIS — D69.6 THROMBOCYTOPENIA (HCC): ICD-10-CM

## 2019-02-13 DIAGNOSIS — K76.9 LIVER LESION: ICD-10-CM

## 2019-02-13 DIAGNOSIS — K28.9 ANASTOMOTIC ULCER: ICD-10-CM

## 2019-02-13 DIAGNOSIS — K52.9 COLITIS: ICD-10-CM

## 2019-02-13 DIAGNOSIS — K70.30 ALCOHOLIC CIRRHOSIS OF LIVER WITHOUT ASCITES (HCC): ICD-10-CM

## 2019-02-13 DIAGNOSIS — K76.0 FATTY LIVER: ICD-10-CM

## 2019-02-13 DIAGNOSIS — K76.9 LIVER DISEASE: ICD-10-CM

## 2019-02-13 DIAGNOSIS — K74.60 CIRRHOSIS OF LIVER WITHOUT ASCITES, UNSPECIFIED HEPATIC CIRRHOSIS TYPE (HCC): ICD-10-CM

## 2019-02-13 DIAGNOSIS — Z98.84 HISTORY OF GASTRIC BYPASS: ICD-10-CM

## 2019-02-13 PROCEDURE — 99214 OFFICE O/P EST MOD 30 MIN: CPT | Performed by: INTERNAL MEDICINE

## 2019-02-13 PROCEDURE — 74183 MRI ABD W/O CNTR FLWD CNTR: CPT

## 2019-02-13 PROCEDURE — A9585 GADOBUTROL INJECTION: HCPCS | Performed by: INTERNAL MEDICINE

## 2019-02-13 RX ORDER — LACTULOSE 10 G/15ML
SOLUTION ORAL
Qty: 2700 ML | Refills: 3 | Status: ON HOLD | OUTPATIENT
Start: 2019-02-13 | End: 2019-07-06 | Stop reason: SDDI

## 2019-02-13 RX ADMIN — GADOBUTROL 10 ML: 604.72 INJECTION INTRAVENOUS at 16:08

## 2019-02-13 NOTE — PROGRESS NOTES
Hematology/Oncology Outpatient Follow- up Note  Adina Ward 61 y o  female MRN: @ Encounter: [id number]        Date:  2/13/2019    Presenting Complaint/Diagnosis :  Pancytopenia In the setting of cirrhosis  Also has a history of iron deficiency  Previous Hematologic/ Oncologic History:      Venofer    Current Hematologic/ Oncologic Treatment:      Patient gets B12 once a month    Interval History:    The patient returns for follow-up visit  Her blood work reflects iron deficiency anemia once again  Her hemoglobin was 9 0 with an MCV of 76  White count was 4 39 and platelet count was 79  The platelet count is probably related to her cirrhosis  However the anemia seems to be from iron deficiency  As far as symptoms are concerned she is fatigued  She does get short of breath on exertion  Denies any nausea denies any vomiting and diarrhea  The rest of her 14 point review of systems today was negative  Test Results:    Imaging: No results found  Labs:   Lab Results   Component Value Date    WBC 4 05 (L) 08/21/2018    HGB 11 4 (L) 08/21/2018    HCT 34 7 (L) 08/21/2018    MCV 90 08/21/2018    PLT 74 (L) 08/21/2018     Lab Results   Component Value Date    K 4 2 08/21/2018     08/21/2018    CO2 26 08/21/2018    BUN 13 08/21/2018    CREATININE 0 93 08/21/2018    GLUF 99 02/12/2018    CALCIUM 9 6 08/21/2018    AST 47 (H) 08/21/2018    ALT 37 08/21/2018    ALKPHOS 173 (H) 08/21/2018    EGFR 68 08/21/2018       Lab Results   Component Value Date    IRON 81 08/21/2018    TIBC 408 08/21/2018    FERRITIN 31 08/21/2018       Lab Results   Component Value Date    CPPRASZX14 115 (H) 06/15/2018         ROS: As stated in the history of present illness otherwise his 14 point review of systems today was negative        Active Problems:   Patient Active Problem List   Diagnosis    Anemia    Encephalopathy, portal systemic (HCC)    Cirrhosis with alcoholism ([facility] [address] )    History of alcohol abuse    History of gastric bypass    Thrombocytopenia (HCC)    Septic shock due to undetermined organism ([facility] [address] )    Colitis    Ascites    Right leg pain    Hypokalemia    Acute kidney injury ([facility] [address] [facility]    Alcohol use disorder    Alcoholic hepatitis    Anastomotic ulcer    Breast cancer (HCC)    Chronic anxiety    Chronic depression    Chronic foot pain    Chronic hand pain    Deconditioned low back    Dyspnea    Fatty liver    Foot fracture    Iron (Fe) deficiency anemia    Liver disease    Liver lesion    Pain syndrome, chronic    Peripheral neuropathy    Rosacea    Ulcers, marginal    Ventral hernia without obstruction or gangrene    Wound of abdomen    Cirrhosis of liver without ascites (HCC)       Past Medical History:   Past Medical History:   Diagnosis Date    Alcohol use disorder     Alcoholic hepatitis     Anastomotic ulcer     Anemia     Anxiety     Ascites     Breast cancer (HCC)     Cancer (HCC)     breast    Chronic foot pain     Chronic narcotic dependence (HCC)     Chronic pain disorder     Cirrhosis, alcoholic (HCC)     Depression     Esophagitis     History of bilateral mastectomy     History of gastric bypass     Hypertension     Liver disease     Morbid obesity (HCC)     Palpitations     Pancytopenia (HCC)     Peripheral neuropathy     Rosacea        Surgical History:   Past Surgical History:   Procedure Laterality Date    ABDOMINAL SURGERY      abdominoplasty    BREAST SURGERY      mastectomy    CHOLECYSTECTOMY      ESOPHAGOGASTRODUODENOSCOPY N/A 8/2/2017    Procedure: ESOPHAGOGASTRODUODENOSCOPY (EGD); Surgeon: Alonso Willams DO;  Location:  MAIN OR;  Service: Gastroenterology    GASTRIC BYPASS      HERNIA REPAIR      MASTECTOMY      [place] ESOPHAGOGASTRODUODENOSCOPY TRANSORAL DIAGNOSTIC N/A 5/18/2017    Procedure: ESOPHAGOGASTRODUODENOSCOPY (EGD) with bx;  Surgeon: Bobo Sterling MD;  Location: [facility];   Service: Gastroenterology    WI ESOPHAGOGASTRODUODENOSCOPY TRANSORAL DIAGNOSTIC N/A 9/14/2017    Procedure: EGD with bx AND COLONOSCOPY with polypectomy;  Surgeon: Clark Jeong MD;  Location: [facility]; Service: Gastroenterology    DECLAN-EN-Y PROCEDURE  2005       Family History:    Family History   Problem Relation Age of Onset    Diabetes Mother     Alzheimer's disease Mother     Kidney disease Father     Hypertension Father     Obesity Sister     Fibrocystic breast disease Sister        Cancer-related family history is not on file      Social History:   Social History     Socioeconomic History    Marital status: /Civil Union     Spouse name: Not on file    Number of children: Not on file    Years of education: Not on file    Highest education level: Not on file   Occupational History    Not on file   Social Needs    Financial resource strain: Not on file    Food insecurity:     Worry: Not on file     Inability: Not on file    Transportation needs:     Medical: Not on file     Non-medical: Not on file   Tobacco Use    Smoking status: Never Smoker    Smokeless tobacco: Never Used   Substance and Sexual Activity    Alcohol use: No     Comment: quit 08/02/2017    Drug use: No    Sexual activity: Not on file   Lifestyle    Physical activity:     Days per week: Not on file     Minutes per session: Not on file    Stress: Not on file   Relationships    Social connections:     Talks on phone: Not on file     Gets together: Not on file     Attends Hindu service: Not on file     Active member of club or organization: Not on file     Attends meetings of clubs or organizations: Not on file     Relationship status: Not on file    Intimate partner violence:     Fear of current or ex partner: Not on file     Emotionally abused: Not on file     Physically abused: Not on file     Forced sexual activity: Not on file   Other Topics Concern    Not on file   Social History Narrative    Not on file       Current Medications:   Current Outpatient Medications Medication Sig Dispense Refill    amitriptyline (ELAVIL) 25 mg tablet Take 2 tablets (50 mg total) by mouth daily at bedtime 60 tablet 2    Calcium-Magnesium-Vitamin D (CALCIUM 500 PO) Take by mouth      Cholecalciferol (VITAMIN D3) 5000 units TABS Take 5,000 Units by mouth daily      folic acid (FOLVITE) 1 mg tablet Take 1 tablet by mouth daily 30 tablet 0    lactulose (CHRONULAC) 10 g/15 mL solution TAKE AND SWALLOW 10 ML THREE TIMES A DAY, TITRATE NUMBER OF DAILY DOSES TO BE HAVING 2 TO 3 BOWEL MOVEMENTS DAILY  2700 mL 3    Multiple Vitamins-Minerals (MULTIVITAMIN WITH MINERALS) tablet Take 1 tablet by mouth daily      pregabalin (LYRICA) 300 MG capsule Take 1 capsule (300 mg total) by mouth 2 (two) times a day 180 capsule 1    Riboflavin 100 MG CAPS Take 300 capsules by mouth Takes 300 mg every other day      Thiamine HCl (VITAMIN B-1 PO) Take 200 mg by mouth daily        topiramate (TOPAMAX) 25 mg sprinkle capsule Take 1 capsule (25 mg total) by mouth daily at bedtime 30 capsule 3    XIFAXAN 550 MG tablet       furosemide (LASIX) 20 mg tablet Take 1 tablet (20 mg total) by mouth daily for 30 days 30 tablet 5    spironolactone (ALDACTONE) 50 mg tablet Take 1 tablet (50 mg total) by mouth daily for 30 days 30 tablet 4     No current facility-administered medications for this visit  Allergies: Allergies   Allergen Reactions    Acetaminophen Other (See Comments)     Liver function, s/p bariatric surgery    Cymbalta [Duloxetine Hcl] GI Intolerance    Duloxetine GI Intolerance       Physical Exam:    There is no height or weight on file to calculate BSA      Wt Readings from Last 3 Encounters:   11/13/18 97 8 kg (215 lb 11 2 oz)   10/08/18 93 4 kg (206 lb)   10/03/18 92 5 kg (204 lb)        Temp Readings from Last 3 Encounters:   10/08/18 97 8 °F (36 6 °C) (Tympanic)   08/27/18 97 7 °F (36 5 °C) (Tympanic)   07/10/18 99 1 °F (37 3 °C) (Tympanic)        BP Readings from Last 3 Encounters: 11/13/18 136/64   10/08/18 128/72   10/03/18 128/68         Pulse Readings from Last 3 Encounters:   11/13/18 96   10/08/18 95   10/03/18 80        Physical Exam     Constitutional   General appearance: No acute distress, well appearing and well nourished  Eyes   Conjunctiva and lids: No swelling, erythema or discharge  Pupils and irises: Equal, round and reactive to light  Ears, Nose, Mouth, and Throat   External inspection of ears and nose: Normal     Nasal mucosa, septum, and turbinates: Normal without edema or erythema  Oropharynx: Normal with no erythema, edema, exudate or lesions  Pulmonary   Respiratory effort: No increased work of breathing or signs of respiratory distress  Auscultation of lungs: Clear to auscultation  Cardiovascular   Palpation of heart: Normal PMI, no thrills  Auscultation of heart: Normal rate and rhythm, normal S1 and S2, without murmurs  Examination of extremities for edema and/or varicosities: Normal     Carotid pulses: Normal     Abdomen   Abdomen: Non-tender, no masses  Liver and spleen: No hepatomegaly or splenomegaly  Lymphatic   Palpation of lymph nodes in neck: No lymphadenopathy  Musculoskeletal   Gait and station: Normal     Digits and nails: Normal without clubbing or cyanosis  Inspection/palpation of joints, bones, and muscles: Normal     Skin   Skin and subcutaneous tissue: Normal without rashes or lesions  Neurologic   Cranial nerves: Cranial nerves 2-12 intact  Sensation: No sensory loss  Psychiatric   Orientation to person, place, and time: Normal     Mood and affect: Normal         Assessment / Plan:      The patient is a pleasant 75-year-old female with a past medical history of cirrhosis who presented with pancytopenia  This is probably related to her cirrhosis  A bone marrow biopsy was done in the past which did not show any major abnormalities but did show iron deficiency   She received 4 doses of Venofer initially but then had a GI bleed  She got 8 more doses and her hemoglobin normalized  She then got more iron in April 2018  Her iron studies normalized again  Her most recent blood work shows iron deficiency so I will give her another 8 doses of Venofer along with B12  I'll see her back in 4 months  At that time if her numbers correct we will put her on a maintenance dose of Venofer every 2 months  Patient is in agreement with the plan  I'll see her back in 4 months  She understands the risk of recurrent iron deficiency and if she has symptoms again or if she has any bleeding or bruising or she has black stools she will call us immediately  This is especially true because of her history of cirrhosis  He was getting B12 every month but apparently has a 30 or co-pay to wishes to get this at her primary care physicians are somewhere where It will be cheaper  I advised her that was reasonable  Goals and Barriers:  Current Goal:  Prolong Survival from Iron deficiency anemia and cirrhosis  Barriers: None  Patient's Capacity to Self Care:  Patient able to self care  Portions of the record may have been created with voice recognition software   Occasional wrong word or "sound a like" substitutions may have occurred due to the inherent limitations of voice recognition software   Read the chart carefully and recognize, using context, where substitutions have occurred

## 2019-02-14 ENCOUNTER — TELEPHONE (OUTPATIENT)
Dept: GASTROENTEROLOGY | Facility: MEDICAL CENTER | Age: 59
End: 2019-02-14

## 2019-02-14 NOTE — TELEPHONE ENCOUNTER
----- Message from Chaz Jama MD sent at 2/14/2019  2:03 PM EST -----  Nodular changes in the liver is likely benign but they are recommending repeat MRI in 6 months  The lesion is too small to characterize due to its small size  But they are not very concerned regarding its we can continue monitor very closely with an MRI in 6 months

## 2019-02-14 NOTE — TELEPHONE ENCOUNTER
Dr Ty's patient   There are significant findings for patients MRI ready to be read in Epic   Also text

## 2019-02-15 RX ORDER — SODIUM CHLORIDE 9 MG/ML
20 INJECTION, SOLUTION INTRAVENOUS CONTINUOUS
Status: DISCONTINUED | OUTPATIENT
Start: 2019-02-20 | End: 2019-02-23 | Stop reason: HOSPADM

## 2019-02-20 ENCOUNTER — HOSPITAL ENCOUNTER (OUTPATIENT)
Dept: INFUSION CENTER | Facility: HOSPITAL | Age: 59
Discharge: HOME | End: 2019-02-20
Payer: OTHER GOVERNMENT

## 2019-02-20 VITALS
HEART RATE: 79 BPM | RESPIRATION RATE: 16 BRPM | SYSTOLIC BLOOD PRESSURE: 115 MMHG | TEMPERATURE: 98 F | DIASTOLIC BLOOD PRESSURE: 58 MMHG | OXYGEN SATURATION: 100 %

## 2019-02-20 PROCEDURE — 96365 THER/PROPH/DIAG IV INF INIT: CPT

## 2019-02-20 RX ADMIN — SODIUM CHLORIDE 20 ML/HR: 9 INJECTION, SOLUTION INTRAVENOUS at 08:52

## 2019-02-20 RX ADMIN — IRON SUCROSE 200 MG: 20 INJECTION, SOLUTION INTRAVENOUS at 08:52

## 2019-02-26 RX ORDER — SODIUM CHLORIDE 9 MG/ML
40 INJECTION, SOLUTION INTRAVENOUS ONCE
Status: COMPLETED | OUTPATIENT
Start: 2019-02-27 | End: 2019-02-27

## 2019-02-27 ENCOUNTER — TELEPHONE (OUTPATIENT)
Dept: GASTROENTEROLOGY | Facility: AMBULARY SURGERY CENTER | Age: 59
End: 2019-02-27

## 2019-02-27 ENCOUNTER — HOSPITAL ENCOUNTER (OUTPATIENT)
Dept: INFUSION CENTER | Facility: HOSPITAL | Age: 59
Discharge: HOME | End: 2019-02-27
Payer: OTHER GOVERNMENT

## 2019-02-27 VITALS
RESPIRATION RATE: 20 BRPM | SYSTOLIC BLOOD PRESSURE: 128 MMHG | HEART RATE: 99 BPM | OXYGEN SATURATION: 100 % | TEMPERATURE: 98.8 F | DIASTOLIC BLOOD PRESSURE: 72 MMHG

## 2019-02-27 PROCEDURE — 96365 THER/PROPH/DIAG IV INF INIT: CPT

## 2019-02-27 RX ORDER — SODIUM CHLORIDE 9 MG/ML
20 INJECTION, SOLUTION INTRAVENOUS CONTINUOUS
Status: DISCONTINUED | OUTPATIENT
Start: 2019-03-01 | End: 2019-03-02 | Stop reason: HOSPADM

## 2019-02-27 RX ADMIN — IRON SUCROSE 200 MG: 20 INJECTION, SOLUTION INTRAVENOUS at 12:53

## 2019-02-27 RX ADMIN — SODIUM CHLORIDE 40 ML/HR: 9 INJECTION, SOLUTION INTRAVENOUS at 12:53

## 2019-02-27 NOTE — PROGRESS NOTES
Venofer infused w/o adverse reaction  PIV dc'd, sm pressure dressg applied  Pt then d/c'd to home with steady gait accompanied by

## 2019-03-01 ENCOUNTER — HOSPITAL ENCOUNTER (OUTPATIENT)
Dept: INFUSION CENTER | Facility: HOSPITAL | Age: 59
Discharge: HOME | End: 2019-03-01
Payer: OTHER GOVERNMENT

## 2019-03-01 ENCOUNTER — TELEPHONE (OUTPATIENT)
Dept: HEMATOLOGY ONCOLOGY | Facility: CLINIC | Age: 59
End: 2019-03-01

## 2019-03-01 VITALS
OXYGEN SATURATION: 98 % | RESPIRATION RATE: 20 BRPM | HEART RATE: 99 BPM | TEMPERATURE: 98.6 F | SYSTOLIC BLOOD PRESSURE: 114 MMHG | DIASTOLIC BLOOD PRESSURE: 62 MMHG

## 2019-03-01 PROCEDURE — 96372 THER/PROPH/DIAG INJ SC/IM: CPT

## 2019-03-01 PROCEDURE — 96365 THER/PROPH/DIAG IV INF INIT: CPT

## 2019-03-01 RX ORDER — CYANOCOBALAMIN 1000 UG/ML
1000 INJECTION INTRAMUSCULAR; SUBCUTANEOUS ONCE
Status: COMPLETED | OUTPATIENT
Start: 2019-03-01 | End: 2019-03-01

## 2019-03-01 RX ADMIN — CYANOCOBALAMIN 1000 MCG: 1000 INJECTION, SOLUTION INTRAMUSCULAR at 10:07

## 2019-03-01 RX ADMIN — SODIUM CHLORIDE 20 ML/HR: 9 INJECTION, SOLUTION INTRAVENOUS at 09:13

## 2019-03-01 RX ADMIN — IRON SUCROSE 200 MG: 20 INJECTION, SOLUTION INTRAVENOUS at 09:13

## 2019-03-01 NOTE — TELEPHONE ENCOUNTER
Patient called to inform Dr Nydia Ramos that she wants a prescription fpr the Vitamin B12 shot sent to infusion once a month along with the Iron infusion starting next week    Please call back and advise  Thank you

## 2019-03-05 RX ORDER — SODIUM CHLORIDE 9 MG/ML
20 INJECTION, SOLUTION INTRAVENOUS CONTINUOUS
Status: DISCONTINUED | OUTPATIENT
Start: 2019-03-06 | End: 2019-03-09 | Stop reason: HOSPADM

## 2019-03-06 ENCOUNTER — HOSPITAL ENCOUNTER (OUTPATIENT)
Dept: INFUSION CENTER | Facility: HOSPITAL | Age: 59
Discharge: HOME | End: 2019-03-06
Payer: OTHER GOVERNMENT

## 2019-03-06 VITALS
HEART RATE: 72 BPM | OXYGEN SATURATION: 98 % | RESPIRATION RATE: 18 BRPM | TEMPERATURE: 97.8 F | SYSTOLIC BLOOD PRESSURE: 117 MMHG | DIASTOLIC BLOOD PRESSURE: 74 MMHG

## 2019-03-06 PROCEDURE — 96365 THER/PROPH/DIAG IV INF INIT: CPT

## 2019-03-06 RX ADMIN — IRON SUCROSE 200 MG: 20 INJECTION, SOLUTION INTRAVENOUS at 14:44

## 2019-03-06 RX ADMIN — SODIUM CHLORIDE 20 ML/HR: 9 INJECTION, SOLUTION INTRAVENOUS at 14:41

## 2019-03-06 NOTE — PROGRESS NOTES
Rec'd pt for Venofer infusion, in good spirits, w/o complaints  Several attempts made for PIV access before successful, tolerated well  Venofer infused w/o adverse reaction  PIV then dc'd and pt d/c' d to home with steady gait

## 2019-03-07 RX ORDER — SODIUM CHLORIDE 9 MG/ML
20 INJECTION, SOLUTION INTRAVENOUS CONTINUOUS
Status: DISCONTINUED | OUTPATIENT
Start: 2019-03-08 | End: 2019-03-11 | Stop reason: HOSPADM

## 2019-03-08 ENCOUNTER — HOSPITAL ENCOUNTER (OUTPATIENT)
Dept: INFUSION CENTER | Facility: HOSPITAL | Age: 59
Discharge: HOME | End: 2019-03-08
Payer: OTHER GOVERNMENT

## 2019-03-08 VITALS
TEMPERATURE: 97.8 F | OXYGEN SATURATION: 100 % | HEART RATE: 90 BPM | SYSTOLIC BLOOD PRESSURE: 121 MMHG | DIASTOLIC BLOOD PRESSURE: 70 MMHG | RESPIRATION RATE: 18 BRPM

## 2019-03-08 PROCEDURE — 96365 THER/PROPH/DIAG IV INF INIT: CPT

## 2019-03-08 RX ADMIN — SODIUM CHLORIDE 20 ML/HR: 9 INJECTION, SOLUTION INTRAVENOUS at 14:40

## 2019-03-08 RX ADMIN — IRON SUCROSE 200 MG: 20 INJECTION, SOLUTION INTRAVENOUS at 14:45

## 2019-03-11 RX ORDER — SODIUM CHLORIDE 9 MG/ML
40 INJECTION, SOLUTION INTRAVENOUS ONCE
Status: COMPLETED | OUTPATIENT
Start: 2019-03-13 | End: 2019-03-13

## 2019-03-13 ENCOUNTER — HOSPITAL ENCOUNTER (OUTPATIENT)
Dept: INFUSION CENTER | Facility: HOSPITAL | Age: 59
Discharge: HOME | End: 2019-03-13
Payer: OTHER GOVERNMENT

## 2019-03-13 VITALS
DIASTOLIC BLOOD PRESSURE: 67 MMHG | TEMPERATURE: 98.4 F | RESPIRATION RATE: 18 BRPM | HEART RATE: 96 BPM | OXYGEN SATURATION: 99 % | SYSTOLIC BLOOD PRESSURE: 126 MMHG

## 2019-03-13 PROCEDURE — 96365 THER/PROPH/DIAG IV INF INIT: CPT

## 2019-03-13 RX ORDER — SODIUM CHLORIDE 9 MG/ML
20 INJECTION, SOLUTION INTRAVENOUS CONTINUOUS
Status: DISCONTINUED | OUTPATIENT
Start: 2019-03-15 | End: 2019-03-16 | Stop reason: HOSPADM

## 2019-03-13 RX ADMIN — SODIUM CHLORIDE 40 ML/HR: 9 INJECTION, SOLUTION INTRAVENOUS at 14:57

## 2019-03-13 RX ADMIN — IRON SUCROSE 200 MG: 20 INJECTION, SOLUTION INTRAVENOUS at 14:57

## 2019-03-15 ENCOUNTER — HOSPITAL ENCOUNTER (OUTPATIENT)
Dept: INFUSION CENTER | Facility: HOSPITAL | Age: 59
Discharge: HOME | End: 2019-03-15
Payer: OTHER GOVERNMENT

## 2019-03-15 VITALS
TEMPERATURE: 98.2 F | DIASTOLIC BLOOD PRESSURE: 68 MMHG | SYSTOLIC BLOOD PRESSURE: 144 MMHG | RESPIRATION RATE: 18 BRPM | OXYGEN SATURATION: 97 % | HEART RATE: 98 BPM

## 2019-03-15 PROCEDURE — 96365 THER/PROPH/DIAG IV INF INIT: CPT

## 2019-03-15 RX ADMIN — IRON SUCROSE 200 MG: 20 INJECTION, SOLUTION INTRAVENOUS at 08:58

## 2019-03-15 RX ADMIN — SODIUM CHLORIDE 20 ML/HR: 9 INJECTION, SOLUTION INTRAVENOUS at 08:57

## 2019-03-17 RX ORDER — SODIUM CHLORIDE 9 MG/ML
20 INJECTION, SOLUTION INTRAVENOUS CONTINUOUS
Status: DISCONTINUED | OUTPATIENT
Start: 2019-03-18 | End: 2019-03-21 | Stop reason: HOSPADM

## 2019-03-18 ENCOUNTER — HOSPITAL ENCOUNTER (OUTPATIENT)
Dept: INFUSION CENTER | Facility: HOSPITAL | Age: 59
Discharge: HOME | End: 2019-03-18
Payer: OTHER GOVERNMENT

## 2019-03-18 VITALS
TEMPERATURE: 98.3 F | RESPIRATION RATE: 17 BRPM | SYSTOLIC BLOOD PRESSURE: 144 MMHG | DIASTOLIC BLOOD PRESSURE: 82 MMHG | OXYGEN SATURATION: 98 % | HEART RATE: 96 BPM

## 2019-03-18 PROCEDURE — 96365 THER/PROPH/DIAG IV INF INIT: CPT

## 2019-03-18 RX ADMIN — SODIUM CHLORIDE 20 ML/HR: 9 INJECTION, SOLUTION INTRAVENOUS at 14:14

## 2019-03-18 RX ADMIN — IRON SUCROSE 200 MG: 20 INJECTION, SOLUTION INTRAVENOUS at 14:14

## 2019-04-05 DIAGNOSIS — K74.60 CIRRHOSIS OF LIVER WITHOUT ASCITES, UNSPECIFIED HEPATIC CIRRHOSIS TYPE (HCC): ICD-10-CM

## 2019-04-05 RX ORDER — FUROSEMIDE 20 MG/1
TABLET ORAL
Qty: 90 TABLET | Refills: 3 | Status: ON HOLD | OUTPATIENT
Start: 2019-04-05 | End: 2019-07-11 | Stop reason: SDUPTHER

## 2019-04-09 RX ORDER — CYANOCOBALAMIN 1000 UG/ML
1000 INJECTION INTRAMUSCULAR; SUBCUTANEOUS ONCE
Status: COMPLETED | OUTPATIENT
Start: 2019-04-10 | End: 2019-04-10

## 2019-04-10 ENCOUNTER — APPOINTMENT (OUTPATIENT)
Dept: LAB | Facility: HOSPITAL | Age: 59
End: 2019-04-10
Payer: OTHER GOVERNMENT

## 2019-04-10 ENCOUNTER — HOSPITAL ENCOUNTER (OUTPATIENT)
Dept: INFUSION CENTER | Facility: HOSPITAL | Age: 59
Discharge: HOME | End: 2019-04-10
Payer: OTHER GOVERNMENT

## 2019-04-10 ENCOUNTER — ANNUAL EXAM (OUTPATIENT)
Dept: OBGYN CLINIC | Facility: CLINIC | Age: 59
End: 2019-04-10
Payer: OTHER GOVERNMENT

## 2019-04-10 ENCOUNTER — OFFICE VISIT (OUTPATIENT)
Dept: PAIN MEDICINE | Facility: CLINIC | Age: 59
End: 2019-04-10
Payer: OTHER GOVERNMENT

## 2019-04-10 ENCOUNTER — TRANSCRIBE ORDERS (OUTPATIENT)
Dept: ADMINISTRATIVE | Facility: HOSPITAL | Age: 59
End: 2019-04-10

## 2019-04-10 VITALS
SYSTOLIC BLOOD PRESSURE: 132 MMHG | HEART RATE: 78 BPM | DIASTOLIC BLOOD PRESSURE: 78 MMHG | BODY MASS INDEX: 36.88 KG/M2 | HEIGHT: 64 IN | WEIGHT: 216 LBS

## 2019-04-10 VITALS — SYSTOLIC BLOOD PRESSURE: 134 MMHG | BODY MASS INDEX: 37.25 KG/M2 | WEIGHT: 217 LBS | DIASTOLIC BLOOD PRESSURE: 62 MMHG

## 2019-04-10 VITALS
TEMPERATURE: 98.4 F | DIASTOLIC BLOOD PRESSURE: 80 MMHG | SYSTOLIC BLOOD PRESSURE: 145 MMHG | OXYGEN SATURATION: 100 % | HEART RATE: 85 BPM

## 2019-04-10 DIAGNOSIS — Z01.411 ENCOUNTER FOR GYNECOLOGICAL EXAMINATION WITH ABNORMAL FINDING: Primary | ICD-10-CM

## 2019-04-10 DIAGNOSIS — G89.29 CHRONIC HAND PAIN, UNSPECIFIED LATERALITY: ICD-10-CM

## 2019-04-10 DIAGNOSIS — G62.1 ALCOHOL-INDUCED POLYNEUROPATHY (HCC): ICD-10-CM

## 2019-04-10 DIAGNOSIS — R29.898 DECONDITIONED LOW BACK: ICD-10-CM

## 2019-04-10 DIAGNOSIS — Z78.0 OSTEOPENIA AFTER MENOPAUSE: ICD-10-CM

## 2019-04-10 DIAGNOSIS — M85.80 OSTEOPENIA AFTER MENOPAUSE: ICD-10-CM

## 2019-04-10 DIAGNOSIS — R10.2 PELVIC PAIN IN FEMALE: ICD-10-CM

## 2019-04-10 DIAGNOSIS — G89.29 CHRONIC FOOT PAIN, UNSPECIFIED LATERALITY: Primary | ICD-10-CM

## 2019-04-10 DIAGNOSIS — M79.643 CHRONIC HAND PAIN, UNSPECIFIED LATERALITY: ICD-10-CM

## 2019-04-10 DIAGNOSIS — K70.31 ALCOHOLIC CIRRHOSIS OF LIVER WITH ASCITES (HCC): Primary | ICD-10-CM

## 2019-04-10 DIAGNOSIS — M79.673 CHRONIC FOOT PAIN, UNSPECIFIED LATERALITY: Primary | ICD-10-CM

## 2019-04-10 DIAGNOSIS — G89.4 PAIN SYNDROME, CHRONIC: ICD-10-CM

## 2019-04-10 DIAGNOSIS — K70.31 ALCOHOLIC CIRRHOSIS OF LIVER WITH ASCITES (HCC): ICD-10-CM

## 2019-04-10 DIAGNOSIS — G89.29 CHRONIC FOOT PAIN, UNSPECIFIED LATERALITY: ICD-10-CM

## 2019-04-10 DIAGNOSIS — M79.673 CHRONIC FOOT PAIN, UNSPECIFIED LATERALITY: ICD-10-CM

## 2019-04-10 LAB
ALBUMIN SERPL BCP-MCNC: 3.5 G/DL (ref 3.5–5)
ALP SERPL-CCNC: 228 U/L (ref 46–116)
ALT SERPL W P-5'-P-CCNC: 46 U/L (ref 12–78)
ANION GAP SERPL CALCULATED.3IONS-SCNC: 13 MMOL/L (ref 4–13)
AST SERPL W P-5'-P-CCNC: 74 U/L (ref 5–45)
BASOPHILS # BLD AUTO: 0.02 THOUSANDS/ΜL (ref 0–0.1)
BASOPHILS NFR BLD AUTO: 1 % (ref 0–1)
BILIRUB DIRECT SERPL-MCNC: 0.85 MG/DL (ref 0–0.2)
BILIRUB SERPL-MCNC: 1.7 MG/DL (ref 0.2–1)
BUN SERPL-MCNC: 13 MG/DL (ref 5–25)
CALCIUM SERPL-MCNC: 9.6 MG/DL (ref 8.3–10.1)
CHLORIDE SERPL-SCNC: 105 MMOL/L (ref 100–108)
CO2 SERPL-SCNC: 21 MMOL/L (ref 21–32)
CREAT SERPL-MCNC: 0.91 MG/DL (ref 0.6–1.3)
EOSINOPHIL # BLD AUTO: 0.1 THOUSAND/ΜL (ref 0–0.61)
EOSINOPHIL NFR BLD AUTO: 4 % (ref 0–6)
ERYTHROCYTE [DISTWIDTH] IN BLOOD BY AUTOMATED COUNT: 23.3 % (ref 11.6–15.1)
GFR SERPL CREATININE-BSD FRML MDRD: 69 ML/MIN/1.73SQ M
GLUCOSE P FAST SERPL-MCNC: 88 MG/DL (ref 65–99)
HCT VFR BLD AUTO: 37.6 % (ref 34.8–46.1)
HGB BLD-MCNC: 11.7 G/DL (ref 11.5–15.4)
IMM GRANULOCYTES # BLD AUTO: 0.01 THOUSAND/UL (ref 0–0.2)
IMM GRANULOCYTES NFR BLD AUTO: 0 % (ref 0–2)
LYMPHOCYTES # BLD AUTO: 0.72 THOUSANDS/ΜL (ref 0.6–4.47)
LYMPHOCYTES NFR BLD AUTO: 29 % (ref 14–44)
MCH RBC QN AUTO: 27.9 PG (ref 26.8–34.3)
MCHC RBC AUTO-ENTMCNC: 31.1 G/DL (ref 31.4–37.4)
MCV RBC AUTO: 90 FL (ref 82–98)
MONOCYTES # BLD AUTO: 0.33 THOUSAND/ΜL (ref 0.17–1.22)
MONOCYTES NFR BLD AUTO: 13 % (ref 4–12)
NEUTROPHILS # BLD AUTO: 1.33 THOUSANDS/ΜL (ref 1.85–7.62)
NEUTS SEG NFR BLD AUTO: 53 % (ref 43–75)
PLATELET # BLD AUTO: 44 THOUSANDS/UL (ref 149–390)
POTASSIUM SERPL-SCNC: 3.7 MMOL/L (ref 3.5–5.3)
PROT SERPL-MCNC: 8.1 G/DL (ref 6.4–8.2)
RBC # BLD AUTO: 4.2 MILLION/UL (ref 3.81–5.12)
SODIUM SERPL-SCNC: 139 MMOL/L (ref 136–145)
WBC # BLD AUTO: 2.51 THOUSAND/UL (ref 4.31–10.16)

## 2019-04-10 PROCEDURE — 85025 COMPLETE CBC W/AUTO DIFF WBC: CPT

## 2019-04-10 PROCEDURE — 36415 COLL VENOUS BLD VENIPUNCTURE: CPT

## 2019-04-10 PROCEDURE — 96372 THER/PROPH/DIAG INJ SC/IM: CPT

## 2019-04-10 PROCEDURE — 99213 OFFICE O/P EST LOW 20 MIN: CPT | Performed by: NURSE PRACTITIONER

## 2019-04-10 PROCEDURE — 80048 BASIC METABOLIC PNL TOTAL CA: CPT

## 2019-04-10 PROCEDURE — 99396 PREV VISIT EST AGE 40-64: CPT | Performed by: NURSE PRACTITIONER

## 2019-04-10 PROCEDURE — 80076 HEPATIC FUNCTION PANEL: CPT

## 2019-04-10 RX ORDER — FUROSEMIDE 20 MG/1
20 TABLET ORAL
COMMUNITY
End: 2019-04-10 | Stop reason: CLARIF

## 2019-04-10 RX ORDER — PREGABALIN 300 MG/1
300 CAPSULE ORAL 2 TIMES DAILY
Qty: 180 CAPSULE | Refills: 1 | Status: SHIPPED | OUTPATIENT
Start: 2019-04-10 | End: 2019-08-16

## 2019-04-10 RX ORDER — PREGABALIN 300 MG/1
300 CAPSULE ORAL 2 TIMES DAILY
Qty: 180 CAPSULE | Refills: 1 | Status: SHIPPED | OUTPATIENT
Start: 2019-04-10 | End: 2019-04-10 | Stop reason: SDUPTHER

## 2019-04-10 RX ADMIN — CYANOCOBALAMIN 1000 MCG: 1000 INJECTION, SOLUTION INTRAMUSCULAR at 11:23

## 2019-04-16 DIAGNOSIS — D50.9 IRON DEFICIENCY ANEMIA, UNSPECIFIED IRON DEFICIENCY ANEMIA TYPE: ICD-10-CM

## 2019-04-16 RX ORDER — SODIUM CHLORIDE 9 MG/ML
20 INJECTION, SOLUTION INTRAVENOUS ONCE
Status: CANCELLED | OUTPATIENT
Start: 2019-05-14

## 2019-04-29 ENCOUNTER — TRANSCRIBE ORDERS (OUTPATIENT)
Dept: ADMINISTRATIVE | Facility: HOSPITAL | Age: 59
End: 2019-04-29

## 2019-04-29 ENCOUNTER — APPOINTMENT (OUTPATIENT)
Dept: LAB | Facility: HOSPITAL | Age: 59
End: 2019-04-29
Payer: OTHER GOVERNMENT

## 2019-04-29 DIAGNOSIS — K70.30 ALCOHOLIC CIRRHOSIS OF LIVER WITHOUT ASCITES (HCC): Primary | ICD-10-CM

## 2019-04-29 DIAGNOSIS — K70.30 ALCOHOLIC CIRRHOSIS OF LIVER WITHOUT ASCITES (HCC): ICD-10-CM

## 2019-04-29 LAB
INR PPP: 1.38 (ref 0.86–1.17)
PROTHROMBIN TIME: 16.2 SECONDS (ref 11.8–14.2)

## 2019-04-29 PROCEDURE — 85610 PROTHROMBIN TIME: CPT

## 2019-04-29 PROCEDURE — 36415 COLL VENOUS BLD VENIPUNCTURE: CPT

## 2019-05-07 ENCOUNTER — TELEPHONE (OUTPATIENT)
Dept: GASTROENTEROLOGY | Facility: CLINIC | Age: 59
End: 2019-05-07

## 2019-05-09 ENCOUNTER — TELEPHONE (OUTPATIENT)
Dept: OBGYN CLINIC | Facility: CLINIC | Age: 59
End: 2019-05-09

## 2019-05-13 DIAGNOSIS — K74.60 CIRRHOSIS OF LIVER WITHOUT ASCITES, UNSPECIFIED HEPATIC CIRRHOSIS TYPE (HCC): ICD-10-CM

## 2019-05-13 DIAGNOSIS — D50.9 IRON DEFICIENCY ANEMIA, UNSPECIFIED IRON DEFICIENCY ANEMIA TYPE: ICD-10-CM

## 2019-05-13 RX ORDER — CYANOCOBALAMIN 1000 UG/ML
1000 INJECTION INTRAMUSCULAR; SUBCUTANEOUS ONCE
Status: CANCELLED | OUTPATIENT
Start: 2019-05-14

## 2019-05-14 ENCOUNTER — HOSPITAL ENCOUNTER (OUTPATIENT)
Dept: INFUSION CENTER | Facility: HOSPITAL | Age: 59
Discharge: HOME | End: 2019-05-14
Payer: OTHER GOVERNMENT

## 2019-05-14 VITALS
DIASTOLIC BLOOD PRESSURE: 64 MMHG | RESPIRATION RATE: 16 BRPM | SYSTOLIC BLOOD PRESSURE: 128 MMHG | HEART RATE: 87 BPM | TEMPERATURE: 98.2 F | OXYGEN SATURATION: 96 %

## 2019-05-14 DIAGNOSIS — D50.9 IRON DEFICIENCY ANEMIA, UNSPECIFIED IRON DEFICIENCY ANEMIA TYPE: Primary | ICD-10-CM

## 2019-05-14 DIAGNOSIS — K74.60 CIRRHOSIS OF LIVER WITHOUT ASCITES, UNSPECIFIED HEPATIC CIRRHOSIS TYPE (HCC): ICD-10-CM

## 2019-05-14 PROCEDURE — 96372 THER/PROPH/DIAG INJ SC/IM: CPT

## 2019-05-14 RX ORDER — CYANOCOBALAMIN 1000 UG/ML
1000 INJECTION INTRAMUSCULAR; SUBCUTANEOUS ONCE
Status: COMPLETED | OUTPATIENT
Start: 2019-05-14 | End: 2019-05-14

## 2019-05-14 RX ORDER — SODIUM CHLORIDE 9 MG/ML
20 INJECTION, SOLUTION INTRAVENOUS ONCE
Status: CANCELLED | OUTPATIENT
Start: 2019-07-13

## 2019-05-14 RX ORDER — CYANOCOBALAMIN 1000 UG/ML
1000 INJECTION INTRAMUSCULAR; SUBCUTANEOUS ONCE
Status: CANCELLED | OUTPATIENT
Start: 2019-07-09

## 2019-05-14 RX ADMIN — CYANOCOBALAMIN 1000 MCG: 1000 INJECTION, SOLUTION INTRAMUSCULAR at 13:31

## 2019-05-17 ENCOUNTER — TELEPHONE (OUTPATIENT)
Dept: HEMATOLOGY ONCOLOGY | Facility: HOSPITAL | Age: 59
End: 2019-05-17

## 2019-05-17 ENCOUNTER — HOSPITAL ENCOUNTER (EMERGENCY)
Facility: HOSPITAL | Age: 59
Discharge: HOME | End: 2019-05-17
Attending: EMERGENCY MEDICINE | Admitting: EMERGENCY MEDICINE
Payer: OTHER GOVERNMENT

## 2019-05-17 ENCOUNTER — APPOINTMENT (EMERGENCY)
Dept: RADIOLOGY | Facility: HOSPITAL | Age: 59
End: 2019-05-17
Payer: OTHER GOVERNMENT

## 2019-05-17 ENCOUNTER — APPOINTMENT (EMERGENCY)
Dept: CT IMAGING | Facility: HOSPITAL | Age: 59
End: 2019-05-17
Payer: OTHER GOVERNMENT

## 2019-05-17 VITALS
BODY MASS INDEX: 37.27 KG/M2 | TEMPERATURE: 98.2 F | RESPIRATION RATE: 22 BRPM | DIASTOLIC BLOOD PRESSURE: 76 MMHG | SYSTOLIC BLOOD PRESSURE: 147 MMHG | WEIGHT: 217.15 LBS | OXYGEN SATURATION: 96 % | HEART RATE: 98 BPM

## 2019-05-17 DIAGNOSIS — D64.9 ANEMIA: Primary | ICD-10-CM

## 2019-05-17 DIAGNOSIS — R53.83 FATIGUE: ICD-10-CM

## 2019-05-17 DIAGNOSIS — R10.9 LEFT SIDED ABDOMINAL PAIN: ICD-10-CM

## 2019-05-17 DIAGNOSIS — R53.1 WEAKNESS: ICD-10-CM

## 2019-05-17 DIAGNOSIS — D69.6 THROMBOCYTOPENIA (HCC): ICD-10-CM

## 2019-05-17 LAB
ALBUMIN SERPL BCP-MCNC: 2.7 G/DL (ref 3.5–5)
ALP SERPL-CCNC: 202 U/L (ref 46–116)
ALT SERPL W P-5'-P-CCNC: 75 U/L (ref 12–78)
AMMONIA PLAS-SCNC: 41 UMOL/L (ref 11–35)
ANION GAP SERPL CALCULATED.3IONS-SCNC: 13 MMOL/L (ref 4–13)
APTT PPP: 36 SECONDS (ref 26–38)
AST SERPL W P-5'-P-CCNC: 206 U/L (ref 5–45)
ATRIAL RATE: 81 BPM
BASOPHILS # BLD AUTO: 0.04 THOUSANDS/ΜL (ref 0–0.1)
BASOPHILS NFR BLD AUTO: 1 % (ref 0–1)
BILIRUB SERPL-MCNC: 1.4 MG/DL (ref 0.2–1)
BUN SERPL-MCNC: 8 MG/DL (ref 5–25)
CALCIUM SERPL-MCNC: 7.8 MG/DL (ref 8.3–10.1)
CHLORIDE SERPL-SCNC: 111 MMOL/L (ref 100–108)
CLARITY, POC: CLEAR
CO2 SERPL-SCNC: 24 MMOL/L (ref 21–32)
COLOR, POC: YELLOW
CREAT SERPL-MCNC: 0.95 MG/DL (ref 0.6–1.3)
EOSINOPHIL # BLD AUTO: 0.1 THOUSAND/ΜL (ref 0–0.61)
EOSINOPHIL NFR BLD AUTO: 4 % (ref 0–6)
ERYTHROCYTE [DISTWIDTH] IN BLOOD BY AUTOMATED COUNT: 18 % (ref 11.6–15.1)
EXT BILIRUBIN, UA: NEGATIVE
EXT BLOOD URINE: NORMAL
EXT GLUCOSE, UA: NEGATIVE
EXT KETONES: NEGATIVE
EXT NITRITE, UA: NEGATIVE
EXT PH, UA: 6
EXT PROTEIN, UA: NEGATIVE
EXT SPECIFIC GRAVITY, UA: 1.01
EXT UROBILINOGEN: 0.2
GFR SERPL CREATININE-BSD FRML MDRD: 66 ML/MIN/1.73SQ M
GLUCOSE SERPL-MCNC: 97 MG/DL (ref 65–140)
HCT VFR BLD AUTO: 27.5 % (ref 34.8–46.1)
HGB BLD-MCNC: 8.4 G/DL (ref 11.5–15.4)
IMM GRANULOCYTES # BLD AUTO: 0.02 THOUSAND/UL (ref 0–0.2)
IMM GRANULOCYTES NFR BLD AUTO: 1 % (ref 0–2)
INR PPP: 1.48 (ref 0.86–1.17)
LACTATE SERPL-SCNC: 1.6 MMOL/L (ref 0.5–2)
LYMPHOCYTES # BLD AUTO: 0.89 THOUSANDS/ΜL (ref 0.6–4.47)
LYMPHOCYTES NFR BLD AUTO: 31 % (ref 14–44)
MCH RBC QN AUTO: 28.7 PG (ref 26.8–34.3)
MCHC RBC AUTO-ENTMCNC: 30.5 G/DL (ref 31.4–37.4)
MCV RBC AUTO: 94 FL (ref 82–98)
MONOCYTES # BLD AUTO: 0.27 THOUSAND/ΜL (ref 0.17–1.22)
MONOCYTES NFR BLD AUTO: 10 % (ref 4–12)
NEUTROPHILS # BLD AUTO: 1.51 THOUSANDS/ΜL (ref 1.85–7.62)
NEUTS SEG NFR BLD AUTO: 53 % (ref 43–75)
NRBC BLD AUTO-RTO: 0 /100 WBCS
P AXIS: 61 DEGREES
PLATELET # BLD AUTO: 27 THOUSANDS/UL (ref 149–390)
PMV BLD AUTO: 10.8 FL (ref 8.9–12.7)
POTASSIUM SERPL-SCNC: 3.7 MMOL/L (ref 3.5–5.3)
PR INTERVAL: 154 MS
PROT SERPL-MCNC: 6.7 G/DL (ref 6.4–8.2)
PROTHROMBIN TIME: 17.1 SECONDS (ref 11.8–14.2)
QRS AXIS: 53 DEGREES
QRSD INTERVAL: 86 MS
QT INTERVAL: 408 MS
QTC INTERVAL: 473 MS
RBC # BLD AUTO: 2.93 MILLION/UL (ref 3.81–5.12)
SODIUM SERPL-SCNC: 148 MMOL/L (ref 136–145)
T WAVE AXIS: 46 DEGREES
TROPONIN I SERPL-MCNC: 0.03 NG/ML
TSH SERPL DL<=0.05 MIU/L-ACNC: 3.01 UIU/ML (ref 0.36–3.74)
VENTRICULAR RATE: 81 BPM
WBC # BLD AUTO: 2.83 THOUSAND/UL (ref 4.31–10.16)
WBC # BLD EST: NEGATIVE 10*3/UL

## 2019-05-17 PROCEDURE — 93005 ELECTROCARDIOGRAM TRACING: CPT

## 2019-05-17 PROCEDURE — 93010 ELECTROCARDIOGRAM REPORT: CPT | Performed by: INTERNAL MEDICINE

## 2019-05-17 PROCEDURE — 85610 PROTHROMBIN TIME: CPT | Performed by: PHYSICIAN ASSISTANT

## 2019-05-17 PROCEDURE — 74177 CT ABD & PELVIS W/CONTRAST: CPT

## 2019-05-17 PROCEDURE — 85730 THROMBOPLASTIN TIME PARTIAL: CPT | Performed by: PHYSICIAN ASSISTANT

## 2019-05-17 PROCEDURE — 83605 ASSAY OF LACTIC ACID: CPT | Performed by: PHYSICIAN ASSISTANT

## 2019-05-17 PROCEDURE — 84443 ASSAY THYROID STIM HORMONE: CPT | Performed by: PHYSICIAN ASSISTANT

## 2019-05-17 PROCEDURE — 71046 X-RAY EXAM CHEST 2 VIEWS: CPT

## 2019-05-17 PROCEDURE — 36415 COLL VENOUS BLD VENIPUNCTURE: CPT | Performed by: PHYSICIAN ASSISTANT

## 2019-05-17 PROCEDURE — 99284 EMERGENCY DEPT VISIT MOD MDM: CPT | Performed by: PHYSICIAN ASSISTANT

## 2019-05-17 PROCEDURE — 84484 ASSAY OF TROPONIN QUANT: CPT | Performed by: PHYSICIAN ASSISTANT

## 2019-05-17 PROCEDURE — 85025 COMPLETE CBC W/AUTO DIFF WBC: CPT | Performed by: PHYSICIAN ASSISTANT

## 2019-05-17 PROCEDURE — 99285 EMERGENCY DEPT VISIT HI MDM: CPT

## 2019-05-17 PROCEDURE — 80053 COMPREHEN METABOLIC PANEL: CPT | Performed by: PHYSICIAN ASSISTANT

## 2019-05-17 PROCEDURE — 82140 ASSAY OF AMMONIA: CPT | Performed by: PHYSICIAN ASSISTANT

## 2019-05-17 RX ORDER — PREGABALIN 100 MG/1
300 CAPSULE ORAL ONCE
Status: COMPLETED | OUTPATIENT
Start: 2019-05-17 | End: 2019-05-17

## 2019-05-17 RX ADMIN — PREGABALIN 300 MG: 100 CAPSULE ORAL at 20:01

## 2019-05-17 RX ADMIN — IOHEXOL 100 ML: 350 INJECTION, SOLUTION INTRAVENOUS at 18:10

## 2019-05-18 ENCOUNTER — APPOINTMENT (OUTPATIENT)
Dept: LAB | Facility: HOSPITAL | Age: 59
End: 2019-05-18
Attending: INTERNAL MEDICINE
Payer: OTHER GOVERNMENT

## 2019-05-18 DIAGNOSIS — D50.9 IRON DEFICIENCY ANEMIA, UNSPECIFIED IRON DEFICIENCY ANEMIA TYPE: ICD-10-CM

## 2019-05-18 DIAGNOSIS — Z98.84 HISTORY OF GASTRIC BYPASS: ICD-10-CM

## 2019-05-18 LAB
FERRITIN SERPL-MCNC: 92 NG/ML (ref 8–388)
IRON SATN MFR SERPL: 9 %
IRON SERPL-MCNC: 36 UG/DL (ref 50–170)
TIBC SERPL-MCNC: 382 UG/DL (ref 250–450)

## 2019-05-18 PROCEDURE — 83550 IRON BINDING TEST: CPT

## 2019-05-18 PROCEDURE — 83918 ORGANIC ACIDS TOTAL QUANT: CPT

## 2019-05-18 PROCEDURE — 82728 ASSAY OF FERRITIN: CPT

## 2019-05-18 PROCEDURE — 36415 COLL VENOUS BLD VENIPUNCTURE: CPT

## 2019-05-18 PROCEDURE — 83540 ASSAY OF IRON: CPT

## 2019-05-20 ENCOUNTER — OFFICE VISIT (OUTPATIENT)
Dept: HEMATOLOGY ONCOLOGY | Facility: HOSPITAL | Age: 59
End: 2019-05-20
Payer: OTHER GOVERNMENT

## 2019-05-20 ENCOUNTER — APPOINTMENT (OUTPATIENT)
Dept: LAB | Facility: HOSPITAL | Age: 59
End: 2019-05-20
Payer: OTHER GOVERNMENT

## 2019-05-20 VITALS
RESPIRATION RATE: 18 BRPM | HEIGHT: 64 IN | WEIGHT: 222 LBS | TEMPERATURE: 98.7 F | OXYGEN SATURATION: 99 % | HEART RATE: 98 BPM | BODY MASS INDEX: 37.9 KG/M2 | DIASTOLIC BLOOD PRESSURE: 70 MMHG | SYSTOLIC BLOOD PRESSURE: 136 MMHG

## 2019-05-20 DIAGNOSIS — D69.6 THROMBOCYTOPENIA (HCC): ICD-10-CM

## 2019-05-20 DIAGNOSIS — K74.60 CIRRHOSIS OF LIVER WITHOUT ASCITES, UNSPECIFIED HEPATIC CIRRHOSIS TYPE (HCC): ICD-10-CM

## 2019-05-20 DIAGNOSIS — D61.818 PANCYTOPENIA (HCC): Primary | ICD-10-CM

## 2019-05-20 DIAGNOSIS — D50.9 IRON DEFICIENCY ANEMIA, UNSPECIFIED IRON DEFICIENCY ANEMIA TYPE: ICD-10-CM

## 2019-05-20 DIAGNOSIS — R25.2 HAND CRAMPS: ICD-10-CM

## 2019-05-20 LAB — MAGNESIUM SERPL-MCNC: 2.1 MG/DL (ref 1.6–2.6)

## 2019-05-20 PROCEDURE — 83735 ASSAY OF MAGNESIUM: CPT

## 2019-05-20 PROCEDURE — 36415 COLL VENOUS BLD VENIPUNCTURE: CPT

## 2019-05-20 PROCEDURE — 99214 OFFICE O/P EST MOD 30 MIN: CPT | Performed by: PHYSICIAN ASSISTANT

## 2019-05-20 RX ORDER — SODIUM CHLORIDE 9 MG/ML
20 INJECTION, SOLUTION INTRAVENOUS ONCE
Status: CANCELLED | OUTPATIENT
Start: 2019-05-21

## 2019-05-20 RX ORDER — CYANOCOBALAMIN 1000 UG/ML
1000 INJECTION INTRAMUSCULAR; SUBCUTANEOUS ONCE
Status: CANCELLED | OUTPATIENT
Start: 2019-06-13

## 2019-05-21 ENCOUNTER — TRANSCRIBE ORDERS (OUTPATIENT)
Dept: LAB | Facility: HOSPITAL | Age: 59
End: 2019-05-21

## 2019-05-21 ENCOUNTER — HOSPITAL ENCOUNTER (OUTPATIENT)
Dept: INFUSION CENTER | Facility: HOSPITAL | Age: 59
Discharge: HOME | End: 2019-05-21
Attending: INTERNAL MEDICINE
Payer: OTHER GOVERNMENT

## 2019-05-21 VITALS
SYSTOLIC BLOOD PRESSURE: 139 MMHG | TEMPERATURE: 98.2 F | HEART RATE: 96 BPM | RESPIRATION RATE: 18 BRPM | DIASTOLIC BLOOD PRESSURE: 71 MMHG

## 2019-05-21 DIAGNOSIS — D50.9 IRON DEFICIENCY ANEMIA, UNSPECIFIED IRON DEFICIENCY ANEMIA TYPE: ICD-10-CM

## 2019-05-21 DIAGNOSIS — D50.9 IRON DEFICIENCY ANEMIA, UNSPECIFIED IRON DEFICIENCY ANEMIA TYPE: Primary | ICD-10-CM

## 2019-05-21 DIAGNOSIS — K70.30 ALCOHOLIC CIRRHOSIS OF LIVER WITHOUT ASCITES (HCC): ICD-10-CM

## 2019-05-21 DIAGNOSIS — Z98.84 HISTORY OF GASTRIC BYPASS: ICD-10-CM

## 2019-05-21 PROBLEM — D61.818 PANCYTOPENIA (HCC): Status: ACTIVE | Noted: 2019-05-21

## 2019-05-21 LAB
ALBUMIN SERPL BCP-MCNC: 3.1 G/DL (ref 3.5–5)
ALP SERPL-CCNC: 181 U/L (ref 46–116)
ALT SERPL W P-5'-P-CCNC: 67 U/L (ref 12–78)
ANION GAP SERPL CALCULATED.3IONS-SCNC: 15 MMOL/L (ref 4–13)
AST SERPL W P-5'-P-CCNC: 153 U/L (ref 5–45)
BASOPHILS # BLD AUTO: 0.03 THOUSANDS/ΜL (ref 0–0.1)
BASOPHILS NFR BLD AUTO: 1 % (ref 0–1)
BILIRUB SERPL-MCNC: 2.7 MG/DL (ref 0.2–1)
BUN SERPL-MCNC: 11 MG/DL (ref 5–25)
CALCIUM SERPL-MCNC: 8.5 MG/DL (ref 8.3–10.1)
CHLORIDE SERPL-SCNC: 105 MMOL/L (ref 100–108)
CO2 SERPL-SCNC: 21 MMOL/L (ref 21–32)
CREAT SERPL-MCNC: 1 MG/DL (ref 0.6–1.3)
EOSINOPHIL # BLD AUTO: 0.09 THOUSAND/ΜL (ref 0–0.61)
EOSINOPHIL NFR BLD AUTO: 3 % (ref 0–6)
ERYTHROCYTE [DISTWIDTH] IN BLOOD BY AUTOMATED COUNT: 17.9 % (ref 11.6–15.1)
GFR SERPL CREATININE-BSD FRML MDRD: 62 ML/MIN/1.73SQ M
GLUCOSE P FAST SERPL-MCNC: 81 MG/DL (ref 65–99)
GLUCOSE SERPL-MCNC: 81 MG/DL (ref 65–140)
HCT VFR BLD AUTO: 30.9 % (ref 34.8–46.1)
HGB BLD-MCNC: 9.2 G/DL (ref 11.5–15.4)
IMM GRANULOCYTES # BLD AUTO: 0.01 THOUSAND/UL (ref 0–0.2)
IMM GRANULOCYTES NFR BLD AUTO: 0 % (ref 0–2)
LYMPHOCYTES # BLD AUTO: 0.57 THOUSANDS/ΜL (ref 0.6–4.47)
LYMPHOCYTES NFR BLD AUTO: 21 % (ref 14–44)
MCH RBC QN AUTO: 28.7 PG (ref 26.8–34.3)
MCHC RBC AUTO-ENTMCNC: 29.8 G/DL (ref 31.4–37.4)
MCV RBC AUTO: 96 FL (ref 82–98)
MONOCYTES # BLD AUTO: 0.34 THOUSAND/ΜL (ref 0.17–1.22)
MONOCYTES NFR BLD AUTO: 12 % (ref 4–12)
NEUTROPHILS # BLD AUTO: 1.72 THOUSANDS/ΜL (ref 1.85–7.62)
NEUTS SEG NFR BLD AUTO: 63 % (ref 43–75)
NRBC BLD AUTO-RTO: 0 /100 WBCS
PLATELET # BLD AUTO: 37 THOUSANDS/UL (ref 149–390)
PMV BLD AUTO: 11.9 FL (ref 8.9–12.7)
POTASSIUM SERPL-SCNC: 4.4 MMOL/L (ref 3.5–5.3)
PROT SERPL-MCNC: 7.3 G/DL (ref 6.4–8.2)
RBC # BLD AUTO: 3.21 MILLION/UL (ref 3.81–5.12)
SODIUM SERPL-SCNC: 141 MMOL/L (ref 136–145)
WBC # BLD AUTO: 2.76 THOUSAND/UL (ref 4.31–10.16)

## 2019-05-21 PROCEDURE — 80053 COMPREHEN METABOLIC PANEL: CPT

## 2019-05-21 PROCEDURE — 85025 COMPLETE CBC W/AUTO DIFF WBC: CPT

## 2019-05-21 PROCEDURE — 96365 THER/PROPH/DIAG IV INF INIT: CPT

## 2019-05-21 RX ORDER — SODIUM CHLORIDE 9 MG/ML
20 INJECTION, SOLUTION INTRAVENOUS ONCE
Status: CANCELLED | OUTPATIENT
Start: 2019-05-23

## 2019-05-21 RX ORDER — SODIUM CHLORIDE 9 MG/ML
20 INJECTION, SOLUTION INTRAVENOUS ONCE
Status: COMPLETED | OUTPATIENT
Start: 2019-05-21 | End: 2019-05-21

## 2019-05-21 RX ORDER — CYANOCOBALAMIN 1000 UG/ML
1000 INJECTION INTRAMUSCULAR; SUBCUTANEOUS ONCE
Status: CANCELLED | OUTPATIENT
Start: 2019-05-21

## 2019-05-21 RX ORDER — CYANOCOBALAMIN 1000 UG/ML
1000 INJECTION INTRAMUSCULAR; SUBCUTANEOUS ONCE
Status: CANCELLED | OUTPATIENT
Start: 2019-06-13

## 2019-05-21 RX ADMIN — IRON SUCROSE 200 MG: 20 INJECTION, SOLUTION INTRAVENOUS at 14:45

## 2019-05-21 RX ADMIN — SODIUM CHLORIDE 20 ML/HR: 9 INJECTION, SOLUTION INTRAVENOUS at 14:40

## 2019-05-21 NOTE — PROGRESS NOTES
Pt here for Venofer infusion, tolerated well  PIV removed intact, pressure dssg applied  Received information of lab results  Disch amb to home, steady gait

## 2019-05-21 NOTE — PLAN OF CARE
Problem: Potential for Falls  Goal: Patient will remain free of falls  Description  INTERVENTIONS:  - Assess patient frequently for physical needs  -  Identify cognitive and physical deficits and behaviors that affect risk of falls  -  [place] fall precautions as indicated by assessment   - Educate patient/family on patient safety including physical limitations  - Instruct patient to call for assistance with activity based on assessment  - Modify environment to reduce risk of injury  - Consider OT/PT consult to assist with strengthening/mobility  Outcome: Progressing     Problem: Knowledge Deficit  Goal: Patient/family/caregiver demonstrates understanding of disease process, treatment plan, medications, and discharge instructions  Description  Complete learning assessment and assess knowledge base    Interventions:  - Provide teaching at level of understanding  - Provide teaching via preferred learning methods  Outcome: Progressing

## 2019-05-22 LAB
METHYLMALONATE SERPL-SCNC: 75 NMOL/L (ref 0–378)
SL AMB DISCLAIMER: NORMAL

## 2019-05-23 ENCOUNTER — HOSPITAL ENCOUNTER (OUTPATIENT)
Dept: INFUSION CENTER | Facility: HOSPITAL | Age: 59
Discharge: HOME | End: 2019-05-23
Attending: INTERNAL MEDICINE
Payer: OTHER GOVERNMENT

## 2019-05-23 VITALS
DIASTOLIC BLOOD PRESSURE: 60 MMHG | TEMPERATURE: 98.4 F | HEART RATE: 91 BPM | OXYGEN SATURATION: 99 % | SYSTOLIC BLOOD PRESSURE: 118 MMHG | RESPIRATION RATE: 18 BRPM

## 2019-05-23 DIAGNOSIS — K70.30 ALCOHOLIC CIRRHOSIS OF LIVER WITHOUT ASCITES (HCC): ICD-10-CM

## 2019-05-23 DIAGNOSIS — D50.9 IRON DEFICIENCY ANEMIA, UNSPECIFIED IRON DEFICIENCY ANEMIA TYPE: Primary | ICD-10-CM

## 2019-05-23 PROCEDURE — 96365 THER/PROPH/DIAG IV INF INIT: CPT

## 2019-05-23 RX ORDER — CYANOCOBALAMIN 1000 UG/ML
1000 INJECTION INTRAMUSCULAR; SUBCUTANEOUS ONCE
Status: CANCELLED | OUTPATIENT
Start: 2019-05-25

## 2019-05-23 RX ORDER — SODIUM CHLORIDE 9 MG/ML
20 INJECTION, SOLUTION INTRAVENOUS ONCE
Status: CANCELLED | OUTPATIENT
Start: 2019-05-29

## 2019-05-23 RX ORDER — SODIUM CHLORIDE 9 MG/ML
20 INJECTION, SOLUTION INTRAVENOUS ONCE
Status: COMPLETED | OUTPATIENT
Start: 2019-05-23 | End: 2019-05-23

## 2019-05-23 RX ADMIN — IRON SUCROSE 200 MG: 20 INJECTION, SOLUTION INTRAVENOUS at 09:23

## 2019-05-23 RX ADMIN — SODIUM CHLORIDE 20 ML/HR: 9 INJECTION, SOLUTION INTRAVENOUS at 09:21

## 2019-05-23 NOTE — PLAN OF CARE
Problem: Potential for Falls  Goal: Patient will remain free of falls  Description  INTERVENTIONS:  - Assess patient frequently for physical needs  -  Identify cognitive and physical deficits and behaviors that affect risk of falls    -  [place] fall precautions as indicated by assessment   - Educate patient/family on patient safety including physical limitations  - Instruct patient to call for assistance with activity based on assessment  - Modify environment to reduce risk of injury  - Consider OT/PT consult to assist with strengthening/mobility  Outcome: Progressing

## 2019-05-29 ENCOUNTER — HOSPITAL ENCOUNTER (OUTPATIENT)
Dept: INFUSION CENTER | Facility: HOSPITAL | Age: 59
Discharge: HOME | End: 2019-05-29
Attending: INTERNAL MEDICINE
Payer: OTHER GOVERNMENT

## 2019-05-29 VITALS
RESPIRATION RATE: 18 BRPM | SYSTOLIC BLOOD PRESSURE: 131 MMHG | DIASTOLIC BLOOD PRESSURE: 68 MMHG | OXYGEN SATURATION: 98 % | HEART RATE: 84 BPM | TEMPERATURE: 98 F

## 2019-05-29 DIAGNOSIS — K70.30 ALCOHOLIC CIRRHOSIS OF LIVER WITHOUT ASCITES (HCC): ICD-10-CM

## 2019-05-29 DIAGNOSIS — D50.9 IRON DEFICIENCY ANEMIA, UNSPECIFIED IRON DEFICIENCY ANEMIA TYPE: Primary | ICD-10-CM

## 2019-05-29 PROCEDURE — 96365 THER/PROPH/DIAG IV INF INIT: CPT

## 2019-05-29 RX ORDER — CYANOCOBALAMIN 1000 UG/ML
1000 INJECTION INTRAMUSCULAR; SUBCUTANEOUS ONCE
Status: CANCELLED | OUTPATIENT
Start: 2019-05-31

## 2019-05-29 RX ORDER — SODIUM CHLORIDE 9 MG/ML
20 INJECTION, SOLUTION INTRAVENOUS ONCE
Status: COMPLETED | OUTPATIENT
Start: 2019-05-29 | End: 2019-05-29

## 2019-05-29 RX ORDER — SODIUM CHLORIDE 9 MG/ML
20 INJECTION, SOLUTION INTRAVENOUS ONCE
Status: CANCELLED | OUTPATIENT
Start: 2019-05-31

## 2019-05-29 RX ADMIN — SODIUM CHLORIDE 20 ML/HR: 9 INJECTION, SOLUTION INTRAVENOUS at 14:58

## 2019-05-29 RX ADMIN — IRON SUCROSE 200 MG: 20 INJECTION, SOLUTION INTRAVENOUS at 14:59

## 2019-05-29 NOTE — PROGRESS NOTES
Pt here for Venofer infusion  Difficult IV start today  Multiple attempts by different people  Venofer infused tolerated well  PIV removed intact  Pressure dssg applied  Disch amb to home, steady gait

## 2019-05-31 ENCOUNTER — HOSPITAL ENCOUNTER (OUTPATIENT)
Dept: INFUSION CENTER | Facility: HOSPITAL | Age: 59
Discharge: HOME | End: 2019-05-31
Attending: INTERNAL MEDICINE

## 2019-05-31 ENCOUNTER — TELEPHONE (OUTPATIENT)
Dept: HEMATOLOGY ONCOLOGY | Facility: CLINIC | Age: 59
End: 2019-05-31

## 2019-06-03 ENCOUNTER — HOSPITAL ENCOUNTER (OUTPATIENT)
Dept: INFUSION CENTER | Facility: HOSPITAL | Age: 59
Discharge: HOME | End: 2019-06-03
Attending: INTERNAL MEDICINE
Payer: OTHER GOVERNMENT

## 2019-06-03 VITALS
TEMPERATURE: 97.9 F | SYSTOLIC BLOOD PRESSURE: 126 MMHG | RESPIRATION RATE: 18 BRPM | OXYGEN SATURATION: 97 % | DIASTOLIC BLOOD PRESSURE: 62 MMHG | HEART RATE: 82 BPM

## 2019-06-03 DIAGNOSIS — K70.30 ALCOHOLIC CIRRHOSIS OF LIVER WITHOUT ASCITES (HCC): Primary | ICD-10-CM

## 2019-06-03 DIAGNOSIS — D50.9 IRON DEFICIENCY ANEMIA, UNSPECIFIED IRON DEFICIENCY ANEMIA TYPE: ICD-10-CM

## 2019-06-03 PROCEDURE — 96365 THER/PROPH/DIAG IV INF INIT: CPT

## 2019-06-03 RX ORDER — SODIUM CHLORIDE 9 MG/ML
20 INJECTION, SOLUTION INTRAVENOUS ONCE
Status: CANCELLED | OUTPATIENT
Start: 2019-06-05

## 2019-06-03 RX ORDER — CYANOCOBALAMIN 1000 UG/ML
1000 INJECTION INTRAMUSCULAR; SUBCUTANEOUS ONCE
Status: CANCELLED | OUTPATIENT
Start: 2019-06-12

## 2019-06-03 RX ORDER — SODIUM CHLORIDE 9 MG/ML
20 INJECTION, SOLUTION INTRAVENOUS ONCE
Status: COMPLETED | OUTPATIENT
Start: 2019-06-03 | End: 2019-06-03

## 2019-06-03 RX ADMIN — SODIUM CHLORIDE 20 ML/HR: 9 INJECTION, SOLUTION INTRAVENOUS at 14:36

## 2019-06-03 RX ADMIN — IRON SUCROSE 200 MG: 20 INJECTION, SOLUTION INTRAVENOUS at 14:36

## 2019-06-03 NOTE — PLAN OF CARE
Problem: Potential for Falls  Goal: Patient will remain free of falls  Description  INTERVENTIONS:  - Assess patient frequently for physical needs  -  Identify cognitive and physical deficits and behaviors that affect risk of falls  -  [place] fall precautions as indicated by assessment   - Educate patient/family on patient safety including physical limitations  - Instruct patient to call for assistance with activity based on assessment  - Modify environment to reduce risk of injury  - Consider OT/PT consult to assist with strengthening/mobility  Outcome: Progressing     Problem: Knowledge Deficit  Goal: Patient/family/caregiver demonstrates understanding of disease process, treatment plan, medications, and discharge instructions  Description  Complete learning assessment and assess knowledge base    Interventions:  - Provide teaching at level of understanding  - Provide teaching via preferred learning methods  Outcome: Progressing

## 2019-06-05 ENCOUNTER — HOSPITAL ENCOUNTER (OUTPATIENT)
Dept: INFUSION CENTER | Facility: HOSPITAL | Age: 59
End: 2019-06-05
Attending: INTERNAL MEDICINE

## 2019-06-10 DIAGNOSIS — M79.2 NEUROPATHIC PAIN: ICD-10-CM

## 2019-06-12 ENCOUNTER — HOSPITAL ENCOUNTER (OUTPATIENT)
Dept: INFUSION CENTER | Facility: HOSPITAL | Age: 59
Discharge: HOME | End: 2019-06-12
Attending: INTERNAL MEDICINE

## 2019-06-12 RX ORDER — TOPIRAMATE 25 MG/1
25 CAPSULE, COATED PELLETS ORAL 2 TIMES DAILY
Qty: 60 CAPSULE | Refills: 3 | Status: SHIPPED | OUTPATIENT
Start: 2019-06-12 | End: 2019-08-06 | Stop reason: SDUPTHER

## 2019-06-14 ENCOUNTER — HOSPITAL ENCOUNTER (OUTPATIENT)
Dept: BONE DENSITY | Facility: IMAGING CENTER | Age: 59
Discharge: HOME | End: 2019-06-14
Payer: OTHER GOVERNMENT

## 2019-06-14 ENCOUNTER — HOSPITAL ENCOUNTER (OUTPATIENT)
Dept: INFUSION CENTER | Facility: HOSPITAL | Age: 59
Discharge: HOME | End: 2019-06-14
Attending: INTERNAL MEDICINE
Payer: OTHER GOVERNMENT

## 2019-06-14 VITALS
SYSTOLIC BLOOD PRESSURE: 129 MMHG | DIASTOLIC BLOOD PRESSURE: 67 MMHG | RESPIRATION RATE: 18 BRPM | OXYGEN SATURATION: 95 % | HEART RATE: 85 BPM | TEMPERATURE: 98.1 F

## 2019-06-14 DIAGNOSIS — D50.9 IRON DEFICIENCY ANEMIA, UNSPECIFIED IRON DEFICIENCY ANEMIA TYPE: ICD-10-CM

## 2019-06-14 DIAGNOSIS — M85.80 OSTEOPENIA AFTER MENOPAUSE: ICD-10-CM

## 2019-06-14 DIAGNOSIS — Z78.0 OSTEOPENIA AFTER MENOPAUSE: ICD-10-CM

## 2019-06-14 DIAGNOSIS — K70.30 ALCOHOLIC CIRRHOSIS OF LIVER WITHOUT ASCITES (HCC): Primary | ICD-10-CM

## 2019-06-14 PROCEDURE — 96372 THER/PROPH/DIAG INJ SC/IM: CPT

## 2019-06-14 PROCEDURE — 96365 THER/PROPH/DIAG IV INF INIT: CPT

## 2019-06-14 PROCEDURE — 77080 DXA BONE DENSITY AXIAL: CPT

## 2019-06-14 RX ORDER — SODIUM CHLORIDE 9 MG/ML
20 INJECTION, SOLUTION INTRAVENOUS ONCE
Status: CANCELLED | OUTPATIENT
Start: 2019-06-17

## 2019-06-14 RX ORDER — CYANOCOBALAMIN 1000 UG/ML
1000 INJECTION INTRAMUSCULAR; SUBCUTANEOUS ONCE
Status: COMPLETED | OUTPATIENT
Start: 2019-06-14 | End: 2019-06-14

## 2019-06-14 RX ORDER — SODIUM CHLORIDE 9 MG/ML
20 INJECTION, SOLUTION INTRAVENOUS ONCE
Status: COMPLETED | OUTPATIENT
Start: 2019-06-14 | End: 2019-06-14

## 2019-06-14 RX ORDER — CYANOCOBALAMIN 1000 UG/ML
1000 INJECTION INTRAMUSCULAR; SUBCUTANEOUS ONCE
Status: CANCELLED | OUTPATIENT
Start: 2019-06-17

## 2019-06-14 RX ADMIN — CYANOCOBALAMIN 1000 MCG: 1000 INJECTION, SOLUTION INTRAMUSCULAR at 14:04

## 2019-06-14 RX ADMIN — SODIUM CHLORIDE 20 ML/HR: 9 INJECTION, SOLUTION INTRAVENOUS at 14:05

## 2019-06-14 RX ADMIN — IRON SUCROSE 200 MG: 20 INJECTION, SOLUTION INTRAVENOUS at 14:04

## 2019-06-14 NOTE — PROGRESS NOTES
Patient tolerated treatment today with no adverse reactions  Patient then d/cd home ambulatory with steady gait

## 2019-06-14 NOTE — PLAN OF CARE
Problem: Potential for Falls  Goal: Patient will remain free of falls  Description  INTERVENTIONS:  - Assess patient frequently for physical needs  -  Identify cognitive and physical deficits and behaviors that affect risk of falls  -  [place] fall precautions as indicated by assessment   - Educate patient/family on patient safety including physical limitations  - Instruct patient to call for assistance with activity based on assessment  - Modify environment to reduce risk of injury  - Consider OT/PT consult to assist with strengthening/mobility  Outcome: Progressing     Problem: Potential for Falls  Goal: Patient will remain free of falls  Description  INTERVENTIONS:  - Assess patient frequently for physical needs  -  Identify cognitive and physical deficits and behaviors that affect risk of falls  -  [place] fall precautions as indicated by assessment   - Educate patient/family on patient safety including physical limitations  - Instruct patient to call for assistance with activity based on assessment  - Modify environment to reduce risk of injury  - Consider OT/PT consult to assist with strengthening/mobility  Outcome: Progressing     Problem: Knowledge Deficit  Goal: Patient/family/caregiver demonstrates understanding of disease process, treatment plan, medications, and discharge instructions  Description  Complete learning assessment and assess knowledge base    Interventions:  - Provide teaching at level of understanding  - Provide teaching via preferred learning methods  Outcome: Progressing

## 2019-06-17 ENCOUNTER — HOSPITAL ENCOUNTER (OUTPATIENT)
Dept: INFUSION CENTER | Facility: HOSPITAL | Age: 59
Discharge: HOME | End: 2019-06-17

## 2019-06-21 ENCOUNTER — HOSPITAL ENCOUNTER (OUTPATIENT)
Dept: INFUSION CENTER | Facility: HOSPITAL | Age: 59
End: 2019-06-21
Attending: INTERNAL MEDICINE

## 2019-06-24 ENCOUNTER — HOSPITAL ENCOUNTER (OUTPATIENT)
Dept: INFUSION CENTER | Facility: HOSPITAL | Age: 59
End: 2019-06-24
Attending: INTERNAL MEDICINE

## 2019-06-25 ENCOUNTER — TELEPHONE (OUTPATIENT)
Dept: HEMATOLOGY ONCOLOGY | Facility: CLINIC | Age: 59
End: 2019-06-25

## 2019-06-25 NOTE — TELEPHONE ENCOUNTER
Discussed with Akiko in infusion  She states patient was dehydrated when there were several sticks  Since then, patient has been hydrated and access was gained on first try  I will discuss with patient and advise PICC is not necessary

## 2019-06-25 NOTE — TELEPHONE ENCOUNTER
Sadi Truong called and was concerned that she is a hard stick  It takes the nurses 7 times last time to get blood  Cy Riling from Infusion told her she cant have a picc line unless she has cancer  She would like a picc line so when she has to get blood work it wont be so hard on everyone   Sadi Truong would like to cancel with Lisbeth on 7/29and reschedule for August  Please call her back and reschedule her at [phone]

## 2019-06-26 ENCOUNTER — HOSPITAL ENCOUNTER (OUTPATIENT)
Dept: INFUSION CENTER | Facility: HOSPITAL | Age: 59
End: 2019-06-26
Attending: INTERNAL MEDICINE

## 2019-06-26 NOTE — TELEPHONE ENCOUNTER
I called and left a message on the patients machine advising that a PICC for lab work is not warranted

## 2019-06-27 ENCOUNTER — TELEPHONE (OUTPATIENT)
Dept: HEMATOLOGY ONCOLOGY | Facility: CLINIC | Age: 59
End: 2019-06-27

## 2019-07-06 ENCOUNTER — HOSPITAL ENCOUNTER (INPATIENT)
Facility: HOSPITAL | Age: 59
LOS: 5 days | Discharge: HOME HEALTH | DRG: 393 | End: 2019-07-11
Attending: EMERGENCY MEDICINE | Admitting: INTERNAL MEDICINE
Payer: OTHER GOVERNMENT

## 2019-07-06 ENCOUNTER — APPOINTMENT (EMERGENCY)
Dept: CT IMAGING | Facility: HOSPITAL | Age: 59
DRG: 393 | End: 2019-07-06
Payer: OTHER GOVERNMENT

## 2019-07-06 DIAGNOSIS — K92.2 ACUTE UPPER GASTROINTESTINAL BLEEDING: ICD-10-CM

## 2019-07-06 DIAGNOSIS — F10.239 ALCOHOL WITHDRAWAL (HCC): ICD-10-CM

## 2019-07-06 DIAGNOSIS — K62.5 GASTROINTESTINAL HEMORRHAGE ASSOCIATED WITH ANORECTAL SOURCE: ICD-10-CM

## 2019-07-06 DIAGNOSIS — K92.2 GI BLEED: Primary | ICD-10-CM

## 2019-07-06 DIAGNOSIS — K70.30 ALCOHOLIC CIRRHOSIS OF LIVER WITHOUT ASCITES (HCC): ICD-10-CM

## 2019-07-06 DIAGNOSIS — D69.6 THROMBOCYTOPENIA (HCC): ICD-10-CM

## 2019-07-06 DIAGNOSIS — F10.10 ETOH ABUSE: ICD-10-CM

## 2019-07-06 DIAGNOSIS — K52.9 COLITIS: ICD-10-CM

## 2019-07-06 DIAGNOSIS — K74.60 CIRRHOSIS OF LIVER WITHOUT ASCITES, UNSPECIFIED HEPATIC CIRRHOSIS TYPE (HCC): ICD-10-CM

## 2019-07-06 DIAGNOSIS — D64.9 CHRONIC ANEMIA: ICD-10-CM

## 2019-07-06 PROBLEM — K92.1 MELENA: Status: ACTIVE | Noted: 2019-07-06

## 2019-07-06 PROBLEM — R10.9 ABDOMINAL PAIN: Status: ACTIVE | Noted: 2019-07-06

## 2019-07-06 LAB
ABO GROUP BLD: NORMAL
ALBUMIN SERPL BCP-MCNC: 2.6 G/DL (ref 3.5–5)
ALP SERPL-CCNC: 136 U/L (ref 46–116)
ALT SERPL W P-5'-P-CCNC: 53 U/L (ref 12–78)
ANION GAP SERPL CALCULATED.3IONS-SCNC: 12 MMOL/L (ref 4–13)
AST SERPL W P-5'-P-CCNC: 152 U/L (ref 5–45)
BASOPHILS # BLD AUTO: 0.02 THOUSANDS/ΜL (ref 0–0.1)
BASOPHILS NFR BLD AUTO: 1 % (ref 0–1)
BILIRUB SERPL-MCNC: 4.6 MG/DL (ref 0.2–1)
BLD GP AB SCN SERPL QL: NEGATIVE
BUN SERPL-MCNC: 11 MG/DL (ref 5–25)
CALCIUM SERPL-MCNC: 8.1 MG/DL (ref 8.3–10.1)
CHLORIDE SERPL-SCNC: 108 MMOL/L (ref 100–108)
CO2 SERPL-SCNC: 23 MMOL/L (ref 21–32)
CREAT SERPL-MCNC: 0.88 MG/DL (ref 0.6–1.3)
EOSINOPHIL # BLD AUTO: 0.06 THOUSAND/ΜL (ref 0–0.61)
EOSINOPHIL NFR BLD AUTO: 2 % (ref 0–6)
ERYTHROCYTE [DISTWIDTH] IN BLOOD BY AUTOMATED COUNT: 19.5 % (ref 11.6–15.1)
GFR SERPL CREATININE-BSD FRML MDRD: 72 ML/MIN/1.73SQ M
GLUCOSE SERPL-MCNC: 75 MG/DL (ref 65–140)
HCT VFR BLD AUTO: 27.9 % (ref 34.8–46.1)
HGB BLD-MCNC: 8.6 G/DL (ref 11.5–15.4)
IMM GRANULOCYTES # BLD AUTO: 0.01 THOUSAND/UL (ref 0–0.2)
IMM GRANULOCYTES NFR BLD AUTO: 0 % (ref 0–2)
LACTATE SERPL-SCNC: 4.7 MMOL/L (ref 0.5–2)
LACTATE SERPL-SCNC: 5 MMOL/L (ref 0.5–2)
LIPASE SERPL-CCNC: 198 U/L (ref 73–393)
LYMPHOCYTES # BLD AUTO: 0.62 THOUSANDS/ΜL (ref 0.6–4.47)
LYMPHOCYTES NFR BLD AUTO: 20 % (ref 14–44)
MCH RBC QN AUTO: 31.2 PG (ref 26.8–34.3)
MCHC RBC AUTO-ENTMCNC: 30.8 G/DL (ref 31.4–37.4)
MCV RBC AUTO: 101 FL (ref 82–98)
MONOCYTES # BLD AUTO: 0.3 THOUSAND/ΜL (ref 0.17–1.22)
MONOCYTES NFR BLD AUTO: 10 % (ref 4–12)
NEUTROPHILS # BLD AUTO: 2.08 THOUSANDS/ΜL (ref 1.85–7.62)
NEUTS SEG NFR BLD AUTO: 67 % (ref 43–75)
NRBC BLD AUTO-RTO: 0 /100 WBCS
PLATELET # BLD AUTO: 20 THOUSANDS/UL (ref 149–390)
PMV BLD AUTO: 12.1 FL (ref 8.9–12.7)
POTASSIUM SERPL-SCNC: 3.7 MMOL/L (ref 3.5–5.3)
PROT SERPL-MCNC: 6.8 G/DL (ref 6.4–8.2)
RBC # BLD AUTO: 2.76 MILLION/UL (ref 3.81–5.12)
RH BLD: POSITIVE
SODIUM SERPL-SCNC: 143 MMOL/L (ref 136–145)
SPECIMEN EXPIRATION DATE: NORMAL
WBC # BLD AUTO: 3.09 THOUSAND/UL (ref 4.31–10.16)

## 2019-07-06 PROCEDURE — 86923 COMPATIBILITY TEST ELECTRIC: CPT

## 2019-07-06 PROCEDURE — C9113 INJ PANTOPRAZOLE SODIUM, VIA: HCPCS | Performed by: PHYSICIAN ASSISTANT

## 2019-07-06 PROCEDURE — 85025 COMPLETE CBC W/AUTO DIFF WBC: CPT | Performed by: PHYSICIAN ASSISTANT

## 2019-07-06 PROCEDURE — 86850 RBC ANTIBODY SCREEN: CPT | Performed by: PHYSICIAN ASSISTANT

## 2019-07-06 PROCEDURE — 96365 THER/PROPH/DIAG IV INF INIT: CPT

## 2019-07-06 PROCEDURE — 99284 EMERGENCY DEPT VISIT MOD MDM: CPT | Performed by: PHYSICIAN ASSISTANT

## 2019-07-06 PROCEDURE — 99285 EMERGENCY DEPT VISIT HI MDM: CPT

## 2019-07-06 PROCEDURE — 83690 ASSAY OF LIPASE: CPT | Performed by: PHYSICIAN ASSISTANT

## 2019-07-06 PROCEDURE — 86900 BLOOD TYPING SEROLOGIC ABO: CPT | Performed by: PHYSICIAN ASSISTANT

## 2019-07-06 PROCEDURE — 36415 COLL VENOUS BLD VENIPUNCTURE: CPT | Performed by: PHYSICIAN ASSISTANT

## 2019-07-06 PROCEDURE — 96368 THER/DIAG CONCURRENT INF: CPT

## 2019-07-06 PROCEDURE — 74177 CT ABD & PELVIS W/CONTRAST: CPT

## 2019-07-06 PROCEDURE — 86901 BLOOD TYPING SEROLOGIC RH(D): CPT | Performed by: PHYSICIAN ASSISTANT

## 2019-07-06 PROCEDURE — 83605 ASSAY OF LACTIC ACID: CPT | Performed by: PHYSICIAN ASSISTANT

## 2019-07-06 PROCEDURE — 80053 COMPREHEN METABOLIC PANEL: CPT | Performed by: PHYSICIAN ASSISTANT

## 2019-07-06 PROCEDURE — 96361 HYDRATE IV INFUSION ADD-ON: CPT

## 2019-07-06 RX ORDER — PSEUDOEPHEDRINE HCL 30 MG
TABLET ORAL DAILY
COMMUNITY

## 2019-07-06 RX ORDER — PREGABALIN 100 MG/1
300 CAPSULE ORAL ONCE
Status: COMPLETED | OUTPATIENT
Start: 2019-07-06 | End: 2019-07-06

## 2019-07-06 RX ADMIN — Medication 80 MG: at 21:57

## 2019-07-06 RX ADMIN — SODIUM CHLORIDE, SODIUM LACTATE, POTASSIUM CHLORIDE, AND CALCIUM CHLORIDE 1000 ML: .6; .31; .03; .02 INJECTION, SOLUTION INTRAVENOUS at 21:50

## 2019-07-06 RX ADMIN — Medication 3.38 G: at 22:57

## 2019-07-06 RX ADMIN — IOHEXOL 100 ML: 350 INJECTION, SOLUTION INTRAVENOUS at 21:04

## 2019-07-06 RX ADMIN — SODIUM CHLORIDE 1000 ML: 0.9 INJECTION, SOLUTION INTRAVENOUS at 19:23

## 2019-07-06 RX ADMIN — PREGABALIN 300 MG: 100 CAPSULE ORAL at 22:05

## 2019-07-07 ENCOUNTER — APPOINTMENT (INPATIENT)
Dept: RADIOLOGY | Facility: HOSPITAL | Age: 59
DRG: 393 | End: 2019-07-07
Payer: OTHER GOVERNMENT

## 2019-07-07 ENCOUNTER — ANESTHESIA EVENT (INPATIENT)
Dept: PERIOP | Facility: HOSPITAL | Age: 59
DRG: 393 | End: 2019-07-07
Payer: OTHER GOVERNMENT

## 2019-07-07 ENCOUNTER — ANESTHESIA (INPATIENT)
Dept: PERIOP | Facility: HOSPITAL | Age: 59
DRG: 393 | End: 2019-07-07
Payer: OTHER GOVERNMENT

## 2019-07-07 ENCOUNTER — APPOINTMENT (INPATIENT)
Dept: PERIOP | Facility: HOSPITAL | Age: 59
DRG: 393 | End: 2019-07-07
Attending: INTERNAL MEDICINE
Payer: OTHER GOVERNMENT

## 2019-07-07 PROBLEM — H10.31 ACUTE BACTERIAL CONJUNCTIVITIS OF RIGHT EYE: Status: ACTIVE | Noted: 2019-07-07

## 2019-07-07 PROBLEM — E87.2 LACTIC ACIDOSIS: Status: RESOLVED | Noted: 2017-10-19 | Resolved: 2019-07-07

## 2019-07-07 PROBLEM — Z98.84 HISTORY OF BARIATRIC SURGERY: Status: ACTIVE | Noted: 2019-07-07

## 2019-07-07 PROBLEM — H57.89 REDNESS OF EYE, RIGHT: Status: ACTIVE | Noted: 2019-07-07

## 2019-07-07 PROBLEM — H57.89 REDNESS OF EYE, RIGHT: Status: RESOLVED | Noted: 2019-07-07 | Resolved: 2019-07-07

## 2019-07-07 PROBLEM — K62.5 GASTROINTESTINAL HEMORRHAGE ASSOCIATED WITH ANORECTAL SOURCE: Status: ACTIVE | Noted: 2017-08-02

## 2019-07-07 PROBLEM — F10.239 ALCOHOL WITHDRAWAL (HCC): Status: ACTIVE | Noted: 2017-03-16

## 2019-07-07 LAB
ABO GROUP BLD BPU: NORMAL
ABO GROUP BLD BPU: NORMAL
AMMONIA PLAS-SCNC: 92 UMOL/L (ref 11–35)
ANION GAP SERPL CALCULATED.3IONS-SCNC: 9 MMOL/L (ref 4–13)
BASOPHILS # BLD AUTO: 0.01 THOUSANDS/ΜL (ref 0–0.1)
BASOPHILS # BLD AUTO: 0.01 THOUSANDS/ΜL (ref 0–0.1)
BASOPHILS NFR BLD AUTO: 0 % (ref 0–1)
BASOPHILS NFR BLD AUTO: 0 % (ref 0–1)
BPU ID: NORMAL
BPU ID: NORMAL
BUN SERPL-MCNC: 10 MG/DL (ref 5–25)
CALCIUM SERPL-MCNC: 7.4 MG/DL (ref 8.3–10.1)
CHLORIDE SERPL-SCNC: 110 MMOL/L (ref 100–108)
CO2 SERPL-SCNC: 24 MMOL/L (ref 21–32)
CREAT SERPL-MCNC: 0.96 MG/DL (ref 0.6–1.3)
EOSINOPHIL # BLD AUTO: 0.07 THOUSAND/ΜL (ref 0–0.61)
EOSINOPHIL # BLD AUTO: 0.08 THOUSAND/ΜL (ref 0–0.61)
EOSINOPHIL NFR BLD AUTO: 3 % (ref 0–6)
EOSINOPHIL NFR BLD AUTO: 3 % (ref 0–6)
ERYTHROCYTE [DISTWIDTH] IN BLOOD BY AUTOMATED COUNT: 19.3 % (ref 11.6–15.1)
ERYTHROCYTE [DISTWIDTH] IN BLOOD BY AUTOMATED COUNT: 19.5 % (ref 11.6–15.1)
GFR SERPL CREATININE-BSD FRML MDRD: 65 ML/MIN/1.73SQ M
GLUCOSE SERPL-MCNC: 89 MG/DL (ref 65–140)
HCT VFR BLD AUTO: 22.7 % (ref 34.8–46.1)
HCT VFR BLD AUTO: 23.5 % (ref 34.8–46.1)
HCT VFR BLD AUTO: 23.6 % (ref 34.8–46.1)
HCT VFR BLD AUTO: 24.1 % (ref 34.8–46.1)
HCT VFR BLD AUTO: 24.8 % (ref 34.8–46.1)
HGB BLD-MCNC: 6.9 G/DL (ref 11.5–15.4)
HGB BLD-MCNC: 7.3 G/DL (ref 11.5–15.4)
HGB BLD-MCNC: 7.9 G/DL (ref 11.5–15.4)
IMM GRANULOCYTES # BLD AUTO: 0.01 THOUSAND/UL (ref 0–0.2)
IMM GRANULOCYTES # BLD AUTO: 0.02 THOUSAND/UL (ref 0–0.2)
IMM GRANULOCYTES NFR BLD AUTO: 0 % (ref 0–2)
IMM GRANULOCYTES NFR BLD AUTO: 1 % (ref 0–2)
INR PPP: 1.62 (ref 0.84–1.19)
LACTATE SERPL-SCNC: 1.7 MMOL/L (ref 0.5–2)
LACTATE SERPL-SCNC: 4.9 MMOL/L (ref 0.5–2)
LYMPHOCYTES # BLD AUTO: 0.47 THOUSANDS/ΜL (ref 0.6–4.47)
LYMPHOCYTES # BLD AUTO: 0.56 THOUSANDS/ΜL (ref 0.6–4.47)
LYMPHOCYTES NFR BLD AUTO: 17 % (ref 14–44)
LYMPHOCYTES NFR BLD AUTO: 24 % (ref 14–44)
MAGNESIUM SERPL-MCNC: 1.5 MG/DL (ref 1.6–2.6)
MCH RBC QN AUTO: 31.3 PG (ref 26.8–34.3)
MCH RBC QN AUTO: 32.6 PG (ref 26.8–34.3)
MCHC RBC AUTO-ENTMCNC: 30.9 G/DL (ref 31.4–37.4)
MCHC RBC AUTO-ENTMCNC: 31.9 G/DL (ref 31.4–37.4)
MCV RBC AUTO: 101 FL (ref 82–98)
MCV RBC AUTO: 103 FL (ref 82–98)
MONOCYTES # BLD AUTO: 0.29 THOUSAND/ΜL (ref 0.17–1.22)
MONOCYTES # BLD AUTO: 0.35 THOUSAND/ΜL (ref 0.17–1.22)
MONOCYTES NFR BLD AUTO: 12 % (ref 4–12)
MONOCYTES NFR BLD AUTO: 13 % (ref 4–12)
NEUTROPHILS # BLD AUTO: 1.39 THOUSANDS/ΜL (ref 1.85–7.62)
NEUTROPHILS # BLD AUTO: 1.79 THOUSANDS/ΜL (ref 1.85–7.62)
NEUTS SEG NFR BLD AUTO: 61 % (ref 43–75)
NEUTS SEG NFR BLD AUTO: 66 % (ref 43–75)
NRBC BLD AUTO-RTO: 0 /100 WBCS
NRBC BLD AUTO-RTO: 0 /100 WBCS
PLATELET # BLD AUTO: 17 THOUSANDS/UL (ref 149–390)
PLATELET # BLD AUTO: 20 THOUSANDS/UL (ref 149–390)
PMV BLD AUTO: 13.5 FL (ref 8.9–12.7)
POTASSIUM SERPL-SCNC: 3.6 MMOL/L (ref 3.5–5.3)
PROTHROMBIN TIME: 18.9 SECONDS (ref 11.6–14.5)
RBC # BLD AUTO: 2.33 MILLION/UL (ref 3.81–5.12)
RBC # BLD AUTO: 2.42 MILLION/UL (ref 3.81–5.12)
SODIUM SERPL-SCNC: 143 MMOL/L (ref 136–145)
UNIT DISPENSE STATUS: NORMAL
UNIT DISPENSE STATUS: NORMAL
UNIT PRODUCT CODE: NORMAL
UNIT PRODUCT CODE: NORMAL
UNIT RH: NORMAL
UNIT RH: NORMAL
WBC # BLD AUTO: 2.34 THOUSAND/UL (ref 4.31–10.16)
WBC # BLD AUTO: 2.71 THOUSAND/UL (ref 4.31–10.16)

## 2019-07-07 PROCEDURE — 99223 1ST HOSP IP/OBS HIGH 75: CPT | Performed by: INTERNAL MEDICINE

## 2019-07-07 PROCEDURE — 43239 EGD BIOPSY SINGLE/MULTIPLE: CPT | Performed by: INTERNAL MEDICINE

## 2019-07-07 PROCEDURE — 30233N1 TRANSFUSION OF NONAUTOLOGOUS RED BLOOD CELLS INTO PERIPHERAL VEIN, PERCUTANEOUS APPROACH: ICD-10-PCS | Performed by: FAMILY MEDICINE

## 2019-07-07 PROCEDURE — 85014 HEMATOCRIT: CPT | Performed by: INTERNAL MEDICINE

## 2019-07-07 PROCEDURE — P9040 RBC LEUKOREDUCED IRRADIATED: HCPCS

## 2019-07-07 PROCEDURE — 99255 IP/OBS CONSLTJ NEW/EST HI 80: CPT | Performed by: INTERNAL MEDICINE

## 2019-07-07 PROCEDURE — 85018 HEMOGLOBIN: CPT | Performed by: INTERNAL MEDICINE

## 2019-07-07 PROCEDURE — 0W3P8ZZ CONTROL BLEEDING IN GASTROINTESTINAL TRACT, VIA NATURAL OR ARTIFICIAL OPENING ENDOSCOPIC: ICD-10-PCS | Performed by: INTERNAL MEDICINE

## 2019-07-07 PROCEDURE — 83735 ASSAY OF MAGNESIUM: CPT | Performed by: NURSE PRACTITIONER

## 2019-07-07 PROCEDURE — 02HV33Z INSERTION OF INFUSION DEVICE INTO SUPERIOR VENA CAVA, PERCUTANEOUS APPROACH: ICD-10-PCS | Performed by: FAMILY MEDICINE

## 2019-07-07 PROCEDURE — P9037 PLATE PHERES LEUKOREDU IRRAD: HCPCS

## 2019-07-07 PROCEDURE — C9113 INJ PANTOPRAZOLE SODIUM, VIA: HCPCS | Performed by: NURSE PRACTITIONER

## 2019-07-07 PROCEDURE — NC001 PR NO CHARGE: Performed by: SURGERY

## 2019-07-07 PROCEDURE — 0DB68ZX EXCISION OF STOMACH, VIA NATURAL OR ARTIFICIAL OPENING ENDOSCOPIC, DIAGNOSTIC: ICD-10-PCS | Performed by: INTERNAL MEDICINE

## 2019-07-07 PROCEDURE — 82140 ASSAY OF AMMONIA: CPT | Performed by: INTERNAL MEDICINE

## 2019-07-07 PROCEDURE — 85025 COMPLETE CBC W/AUTO DIFF WBC: CPT | Performed by: INTERNAL MEDICINE

## 2019-07-07 PROCEDURE — 80048 BASIC METABOLIC PNL TOTAL CA: CPT | Performed by: NURSE PRACTITIONER

## 2019-07-07 PROCEDURE — 87077 CULTURE AEROBIC IDENTIFY: CPT | Performed by: INTERNAL MEDICINE

## 2019-07-07 PROCEDURE — C9113 INJ PANTOPRAZOLE SODIUM, VIA: HCPCS | Performed by: INTERNAL MEDICINE

## 2019-07-07 PROCEDURE — 36558 INSERT TUNNELED CV CATH: CPT | Performed by: SURGERY

## 2019-07-07 PROCEDURE — 85610 PROTHROMBIN TIME: CPT | Performed by: INTERNAL MEDICINE

## 2019-07-07 PROCEDURE — 30233R1 TRANSFUSION OF NONAUTOLOGOUS PLATELETS INTO PERIPHERAL VEIN, PERCUTANEOUS APPROACH: ICD-10-PCS | Performed by: FAMILY MEDICINE

## 2019-07-07 PROCEDURE — 71045 X-RAY EXAM CHEST 1 VIEW: CPT

## 2019-07-07 PROCEDURE — 85025 COMPLETE CBC W/AUTO DIFF WBC: CPT | Performed by: NURSE PRACTITIONER

## 2019-07-07 PROCEDURE — 3E0G8GC INTRODUCTION OF OTHER THERAPEUTIC SUBSTANCE INTO UPPER GI, VIA NATURAL OR ARTIFICIAL OPENING ENDOSCOPIC: ICD-10-PCS | Performed by: INTERNAL MEDICINE

## 2019-07-07 PROCEDURE — 83605 ASSAY OF LACTIC ACID: CPT | Performed by: NURSE PRACTITIONER

## 2019-07-07 RX ORDER — THIAMINE MONONITRATE (VIT B1) 100 MG
100 TABLET ORAL DAILY
Status: DISCONTINUED | OUTPATIENT
Start: 2019-07-07 | End: 2019-07-11 | Stop reason: HOSPADM

## 2019-07-07 RX ORDER — NEOMYCIN SULFATE, POLYMYXIN B SULFATE AND GRAMICIDIN 1.75; 10000; .025 MG/ML; [USP'U]/ML; MG/ML
2 SOLUTION/ DROPS OPHTHALMIC 4 TIMES DAILY
Status: DISCONTINUED | OUTPATIENT
Start: 2019-07-07 | End: 2019-07-11 | Stop reason: HOSPADM

## 2019-07-07 RX ORDER — PROPOFOL 10 MG/ML
INJECTION, EMULSION INTRAVENOUS AS NEEDED
Status: DISCONTINUED | OUTPATIENT
Start: 2019-07-07 | End: 2019-07-07 | Stop reason: SURG

## 2019-07-07 RX ORDER — SPIRONOLACTONE 25 MG/1
50 TABLET ORAL DAILY
Status: DISCONTINUED | OUTPATIENT
Start: 2019-07-07 | End: 2019-07-07

## 2019-07-07 RX ORDER — MULTIVITAMIN/IRON/FOLIC ACID 18MG-0.4MG
1 TABLET ORAL DAILY
Status: DISCONTINUED | OUTPATIENT
Start: 2019-07-07 | End: 2019-07-11 | Stop reason: HOSPADM

## 2019-07-07 RX ORDER — CEFTRIAXONE 1 G/50ML
1000 INJECTION, SOLUTION INTRAVENOUS EVERY 24 HOURS
Status: DISCONTINUED | OUTPATIENT
Start: 2019-07-07 | End: 2019-07-07

## 2019-07-07 RX ORDER — PANTOPRAZOLE SODIUM 40 MG/1
40 INJECTION, POWDER, FOR SOLUTION INTRAVENOUS EVERY 12 HOURS SCHEDULED
Status: DISCONTINUED | OUTPATIENT
Start: 2019-07-07 | End: 2019-07-07

## 2019-07-07 RX ORDER — SODIUM CHLORIDE AND POTASSIUM CHLORIDE .9; .15 G/100ML; G/100ML
50 SOLUTION INTRAVENOUS CONTINUOUS
Status: DISCONTINUED | OUTPATIENT
Start: 2019-07-07 | End: 2019-07-09

## 2019-07-07 RX ORDER — FOLIC ACID 1 MG/1
1 TABLET ORAL DAILY
Status: DISCONTINUED | OUTPATIENT
Start: 2019-07-07 | End: 2019-07-11 | Stop reason: HOSPADM

## 2019-07-07 RX ORDER — CALCIUM CARBONATE 500(1250)
1 TABLET ORAL
Status: DISCONTINUED | OUTPATIENT
Start: 2019-07-07 | End: 2019-07-11 | Stop reason: HOSPADM

## 2019-07-07 RX ORDER — LORAZEPAM 0.5 MG/1
0.5 TABLET ORAL 3 TIMES DAILY
Status: DISCONTINUED | OUTPATIENT
Start: 2019-07-07 | End: 2019-07-11

## 2019-07-07 RX ORDER — EPINEPHRINE 1 MG/ML
INJECTION, SOLUTION, CONCENTRATE INTRAVENOUS CODE/TRAUMA/SEDATION MEDICATION
Status: COMPLETED | OUTPATIENT
Start: 2019-07-07 | End: 2019-07-07

## 2019-07-07 RX ORDER — LIDOCAINE HYDROCHLORIDE 10 MG/ML
INJECTION, SOLUTION EPIDURAL; INFILTRATION; INTRACAUDAL; PERINEURAL
Status: DISCONTINUED
Start: 2019-07-07 | End: 2019-07-07 | Stop reason: WASHOUT

## 2019-07-07 RX ORDER — LORAZEPAM 2 MG/ML
1 INJECTION INTRAMUSCULAR EVERY 6 HOURS PRN
Status: DISCONTINUED | OUTPATIENT
Start: 2019-07-07 | End: 2019-07-11

## 2019-07-07 RX ORDER — TOPIRAMATE 25 MG/1
25 TABLET ORAL 2 TIMES DAILY
Status: DISCONTINUED | OUTPATIENT
Start: 2019-07-07 | End: 2019-07-11 | Stop reason: HOSPADM

## 2019-07-07 RX ORDER — FUROSEMIDE 20 MG/1
20 TABLET ORAL DAILY
Status: DISCONTINUED | OUTPATIENT
Start: 2019-07-07 | End: 2019-07-07

## 2019-07-07 RX ORDER — SODIUM CHLORIDE 9 MG/ML
125 INJECTION, SOLUTION INTRAVENOUS CONTINUOUS
Status: DISCONTINUED | OUTPATIENT
Start: 2019-07-07 | End: 2019-07-07

## 2019-07-07 RX ORDER — PREGABALIN 100 MG/1
300 CAPSULE ORAL 2 TIMES DAILY
Status: DISCONTINUED | OUTPATIENT
Start: 2019-07-07 | End: 2019-07-11 | Stop reason: HOSPADM

## 2019-07-07 RX ADMIN — PROPOFOL 30 MG: 10 INJECTION, EMULSION INTRAVENOUS at 16:35

## 2019-07-07 RX ADMIN — LORAZEPAM 0.5 MG: 0.5 TABLET ORAL at 18:46

## 2019-07-07 RX ADMIN — LORAZEPAM 0.5 MG: 0.5 TABLET ORAL at 21:31

## 2019-07-07 RX ADMIN — PROPOFOL 30 MG: 10 INJECTION, EMULSION INTRAVENOUS at 16:45

## 2019-07-07 RX ADMIN — PROPOFOL 100 MG: 10 INJECTION, EMULSION INTRAVENOUS at 16:10

## 2019-07-07 RX ADMIN — Medication 8 MG/HR: at 18:46

## 2019-07-07 RX ADMIN — TOPIRAMATE 25 MG: 25 TABLET, FILM COATED ORAL at 18:48

## 2019-07-07 RX ADMIN — PROPOFOL 30 MG: 10 INJECTION, EMULSION INTRAVENOUS at 16:40

## 2019-07-07 RX ADMIN — PROPOFOL 30 MG: 10 INJECTION, EMULSION INTRAVENOUS at 16:24

## 2019-07-07 RX ADMIN — PREGABALIN 300 MG: 100 CAPSULE ORAL at 09:31

## 2019-07-07 RX ADMIN — NEOMYCIN SULFATE, POLYMYXIN B SULFATE AND GRAMICIDIN 2 DROP: 1.75; 10000; .025 SOLUTION/ DROPS OPHTHALMIC at 18:48

## 2019-07-07 RX ADMIN — EPINEPHRINE 1 MG: 1 INJECTION, SOLUTION, CONCENTRATE INTRAVENOUS at 16:21

## 2019-07-07 RX ADMIN — NEOMYCIN SULFATE, POLYMYXIN B SULFATE AND GRAMICIDIN 2 DROP: 1.75; 10000; .025 SOLUTION/ DROPS OPHTHALMIC at 13:04

## 2019-07-07 RX ADMIN — THIAMINE HCL TAB 100 MG 100 MG: 100 TAB at 13:04

## 2019-07-07 RX ADMIN — PANTOPRAZOLE SODIUM 40 MG: 40 INJECTION, POWDER, FOR SOLUTION INTRAVENOUS at 06:04

## 2019-07-07 RX ADMIN — NEOMYCIN SULFATE, POLYMYXIN B SULFATE AND GRAMICIDIN 2 DROP: 1.75; 10000; .025 SOLUTION/ DROPS OPHTHALMIC at 21:50

## 2019-07-07 RX ADMIN — PROPOFOL 30 MG: 10 INJECTION, EMULSION INTRAVENOUS at 16:17

## 2019-07-07 RX ADMIN — RIFAXIMIN 550 MG: 550 TABLET ORAL at 00:42

## 2019-07-07 RX ADMIN — PROPOFOL 50 MG: 10 INJECTION, EMULSION INTRAVENOUS at 16:15

## 2019-07-07 RX ADMIN — NEOMYCIN SULFATE, POLYMYXIN B SULFATE AND GRAMICIDIN 2 DROP: 1.75; 10000; .025 SOLUTION/ DROPS OPHTHALMIC at 09:37

## 2019-07-07 RX ADMIN — EPINEPHRINE 1 MG: 1 INJECTION, SOLUTION, CONCENTRATE INTRAVENOUS at 16:35

## 2019-07-07 RX ADMIN — SODIUM CHLORIDE 125 ML/HR: 0.9 INJECTION, SOLUTION INTRAVENOUS at 00:32

## 2019-07-07 RX ADMIN — LORAZEPAM 0.5 MG: 0.5 TABLET ORAL at 11:20

## 2019-07-07 RX ADMIN — PROPOFOL 30 MG: 10 INJECTION, EMULSION INTRAVENOUS at 16:30

## 2019-07-07 RX ADMIN — PREGABALIN 300 MG: 100 CAPSULE ORAL at 18:46

## 2019-07-07 RX ADMIN — SODIUM CHLORIDE AND POTASSIUM CHLORIDE 100 ML/HR: .9; .15 SOLUTION INTRAVENOUS at 09:20

## 2019-07-07 RX ADMIN — PROPOFOL 50 MG: 10 INJECTION, EMULSION INTRAVENOUS at 16:12

## 2019-07-07 NOTE — ASSESSMENT & PLAN NOTE
· Patient at some point was being evaluated for a transplant, however she was currently drinking and states she quit this Thursday  · LFTs are elevated, will trend daily  · GI was consulted  · Restarted rifaximin  · Will trend ammonia levels if patient's mentation declines

## 2019-07-07 NOTE — ED NOTES
Pt given applesauce, did not eat crackers, pt aware NPO after midnight       Ervin Cook RN  07/06/19 [id number]

## 2019-07-07 NOTE — CONSULTS
Consultation - GI   Dieudonne Hannah 61 y o  female MRN: [id number]  Unit/Bed#: [address] [place] [address] [id number] Encounter: [id number]    Consults  ASSESSMENT   1  Gastrointestinal hemorrhage suspect upper GI source  Differential diagnosis includes bleeding from esophageal varices  Stomal ulcer is a possibility  Less likely lower GI bleeding    2 anemia secondary to acute gastrointestinal blood loss    3  Cirrhosis on the basis of alcohol abuse    4  Severe thrombocytopenia related to alcohol toxicity on bone marrow and cirrhosis    5  History of Nicole-en-Y gastric bypass    6  History of bleeding stomal ulcer in the past    7  Alcohol use disorder severe    8  Apparent alcohol withdrawal-ongoing heavy use for the past 6 months 2 bottles of wine a day    9  Chronic iron deficiency anemia    10  History of B12 deficiency    11  Large abdominal wall hernia     12  Limited intravenous access     Plan  1  Transfuse 1 unit packed cells if hemoglobin drops below 7  2  Urgent EGD today would like to get platelet count up however prior to procedure and better IV access  3  Prophylactic IV Rocephin in case this is a  Variceal  Bleed  4  Alcohol withdrawal protocol  5  If IV access available would give IV octreotide  6  Q6 hour H&H as ordered   7  Stat INR ordered will review  8  Advise transfer patient to step-down unit         Chief Complaint   Patient presents with    Rectal Bleeding     To ED with c/o black stools with red diarrhea since thursday, now has weakness, dizziness, shaking patient is in withdraw from alcohol  Has abd  pain   Withdrawal - Alcohol     Physician Requesting Consult: Marily Burnett MD    HPI Dieudonne Hannah is a 61y o  year old female who presents with 3 day history of melanotic stool  The patient does have a history of alcoholic cirrhosis  She was in her usual state health until about the evening of July 3rd  At that time she began having black appearing stool  She did have some red blood per rectum    She reports that she has had almost up to 10 bowel movements per day  She has not had hematemesis  She does feel some gurgling in the lower abdomen and actually some reports she had some more black stool while in the hospital   Patient does have a history of gait GI bleeding from a stomal ulcer  She had Nicole-en-Y gastric bypass in the past   The patient has a history of heavy alcohol abuse and actually was evaluated for liver transplant in the past   She became sober and stop drinking for quite some time and her lab studies markedly improved  However last fall she began drinking again 1st with cooking wine and then escalating to having 2 bottles of wine per day  Her  drinks  She has been doing this because she is bored  In any event [place] from [place] as she has been followed in our clinic  She receives frequent iron infusions from Dr Sofia Arellano office hematology  It should be noted that the patient is not taking aspirin and nonsteroidal anti-inflammatory agents  Historical Information   Past Medical History:   Diagnosis Date    Alcohol use disorder     Alcoholic hepatitis     Anastomotic ulcer     Anemia     Anxiety     Ascites     Breast cancer ([facility] [address] ) 2010    stage 0; bilateral mastectomy (prophylactic on left)    Chronic foot pain     Chronic narcotic dependence (HCC)     Chronic pain disorder     Cirrhosis, alcoholic (HCC)     Depression     Esophagitis     History of bilateral mastectomy     History of gastric bypass     Hypertension     Liver disease     Morbid obesity (HCC)     Palpitations     Pancytopenia (HCC)     Peripheral neuropathy     Rosacea      Past Surgical History:   Procedure Laterality Date    ABDOMINAL SURGERY      abdominoplasty    BREAST SURGERY      mastectomy    CHOLECYSTECTOMY      ESOPHAGOGASTRODUODENOSCOPY N/A 8/2/2017    Procedure: ESOPHAGOGASTRODUODENOSCOPY (EGD);   Surgeon: Marco Jack DO;  Location: [facility] OR;  Service: Gastroenterology    GASTRIC BYPASS      HERNIA REPAIR      MASTECTOMY      OR ESOPHAGOGASTRODUODENOSCOPY TRANSORAL DIAGNOSTIC N/A 5/18/2017    Procedure: ESOPHAGOGASTRODUODENOSCOPY (EGD) with bx;  Surgeon: Opal Cooks, MD;  Location: [facility]; Service: Gastroenterology    OR ESOPHAGOGASTRODUODENOSCOPY TRANSORAL DIAGNOSTIC N/A 9/14/2017    Procedure: EGD with bx AND COLONOSCOPY with polypectomy;  Surgeon: Opal Cooks, MD;  Location: [facility];   Service: Gastroenterology    DECLAN-EN-Y PROCEDURE  2005     Social History   Social History     Substance and Sexual Activity   Alcohol Use Yes     Social History     Substance and Sexual Activity   Drug Use No     Social History     Tobacco Use   Smoking Status Never Smoker   Smokeless Tobacco Never Used     Family History   Problem Relation Age of Onset    Diabetes Mother     Alzheimer's disease Mother     Kidney disease Father     Hypertension Father     Obesity Sister     Fibrocystic breast disease Sister     Breast cancer Sister 62    Colon cancer Neg Hx     Uterine cancer Neg Hx     Ovarian cancer Neg Hx        Meds/Allergies   Current Facility-Administered Medications   Medication Dose Route Frequency    calcium carbonate (OYSTER SHELL,OSCAL) 500 mg tablet 1 tablet  1 tablet Oral Daily With Breakfast    folic acid (FOLVITE) tablet 1 mg  1 mg Oral Daily    multivitamin-minerals (CENTRUM ADULTS) tablet 1 tablet  1 tablet Oral Daily    neomycin-polymyxin-gramicidin (NEOSPORIN) 0 175%-10,000 units/mL-0 0025% ophthalmic solution 2 drop  2 drop Both Eyes 4x Daily    pantoprazole (PROTONIX) injection 40 mg  40 mg Intravenous Q12H [facility]    pregabalin (LYRICA) capsule 300 mg  300 mg Oral BID    rifaximin (XIFAXAN) tablet 550 mg  550 mg Oral Q12H [facility]    sodium chloride 0 9 % with KCl 20 mEq/L infusion (premix)  100 mL/hr Intravenous Continuous    topiramate (TOPAMAX) tablet 25 mg  25 mg Oral BID     Medications Prior to Admission   Medication    Calcium Citrate 250 MG TABS    Cholecalciferol (VITAMIN D3) 5000 units TABS    folic acid (FOLVITE) 1 mg tablet    furosemide (LASIX) 20 mg tablet    Multiple Vitamins-Minerals (MULTIVITAMIN WITH MINERALS) tablet    pregabalin (LYRICA) 300 MG capsule    spironolactone (ALDACTONE) 50 mg tablet    topiramate (TOPAMAX) 25 mg sprinkle capsule    XIFAXAN 550 MG tablet       Allergies   Allergen Reactions    Acetaminophen Other (See Comments)     Liver function, s/p bariatric surgery    Cymbalta [Duloxetine Hcl] GI Intolerance    Duloxetine GI Intolerance       10 Point Review of Systems - general-overall weakness  Neuro tremulousness and unsteady on the feet  Skin peripheral edema  Psych good mental status  GI please see above  Rest of the review is negative    PHYSICALEXAM  /60 (BP Location: Right arm)   Pulse 101   Temp 99 7 °F (37 6 °C) (Oral)   Resp 20   Ht 5' 4" (1 626 m)   Wt 111 kg (245 lb 9 5 oz)   SpO2 96%   Breastfeeding?  No   BMI 42 16 kg/m²   General appearance: alert, appears stated age and cooperative  Head: Normocephalic, without obvious abnormality, atraumatic  Eyes: sclerae anicteric, PERRLA   Lungs: clear to auscultation bilaterally, no wheeze   Heart: regular rate and rhythm,  no murmur  Abdomen: soft, non-tender; bowel sounds normal; no masses,  no organomegaly  Extremities: extremities normal, atraumatic, no cyanosis or edema  Neurologic: Grossly normal  Psych: alert, oriented x3  Skin: no rash, no jaundice    Lab Results   Component Value Date    CALCIUM 7 4 (L) 07/07/2019    K 3 6 07/07/2019    CO2 24 07/07/2019     (H) 07/07/2019    BUN 10 07/07/2019    CREATININE 0 96 07/07/2019     Lab Results   Component Value Date    WBC 2 34 (L) 07/07/2019    HGB 7 3 (L) 07/07/2019    HCT 23 6 (L) 07/07/2019     (H) 07/07/2019    PLT 17 (LL) 07/07/2019     Lab Results   Component Value Date    ALT 53 07/06/2019     (H) 07/06/2019    ALKPHOS 136 (H) 07/06/2019     No results found for: AMYLASE  Lab Results   Component Value Date    LIPASE 198 07/06/2019     Lab Results   Component Value Date    IRON 36 (L) 05/18/2019    TIBC 382 05/18/2019    FERRITIN 92 05/18/2019     Lab Results   Component Value Date    INR 1 48 (H) 05/17/2019       Imaging Studies: I have personally reviewed pertinent reports  EKG, Pathology, and Other Studies: I have personally reviewed pertinent reports

## 2019-07-07 NOTE — ED NOTES
Waiting on the house supervisor to bring down the protonix paras Carvajal, LUIS ALBERTO  07/06/19 [id number]

## 2019-07-07 NOTE — ED PROVIDER NOTES
History  Chief Complaint   Patient presents with    Rectal Bleeding     To ED with c/o black stools with red diarrhea since thursday, now has weakness, dizziness, shaking patient is in withdraw from alcohol  Has abd  pain   Withdrawal - Alcohol     60 yo female w/ hx of EtoH abuse (last drink 2 nights ago), alcoholic hepatitis, thrombocytopenia, HTN, breast CA in remission and peripheral neuropathy presents to the Emergency Department for evaluation of abdominal cramping and "bloody stool" x 3-4 days  She initially states that she has seen red blood in the toilet bowel and on toilet tissue, but on further questioning it appears to be more melanotic  She states that she has relapsed with EtOH use and has been drinking heavily until 2 days ago; currently experiencing withdrawal symptoms including anxiety and tremors, denies hallucinations, perceptual disturbances or diaphoresis  Has hx of Nicole-en-Y with stromal ulcer  Also with hx of esophageal varices although no known hx of variceal bleed  Prior to Admission Medications   Prescriptions Last Dose Informant Patient Reported? Taking?    Calcium Citrate 250 MG TABS   Yes Yes   Sig: Take by mouth daily 500mg, takes 3 daily- 1500mg total   Cholecalciferol (VITAMIN D3) 5000 units TABS   Yes No   Sig: Take 5,000 Units by mouth daily   Multiple Vitamins-Minerals (MULTIVITAMIN WITH MINERALS) tablet   Yes No   Sig: Take 1 tablet by mouth daily   XIFAXAN 550 MG tablet   Yes No   Sig: Take by mouth every 12 (twelve) hours Pt states not compliant with taking   folic acid (FOLVITE) 1 mg tablet  Self No No   Sig: Take 1 tablet by mouth daily   furosemide (LASIX) 20 mg tablet   No No   Sig: TAKE 1 TABLET DAILY AS DIRECTED   pregabalin (LYRICA) 300 MG capsule   No No   Sig: Take 1 capsule (300 mg total) by mouth 2 (two) times a day   spironolactone (ALDACTONE) 50 mg tablet   No No   Sig: Take 1 tablet (50 mg total) by mouth daily for 30 days   topiramate (TOPAMAX) 25 mg sprinkle capsule   No No   Sig: Take 1 capsule (25 mg total) by mouth 2 (two) times a day   Patient taking differently: Take 50 mg by mouth 2 (two) times a day       Facility-Administered Medications: None       Past Medical History:   Diagnosis Date    Alcohol use disorder     Alcoholic hepatitis     Anastomotic ulcer     Anemia     Anxiety     Ascites     [facility] 2010    stage 0; bilateral mastectomy (prophylactic on left)    Chronic foot pain     Chronic narcotic dependence (HCC)     Chronic pain disorder     Cirrhosis, alcoholic (HCC)     Depression     Esophagitis     History of bilateral mastectomy     History of gastric bypass     Hypertension     Liver disease     Morbid obesity ([facility] )     Palpitations     Pancytopenia ([facility]     Peripheral neuropathy     Rosacea        Past Surgical History:   Procedure Laterality Date    ABDOMINAL SURGERY      abdominoplasty    BREAST SURGERY      mastectomy    CHOLECYSTECTOMY      ESOPHAGOGASTRODUODENOSCOPY N/A 8/2/2017    Procedure: ESOPHAGOGASTRODUODENOSCOPY (EGD); Surgeon: Joshua Daniels DO;  Location:  MAIN OR;  Service: Gastroenterology    GASTRIC BYPASS      HERNIA REPAIR      MASTECTOMY      [place] ESOPHAGOGASTRODUODENOSCOPY TRANSORAL DIAGNOSTIC N/A 5/18/2017    Procedure: ESOPHAGOGASTRODUODENOSCOPY (EGD) with bx;  Surgeon: Coy Alegria MD;  Location: [facility]; Service: Gastroenterology    [place] ESOPHAGOGASTRODUODENOSCOPY TRANSORAL DIAGNOSTIC N/A 9/14/2017    Procedure: EGD with bx AND COLONOSCOPY with polypectomy;  Surgeon: Coy Alegria MD;  Location: [facility];   Service: Gastroenterology    DECLAN-EN-Y PROCEDURE  2005       Family History   Problem Relation Age of Onset    Diabetes Mother     Alzheimer's disease Mother     Kidney disease Father     Hypertension Father     Obesity Sister     Fibrocystic breast disease Sister     Breast cancer Sister 62    Colon cancer Neg Hx     Uterine cancer Neg Hx     Ovarian cancer Neg Hx      I have reviewed and agree with the history as documented  Social History     Tobacco Use    Smoking status: Never Smoker    Smokeless tobacco: Never Used   Substance Use Topics    Alcohol use: Yes    Drug use: No        Review of Systems   Constitutional: Positive for fatigue  Negative for chills, diaphoresis and fever  Eyes: Negative for visual disturbance  Respiratory: Negative for cough and shortness of breath  Cardiovascular: Negative for chest pain and palpitations  Gastrointestinal: Positive for abdominal pain, blood in stool and diarrhea  Negative for nausea and vomiting  Genitourinary: Negative for dysuria, flank pain and frequency  Musculoskeletal: Negative for arthralgias and myalgias  Skin: Negative for color change, rash and wound  Allergic/Immunologic: Negative for immunocompromised state  Neurological: Negative for dizziness and light-headedness  Hematological: Does not bruise/bleed easily  Psychiatric/Behavioral: Negative for confusion  The patient is not nervous/anxious  Physical Exam  Physical Exam   Constitutional: She is oriented to person, place, and time  She appears well-developed and well-nourished  No distress  HENT:   Head: Normocephalic and atraumatic  Mouth/Throat: Oropharynx is clear and moist    Eyes: Pupils are equal, round, and reactive to light  No scleral icterus  Neck: No JVD present  Cardiovascular: Normal rate and regular rhythm  Exam reveals no gallop and no friction rub  No murmur heard  Pulmonary/Chest: No respiratory distress  She has no wheezes  She has no rales  Abdominal: Soft  Bowel sounds are normal  She exhibits no distension and no mass  There is no tenderness  There is no rebound and no guarding  A hernia is present  Hernia confirmed positive in the ventral area     Large incisional/ventral hernia, soft, reducible  Abdomen is generally soft with mild lower abd tenderness   Genitourinary: Genitourinary Comments: Grossly melanotic stool, heme positive  No bright red blood    Musculoskeletal: She exhibits no edema  Neurological: She is alert and oriented to person, place, and time  Skin: Skin is warm and dry  Capillary refill takes less than 2 seconds  She is not diaphoretic  There is pallor  Psychiatric: She has a normal mood and affect  Her behavior is normal    Vitals reviewed        Vital Signs  ED Triage Vitals [07/06/19 1854]   Temperature Pulse Respirations Blood Pressure SpO2   98 2 °F (36 8 °C) 74 20 151/60 100 %      Temp Source Heart Rate Source Patient Position - Orthostatic VS BP Location FiO2 (%)   Tympanic Monitor Lying Left arm --      Pain Score       6           Vitals:    07/07/19 1327 07/07/19 1346 07/07/19 1415 07/07/19 1459   BP: 154/69 129/67 132/62 128/59   Pulse: (!) 118 100 102 102   Patient Position - Orthostatic VS:             Visual Acuity  Visual Acuity      Most Recent Value   L Pupil Size (mm)  3   R Pupil Size (mm)  3          ED Medications  Medications   calcium carbonate (OYSTER SHELL,OSCAL) 500 mg tablet 1 tablet ( Oral MAR Unhold 5/5/31 4033)   folic acid (FOLVITE) tablet 1 mg ( Oral MAR Unhold 7/7/19 1142)   multivitamin-minerals (CENTRUM ADULTS) tablet 1 tablet ( Oral MAR Unhold 7/7/19 1142)   pregabalin (LYRICA) capsule 300 mg ( Oral MAR Unhold 7/7/19 1142)   topiramate (TOPAMAX) tablet 25 mg ( Oral MAR Unhold 7/7/19 1142)   rifaximin (XIFAXAN) tablet 550 mg ( Oral MAR Unhold 7/7/19 1142)   pantoprazole (PROTONIX) injection 40 mg ( Intravenous MAR Unhold 7/7/19 1142)   sodium chloride 0 9 % with KCl 20 mEq/L infusion (premix) (0 mL/hr Intravenous Stopped 7/7/19 1345)   neomycin-polymyxin-gramicidin (NEOSPORIN) 0 175%-10,000 units/mL-0 0025% ophthalmic solution 2 drop (2 drops Both Eyes Given 7/7/19 1304)   thiamine (VITAMIN B1) tablet 100 mg (100 mg Oral Given 7/7/19 1304)   LORazepam (ATIVAN) tablet 0 5 mg ( Oral MAR Unhold 7/7/19 6589)   LORazepam (ATIVAN) 2 mg/mL injection 1 mg ( Intravenous MAR Unhold 7/7/19 1142)   sodium chloride 0 9 % bolus 1,000 mL (0 mL Intravenous Stopped 7/6/19 2121)   lactated ringers bolus 1,000 mL (0 mL Intravenous Stopped 7/7/19 1143)   pantoprazole (PROTONIX) 80 mg in sodium chloride 0 9 % 100 mL IVPB (0 mg Intravenous Stopped 7/6/19 2215)   iohexol (OMNIPAQUE) 350 MG/ML injection (MULTI-DOSE) 100 mL (100 mL Intravenous Given 7/6/19 2104)   pregabalin (LYRICA) capsule 300 mg (300 mg Oral Given 7/6/19 2205)   piperacillin-tazobactam (ZOSYN) 3 375 g in sodium chloride 0 9 % 50 mL IVPB (0 g Intravenous Stopped 7/7/19 1144)       Diagnostic Studies  Results Reviewed     Procedure Component Value Units Date/Time    Lactic acid, plasma [[id number]]  (Abnormal) Collected:  07/06/19 2149    Lab Status:  Final result Specimen:  Blood from Arm, Right Updated:  07/06/19 2246     LACTIC ACID 5 0 mmol/L     Narrative:       Result may be elevated if tourniquet was used during collection      Comprehensive metabolic panel [[id number]]  (Abnormal) Collected:  07/06/19 1929    Lab Status:  Final result Specimen:  Blood from Arm, Left Updated:  07/06/19 2017     Sodium 143 mmol/L      Potassium 3 7 mmol/L      Chloride 108 mmol/L      CO2 23 mmol/L      ANION GAP 12 mmol/L      BUN 11 mg/dL      Creatinine 0 88 mg/dL      Glucose 75 mg/dL      Calcium 8 1 mg/dL       U/L      ALT 53 U/L      Alkaline Phosphatase 136 U/L      Total Protein 6 8 g/dL      Albumin 2 6 g/dL      Total Bilirubin 4 60 mg/dL      eGFR 72 ml/min/1 73sq m     Narrative:       [place] guidelines for Chronic Kidney Disease (CKD):     Stage 1 with normal or high GFR (GFR > 90 mL/min/1 73 square meters)    Stage 2 Mild CKD (GFR = 60-89 mL/min/1 73 square meters)    Stage 3A Moderate CKD (GFR = 45-59 mL/min/1 73 square meters)    Stage 3B Moderate CKD (GFR = 30-44 mL/min/1 73 square meters)    Stage 4 Severe CKD (GFR = 15-29 mL/min/1 73 square meters)    Stage 5 End Stage CKD (GFR <15 mL/min/1 73 square meters)  Note: GFR calculation is accurate only with a steady state creatinine    Lipase [[id number]]  (Normal) Collected:  07/06/19 1929    Lab Status:  Final result Specimen:  Blood from Arm, Left Updated:  07/06/19 2017     Lipase 198 u/L     CBC and differential [[id number]]  (Abnormal) Collected:  07/06/19 1929    Lab Status:  Final result Specimen:  Blood from Arm, Left Updated:  07/06/19 2014     WBC 3 09 Thousand/uL      RBC 2 76 Million/uL      Hemoglobin 8 6 g/dL      Hematocrit 27 9 %       fL      MCH 31 2 pg      MCHC 30 8 g/dL      RDW 19 5 %      MPV 12 1 fL      Platelets 20 Thousands/uL      nRBC 0 /100 WBCs      Neutrophils Relative 67 %      Immat GRANS % 0 %      Lymphocytes Relative 20 %      Monocytes Relative 10 %      Eosinophils Relative 2 %      Basophils Relative 1 %      Neutrophils Absolute 2 08 Thousands/µL      Immature Grans Absolute 0 01 Thousand/uL      Lymphocytes Absolute 0 62 Thousands/µL      Monocytes Absolute 0 30 Thousand/µL      Eosinophils Absolute 0 06 Thousand/µL      Basophils Absolute 0 02 Thousands/µL     Lactic acid, plasma [[id number]]  (Abnormal) Collected:  07/06/19 1929    Lab Status:  Final result Specimen:  Blood from Arm, Left Updated:  07/06/19 2010     LACTIC ACID 4 7 mmol/L     Narrative:       Result may be elevated if tourniquet was used during collection  XR chest portable   Final Result by Wes Davila MD (07/07 1420)      A left subclavian central venous catheter tip is at the junction of the superior vena cava and left brachiocephalic vein  No pneumothorax  Workstation performed: [id number]         CT abdomen pelvis with contrast   Final Result by Dionna Shin MD (07/06 2150)      Ascending and proximal transverse colitis  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       A 2nd supraumbilical hernia containing mesenteric fat is noted       There is mild to moderate anasarca  Severely fatty infiltrated liver is again noted  Recanalization umbilical vein and esophageal varices again noted  Patient again noted to be post gastric bypass without evidence for obstruction  Workstation performed: [id number]                    Procedures  Procedures       ED Course  ED Course as of Jul 07 1507   Sat Jul 06, 2019   2030 Gross melena on rectal exam, no obvious lower bleed  Heme positive                                  MDM  Number of Diagnoses or Management Options  [facility] withdrawal ([facility] ): new and requires workup  Colitis: new and requires workup  GI bleed: new and requires workup  Thrombocytopenia [facility]): new and requires workup  Diagnosis management comments: 60 yo female presents w/ melanotic stool, likely upper GI bleed in the setting of alcohol abuse  CT scan does show proximal and transverse colitis; she has no clear risk factors for C diff  She is given IV abx and protonix for upper GI bleed; no indications for transfusion at this time   She will be admitted to med/surg for ongoing management and GI consultation       Amount and/or Complexity of Data Reviewed  Clinical lab tests: ordered and reviewed  Tests in the radiology section of CPT®: ordered and reviewed  Tests in the medicine section of CPT®: ordered and reviewed  Review and summarize past medical records: yes  Discuss the patient with other providers: yes  Independent visualization of images, tracings, or specimens: yes        Disposition  Final diagnoses:   GI bleed   Alcohol withdrawal ([facility] )   Thrombocytopenia ([facility] )   Colitis     Time reflects when diagnosis was documented in both MDM as applicable and the Disposition within this note     Time User Action Codes Description Comment    7/6/2019  9:35 PM Darus Aver Add [K92 2] GI bleed     7/6/2019  9:35 PM Darus Aver Add [F10 239] Alcohol withdrawal ([facility] )     7/6/2019  9:35 PM Darus Aver Add [D69 6] Thrombocytopenia ([facility] )     7/6/2019 10:30 PM Andre Rose Add [K52 9] Colitis     7/7/2019  9:07 AM General Ezio Add [K62 5] Gastrointestinal hemorrhage associated with anorectal source     7/7/2019 12:14 PM Jhonathan Ochoa Add [D64 9] Chronic anemia       ED Disposition     ED Disposition Condition Date/Time Comment    Admit Stable Sat Jul 6, 2019 10:30 PM Case was discussed with Swati Clancy NP and the patient's admission status was agreed to be Admission Status: inpatient status to the service of Dr Alvin Vidal   Follow-up Information    None         Current Discharge Medication List      CONTINUE these medications which have NOT CHANGED    Details   Calcium Citrate 250 MG TABS Take by mouth daily 500mg, takes 3 daily- 1500mg total      Cholecalciferol (VITAMIN D3) 5000 units TABS Take 5,000 Units by mouth daily      folic acid (FOLVITE) 1 mg tablet Take 1 tablet by mouth daily  Qty: 30 tablet, Refills: 0      furosemide (LASIX) 20 mg tablet TAKE 1 TABLET DAILY AS DIRECTED  Qty: 90 tablet, Refills: 3    Associated Diagnoses: Cirrhosis of liver without ascites, unspecified hepatic cirrhosis type (HCC)      Multiple Vitamins-Minerals (MULTIVITAMIN WITH MINERALS) tablet Take 1 tablet by mouth daily      pregabalin (LYRICA) 300 MG capsule Take 1 capsule (300 mg total) by mouth 2 (two) times a day  Qty: 180 capsule, Refills: 1    Associated Diagnoses: Chronic foot pain, unspecified laterality; Chronic hand pain, unspecified laterality;  Alcohol-induced polyneuropathy (HCC)      spironolactone (ALDACTONE) 50 mg tablet Take 1 tablet (50 mg total) by mouth daily for 30 days  Qty: 30 tablet, Refills: 4    Associated Diagnoses: Cirrhosis of liver without ascites, unspecified hepatic cirrhosis type (HCC)      topiramate (TOPAMAX) 25 mg sprinkle capsule Take 1 capsule (25 mg total) by mouth 2 (two) times a day  Qty: 60 capsule, Refills: 3    Associated Diagnoses: Neuropathic pain      XIFAXAN 550 MG tablet Take by mouth every 12 (twelve) hours Pt states not compliant with taking           No discharge procedures on file      ED Provider  Electronically Signed by           Oracio Arellano PA-C  07/07/19 4371

## 2019-07-07 NOTE — UTILIZATION REVIEW
Initial Clinical Review    Admission: Date/Time/Statement: Inpatient 7/6/19 @ 2229     Orders Placed This Encounter   Procedures    Inpatient Admission (expected length of stay for this patient Order details is greater than two midnights)     Standing Status:   Standing     Number of Occurrences:   1     Order Specific Question:   Admitting Physician     Answer:   Dulce Caceres [[id number]]     Order Specific Question:   Level of Care     Answer:   Med Surg [[id number]]     Order Specific Question:   Estimated length of stay     Answer:   More than 2 Midnights     Order Specific Question:   Certification     Answer:   I certify that inpatient services are medically necessary for this patient for a duration of greater than two midnights  See H&P and MD Progress Notes for additional information about the patient's course of treatment  ED Arrival Information     Expected Arrival Acuity Means of Arrival Escorted By Service Admission Type    - 7/6/2019 18:25 Urgent Wheelchair Family Member Hospitalist Urgent    Arrival Complaint    -        Chief Complaint   Patient presents with    Rectal Bleeding     To ED with c/o black stools with red diarrhea since thursday, now has weakness, dizziness, shaking patient is in withdraw from alcohol  Has abd  pain   Withdrawal - Alcohol     Assessment/Plan: 61year old female to the ED from home with complaints of black tarry stools for 3 days, dizziness, abdominal pain  Admitted to inpatient for Melena, colitis, lactic acidosis  H/O alcohol abuse, Nicole en Y surgery, GI bleed, cirrhosis  Chronic anemia,/ thrombocytopenia  Protonix IV given in the ED, NPO, GI consult  Colitis noted on CT of abdomen, given a dose of IV abx in ED  Elevated lactic of 4 7 in ED, giving fluids, repeat lactic 5 0  Patient transferred to Step down level of care for closer monitoring  Patient states her last drink was 3 days ago  [facility] protocol  Trend ammonia levels   Will trend hemoglobin q8 hrs in the setting of melena  She has abdominal pain, blood in her stool, leg swelling  Patient's patient's platelet count has drifted below 20,000 will give 2 units of platelets  patient has received one unit thus far of  FFP  GI consult:  Gastrointestinal hemorrhage suspect upper GI source  Differential diagnosis includes bleeding from esophageal varices  Stomal ulcer is a possibility  Less likely lower GI bleeding  Plan for urgent EGD  Need better IV access and then give IV ocreotide  Q 6 hour H&H       ED Triage Vitals [07/06/19 1854]   Temperature Pulse Respirations Blood Pressure SpO2   98 2 °F (36 8 °C) 74 20 151/60 100 %      Temp Source Heart Rate Source Patient Position - Orthostatic VS BP Location FiO2 (%)   Tympanic Monitor Lying Left arm --      Pain Score       6        Wt Readings from Last 1 Encounters:   07/07/19 111 kg (245 lb 9 5 oz)     Additional Vital Signs:   Date/Time Temp Pulse Resp BP MAP (mmHg) SpO2 O2 Device   07/07/19 0900  111Abnormal         07/07/19 0712 99 7 °F (37 6 °C) 101 20 124/60  96 % None (Room air)   07/07/19 0002 98 5 °F (36 9 °C) 96 20 139/70  98 % None (Room air)   07/06/19 2312 98 1 °F (36 7 °C) 98 20 158/68  96 % None (Room air)   07/06/19 2230  100  166/74 106 98 %    07/06/19 2215  115Abnormal   179/60Abnormal  107 98 %    07/06/19 2211    201/88Abnormal       CIWA-Ar Score     Row Name  07/07/19  0900   07/07/19  0500  07/07/19  0035    CIWA-Ar   BP     129/73      Pulse  111Abnormal    103      Nausea and Vomiting  0   0  0    Tactile Disturbances  0   0  0    Tremor  3   4  4    Auditory Disturbances  0   0  0    Paroxysmal Sweats  0   0  0    Visual Disturbances  0   0  2    Anxiety  0   0  0    Headache, Fullness in Head  0   0  0    Agitation  0   0  0    Orientation and Clouding of Sensorium  0   0  0    CIWA-Ar Total  3   4  6          Pertinent Labs/Diagnostic Test Results:   CT A/P;  Ascending and proximal transverse colitis    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     A 2nd supraumbilical hernia containing mesenteric fat is noted  There is mild to moderate anasarca  Severely fatty infiltrated liver is again noted   Recanalization umbilical vein and esophageal varices again noted  Patient again noted to be post gastric bypass without evidence for obstruction    Results from last 7 days   Lab Units 07/07/19  0915 07/07/19  0548 07/06/19  1929   WBC Thousand/uL 2 71* 2 34* 3 09*   HEMOGLOBIN g/dL 7 9* 7 3* 8 6*   HEMATOCRIT % 24 8* 23 6* 27 9*   PLATELETS Thousands/uL 20* 17* 20*   NEUTROS ABS Thousands/µL 1 79* 1 39* 2 08         Results from last 7 days   Lab Units 07/07/19  0548 07/06/19  1929   SODIUM mmol/L 143 143   POTASSIUM mmol/L 3 6 3 7   CHLORIDE mmol/L 110* 108   CO2 mmol/L 24 23   ANION GAP mmol/L 9 12   BUN mg/dL 10 11   CREATININE mg/dL 0 96 0 88   EGFR ml/min/1 73sq m 65 72   CALCIUM mg/dL 7 4* 8 1*   MAGNESIUM mg/dL 1 5*  --      Results from last 7 days   Lab Units 07/07/19  0915 07/06/19  1929   AST U/L  --  152*   ALT U/L  --  53   ALK PHOS U/L  --  136*   TOTAL PROTEIN g/dL  --  6 8   ALBUMIN g/dL  --  2 6*   TOTAL BILIRUBIN mg/dL  --  4 60*   AMMONIA umol/L 92*  --          Results from last 7 days   Lab Units 07/07/19  0548 07/06/19  1929   GLUCOSE RANDOM mg/dL 89 75     Results from last 7 days   Lab Units 07/07/19  0915   PROTIME seconds 18 9*   INR  1 62*         Results from last 7 days   Lab Units 07/07/19  0548 07/07/19  0030 07/06/19  2149 07/06/19  1929   LACTIC ACID mmol/L 1 7 4 9* 5 0* 4 7*       Results from last 7 days   Lab Units 07/06/19  1929   LIPASE u/L 198       ED Treatment:   Medication Administration from 07/06/2019 1824 to 07/06/2019 2328       Date/Time Order Dose Route Action     07/06/2019 1923 sodium chloride 0 9 % bolus 1,000 mL 1,000 mL Intravenous New Bag     07/06/2019 2150 lactated ringers bolus 1,000 mL 1,000 mL Intravenous New Bag     07/06/2019 2157 pantoprazole (PROTONIX) 80 mg in sodium chloride 0 9 % 100 mL IVPB 80 mg Intravenous New Bag     07/06/2019 2205 pregabalin (LYRICA) capsule 300 mg 300 mg Oral Given     07/06/2019 2257 piperacillin-tazobactam (ZOSYN) 3 375 g in sodium chloride 0 9 % 50 mL IVPB 3 375 g Intravenous New Bag        Past Medical History:   Diagnosis Date    Alcohol use disorder     Alcoholic hepatitis     Anastomotic ulcer     Anemia     Anxiety     Ascites     Breast cancer ([facility] ) 2010    stage 0; bilateral mastectomy (prophylactic on left)    Chronic foot pain     Chronic narcotic dependence (HCC)     Chronic pain disorder     Cirrhosis, alcoholic (HCC)     Depression     Esophagitis     History of bilateral mastectomy     History of gastric bypass     Hypertension     Liver disease     Morbid obesity (HCC)     Palpitations     Pancytopenia (HCC)     Peripheral neuropathy     Rosacea      Admitting Diagnosis: Alcohol withdrawal ([facility] [address] ) [F10 239]  Rectal pain [K62 89]  Colitis [K52 9]  Weakness [R53 1]  Thrombocytopenia (HCC) [D69 6]  GI bleed [K92 2]  Disorder of right eye [H57 9]  Age/Sex: 61 y o  female  Admission Orders:  CBC, CMP, H&H every 6 houres  NPO  Current Facility-Administered Medications:  calcium carbonate 1 tablet Oral Daily With Breakfast   folic acid 1 mg Oral Daily   LORazepam 1 mg Intravenous Q6H PRN   LORazepam 0 5 mg Oral TID   multivitamin-minerals 1 tablet Oral Daily   neomycin-polymyxin-gramicidin 2 drop Both Eyes 4x Daily   pantoprazole 40 mg Intravenous Q12H CASTRO   pregabalin 300 mg Oral BID   rifaximin 550 mg Oral Q12H [facility] & long term   sodium chloride 0 9 % with KCl 20 mEq/L 100 mL/hr Intravenous Continuous   thiamine 100 mg Oral Daily   topiramate 25 mg Oral BID       IP CONSULT TO GASTROENTEROLOGY  IP CONSULT TO ALCOHOL BRIEF INTERVENTION TRAUMA    Network Utilization Review Department  Phone: [phone]; Fax [phone]  [email or web address]  ATTENTION: Please call with any questions or concerns to [phone]  and carefully listen to the prompts so that you are directed to the right person  Send all requests for admission clinical reviews, approved or denied determinations and any other requests to fax [phone]   All voicemails are confidential

## 2019-07-07 NOTE — ASSESSMENT & PLAN NOTE
· Start thiamine and folic acid  · Placed on CIWA protocol  · As per patient's last drink was Thursday, she drinks 2 bottles of white wine daily

## 2019-07-07 NOTE — ASSESSMENT & PLAN NOTE
· Continue Aldactone and Lasix daily  · Mild to moderate ascites seen on CT of abdomen  · Strict I/O

## 2019-07-07 NOTE — PROCEDURES
Central Line Insertion  Date/Time: 7/7/2019 12:13 PM  Performed by: Adria Miranda MD  Authorized by: Adria Miranda MD     Patient location:  Bedside  Other Assisting Provider: No    Consent:     Consent obtained:  Written    Consent given by:  Patient    Risks discussed:  Arterial puncture, bleeding, incorrect placement, infection, nerve damage and pneumothorax    Alternatives discussed:  No treatment  Universal protocol:     Procedure explained and questions answered to patient or proxy's satisfaction: yes      Relevant documents present and verified: yes      Test results available and properly labeled: yes      Radiology Images displayed and confirmed  If images not available, report reviewed: yes      Required blood products, implants, devices, and special equipment available: yes      Site/side marked: yes      Immediately prior to procedure, a time out was called: yes      Patient identity confirmed:  Verbally with patient  Pre-procedure details:     Hand hygiene: Hand hygiene performed prior to insertion      Sterile barrier technique: All elements of maximal sterile technique followed      Skin preparation:  2% chlorhexidine    Skin preparation agent: Skin preparation agent completely dried prior to procedure    Indications:     Central line indications: medications requiring central line and hemodynamic monitoring    Anesthesia (see MAR for exact dosages):      Anesthesia method:  Local infiltration    Local anesthetic:  Lidocaine 1% w/o epi  Procedure details:     Location:  Left subclavian    Vessel type: vein      Laterality:  Left    Approach: percutaneous technique used      Patient position:  Flat    Catheter type:  Triple lumen 20cm    Catheter size:  7 5 Fr    Landmarks identified: yes      Ultrasound guidance: no      Number of attempts:  1    Successful placement: yes    Post-procedure details:     Post-procedure:  Dressing applied and line sutured    Assessment:  Blood return through all ports    Patient tolerance of procedure:   Tolerated well, no immediate complications

## 2019-07-07 NOTE — ASSESSMENT & PLAN NOTE
· Patient has chronic thrombocytopenia  · Follows with Dr Dawn Bose from Heme-Onc  · Plt this admission - 20, will trend daily with CBCs  · Most recent baseline appears to be 30-40s

## 2019-07-07 NOTE — PROGRESS NOTES
EGD postop report full report in lumens  Preprocedure diagnosis-upper GI bleeding  Postprocedure diagnosis-normal esophagus without esophageal varices                                              -contact bleeding of anastomotic knows is that the patient's Nicole-en-Y   gastric bypass anastomosis-treating in with epinephrine injection and bipolar cautery good hemostasis obtained-this is felt to be the bleeding source on admission  -biopsy taken proximal stomach which appeared normal for H pylori  Unfortunately appear to bleed significantly and had to be cauterized and injected with epinephrine as well    This continued  With  oozing at the termination of procedure  -normal jejunum mucosa the patient's Nicole Y anastomosis  Blood loss approximately 100-150 mL  Procedure terminated to ensure her airway safety for the patient    Recommendations  Maintain in step-down unit  Monitor H&H q 6 hours along with platelet count  Begin proton pump inhibitor drip  Could consider re-endoscopy but would have the patient intubated prior  Monitor for ETOH withdrawal symptoms

## 2019-07-07 NOTE — ASSESSMENT & PLAN NOTE
· Hemoglobin 8 7 this admission, baseline appears to be 9 0-11 0  · She follows with Dr Joshua Clark outpatient for Venofer transfusions   · Will trend hemoglobin q8 hrs in the setting of melena  · Transfuse for hemoglobin less than 7 0

## 2019-07-07 NOTE — PROGRESS NOTES
Transfusing 2nd unit of platelets  Pt transported to OR for EGD  Pacu made aware to end transfusion

## 2019-07-07 NOTE — ANESTHESIA PREPROCEDURE EVALUATION
Review of Systems/Medical History  Patient summary reviewed  Chart reviewed      Cardiovascular  EKG reviewed, Negative cardio ROS Hypertension ,    Pulmonary  Negative pulmonary ROS        GI/Hepatic  Negative GI/hepatic ROS   GI bleeding , active and > 500ml blood loss, PUD, Liver disease , alcohol related and cirrhosis, Chronic liver disease,   Comment: History of gastric bypass     Negative  ROS        Endo/Other  Negative endo/other ROS   Obesity    GYN  Negative gynecology ROS          Hematology  Negative hematology ROS Anemia ,  Thrombocytopenia,    Musculoskeletal  Negative musculoskeletal ROS        Neurology  Negative neurology ROS   Neuromuscular disease ,    Psychology   Negative psychology ROS Anxiety, Depression ,              Physical Exam    Airway    Mallampati score: III  TM Distance: >3 FB  Neck ROM: full     Dental       Cardiovascular  Comment: Negative ROS, Cardiovascular exam normal    Pulmonary  Pulmonary exam normal     Other Findings        Anesthesia Plan  ASA Score- 4     Anesthesia Type- IV sedation with anesthesia with ASA Monitors  Additional Monitors:   Airway Plan:     Comment: No nausea or vomiting of blood, will start with IV sedation, GA prn        Plan Factors-    Induction- intravenous  Postoperative Plan-     Informed Consent- Anesthetic plan and risks discussed with patient

## 2019-07-07 NOTE — ASSESSMENT & PLAN NOTE
· Patient with melena that started on Thursday  · Hemoglobin 8 6 this admission, will continue to trend  · Has history of alcohol abuse and liver disease  · GI consulted  · Patient given Protonix in the ED, will continue b i d   · NPO after midnight

## 2019-07-07 NOTE — PLAN OF CARE
Problem: Potential for Falls  Goal: Patient will remain free of falls  Description  INTERVENTIONS:  - Assess patient frequently for physical needs  -  Identify cognitive and physical deficits and behaviors that affect risk of falls    -  [place] fall precautions as indicated by assessment   - Educate patient/family on patient safety including physical limitations  - Instruct patient to call for assistance with activity based on assessment  - Modify environment to reduce risk of injury  - Consider OT/PT consult to assist with strengthening/mobility  Outcome: Progressing     Problem: PAIN - ADULT  Goal: Verbalizes/displays adequate comfort level or baseline comfort level  Description  Interventions:  - Encourage patient to monitor pain and request assistance  - Assess pain using appropriate pain scale  - Administer analgesics based on type and severity of pain and evaluate response  - Implement non-pharmacological measures as appropriate and evaluate response  - Consider cultural and social influences on pain and pain management  - Notify physician/advanced practitioner if interventions unsuccessful or patient reports new pain  Outcome: Progressing     Problem: INFECTION - ADULT  Goal: Absence or prevention of progression during hospitalization  Description  INTERVENTIONS:  - Assess and monitor for signs and symptoms of infection  - Monitor lab/diagnostic results  - Monitor all insertion sites, i e  indwelling lines, tubes, and drains  - Monitor endotracheal (as able) and nasal secretions for changes in amount and color  - Wilmot appropriate cooling/warming therapies per order  - Administer medications as ordered  - Instruct and encourage patient and family to use good hand hygiene technique  - Identify and instruct in appropriate isolation precautions for identified infection/condition  Outcome: Progressing  Goal: Absence of fever/infection during neutropenic period  Description  INTERVENTIONS:  - Monitor WBC  - Implement neutropenic guidelines  Outcome: Progressing

## 2019-07-07 NOTE — UTILIZATION REVIEW
Notification of Inpatient Admission/Inpatient Authorization Request  This is a Notification of Inpatient Admission/Request for Inpatient Authorization for our facility [facility]  Be advised that this patient was admitted to our facility under Inpatient Status  Please contact the Utilization Review Department where the patient is receiving care services for additional admission information  Place of Service Code: 24   Place of Service Name: Inpatient Hospital  Presentation Date & Time: 7/6/2019  6:45 PM  Inpatient Admission Date & Time: 7/6/19 2229  Discharge Date & Time: No discharge date for patient encounter  Discharge Disposition (if discharged): Home/Self Care  Attending Physician: Lisbet Hawthorne, [address] [[id number]]  Admission Orders (From admission, onward)    Ordered        07/06/19 2229  Inpatient Admission (expected length of stay for this patient Order details is greater than two midnights)  Once           Facility: [facility] [address]  Utilization Review Department  Phone: [phone]; Fax [phone]  [email or web address]  ATTENTION: Please call with any questions or concerns to [phone]  and carefully listen to the prompts so that you are directed to the right person  Send all requests for admission clinical reviews, approved or denied determinations and any other requests to fax [phone]   All voicemails are confidential

## 2019-07-07 NOTE — PROGRESS NOTES
VS: HR tachy   BP stable  Tremors noted upper extremities  Bartholome Pinks weak in legs needs assist of one to BR    On CIWA( h/o etoh intake, last drink was Wednesday)  started on ativan    H/H  platelets low    Seen by zaida an and NEHEMIAS banuelos, from GI  Bartholome Pinks Planning on EGD this afternoon    Still having dark black formed stool with bloody hue    Due to nature of pt condition was felt to transfer to SDU for closer monitoring    Report gv to  [facility]

## 2019-07-07 NOTE — H&P
H&P- Sierra Montano 1960, 61 y o  female MRN: [id number]    Unit/Bed#: [address] [place] [address] Encounter: [id number]    Primary Care Provider: Gulshan Mas MD   Date and time admitted to hospital: 7/6/2019  6:45 PM        * Melena  Assessment & Plan  · Patient with melena that started on Thursday  · Hemoglobin 8 6 this admission, will continue to trend  · Has history of alcohol abuse and liver disease  · GI consulted  · Patient given Protonix in the ED, will continue b i d   · NPO after midnight    Colitis  Assessment & Plan  · CT of abdomen and pelvis- Ascending and proximal transverse colitis  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  A 2nd supraumbilical hernia containing mesenteric fat is noted  There is mild to moderate anasarca  Severely fatty infiltrated liver is again noted  Recanalization umbilical vein and esophageal varices again noted  Patient again noted to be post gastric bypass without evidence for obstruction    · Patient received a dose of Zosyn in the ED  · Will await GI consultation for further antibiotic treatment    Lactic acidosis  Assessment & Plan  · Lactic acid was 4 7 on admission, patient received IV fluids, lactic acid trending upward to 5 0  · Patient was given a bolus of LR in the ED, repeat lactic is pending, will trend until normalized  · Patient does have liver cirrhosis due to alcohol abuse    Thrombocytopenia ([facility] [address] )  Assessment & Plan  · Patient has chronic thrombocytopenia  · Follows with Dr Mariangel Spaulding from Heme-Onc  · Plt this admission - 20, will trend daily with CBCs  · Most recent baseline appears to be 30-40s    ETOH abuse  Assessment & Plan  · Start thiamine and folic acid  · Placed on CIWA protocol  · As per patient's last drink was Thursday, she drinks 2 bottles of white wine daily    Cirrhosis with alcoholism ([facility] [address] )  Assessment & Plan  · Patient at some point was being evaluated for a transplant, however she was currently drinking and states she quit this Thursday  · LFTs are elevated, will trend daily  · GI was consulted  · Restarted rifaximin  · Will trend ammonia levels if patient's mentation declines    Ascites  Assessment & Plan  · Continue Aldactone and Lasix daily  · Mild to moderate ascites seen on CT of abdomen  · Strict I/O    Chronic anemia  Assessment & Plan  · Hemoglobin 8 7 this admission, baseline appears to be 9 0-11 0  · She follows with Dr Angelique Dunn outpatient for Venofer transfusions   · Will trend hemoglobin q8 hrs in the setting of melena  · Transfuse for hemoglobin less than 7 0    History and Physical - [facility] Internal Medicine    Patient Information: Anthony Coppola 61 y o  female MRN: [id number]  Unit/Bed#: [address] [place] [address] Encounter: [id number]  Admitting Physician: Jaswinder Carrington  PCP: Armando Balbuena MD  Date of Admission:  07/07/19    Assessment/Plan:    Hospital Problem List:     Principal Problem:    Melena  Active Problems:    Lactic acidosis    Colitis    Thrombocytopenia (HCC)    ETOH abuse    Cirrhosis with alcoholism (HCC)    Ascites    Chronic anemia    Redness of eye, right      VTE Prophylaxis: GI bleed  / sequential compression device   Code Status: FC    Anticipated Length of Stay:  Patient will be admitted on an Inpatient basis with an anticipated length of stay of  2 midnights  Justification for Hospital Stay: Melena, ETOH w/d, lactic acidosis     Total Time for Visit, including Counseling / Coordination of Care: 26 mins  Greater than 50% of this total time spent on direct patient counseling and coordination of care  Chief Complaint:   Cramps and dark stools    History of Present Illness:    Anthony Coppola is a 61 y o  female who presents with cramps and melena that started on Thursday night  The patient has significant history of ETOH abuse with liver cirrhosis, was being evaluated at [facility] in 2017 for possible transplantation  She expresses drinking currently about 2 bottles of white wine daily   Her last drink was on Thursday, however she would like to quit  She also has history of gastric bypass surgery (15 years ago), breast CA s/p b/l mastectomy, umbilical hernia, chronic anemia and thrombocytopenia  On admission to the ED she had a lactic acidosis of 4 7 with an uptrend to 5 0 after IVF administration  The patients abdominal CT scan revealed colitis, cirrhosis and ascites  Her melena was noted by the ED practitioner and she was heme positive  She was given a dose of zosyn and IV protonix  She was then admitted to the unit for further evaluation and management  Review of Systems:    Review of Systems   Constitutional: Positive for appetite change  HENT: Negative  Eyes: Positive for redness  Respiratory: Negative  Cardiovascular: Positive for leg swelling  Gastrointestinal: Positive for abdominal pain, blood in stool and nausea  Endocrine: Negative  Genitourinary: Negative  Musculoskeletal: Negative  Skin: Negative  Allergic/Immunologic: Negative  Neurological: Negative  Hematological: Negative  Psychiatric/Behavioral: Negative          Past Medical and Surgical History:     Past Medical History:   Diagnosis Date    Alcohol use disorder     Alcoholic hepatitis     Anastomotic ulcer     Anemia     Anxiety     Ascites     Breast cancer ([facility] [address] ) 2010    stage 0; bilateral mastectomy (prophylactic on left)    Chronic foot pain     Chronic narcotic dependence (HCC)     Chronic pain disorder     Cirrhosis, alcoholic (HCC)     Depression     Esophagitis     History of bilateral mastectomy     History of gastric bypass     Hypertension     Liver disease     Morbid obesity (HCC)     Palpitations     Pancytopenia (HCC)     Peripheral neuropathy     Rosacea        Past Surgical History:   Procedure Laterality Date    ABDOMINAL SURGERY      abdominoplasty    BREAST SURGERY      mastectomy    CHOLECYSTECTOMY      ESOPHAGOGASTRODUODENOSCOPY N/A 8/2/2017 Procedure: ESOPHAGOGASTRODUODENOSCOPY (EGD); Surgeon: Marco Jack DO;  Location: [facility];  Service: Gastroenterology    GASTRIC BYPASS      HERNIA REPAIR      MASTECTOMY      MI ESOPHAGOGASTRODUODENOSCOPY TRANSORAL DIAGNOSTIC N/A 5/18/2017    Procedure: ESOPHAGOGASTRODUODENOSCOPY (EGD) with bx;  Surgeon: Benigno Medina MD;  [facility]: [facility]; Service: Gastroenterology    MI ESOPHAGOGASTRODUODENOSCOPY TRANSORAL DIAGNOSTIC N/A 9/14/2017    Procedure: EGD with bx AND COLONOSCOPY with polypectomy;  Surgeon: Benigno Medina MD;  [facility]: [facility]; Service: Gastroenterology    DECLAN-EN-Y PROCEDURE  2005       Meds/Allergies:    Prior to Admission medications    Medication Sig Start Date End Date Taking? Authorizing Provider   Calcium Citrate 250 MG TABS Take by mouth daily 500mg, takes 3 daily- 1500mg total   Yes Historical Provider, MD   Cholecalciferol (VITAMIN D3) 5000 units TABS Take 5,000 Units by mouth daily    Historical Provider, MD   folic acid (FOLVITE) 1 mg tablet Take 1 tablet by mouth daily 8/9/17   Ariana Emilia Bocanegra   furosemide (LASIX) 20 mg tablet TAKE 1 TABLET DAILY AS DIRECTED 4/5/19   Benigno Medina MD   Multiple Vitamins-Minerals (MULTIVITAMIN WITH MINERALS) tablet Take 1 tablet by mouth daily    Historical Provider, MD   pregabalin (LYRICA) 300 MG capsule Take 1 capsule (300 mg total) by mouth 2 (two) times a day 4/10/19   RENÉ Liu   spironolactone (ALDACTONE) 50 mg tablet Take 1 tablet (50 mg total) by mouth daily for 30 days 10/8/18 4/10/19  Benigno Medina MD   topiramate (TOPAMAX) 25 mg sprinkle capsule Take 1 capsule (25 mg total) by mouth 2 (two) times a day  Patient taking differently: Take 50 mg by mouth 2 (two) times a day  6/12/19   Navid Green MD   XIFAXAN 550 MG tablet Take by mouth every 12 (twelve) hours Pt states not compliant with taking 8/20/18   Historical Provider, MD     I have reviewed home medications with patient personally  Allergies:    Allergies Allergen Reactions    Acetaminophen Other (See Comments)     Liver function, s/p bariatric surgery    Cymbalta [Duloxetine Hcl] GI Intolerance    Duloxetine GI Intolerance       Social History:     Marital Status: /Civil Union     Substance Use History:   Social History     Substance and Sexual Activity   Alcohol Use Yes     Social History     Tobacco Use   Smoking Status Never Smoker   Smokeless Tobacco Never Used     Social History     Substance and Sexual Activity   Drug Use No       Family History:    Family History   Problem Relation Age of Onset    Diabetes Mother     Alzheimer's disease Mother     Kidney disease Father     Hypertension Father     Obesity Sister     Fibrocystic breast disease Sister     Breast cancer Sister 62    Colon cancer Neg Hx     Uterine cancer Neg Hx     Ovarian cancer Neg Hx        Physical Exam:     Vitals:   Blood Pressure: 139/70 (07/07/19 0002)  Pulse: 96 (07/07/19 0002)  Temperature: 98 5 °F (36 9 °C) (07/07/19 0002)  Temp Source: Oral (07/07/19 0002)  Respirations: 20 (07/07/19 0002)  Height: 5' 4" (162 6 cm) (07/07/19 0002)  Weight - Scale: 111 kg (245 lb 9 5 oz) (07/07/19 0002)  SpO2: 95 % (07/07/19 0044)    Physical Exam   Constitutional: She is oriented to person, place, and time  No distress  HENT:   Head: Normocephalic and atraumatic  Mouth/Throat: Oropharynx is clear and moist    Eyes: Pupils are equal, round, and reactive to light  Neck: No tracheal deviation present  Cardiovascular: S1 normal and S2 normal  Tachycardia present  Pulses:       Radial pulses are 2+ on the right side, and 2+ on the left side  Dorsalis pedis pulses are 1+ on the right side, and 1+ on the left side  +1 edema in b/l LE extremities    Pulmonary/Chest: No respiratory distress  She has decreased breath sounds in the right lower field and the left lower field     Abdominal: Bowel sounds are normal  There is tenderness in the right lower quadrant and left lower quadrant  A hernia is present  Musculoskeletal: Normal range of motion  Lymphadenopathy:     She has no cervical adenopathy  Neurological: She is alert and oriented to person, place, and time  GCS eye subscore is 4  GCS verbal subscore is 5  GCS motor subscore is 6  Skin: Skin is warm and dry  She is not diaphoretic  No cyanosis  Nails show no clubbing  Psychiatric: Her speech is normal  Cognition and memory are normal        Additional Data:     Lab Results: I have personally reviewed pertinent reports  Results from last 7 days   Lab Units 07/06/19  1929   WBC Thousand/uL 3 09*   HEMOGLOBIN g/dL 8 6*   HEMATOCRIT % 27 9*   PLATELETS Thousands/uL 20*   NEUTROS PCT % 67   LYMPHS PCT % 20   MONOS PCT % 10   EOS PCT % 2     Results from last 7 days   Lab Units 07/06/19  1929   POTASSIUM mmol/L 3 7   CHLORIDE mmol/L 108   CO2 mmol/L 23   BUN mg/dL 11   CREATININE mg/dL 0 88   CALCIUM mg/dL 8 1*   ALK PHOS U/L 136*   ALT U/L 53   AST U/L 152*           Imaging: I have personally reviewed pertinent reports  Dxa Bone Density Spine Hip And Pelvis    Result Date: 6/14/2019  Narrative: CENTRAL  DXA SCAN CLINICAL HISTORY:   61year old post-menopausal  female risk factors include history of breast carcinoma  Chronic, poor mobility secondary to peripheral neuropathy  Gastric bypass surgery  Chronic liver disease  Calcium and vitamin D use  TECHNIQUE: Bone densitometry was performed using a Horizon A bone densitometer  Regions of interest appear properly placed  There are no obvious fractures or other confounding variables which could limit the study  Degenerative changes of the lumbar spine and hip  This will falsely elevate the bone mineral densities in these regions   COMPARISON:  Prior study November 15, 2017 RESULTS: LUMBAR SPINE:  L1-L4: BMD 0 993 gm/cm2 T-score -0 5 Z-score 0 9 LEFT TOTAL HIP: BMD 0 835 gm/cm2 T-score -0 9 Z-score 0 0 LEFT FEMORAL NECK: BMD 0 663 gm/cm2 T-score -1 7 Z-score -0 4     Impression: 1  Based on the [facility] classification, the T-score of -1 7 in the left hip is consistent with low bone mineral density (OSTEOPENIA)  2  When compared to the prior examination, there has been an  8 % INCREASE in the total bone mineral density of the lumbar spine  There has been NO SIGNIFICANT CHANGE in the total bone mineral density of the left hip  The increase in bone mineral density seen in the lumbar spine is likely falsely elevated due to progression of degenerative changes within this region  When follow-up densitometry is performed, it is recommended that bone mineral density of the forearm also be assessed, due to the advanced degenerative changes seen in the lumbar spine and hip  Bone densitometry of the forearm is less susceptible to degenerative disc and degenerative joint disease  3   Any secondary causes of low bone mineral density should be excluded prior to treatment, if clinically indicated  4   A daily intake of at least 1200 mg calcium and 800 to 1000 IU of Vitamin D, as well as weight bearing and muscle strengthening exercise, fall prevention and avoidance of tobacco and excessive alcohol intake as basic preventive measures are suggested  5   Repeat DXA  in 18 - 24 months, on the same machine, as clinically indicated  The 10 year risk of hip fracture is 1%, with the 10 year risk of major osteoporotic fracture being 13%, as calculated by the [facility] fracture risk assessment tool (FRAX)  The current NOF guidelines recommend treating patients with FRAX 10 year risk score of  >3% for hip fracture and >20% for major osteoporotic fracture   WHO CLASSIFICATION: Normal (a T-score of -1 0 or higher) Low bone mineral density (a T-score of less than -1 0 but higher than -2 5) Osteoporosis (a T-score of -2 5 or less) Severe osteoporosis (a T-score of -2 5 or less with a fragility fracture)   Workstation performed: [id number]     Ct Abdomen Pelvis With Contrast    Result Date: 7/6/2019  Narrative: CT ABDOMEN AND PELVIS WITH IV CONTRAST INDICATION:   lower abd pain, rectal bleeding  COMPARISON:  5/17/2019  TECHNIQUE:  CT examination of the abdomen and pelvis was performed  Axial, sagittal, and coronal 2D reformatted images were created from the source data and submitted for interpretation  Radiation dose length product (DLP) for this visit:  1066 2 mGy-cm   This examination, like all CT scans performed in the [facility], was performed utilizing techniques to minimize radiation dose exposure, including the use of iterative  reconstruction and automated exposure control  IV Contrast:  100 mL of iohexol (OMNIPAQUE) Enteric Contrast:  Enteric contrast was not administered  FINDINGS: ABDOMEN LOWER CHEST:  No clinically significant abnormality identified in the visualized lower chest  LIVER/BILIARY TREE:  Liver is diffusely decreased in density consistent with fatty change  No CT evidence of suspicious hepatic mass  Normal hepatic contours  No biliary dilatation  GALLBLADDER:  Gallbladder is surgically absent  SPLEEN:  Unremarkable  PANCREAS:  Unremarkable  ADRENAL GLANDS:  Unremarkable  KIDNEYS/URETERS:  One or more simple renal cyst(s) is noted  Otherwise unremarkable kidneys  No hydronephrosis  STOMACH AND BOWEL:  There is an anterior abdominal wall supraumbilical midline hernia containing a loop of large bowel, a Kiser's type hernia     There is ascending and proximal transverse colitis  Post gastric bypass  APPENDIX:  No findings to suggest appendicitis  ABDOMINOPELVIC CAVITY:  No ascites or free intraperitoneal air  No lymphadenopathy  VESSELS: Esophageal varices are again noted  There is recanalization of bulging  Otherwise Unremarkable for patient's age  PELVIS REPRODUCTIVE ORGANS:  Unremarkable for patient's age  URINARY BLADDER:  Unremarkable  ABDOMINAL WALL/INGUINAL REGIONS:  Supraumbilical midline hernia containing mesenteric fat and bowel    Small to moderate midline umbilical hernia containing mesentery     Moderate diffuse anasarca  OSSEOUS STRUCTURES:  No acute fracture or destructive osseous lesion  Bilateral L5-S1 pars defects resulting in grade 1 anterolisthesis of L5 on S1  Impression: Ascending and proximal transverse colitis  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  A 2nd supraumbilical hernia containing mesenteric fat is noted  There is mild to moderate anasarca  Severely fatty infiltrated liver is again noted  Recanalization umbilical vein and esophageal varices again noted  Patient again noted to be post gastric bypass without evidence for obstruction  Workstation performed: [id number]       EKG, Pathology, and Other Studies Reviewed on Admission:   · EKG: ST     Allscripts / Epic Records Reviewed: Yes     ** Please Note: This note has been constructed using a voice recognition system   **

## 2019-07-07 NOTE — ASSESSMENT & PLAN NOTE
· Lactic acid was 4 7 on admission, patient received IV fluids, lactic acid trending upward to 5 0  · Patient was given a bolus of LR in the ED, repeat lactic is pending, will trend until normalized  · Patient does have liver cirrhosis due to alcohol abuse

## 2019-07-07 NOTE — ASSESSMENT & PLAN NOTE
· CT of abdomen and pelvis- Ascending and proximal transverse colitis  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  A 2nd supraumbilical hernia containing mesenteric fat is noted  There is mild to moderate anasarca  Severely fatty infiltrated liver is again noted  Recanalization umbilical vein and esophageal varices again noted  Patient again noted to be post gastric bypass without evidence for obstruction    · Patient received a dose of Zosyn in the ED  · Will await GI consultation for further antibiotic treatment

## 2019-07-07 NOTE — MALNUTRITION/BMI
This medical record reflects one or more clinical indicators suggestive of malnutrition and/or morbid obesity  Malnutrition Findings:              BMI Findings:  BMI Classifications: Morbid Obesity 40-44 9     Body mass index is 42 16 kg/m²  See Nutrition note dated 07/07/2019 for additional details  Completed nutrition assessment is viewable in the nutrition documentation

## 2019-07-07 NOTE — ED NOTES
Brief coughing episode after taking Lyrica, pt requested and received small snack, crackers, apple juice after discussed with WENDI Barkley RN  07/06/19 9943

## 2019-07-08 PROBLEM — K92.2 ACUTE UPPER GASTROINTESTINAL BLEEDING: Status: ACTIVE | Noted: 2019-07-06

## 2019-07-08 LAB
ALBUMIN SERPL BCP-MCNC: 2.2 G/DL (ref 3.5–5)
ALP SERPL-CCNC: 96 U/L (ref 46–116)
ALT SERPL W P-5'-P-CCNC: 36 U/L (ref 12–78)
ANION GAP SERPL CALCULATED.3IONS-SCNC: 8 MMOL/L (ref 4–13)
ANION GAP SERPL CALCULATED.3IONS-SCNC: 9 MMOL/L (ref 4–13)
AST SERPL W P-5'-P-CCNC: 101 U/L (ref 5–45)
BASOPHILS # BLD AUTO: 0.01 THOUSANDS/ΜL (ref 0–0.1)
BASOPHILS NFR BLD AUTO: 0 % (ref 0–1)
BILIRUB SERPL-MCNC: 6.1 MG/DL (ref 0.2–1)
BUN SERPL-MCNC: 14 MG/DL (ref 5–25)
BUN SERPL-MCNC: 15 MG/DL (ref 5–25)
CALCIUM SERPL-MCNC: 7.5 MG/DL (ref 8.3–10.1)
CALCIUM SERPL-MCNC: 8.2 MG/DL (ref 8.3–10.1)
CHLORIDE SERPL-SCNC: 109 MMOL/L (ref 100–108)
CHLORIDE SERPL-SCNC: 111 MMOL/L (ref 100–108)
CO2 SERPL-SCNC: 24 MMOL/L (ref 21–32)
CO2 SERPL-SCNC: 25 MMOL/L (ref 21–32)
CREAT SERPL-MCNC: 0.86 MG/DL (ref 0.6–1.3)
CREAT SERPL-MCNC: 1.07 MG/DL (ref 0.6–1.3)
EOSINOPHIL # BLD AUTO: 0.09 THOUSAND/ΜL (ref 0–0.61)
EOSINOPHIL NFR BLD AUTO: 4 % (ref 0–6)
ERYTHROCYTE [DISTWIDTH] IN BLOOD BY AUTOMATED COUNT: 20.1 % (ref 11.6–15.1)
GFR SERPL CREATININE-BSD FRML MDRD: 57 ML/MIN/1.73SQ M
GFR SERPL CREATININE-BSD FRML MDRD: 74 ML/MIN/1.73SQ M
GLUCOSE SERPL-MCNC: 101 MG/DL (ref 65–140)
GLUCOSE SERPL-MCNC: 83 MG/DL (ref 65–140)
HCT VFR BLD AUTO: 23.8 % (ref 34.8–46.1)
HCT VFR BLD AUTO: 24.2 % (ref 34.8–46.1)
HCT VFR BLD AUTO: 26.4 % (ref 34.8–46.1)
HGB BLD-MCNC: 7.4 G/DL (ref 11.5–15.4)
HGB BLD-MCNC: 7.5 G/DL (ref 11.5–15.4)
HGB BLD-MCNC: 8.1 G/DL (ref 11.5–15.4)
IMM GRANULOCYTES # BLD AUTO: 0.02 THOUSAND/UL (ref 0–0.2)
IMM GRANULOCYTES NFR BLD AUTO: 1 % (ref 0–2)
LYMPHOCYTES # BLD AUTO: 0.57 THOUSANDS/ΜL (ref 0.6–4.47)
LYMPHOCYTES NFR BLD AUTO: 25 % (ref 14–44)
MCH RBC QN AUTO: 32.2 PG (ref 26.8–34.3)
MCHC RBC AUTO-ENTMCNC: 31.1 G/DL (ref 31.4–37.4)
MCV RBC AUTO: 104 FL (ref 82–98)
MONOCYTES # BLD AUTO: 0.31 THOUSAND/ΜL (ref 0.17–1.22)
MONOCYTES NFR BLD AUTO: 13 % (ref 4–12)
NEUTROPHILS # BLD AUTO: 1.33 THOUSANDS/ΜL (ref 1.85–7.62)
NEUTS SEG NFR BLD AUTO: 57 % (ref 43–75)
NRBC BLD AUTO-RTO: 0 /100 WBCS
PLATELET # BLD AUTO: 43 THOUSANDS/UL (ref 149–390)
PMV BLD AUTO: 11.1 FL (ref 8.9–12.7)
POTASSIUM SERPL-SCNC: 3.5 MMOL/L (ref 3.5–5.3)
POTASSIUM SERPL-SCNC: 3.5 MMOL/L (ref 3.5–5.3)
PROT SERPL-MCNC: 5.7 G/DL (ref 6.4–8.2)
RBC # BLD AUTO: 2.3 MILLION/UL (ref 3.81–5.12)
SODIUM SERPL-SCNC: 142 MMOL/L (ref 136–145)
SODIUM SERPL-SCNC: 144 MMOL/L (ref 136–145)
UREASE TISS QL: NEGATIVE
WBC # BLD AUTO: 2.33 THOUSAND/UL (ref 4.31–10.16)

## 2019-07-08 PROCEDURE — 80048 BASIC METABOLIC PNL TOTAL CA: CPT | Performed by: NURSE PRACTITIONER

## 2019-07-08 PROCEDURE — 99232 SBSQ HOSP IP/OBS MODERATE 35: CPT | Performed by: INTERNAL MEDICINE

## 2019-07-08 PROCEDURE — 80053 COMPREHEN METABOLIC PANEL: CPT | Performed by: INTERNAL MEDICINE

## 2019-07-08 PROCEDURE — C9113 INJ PANTOPRAZOLE SODIUM, VIA: HCPCS | Performed by: INTERNAL MEDICINE

## 2019-07-08 PROCEDURE — 94760 N-INVAS EAR/PLS OXIMETRY 1: CPT

## 2019-07-08 PROCEDURE — 85025 COMPLETE CBC W/AUTO DIFF WBC: CPT | Performed by: INTERNAL MEDICINE

## 2019-07-08 PROCEDURE — 85014 HEMATOCRIT: CPT | Performed by: INTERNAL MEDICINE

## 2019-07-08 PROCEDURE — 99233 SBSQ HOSP IP/OBS HIGH 50: CPT | Performed by: INTERNAL MEDICINE

## 2019-07-08 PROCEDURE — 85018 HEMOGLOBIN: CPT | Performed by: INTERNAL MEDICINE

## 2019-07-08 RX ORDER — FUROSEMIDE 20 MG/1
20 TABLET ORAL DAILY
Status: DISCONTINUED | OUTPATIENT
Start: 2019-07-08 | End: 2019-07-08

## 2019-07-08 RX ORDER — POTASSIUM CHLORIDE 20 MEQ/1
20 TABLET, EXTENDED RELEASE ORAL ONCE
Status: COMPLETED | OUTPATIENT
Start: 2019-07-08 | End: 2019-07-08

## 2019-07-08 RX ORDER — SPIRONOLACTONE 25 MG/1
50 TABLET ORAL DAILY
Status: DISCONTINUED | OUTPATIENT
Start: 2019-07-08 | End: 2019-07-08

## 2019-07-08 RX ORDER — MAGNESIUM SULFATE HEPTAHYDRATE 40 MG/ML
2 INJECTION, SOLUTION INTRAVENOUS ONCE
Status: COMPLETED | OUTPATIENT
Start: 2019-07-08 | End: 2019-07-08

## 2019-07-08 RX ADMIN — NEOMYCIN SULFATE, POLYMYXIN B SULFATE AND GRAMICIDIN 2 DROP: 1.75; 10000; .025 SOLUTION/ DROPS OPHTHALMIC at 17:55

## 2019-07-08 RX ADMIN — FUROSEMIDE 20 MG: 20 TABLET ORAL at 09:06

## 2019-07-08 RX ADMIN — Medication 1 TABLET: at 09:06

## 2019-07-08 RX ADMIN — THIAMINE HCL TAB 100 MG 100 MG: 100 TAB at 09:06

## 2019-07-08 RX ADMIN — NEOMYCIN SULFATE, POLYMYXIN B SULFATE AND GRAMICIDIN 2 DROP: 1.75; 10000; .025 SOLUTION/ DROPS OPHTHALMIC at 11:42

## 2019-07-08 RX ADMIN — TOPIRAMATE 25 MG: 25 TABLET, FILM COATED ORAL at 09:07

## 2019-07-08 RX ADMIN — Medication 8 MG/HR: at 22:10

## 2019-07-08 RX ADMIN — NEOMYCIN SULFATE, POLYMYXIN B SULFATE AND GRAMICIDIN 2 DROP: 1.75; 10000; .025 SOLUTION/ DROPS OPHTHALMIC at 09:07

## 2019-07-08 RX ADMIN — TOPIRAMATE 25 MG: 25 TABLET, FILM COATED ORAL at 17:55

## 2019-07-08 RX ADMIN — NEOMYCIN SULFATE, POLYMYXIN B SULFATE AND GRAMICIDIN 2 DROP: 1.75; 10000; .025 SOLUTION/ DROPS OPHTHALMIC at 22:10

## 2019-07-08 RX ADMIN — SODIUM CHLORIDE AND POTASSIUM CHLORIDE 50 ML/HR: .9; .15 SOLUTION INTRAVENOUS at 20:49

## 2019-07-08 RX ADMIN — PREGABALIN 300 MG: 100 CAPSULE ORAL at 09:05

## 2019-07-08 RX ADMIN — LORAZEPAM 0.5 MG: 0.5 TABLET ORAL at 09:04

## 2019-07-08 RX ADMIN — LORAZEPAM 0.5 MG: 0.5 TABLET ORAL at 20:02

## 2019-07-08 RX ADMIN — CALCIUM 1 TABLET: 500 TABLET ORAL at 07:38

## 2019-07-08 RX ADMIN — LORAZEPAM 0.5 MG: 0.5 TABLET ORAL at 16:47

## 2019-07-08 RX ADMIN — PREGABALIN 300 MG: 100 CAPSULE ORAL at 17:54

## 2019-07-08 RX ADMIN — POTASSIUM CHLORIDE 20 MEQ: 20 TABLET, EXTENDED RELEASE ORAL at 20:32

## 2019-07-08 RX ADMIN — Medication 8 MG/HR: at 11:58

## 2019-07-08 RX ADMIN — Medication 8 MG/HR: at 01:04

## 2019-07-08 RX ADMIN — SPIRONOLACTONE 50 MG: 25 TABLET ORAL at 09:06

## 2019-07-08 RX ADMIN — MAGNESIUM SULFATE HEPTAHYDRATE 2 G: 40 INJECTION, SOLUTION INTRAVENOUS at 20:49

## 2019-07-08 RX ADMIN — FOLIC ACID 1 MG: 1 TABLET ORAL at 09:06

## 2019-07-08 NOTE — PROGRESS NOTES
Gris Izquierdo  [id number]    61 y o   female      ASSESSMENT  1  Acute gastrointestinal hemorrhage secondary to anastomotic ulcer at gastric bypass site  -further bleeding at biopsy site  Status post cautery and epinephrine injection  2  Anemia secondary to acute gastrointestinal blood loss  3  Thrombocytopenia secondary to cirrhosis and toxic affects of alcohol on the bone marrow  4  Alcohol use disorder severe  5  Alcoholic cirrhosis  6  Status post gastric bypass surgery Nicole-en-Y in place  7  Large incisional hernia       PLAN  1  Continue IV PPI drip for another 48 hours  2  We will give clear liquids tonight  3  Monitor H&H  4  Check ammonia level  Patient reports she is sleepy do this in the a m   5  Follow-up EGD 6-8 weeks to document healing of the ulcers  6  Probably would benefit from psych consult for her alcohol use disorder     Chief Complaint   Patient presents with    Rectal Bleeding     To ED with c/o black stools with red diarrhea since thursday, now has weakness, dizziness, shaking patient is in withdraw from alcohol  Has abd  pain   Withdrawal - Alcohol       SUBJECTIVE/HPI feels sleepy  Some epigastric pain otherwise no major distress    /68 (BP Location: Left arm)   Pulse 84   Temp 97 8 °F (36 6 °C) (Oral)   Resp 18   Ht 5' 4" (1 626 m)   Wt 111 kg (245 lb 9 5 oz)   SpO2 98%   Breastfeeding?  No   BMI 42 16 kg/m²     PHYSICALEXAM  General alert oriented no acute distress  Eyes positive icterus  Neck no JVP  Chest essentially clear somewhat decreased breath sounds  Cor S1-S2  Abdomen soft large incisional hernia  Extremities trace edema  Skin pale  Neuro alert oriented x3 no asterixis    Lab Results   Component Value Date    CALCIUM 7 5 (L) 07/08/2019    K 3 5 07/08/2019    CO2 25 07/08/2019     (H) 07/08/2019    BUN 15 07/08/2019    CREATININE 0 86 07/08/2019     Lab Results   Component Value Date    WBC 2 33 (L) 07/08/2019    HGB 8 1 (L) 07/08/2019    HCT 26 4 (L) 07/08/2019     (H) 07/08/2019    PLT 43 (LL) 07/08/2019     Lab Results   Component Value Date    ALT 36 07/08/2019     (H) 07/08/2019    ALKPHOS 96 07/08/2019     No components found for: [id number]  Lab Results   Component Value Date    LIPASE 198 07/06/2019     Lab Results   Component Value Date    IRON 36 (L) 05/18/2019    TIBC 382 05/18/2019    FERRITIN 92 05/18/2019     Lab Results   Component Value Date    INR 1 62 (H) 07/07/2019

## 2019-07-08 NOTE — ASSESSMENT & PLAN NOTE
· Patient with melena that started on Thursday  · Hemoglobin 8 6 this admission, trended down to 7 4  · Has history of alcohol abuse and liver disease  · Status post EGD:  Anastomotic bleeding gastric bypass noted  · Patient given Protonix in the ED, will continue Protonix drip  · Currently NPO  · GI follow-up

## 2019-07-08 NOTE — PROGRESS NOTES
07/08/19 1300   Clinical Encounter Type   Visited With Patient   Routine Visit Introduction   Patient Spiritual Encounters   Spiritual Assessment 1   Family Spiritual Encounters   Family Coping   (Concerned for impact spouse's ETOH use has on her sobriety )

## 2019-07-08 NOTE — ASSESSMENT & PLAN NOTE
· thiamine and folic acid  · Placed on CIWA protocol  · As per patient's last drink was Thursday, she drinks 2 bottles of white wine daily

## 2019-07-08 NOTE — SOCIAL WORK
Patient is 2 LOS  She is not a Medicare Bundled or 30 day Medicare re admission patient  Kd Melgar with patient to discuss the role of Care Management  Patient resides in a RV with her spouse in [place]  There are 4 steep steps to enter  Patient perpares meals when at the RV  At time she stays with her brother in [facility] at [address] [place] [address]  She reports in a a 2 story townhouse with 4 CARLOTTA and 13 steps to the second floor  There in a TN on the first floor  Patient has a RW and SPC at home and currently is not using them  She reports her spouse in her care giver  She has an Advanced Directive and POA and reports it is in her chart  She is unemployed, her  collects a pension from the [employer]  This SW checked Media and could not find it, asked her to have her family bring it in  Patient [facility] [address] [facility] [place] and [facility] for her meds  She reports no BHU, SNF or drug and alcohol rehab stay  She is interested in inaptient alcohol rehab if covered by her insurance  Will check bed availability  at discharge  Patient has been followed by [facility] in the past, if she return home she wants them to follow her again

## 2019-07-08 NOTE — ASSESSMENT & PLAN NOTE
· CT of abdomen and pelvis- Ascending and proximal transverse colitis  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  A 2nd supraumbilical hernia containing mesenteric fat is noted  There is mild to moderate anasarca  Severely fatty infiltrated liver is again noted  Recanalization umbilical vein and esophageal varices again noted  Patient again noted to be post gastric bypass without evidence for obstruction    · Patient received a dose of Zosyn in the ED; observing off antibiotics  · GI follow-up

## 2019-07-08 NOTE — PROGRESS NOTES
Progress Note - Edy Han 1960, 61 y o  female MRN: [id number]    Unit/Bed#: -02 Encounter: [id number]    Primary Care Provider: Senait Menjivar MD   Date and time admitted to hospital: 7/6/2019  6:45 PM    * Acute upper gastrointestinal bleeding  Assessment & Plan  · Patient with melena that started on Thursday  · Hemoglobin 8 6 this admission, trended down to 7 4  · Has history of alcohol abuse and liver disease  · Status post EGD:  Anastomotic bleeding gastric bypass noted  · Patient given Protonix in the ED, will continue Protonix drip  · Currently NPO  · GI follow-up    Cirrhosis with alcoholism ([facility] )  Assessment & Plan  · Patient at some point was being evaluated for a transplant, however she was currently drinking and states she quit this Thursday  · GI follow-up  · Continue rifaximin  · Hold Lasix and spironolactone while patient NPO, may resume once started back on diet    Thrombocytopenia ([facility] )  Assessment & Plan  · Patient has chronic thrombocytopenia  · Follows with Dr Kimberly Broussard from Heme-Onc  · Plt this admission - 20, received platelet transfusion  · Most recent baseline appears to be 30-40s    ETOH abuse  Assessment & Plan  · thiamine and folic acid  · Placed on CIWA protocol  · As per patient's last drink was Thursday, she drinks 2 bottles of white wine daily    Colitis  Assessment & Plan  · CT of abdomen and pelvis- Ascending and proximal transverse colitis  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  A 2nd supraumbilical hernia containing mesenteric fat is noted  There is mild to moderate anasarca  Severely fatty infiltrated liver is again noted  Recanalization umbilical vein and esophageal varices again noted  Patient again noted to be post gastric bypass without evidence for obstruction    · Patient received a dose of Zosyn in the ED; observing off antibiotics  · GI follow-up     History of bariatric surgery  Assessment & Plan  · Outpatient follow-up    VTE Pharmacologic Prophylaxis:   Pharmacologic: Pharmacologic VTE Prophylaxis contraindicated due to GI bleeding  Mechanical VTE Prophylaxis in Place: Yes    Patient Centered Rounds: I have performed bedside rounds with nursing staff today  Discussions with Specialists or Other Care Team Provider:     Education and Discussions with Family / Patient:  Patient    Time Spent for Care: 30 minutes  More than 50% of total time spent on counseling and coordination of care as described above  Current Length of Stay: 2 day(s)    Current Patient Status: Inpatient   Certification Statement: The patient will continue to require additional inpatient hospital stay due to GI bleeding    Discharge Plan:  Pending clinical improvement    Code Status: Level 1 - Full Code      Subjective:   Patient feels little better today  Complaining of bloating and gas  Objective:     Vitals:   Temp (24hrs), Av 8 °F (37 1 °C), Min:97 8 °F (36 6 °C), Max:100 5 °F (38 1 °C)    Temp:  [97 8 °F (36 6 °C)-100 5 °F (38 1 °C)] 97 8 °F (36 6 °C)  HR:  [] 87  Resp:  [16-32] 18  BP: (121-161)/(52-81) 150/69  SpO2:  [92 %-99 %] 95 %  Body mass index is 42 16 kg/m²  Input and Output Summary (last 24 hours): Intake/Output Summary (Last 24 hours) at 2019 1104  Last data filed at 2019 1103  Gross per 24 hour   Intake 3468  16 ml   Output 3600 ml   Net -131 84 ml       Physical Exam:     Physical Exam     Gen -Patient comfortable in chair  Pallor noted  Neck- Supple  No thyromegaly or lymphadenopathy  Lungs-Clear bilaterally without any wheeze or rales   Heart S1-S2, regular rate and rhythm, no murmurs  Abdomen-obese, hernia noted soft nontender, no organomegaly   Bowel sounds present  Extremities-no cyanosi,  Clubbing; edema of legs and upper extremities noted  Skin- no rash  Neuro-awake alert and oriented         Additional Data:     Labs:    Results from last 7 days   Lab Units 19  0517   WBC Thousand/uL 2 33* HEMOGLOBIN g/dL 7 4*   HEMATOCRIT % 23 8*   PLATELETS Thousands/uL 43*   NEUTROS PCT % 57   LYMPHS PCT % 25   MONOS PCT % 13*   EOS PCT % 4     Results from last 7 days   Lab Units 07/08/19  0517   SODIUM mmol/L 144   POTASSIUM mmol/L 3 5   CHLORIDE mmol/L 111*   CO2 mmol/L 25   BUN mg/dL 15   CREATININE mg/dL 0 86   ANION GAP mmol/L 8   CALCIUM mg/dL 7 5*   ALBUMIN g/dL 2 2*   TOTAL BILIRUBIN mg/dL 6 10*   ALK PHOS U/L 96   ALT U/L 36   AST U/L 101*   GLUCOSE RANDOM mg/dL 83     Results from last 7 days   Lab Units 07/07/19  0915   INR  1 62*             Results from last 7 days   Lab Units 07/07/19  0548 07/07/19  0030 07/06/19  2149 07/06/19  1929   LACTIC ACID mmol/L 1 7 4 9* 5 0* 4 7*           * I Have Reviewed All Lab Data Listed Above  * Additional Pertinent Lab Tests Reviewed:  All Labs Within Last 24 Hours Reviewed    Imaging:    Imaging Reports Reviewed Today Include:   Imaging Personally Reviewed by Myself Includes:      Recent Cultures (last 7 days):           Last 24 Hours Medication List:     Current Facility-Administered Medications:  calcium carbonate 1 tablet Oral Daily With Breakfast Willa Beard MD    folic acid 1 mg Oral Daily Willa Beard MD    LORazepam 1 mg Intravenous Q6H PRN Willa Beard MD    LORazepam 0 5 mg Oral TID Willa Beard MD    multivitamin-minerals 1 tablet Oral Daily Willa Beard MD    neomycin-polymyxin-gramicidin 2 drop Both Eyes 4x Daily Willa Beard MD    pantoprozole (PROTONIX) infusion (Continuous) 8 mg/hr Intravenous Continuous Willa Beard MD Last Rate: 8 mg/hr (07/08/19 0104)   pantoprazole 80 mg Intravenous Once Willa Beard MD    pregabalin 300 mg Oral BID Willa Beard MD    sodium chloride 0 9 % with KCl 20 mEq/L 50 mL/hr Intravenous Continuous RENÉ Mccray Last Rate: 50 mL/hr (07/08/19 0045)   thiamine 100 mg Oral Daily Willa Beard MD    topiramate 25 mg Oral BID Willa Beard MD         Today, Patient Was Seen By: Usama Browne, MD    ** Please Note: Dictation voice to text software may have been used in the creation of this document   **

## 2019-07-08 NOTE — PROGRESS NOTES
07/08/19 5 [facility] affiliation   Spiritual Beliefs/Perceptions   Concept of God Accepting   Relationship with God Close   Spiritual Strengths God perceived as a helpful presence  Prayer important  Support Systems Spouse/significant other;Family members   Stress Factors   Patient Stress Factors Health changes  (Patient remorseful about relapse w/ETOH)   Coping Responses   Patient Coping Anxiety; Other (Comment)   Family Coping   (Concerned for impact spouse's ETOH use has on her sobriety )   [facility] Initiated Yes   Assessment Completed by: Unit visit

## 2019-07-08 NOTE — ASSESSMENT & PLAN NOTE
· Patient at some point was being evaluated for a transplant, however she was currently drinking and states she quit this Thursday  · GI follow-up  · Continue rifaximin  · Hold Lasix and spironolactone while patient NPO, may resume once started back on diet

## 2019-07-08 NOTE — PROGRESS NOTES
Spoke to Melvi, hgb 7 3 no new orders received will repeat labs and re-evaluate  Discussed home meds diuretics to restart in the morning

## 2019-07-08 NOTE — ASSESSMENT & PLAN NOTE
· Patient has chronic thrombocytopenia  · Follows with Dr Baltazar Rider from Heme-Onc  · Plt this admission - 20, received platelet transfusion  · Most recent baseline appears to be 30-40s

## 2019-07-09 LAB
AMMONIA PLAS-SCNC: 44 UMOL/L (ref 11–35)
ANION GAP SERPL CALCULATED.3IONS-SCNC: 9 MMOL/L (ref 4–13)
BUN SERPL-MCNC: 12 MG/DL (ref 5–25)
CALCIUM SERPL-MCNC: 8.3 MG/DL (ref 8.3–10.1)
CHLORIDE SERPL-SCNC: 111 MMOL/L (ref 100–108)
CO2 SERPL-SCNC: 24 MMOL/L (ref 21–32)
CREAT SERPL-MCNC: 1.04 MG/DL (ref 0.6–1.3)
GFR SERPL CREATININE-BSD FRML MDRD: 59 ML/MIN/1.73SQ M
GLUCOSE SERPL-MCNC: 90 MG/DL (ref 65–140)
HCT VFR BLD AUTO: 24.8 % (ref 34.8–46.1)
HCT VFR BLD AUTO: 25 % (ref 34.8–46.1)
HGB BLD-MCNC: 7.4 G/DL (ref 11.5–15.4)
HGB BLD-MCNC: 7.7 G/DL (ref 11.5–15.4)
INR PPP: 1.63 (ref 0.84–1.19)
MAGNESIUM SERPL-MCNC: 2.2 MG/DL (ref 1.6–2.6)
POTASSIUM SERPL-SCNC: 4.1 MMOL/L (ref 3.5–5.3)
PROTHROMBIN TIME: 19 SECONDS (ref 11.6–14.5)
SODIUM SERPL-SCNC: 144 MMOL/L (ref 136–145)

## 2019-07-09 PROCEDURE — 82140 ASSAY OF AMMONIA: CPT | Performed by: INTERNAL MEDICINE

## 2019-07-09 PROCEDURE — 99232 SBSQ HOSP IP/OBS MODERATE 35: CPT | Performed by: INTERNAL MEDICINE

## 2019-07-09 PROCEDURE — 80048 BASIC METABOLIC PNL TOTAL CA: CPT | Performed by: INTERNAL MEDICINE

## 2019-07-09 PROCEDURE — 94760 N-INVAS EAR/PLS OXIMETRY 1: CPT

## 2019-07-09 PROCEDURE — 85014 HEMATOCRIT: CPT | Performed by: INTERNAL MEDICINE

## 2019-07-09 PROCEDURE — 85018 HEMOGLOBIN: CPT | Performed by: INTERNAL MEDICINE

## 2019-07-09 PROCEDURE — 99232 SBSQ HOSP IP/OBS MODERATE 35: CPT | Performed by: FAMILY MEDICINE

## 2019-07-09 PROCEDURE — 83735 ASSAY OF MAGNESIUM: CPT | Performed by: NURSE PRACTITIONER

## 2019-07-09 PROCEDURE — 85610 PROTHROMBIN TIME: CPT | Performed by: INTERNAL MEDICINE

## 2019-07-09 PROCEDURE — C9113 INJ PANTOPRAZOLE SODIUM, VIA: HCPCS | Performed by: INTERNAL MEDICINE

## 2019-07-09 RX ORDER — HALOPERIDOL 0.5 MG/1
0.5 TABLET ORAL EVERY 6 HOURS PRN
Status: DISCONTINUED | OUTPATIENT
Start: 2019-07-09 | End: 2019-07-09

## 2019-07-09 RX ORDER — POTASSIUM CHLORIDE 20 MEQ/1
40 TABLET, EXTENDED RELEASE ORAL ONCE
Status: COMPLETED | OUTPATIENT
Start: 2019-07-09 | End: 2019-07-09

## 2019-07-09 RX ADMIN — CALCIUM 1 TABLET: 500 TABLET ORAL at 08:33

## 2019-07-09 RX ADMIN — Medication 8 MG/HR: at 20:19

## 2019-07-09 RX ADMIN — Medication 8 MG/HR: at 09:36

## 2019-07-09 RX ADMIN — PREGABALIN 300 MG: 100 CAPSULE ORAL at 08:34

## 2019-07-09 RX ADMIN — TOPIRAMATE 25 MG: 25 TABLET, FILM COATED ORAL at 08:34

## 2019-07-09 RX ADMIN — FOLIC ACID 1 MG: 1 TABLET ORAL at 08:34

## 2019-07-09 RX ADMIN — THIAMINE HCL TAB 100 MG 100 MG: 100 TAB at 08:35

## 2019-07-09 RX ADMIN — NEOMYCIN SULFATE, POLYMYXIN B SULFATE AND GRAMICIDIN 2 DROP: 1.75; 10000; .025 SOLUTION/ DROPS OPHTHALMIC at 12:00

## 2019-07-09 RX ADMIN — POTASSIUM CHLORIDE 40 MEQ: 20 TABLET, EXTENDED RELEASE ORAL at 03:07

## 2019-07-09 RX ADMIN — LORAZEPAM 0.5 MG: 0.5 TABLET ORAL at 08:33

## 2019-07-09 RX ADMIN — Medication 1 TABLET: at 08:33

## 2019-07-09 RX ADMIN — NEOMYCIN SULFATE, POLYMYXIN B SULFATE AND GRAMICIDIN 2 DROP: 1.75; 10000; .025 SOLUTION/ DROPS OPHTHALMIC at 17:10

## 2019-07-09 RX ADMIN — NEOMYCIN SULFATE, POLYMYXIN B SULFATE AND GRAMICIDIN 2 DROP: 1.75; 10000; .025 SOLUTION/ DROPS OPHTHALMIC at 21:22

## 2019-07-09 RX ADMIN — TOPIRAMATE 25 MG: 25 TABLET, FILM COATED ORAL at 17:10

## 2019-07-09 RX ADMIN — NEOMYCIN SULFATE, POLYMYXIN B SULFATE AND GRAMICIDIN 2 DROP: 1.75; 10000; .025 SOLUTION/ DROPS OPHTHALMIC at 08:35

## 2019-07-09 RX ADMIN — LORAZEPAM 0.5 MG: 0.5 TABLET ORAL at 17:13

## 2019-07-09 RX ADMIN — LORAZEPAM 0.5 MG: 0.5 TABLET ORAL at 21:22

## 2019-07-09 RX ADMIN — PREGABALIN 300 MG: 100 CAPSULE ORAL at 17:13

## 2019-07-09 NOTE — PROGRESS NOTES
Late note for 7/08/2019 at 2015-Patient with compllaints of bilateral hands cramp into forearms-states that when it occurs it is " 10/10" lasts a few seconds and comes and goes  This was reported to [facility] CRNP-new orders for labs , MG bolus and oral Potassium tablet-l oral med given and labs drawn and sent -results were reviewed with CRNP

## 2019-07-09 NOTE — PLAN OF CARE
Problem: Potential for Falls  Goal: Patient will remain free of falls  Description  INTERVENTIONS:  - Assess patient frequently for physical needs  -  Identify cognitive and physical deficits and behaviors that affect risk of falls    -  [place] fall precautions as indicated by assessment   - Educate patient/family on patient safety including physical limitations  - Instruct patient to call for assistance with activity based on assessment  - Modify environment to reduce risk of injury  - Consider OT/PT consult to assist with strengthening/mobility  Outcome: Progressing     Problem: PAIN - ADULT  Goal: Verbalizes/displays adequate comfort level or baseline comfort level  Description  Interventions:  - Encourage patient to monitor pain and request assistance  - Assess pain using appropriate pain scale  - Administer analgesics based on type and severity of pain and evaluate response  - Implement non-pharmacological measures as appropriate and evaluate response  - Consider cultural and social influences on pain and pain management  - Notify physician/advanced practitioner if interventions unsuccessful or patient reports new pain  Outcome: Progressing     Problem: INFECTION - ADULT  Goal: Absence or prevention of progression during hospitalization  Description  INTERVENTIONS:  - Assess and monitor for signs and symptoms of infection  - Monitor lab/diagnostic results  - Monitor all insertion sites, i e  indwelling lines, tubes, and drains  - Monitor endotracheal (as able) and nasal secretions for changes in amount and color  - [place] appropriate cooling/warming therapies per order  - Administer medications as ordered  - Instruct and encourage patient and family to use good hand hygiene technique  - Identify and instruct in appropriate isolation precautions for identified infection/condition  Outcome: Progressing  Goal: Absence of fever/infection during neutropenic period  Description  INTERVENTIONS:  - Monitor Screening Questionnaire for Adult Immunization    Are you sick today?   No   Do you have allergies to medications, food, a vaccine component or latex?   No   Have you ever had a serious reaction after receiving a vaccination?   No   Do you have a long-term health problem with heart disease, lung disease, asthma, kidney disease, metabolic disease (e.g. diabetes), anemia, or other blood disorder?   No   Do you have cancer, leukemia, HIV/AIDS, or any other immune system problem?   No   In the past 3 months, have you taken medications that affect  your immune system, such as prednisone, other steroids, or anticancer drugs; drugs for the treatment of rheumatoid arthritis, Crohn s disease, or psoriasis; or have you had radiation treatments?   No   Have you had a seizure, or a brain or other nervous system problem?   No   During the past year, have you received a transfusion of blood or blood     products, or been given immune (gamma) globulin or antiviral drug?   No   For women: Are you pregnant or is there a chance you could become        pregnant during the next month?   No   Have you received any vaccinations in the past 4 weeks?   No     Immunization questionnaire answers were all negative.      MNVFC doesn't apply on this patient         Screening performed by Kaitlynn Correia on 3/21/2017 at 10:37 AM.     WBC  - Implement neutropenic guidelines  Outcome: Progressing     Problem: CARDIOVASCULAR - ADULT  Goal: Maintains optimal cardiac output and hemodynamic stability  Description  INTERVENTIONS:  - Monitor I/O, vital signs and rhythm  - Monitor for S/S and trends of decreased cardiac output i e  bleeding, hypotension  - Administer and titrate ordered vasoactive medications to optimize hemodynamic stability  - Assess quality of pulses, skin color and temperature  - Assess for signs of decreased coronary artery perfusion - ex   Angina  - Instruct patient to report change in severity of symptoms  Outcome: Progressing  Goal: Absence of cardiac dysrhythmias or at baseline rhythm  Description  INTERVENTIONS:  - Continuous cardiac monitoring, monitor vital signs, obtain 12 lead EKG if indicated  - Administer antiarrhythmic and heart rate control medications as ordered  - Monitor electrolytes and administer replacement therapy as ordered  Outcome: Progressing     Problem: METABOLIC, FLUID AND ELECTROLYTES - ADULT  Goal: Electrolytes maintained within normal limits  Description  INTERVENTIONS:  - Monitor labs and assess patient for signs and symptoms of electrolyte imbalances  - Administer electrolyte replacement as ordered  - Monitor response to electrolyte replacements, including repeat lab results as appropriate  - Instruct patient on fluid and nutrition as appropriate  Outcome: Progressing  Goal: Fluid balance maintained  Description  INTERVENTIONS:  - Monitor labs and assess for signs and symptoms of volume excess or deficit  - Monitor I/O and WT  - Instruct patient on fluid and nutrition as appropriate  Outcome: Progressing  Goal: Glucose maintained within target range  Description  INTERVENTIONS:  - Monitor Blood Glucose as ordered  - Assess for signs and symptoms of hyperglycemia and hypoglycemia  - Administer ordered medications to maintain glucose within target range  - Assess nutritional intake and initiate nutrition service referral as needed  Outcome: Progressing     Problem: Prexisting or High Potential for Compromised Skin Integrity  Goal: Skin integrity is maintained or improved  Description  INTERVENTIONS:  - Identify patients at risk for skin breakdown  - Assess and monitor skin integrity  - Assess and monitor nutrition and hydration status  - Monitor labs (i e  albumin)  - Assess for incontinence   - Turn and reposition patient  - Assist with mobility/ambulation  - Relieve pressure over bony prominences  - Avoid friction and shearing  - Provide appropriate hygiene as needed including keeping skin clean and dry  - Evaluate need for skin moisturizer/barrier cream  - Collaborate with interdisciplinary team (i e  Nutrition, Rehabilitation, etc )   - Patient/family teaching  Outcome: Progressing     Problem: Nutrition/Hydration-ADULT  Goal: Nutrient/Hydration intake appropriate for improving, restoring or maintaining nutritional needs  Description  Monitor and assess patient's nutrition/hydration status for malnutrition (ex- brittle hair, bruises, dry skin, pale skin and conjunctiva, muscle wasting, smooth red tongue, and disorientation)  Collaborate with interdisciplinary team and initiate plan and interventions as ordered  Monitor patient's weight and dietary intake as ordered or per policy  Utilize nutrition screening tool and intervene per policy  Determine patient's food preferences and provide high-protein, high-caloric foods as appropriate       INTERVENTIONS:  - Monitor oral intake, urinary output, labs, and treatment plans  - Assess nutrition and hydration status and recommend course of action  - Evaluate amount of meals eaten  - Assist patient with eating if necessary   - Allow adequate time for meals  - Recommend/ encourage appropriate diets, oral nutritional supplements, and vitamin/mineral supplements  - Order, calculate, and assess calorie counts as needed  - Recommend, monitor, and adjust tube feedings and TPN/PPN based on assessed needs  - Assess need for intravenous fluids  - Provide specific nutrition/hydration education as appropriate  - Include patient/family/caregiver in decisions related to nutrition  Outcome: Progressing

## 2019-07-09 NOTE — PROGRESS NOTES
Progress note - Gastroenterology   Adonis Ruiz 61 y o  female MRN: [id number]  Unit/Bed#: -02 Encounter: [id number]    ASSESSMENT and PLAN    ASSESSMENT  1  Acute gastrointestinal hemorrhage secondary to anastomotic ulcer at gastric bypass site  -further bleeding at biopsy site  Status post cautery and epinephrine injection  2  Anemia secondary to acute gastrointestinal blood loss  H/H drifting down but no obvious bleeding   3  Thrombocytopenia secondary to cirrhosis and toxic affects of alcohol on the bone marrow  4  Alcohol use disorder severe  5  Alcoholic cirrhosis  6  Status post gastric bypass surgery Nicole-en-Y in place  7  Large incisional hernia        PLAN  1  Continue IV PPI drip for today and t he switch to po  2  Advance to full liquid diet  3  Monitor H&H and signs of bleeding  4  Follow-up EGD 6-8 weeks to document healing of the ulcers  5  OOB and ambulate  6  Probably would benefit from psych consult for her alcohol use disorder    Chief Complaint   Patient presents with    Rectal Bleeding     To ED with c/o black stools with red diarrhea since thursday, now has weakness, dizziness, shaking patient is in withdraw from alcohol  Has abd  pain   Withdrawal - Alcohol       SUBJECTIVE/HPI   Tolerating clear liquid diet without nausea/vomiting or pain  No bowel movement or evidence of GI bleeding    /65 (BP Location: Left arm)   Pulse 83   Temp (!) 97 4 °F (36 3 °C) (Tympanic)   Resp 16   Ht 5' 4" (1 626 m)   Wt 111 kg (245 lb 9 5 oz)   SpO2 99%   Breastfeeding?  No   BMI 42 16 kg/m²     PHYSICALEXAM  General appearance: alert, appears stated age and cooperative  Head: Normocephalic, without obvious abnormality, atraumatic  Lungs: clear to auscultation bilaterally  Heart: regular rate and rhythm, S1, S2 normal, no murmur, click, rub or gallop  Abdomen: soft, non-tender; bowel sounds normal; no masses,  no organomegaly  Extremities: extremities normal, atraumatic, no cyanosis or edema  Neurologic: Grossly normal   No asterixis    Lab Results   Component Value Date    CALCIUM 8 3 07/09/2019    K 4 1 07/09/2019    CO2 24 07/09/2019     (H) 07/09/2019    BUN 12 07/09/2019    CREATININE 1 04 07/09/2019     Lab Results   Component Value Date    WBC 2 33 (L) 07/08/2019    HGB 7 5 (L) 07/08/2019    HCT 24 2 (L) 07/08/2019     (H) 07/08/2019    PLT 43 (LL) 07/08/2019     Lab Results   Component Value Date    ALT 36 07/08/2019     (H) 07/08/2019    ALKPHOS 96 07/08/2019     No results found for: AMYLASE  Lab Results   Component Value Date    LIPASE 198 07/06/2019     Lab Results   Component Value Date    IRON 36 (L) 05/18/2019    TIBC 382 05/18/2019    FERRITIN 92 05/18/2019     Lab Results   Component Value Date    INR 1 63 (H) 07/09/2019       Counseling / Coordination of Care  Total floor / unit time spent today 30 minutes

## 2019-07-09 NOTE — ASSESSMENT & PLAN NOTE
· CT of abdomen and pelvis- Ascending and proximal transverse colitis  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  A 2nd supraumbilical hernia containing mesenteric fat is noted  There is mild to moderate anasarca  Severely fatty infiltrated liver is again noted  Recanalization umbilical vein and esophageal varices again noted  Patient again noted to be post gastric bypass without evidence for obstruction    · Patient received a dose of Zosyn in the ED; observing off antibiotics  · GI follow-up appreciated

## 2019-07-09 NOTE — ASSESSMENT & PLAN NOTE
· Patient has chronic thrombocytopenia  · Follows with Dr Trent Bajwa from Heme-Onc  · Plt this admission - 20, received platelet transfusion  · Most recent baseline appears to be 30-40s  · Recheck platelets in the morning

## 2019-07-09 NOTE — ASSESSMENT & PLAN NOTE
·  Acute on chronic anemia secondary to GI blood loss  · Hemoglobin 8 7 this admission, baseline appears to be 9 0-11 0  · Hemoglobin down to 7 4, transfuse for hemoglobin less than 7  · She follows with Dr Smooth [place] outpatient for Venofer transfusions   · Will trend hemoglobin q8 hrs in the setting of melena  · GI evaluation appreciated  ·

## 2019-07-09 NOTE — ASSESSMENT & PLAN NOTE
· Patient with melena that started on Thursday  · Hemoglobin 8 6 this admission, trended down to 7 4  · Has history of alcohol abuse and liver disease  · Status post EGD:  Anastomotic bleeding gastric bypass noted-is to continue with the IV PPI today and transition to p o  By tomorrow    Advance the diet as per Gastroenterology  · Patient given Protonix in the ED, will continue Protonix drip  · GI follow-up as an outpatient  · Monitor H and H

## 2019-07-09 NOTE — ASSESSMENT & PLAN NOTE
· Patient at some point was being evaluated for a transplant, however she was currently drinking and states she quit this Thursday  · GI follow-up  · Continue rifaximin  · Hold Lasix and spironolactone while patient NPO, may resume once started back on diet  · Discussed with the patient about psychiatric consultation-patient refused saying that her alcohol consumption is secondary to social issues    She does not that she has an alcohol problem

## 2019-07-09 NOTE — PROGRESS NOTES
Progress Note - Jess Sweet 1960, 61 y o  female MRN: [id number]    Unit/Bed#: -02 Encounter: [id number]    Primary Care Provider: Adina Holly MD   Date and time admitted to hospital: 7/6/2019  6:45 PM        * Acute upper gastrointestinal bleeding  Assessment & Plan  · Patient with melena that started on Thursday  · Hemoglobin 8 6 this admission, trended down to 7 4  · Has history of alcohol abuse and liver disease  · Status post EGD:  Anastomotic bleeding gastric bypass noted-is to continue with the IV PPI today and transition to p o  By tomorrow  Advance the diet as per Gastroenterology  · Patient given Protonix in the ED, will continue Protonix drip  · GI follow-up as an outpatient  · Monitor H and H    History of bariatric surgery  Assessment & Plan  · Outpatient follow-up    ETOH abuse  Assessment & Plan  · thiamine and folic acid  · Placed on CIWA protocol  · As per patient's last drink was Thursday, she drinks 2 bottles of white wine daily    Alcohol withdrawal (HCC)  Assessment & Plan  · CIWA protocol    Ascites  Assessment & Plan  · Continue Aldactone and Lasix daily  · Mild to moderate ascites seen on CT of abdomen  · Strict I/O    Colitis  Assessment & Plan  · CT of abdomen and pelvis- Ascending and proximal transverse colitis  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  A 2nd supraumbilical hernia containing mesenteric fat is noted  There is mild to moderate anasarca  Severely fatty infiltrated liver is again noted  Recanalization umbilical vein and esophageal varices again noted  Patient again noted to be post gastric bypass without evidence for obstruction    · Patient received a dose of Zosyn in the ED; observing off antibiotics  · GI follow-up appreciated    Thrombocytopenia ([address] )  Assessment & Plan  · Patient has chronic thrombocytopenia  · Follows with Dr Toma Marcus from Heme-Onc  · Plt this admission - 20, received platelet transfusion  · Most recent baseline appears to be 30-40s  · Recheck platelets in the morning    Cirrhosis with alcoholism [facility])  Assessment & Plan  · Patient at some point was being evaluated for a transplant, however she was currently drinking and states she quit this Thursday  · GI follow-up  · Continue rifaximin  · Hold Lasix and spironolactone while patient NPO, may resume once started back on diet  · Discussed with the patient about psychiatric consultation-patient refused saying that her alcohol consumption is secondary to social issues  She does not that she has an alcohol problem    Chronic anemia  Assessment & Plan  ·  Acute on chronic anemia secondary to GI blood loss  · Hemoglobin 8 7 this admission, baseline appears to be 9 0-11 0  · Hemoglobin down to 7 4, transfuse for hemoglobin less than 7  · She follows with Dr Nadeen Degroot outpatient for Venofer transfusions   · Will trend hemoglobin q8 hrs in the setting of melena  · GI evaluation appreciated  ·     VTE Pharmacologic Prophylaxis:   Pharmacologic: Pharmacologic VTE Prophylaxis contraindicated due to GI bleed  Mechanical VTE Prophylaxis in Place: Yes    Patient Centered Rounds: I have performed bedside rounds with nursing staff today  Discussions with Specialists or Other Care Team Provider:     Education and Discussions with Family / Patient:  Updated patient    Time Spent for Care: 30 minutes More than 50% of total time spent on counseling and coordination of care as described above  Current Length of Stay: 3 day(s)    Current Patient Status: Inpatient   Certification Statement: The patient will continue to require additional inpatient hospital stay due to GI bleed/monitor hemoglobin    Discharge Plan:     Code Status: Level 1 - Full Code      Subjective:   Patient seen and examined  No specific complaints offered  Reported that he had a bowel movement a it was dark in color  No active bleeding  GI follow-up appreciated    Discussed with the patient about alcohol consumption and possible psychiatry evaluation to aid with alcohol cessation  Patient reported that she does not need any psychiatry evaluation she does not believe that she has an alcohol problem  She believes that her alcohol consumption is related to her social circumstances    Objective:     Vitals:   Temp (24hrs), Av 9 °F (36 6 °C), Min:97 4 °F (36 3 °C), Max:98 2 °F (36 8 °C)    Temp:  [97 4 °F (36 3 °C)-98 2 °F (36 8 °C)] 98 1 °F (36 7 °C)  HR:  [83-99] 99  Resp:  [16-18] 16  BP: (109-130)/(57-68) 109/57  SpO2:  [94 %-100 %] 100 %  Body mass index is 42 16 kg/m²  Input and Output Summary (last 24 hours): Intake/Output Summary (Last 24 hours) at 2019 1059  Last data filed at 2019 1001  Gross per 24 hour   Intake 2134 5 ml   Output 5300 ml   Net -3165 5 ml       Physical Exam:     Physical Exam   Constitutional: She appears well-developed  No distress  HENT:   Head: Normocephalic and atraumatic  Eyes: Pupils are equal, round, and reactive to light  Left eye exhibits no discharge  Neck: Normal range of motion  No JVD present  Some rule and present   Cardiovascular: Normal rate  Exam reveals no friction rub  No murmur heard  Pulmonary/Chest: Effort normal  No stridor  No respiratory distress  Abdominal: Soft  Bowel sounds are normal    Hernia present   Musculoskeletal: Normal range of motion  She exhibits no edema  Neurological: She is alert  No cranial nerve deficit  Skin: Skin is warm  Additional Data:     Labs:    Results from last 7 days   Lab Units 19  0815  19  0517   WBC Thousand/uL  --   --  2 33*   HEMOGLOBIN g/dL 7 4*   < > 7 4*   HEMATOCRIT % 24 8*   < > 23 8*   PLATELETS Thousands/uL  --   --  43*   NEUTROS PCT %  --   --  57   LYMPHS PCT %  --   --  25   MONOS PCT %  --   --  13*   EOS PCT %  --   --  4    < > = values in this interval not displayed       Results from last 7 days   Lab Units 19  0457  19  0517   POTASSIUM mmol/L 4 1   < > 3 5   CHLORIDE mmol/L 111*   < > 111*   CO2 mmol/L 24   < > 25   BUN mg/dL 12   < > 15   CREATININE mg/dL 1 04   < > 0 86   CALCIUM mg/dL 8 3   < > 7 5*   ALK PHOS U/L  --   --  96   ALT U/L  --   --  36   AST U/L  --   --  101*    < > = values in this interval not displayed  Results from last 7 days   Lab Units 07/09/19  0457   INR  1 63*       * I Have Reviewed All Lab Data Listed Above  * Additional Pertinent Lab Tests Reviewed: Jaimee 66 Admission Reviewed    Imaging:    Imaging Reports Reviewed Today Include:   Imaging Personally Reviewed by Myself Includes:     Recent Cultures (last 7 days):           Last 24 Hours Medication List:     Current Facility-Administered Medications:  calcium carbonate 1 tablet Oral Daily With Breakfast Katerin Charles MD    folic acid 1 mg Oral Daily Katerin Charles MD    LORazepam 1 mg Intravenous Q6H PRN Katerin Charles MD    LORazepam 0 5 mg Oral TID Katerin Charles MD    multivitamin-minerals 1 tablet Oral Daily Katerin Charles MD    neomycin-polymyxin-gramicidin 2 drop Both Eyes 4x Daily Katerin Charles MD    pantoprozole (PROTONIX) infusion (Continuous) 8 mg/hr Intravenous Continuous Katerin Charles MD Last Rate: 8 mg/hr (07/09/19 0936)   pantoprazole 80 mg Intravenous Once Katerin Charles MD    pregabalin 300 mg Oral BID Katerin Charles MD    sodium chloride 0 9 % with KCl 20 mEq/L 50 mL/hr Intravenous Continuous RENÉ Morgan Last Rate: 50 mL/hr (07/08/19 2049)   thiamine 100 mg Oral Daily Katerin Charles MD    topiramate 25 mg Oral BID Katerin Charles MD         Today, Patient Was Seen By: Cleta Moritz, MD    ** Please Note: Dictation voice to text software may have been used in the creation of this document   **

## 2019-07-09 NOTE — SOCIAL WORK
Continuing to follow patient  Placed calls to [facility], [facility], [facility], [facility], [facility] and they are not contracted with [facility]  Spoke with admissions a [facility] and [address] and they are contracted with [facility]  Spoke with [facility] at [phone] and was informd patient has rehab benefits  She further informed me the drug and alcohol rehab falls under Mental Health benefits and no referral or pre authorization is needed  Met with patient and informed her of the above and she would like to receive inpatient  alcohol rehab at [facility]  in [id number]  Will check bed availability there once patient is ready for discharge

## 2019-07-09 NOTE — PLAN OF CARE
Problem: Potential for Falls  Goal: Patient will remain free of falls  Description  INTERVENTIONS:  - Assess patient frequently for physical needs  -  Identify cognitive and physical deficits and behaviors that affect risk of falls    -  [place] fall precautions as indicated by assessment   - Educate patient/family on patient safety including physical limitations  - Instruct patient to call for assistance with activity based on assessment  - Modify environment to reduce risk of injury  - Consider OT/PT consult to assist with strengthening/mobility  Outcome: Progressing     Problem: PAIN - ADULT  Goal: Verbalizes/displays adequate comfort level or baseline comfort level  Description  Interventions:  - Encourage patient to monitor pain and request assistance  - Assess pain using appropriate pain scale  - Administer analgesics based on type and severity of pain and evaluate response  - Implement non-pharmacological measures as appropriate and evaluate response  - Consider cultural and social influences on pain and pain management  - Notify physician/advanced practitioner if interventions unsuccessful or patient reports new pain  Outcome: Progressing     Problem: INFECTION - ADULT  Goal: Absence or prevention of progression during hospitalization  Description  INTERVENTIONS:  - Assess and monitor for signs and symptoms of infection  - Monitor lab/diagnostic results  - Monitor all insertion sites, i e  indwelling lines, tubes, and drains  - Monitor endotracheal (as able) and nasal secretions for changes in amount and color  - [place] appropriate cooling/warming therapies per order  - Administer medications as ordered  - Instruct and encourage patient and family to use good hand hygiene technique  - Identify and instruct in appropriate isolation precautions for identified infection/condition  Outcome: Progressing  Goal: Absence of fever/infection during neutropenic period  Description  INTERVENTIONS:  - Monitor WBC  - Implement neutropenic guidelines  Outcome: Progressing     Problem: CARDIOVASCULAR - ADULT  Goal: Maintains optimal cardiac output and hemodynamic stability  Description  INTERVENTIONS:  - Monitor I/O, vital signs and rhythm  - Monitor for S/S and trends of decreased cardiac output i e  bleeding, hypotension  - Administer and titrate ordered vasoactive medications to optimize hemodynamic stability  - Assess quality of pulses, skin color and temperature  - Assess for signs of decreased coronary artery perfusion - ex   Angina  - Instruct patient to report change in severity of symptoms  Outcome: Progressing  Goal: Absence of cardiac dysrhythmias or at baseline rhythm  Description  INTERVENTIONS:  - Continuous cardiac monitoring, monitor vital signs, obtain 12 lead EKG if indicated  - Administer antiarrhythmic and heart rate control medications as ordered  - Monitor electrolytes and administer replacement therapy as ordered  Outcome: Progressing     Problem: METABOLIC, FLUID AND ELECTROLYTES - ADULT  Goal: Electrolytes maintained within normal limits  Description  INTERVENTIONS:  - Monitor labs and assess patient for signs and symptoms of electrolyte imbalances  - Administer electrolyte replacement as ordered  - Monitor response to electrolyte replacements, including repeat lab results as appropriate  - Instruct patient on fluid and nutrition as appropriate  Outcome: Progressing  Goal: Fluid balance maintained  Description  INTERVENTIONS:  - Monitor labs and assess for signs and symptoms of volume excess or deficit  - Monitor I/O and WT  - Instruct patient on fluid and nutrition as appropriate  Outcome: Progressing  Goal: Glucose maintained within target range  Description  INTERVENTIONS:  - Monitor Blood Glucose as ordered  - Assess for signs and symptoms of hyperglycemia and hypoglycemia  - Administer ordered medications to maintain glucose within target range  - Assess nutritional intake and initiate nutrition service referral as needed  Outcome: Progressing     Problem: Prexisting or High Potential for Compromised Skin Integrity  Goal: Skin integrity is maintained or improved  Description  INTERVENTIONS:  - Identify patients at risk for skin breakdown  - Assess and monitor skin integrity  - Assess and monitor nutrition and hydration status  - Monitor labs (i e  albumin)  - Assess for incontinence   - Turn and reposition patient  - Assist with mobility/ambulation  - Relieve pressure over bony prominences  - Avoid friction and shearing  - Provide appropriate hygiene as needed including keeping skin clean and dry  - Evaluate need for skin moisturizer/barrier cream  - Collaborate with interdisciplinary team (i e  Nutrition, Rehabilitation, etc )   - Patient/family teaching  Outcome: Progressing     Problem: Nutrition/Hydration-ADULT  Goal: Nutrient/Hydration intake appropriate for improving, restoring or maintaining nutritional needs  Description  Monitor and assess patient's nutrition/hydration status for malnutrition (ex- brittle hair, bruises, dry skin, pale skin and conjunctiva, muscle wasting, smooth red tongue, and disorientation)  Collaborate with interdisciplinary team and initiate plan and interventions as ordered  Monitor patient's weight and dietary intake as ordered or per policy  Utilize nutrition screening tool and intervene per policy  Determine patient's food preferences and provide high-protein, high-caloric foods as appropriate       INTERVENTIONS:  - Monitor oral intake, urinary output, labs, and treatment plans  - Assess nutrition and hydration status and recommend course of action  - Evaluate amount of meals eaten  - Assist patient with eating if necessary   - Allow adequate time for meals  - Recommend/ encourage appropriate diets, oral nutritional supplements, and vitamin/mineral supplements  - Order, calculate, and assess calorie counts as needed  - Recommend, monitor, and adjust tube feedings and TPN/PPN based on assessed needs  - Assess need for intravenous fluids  - Provide specific nutrition/hydration education as appropriate  - Include patient/family/caregiver in decisions related to nutrition  Outcome: Progressing

## 2019-07-10 PROBLEM — H10.31 ACUTE BACTERIAL CONJUNCTIVITIS OF RIGHT EYE: Status: RESOLVED | Noted: 2019-07-07 | Resolved: 2019-07-10

## 2019-07-10 LAB
ABO GROUP BLD BPU: NORMAL
ABO GROUP BLD BPU: NORMAL
ALBUMIN SERPL BCP-MCNC: 2.5 G/DL (ref 3.5–5)
ALP SERPL-CCNC: 132 U/L (ref 46–116)
ALT SERPL W P-5'-P-CCNC: 39 U/L (ref 12–78)
ANION GAP SERPL CALCULATED.3IONS-SCNC: 10 MMOL/L (ref 4–13)
AST SERPL W P-5'-P-CCNC: 95 U/L (ref 5–45)
BILIRUB SERPL-MCNC: 3.3 MG/DL (ref 0.2–1)
BPU ID: NORMAL
BPU ID: NORMAL
BUN SERPL-MCNC: 11 MG/DL (ref 5–25)
CALCIUM SERPL-MCNC: 8.5 MG/DL (ref 8.3–10.1)
CHLORIDE SERPL-SCNC: 110 MMOL/L (ref 100–108)
CO2 SERPL-SCNC: 23 MMOL/L (ref 21–32)
CREAT SERPL-MCNC: 1.15 MG/DL (ref 0.6–1.3)
CROSSMATCH: NORMAL
CROSSMATCH: NORMAL
ERYTHROCYTE [DISTWIDTH] IN BLOOD BY AUTOMATED COUNT: 19.9 % (ref 11.6–15.1)
GFR SERPL CREATININE-BSD FRML MDRD: 52 ML/MIN/1.73SQ M
GLUCOSE SERPL-MCNC: 94 MG/DL (ref 65–140)
HCT VFR BLD AUTO: 26.9 % (ref 34.8–46.1)
HGB BLD-MCNC: 8.1 G/DL (ref 11.5–15.4)
MCH RBC QN AUTO: 31.9 PG (ref 26.8–34.3)
MCHC RBC AUTO-ENTMCNC: 30.1 G/DL (ref 31.4–37.4)
MCV RBC AUTO: 106 FL (ref 82–98)
PLATELET # BLD AUTO: 50 THOUSANDS/UL (ref 149–390)
PMV BLD AUTO: 11.6 FL (ref 8.9–12.7)
POTASSIUM SERPL-SCNC: 4.1 MMOL/L (ref 3.5–5.3)
PROT SERPL-MCNC: 6.5 G/DL (ref 6.4–8.2)
RBC # BLD AUTO: 2.54 MILLION/UL (ref 3.81–5.12)
SODIUM SERPL-SCNC: 143 MMOL/L (ref 136–145)
UNIT DISPENSE STATUS: NORMAL
UNIT DISPENSE STATUS: NORMAL
UNIT PRODUCT CODE: NORMAL
UNIT PRODUCT CODE: NORMAL
UNIT RH: NORMAL
UNIT RH: NORMAL
WBC # BLD AUTO: 2.55 THOUSAND/UL (ref 4.31–10.16)

## 2019-07-10 PROCEDURE — 99232 SBSQ HOSP IP/OBS MODERATE 35: CPT | Performed by: INTERNAL MEDICINE

## 2019-07-10 PROCEDURE — 85027 COMPLETE CBC AUTOMATED: CPT | Performed by: FAMILY MEDICINE

## 2019-07-10 PROCEDURE — 99222 1ST HOSP IP/OBS MODERATE 55: CPT | Performed by: PHYSICIAN ASSISTANT

## 2019-07-10 PROCEDURE — 99232 SBSQ HOSP IP/OBS MODERATE 35: CPT | Performed by: FAMILY MEDICINE

## 2019-07-10 PROCEDURE — 80053 COMPREHEN METABOLIC PANEL: CPT | Performed by: FAMILY MEDICINE

## 2019-07-10 RX ORDER — PANTOPRAZOLE SODIUM 40 MG/1
40 TABLET, DELAYED RELEASE ORAL
Status: DISCONTINUED | OUTPATIENT
Start: 2019-07-10 | End: 2019-07-11 | Stop reason: HOSPADM

## 2019-07-10 RX ADMIN — CALCIUM 1 TABLET: 500 TABLET ORAL at 08:19

## 2019-07-10 RX ADMIN — Medication 1 TABLET: at 09:00

## 2019-07-10 RX ADMIN — PANTOPRAZOLE SODIUM 40 MG: 40 TABLET, DELAYED RELEASE ORAL at 16:58

## 2019-07-10 RX ADMIN — FOLIC ACID 1 MG: 1 TABLET ORAL at 09:00

## 2019-07-10 RX ADMIN — NEOMYCIN SULFATE, POLYMYXIN B SULFATE AND GRAMICIDIN 2 DROP: 1.75; 10000; .025 SOLUTION/ DROPS OPHTHALMIC at 22:04

## 2019-07-10 RX ADMIN — IRON SUCROSE 200 MG: 20 INJECTION, SOLUTION INTRAVENOUS at 11:36

## 2019-07-10 RX ADMIN — PREGABALIN 300 MG: 100 CAPSULE ORAL at 08:58

## 2019-07-10 RX ADMIN — NEOMYCIN SULFATE, POLYMYXIN B SULFATE AND GRAMICIDIN 2 DROP: 1.75; 10000; .025 SOLUTION/ DROPS OPHTHALMIC at 19:16

## 2019-07-10 RX ADMIN — NEOMYCIN SULFATE, POLYMYXIN B SULFATE AND GRAMICIDIN 2 DROP: 1.75; 10000; .025 SOLUTION/ DROPS OPHTHALMIC at 09:02

## 2019-07-10 RX ADMIN — TOPIRAMATE 25 MG: 25 TABLET, FILM COATED ORAL at 09:02

## 2019-07-10 RX ADMIN — NEOMYCIN SULFATE, POLYMYXIN B SULFATE AND GRAMICIDIN 2 DROP: 1.75; 10000; .025 SOLUTION/ DROPS OPHTHALMIC at 11:40

## 2019-07-10 RX ADMIN — THIAMINE HCL TAB 100 MG 100 MG: 100 TAB at 09:01

## 2019-07-10 RX ADMIN — TOPIRAMATE 25 MG: 25 TABLET, FILM COATED ORAL at 19:17

## 2019-07-10 RX ADMIN — LORAZEPAM 0.5 MG: 0.5 TABLET ORAL at 16:58

## 2019-07-10 RX ADMIN — LORAZEPAM 0.5 MG: 0.5 TABLET ORAL at 21:54

## 2019-07-10 RX ADMIN — PREGABALIN 300 MG: 100 CAPSULE ORAL at 19:15

## 2019-07-10 RX ADMIN — LORAZEPAM 0.5 MG: 0.5 TABLET ORAL at 08:59

## 2019-07-10 NOTE — ASSESSMENT & PLAN NOTE
· Patient with melena that started on Thursday  · Hemoglobin 8 6 this admission, trended down to 7 4, the latest hemoglobin is 8 point  · Has history of alcohol abuse and liver disease  · Status post EGD:  Anastomotic bleeding gastric bypass noted-is to continue with the IV PPI today and transition to p o  By tomorrow  Patient was taken off of the IV Protonix drip and transition to p  O  Protonix drip by Gastroenterology today  Advanced diet to known ulcerative neck today   · Transition to p o   Protonix today  · GI follow-up as an outpatient  · Monitor H and H  · If remained stable can be discharged tomorrow to alcohol rehab

## 2019-07-10 NOTE — PROGRESS NOTES
Progress Note - Bhargav Roman 1960, 61 y o  female MRN: [id number]    Unit/Bed#: -02 Encounter: [id number]    Primary Care Provider: Lona López MD   Date and time admitted to hospital: 7/6/2019  6:45 PM        * Acute upper gastrointestinal bleeding  Assessment & Plan  · Patient with melena that started on Thursday  · Hemoglobin 8 6 this admission, trended down to 7 4, the latest hemoglobin is 8 point  · Has history of alcohol abuse and liver disease  · Status post EGD:  Anastomotic bleeding gastric bypass noted-is to continue with the IV PPI today and transition to p o  By tomorrow  Patient was taken off of the IV Protonix drip and transition to p  O  Protonix drip by Gastroenterology today  Advanced diet to known ulcerative neck today   · Transition to p o  Protonix today  · GI follow-up as an outpatient  · Monitor H and H  · If remained stable can be discharged tomorrow to alcohol rehab    History of bariatric surgery  Assessment & Plan  · Outpatient follow-up    ETOH abuse  Assessment & Plan  · thiamine and folic acid  · Placed on CIWA protocol  · As per patient's last drink was Thursday, she drinks 2 bottles of white wine daily  · Patient is interested in inpatient rehabilitation-likely DC to the inpatient alcohol rehabilitation by tomorrow if remained stable and cleared by Gastroenterology    Alcohol withdrawal ([facility] [address] )  Assessment & Plan  · CIWA protocol    Ascites  Assessment & Plan  · Start back on Aldactone and Lasix tomorrow  · Mild to moderate ascites seen on CT of abdomen  · Strict I/O    Colitis  Assessment & Plan  · CT of abdomen and pelvis- Ascending and proximal transverse colitis  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  A 2nd supraumbilical hernia containing mesenteric fat is noted  There is mild to moderate anasarca  Severely fatty infiltrated liver is again noted  Recanalization umbilical vein and esophageal varices again noted   Patient again noted to be post gastric bypass without evidence for obstruction  · Patient received a dose of Zosyn in the ED; observing off antibiotics  · GI follow-up appreciated  · Denies any pain  Thrombocytopenia ([address] )  Assessment & Plan  · Patient has chronic thrombocytopenia  · Follows with Dr Feliciano Escamilla from Heme-Onc  · Plt this admission - 20, received platelet transfusion  · Most recent baseline appears to be 30-40s  · Remained stable    Cirrhosis with alcoholism [facility])  Assessment & Plan  · Patient at some point was being evaluated for a transplant, however she is  currently drinking and states she quit this Thursday  · GI follow-up  · Continue rifaximin  · Restart back on the Lasix and spironolactone tomorrow  · It appears that the patient is interested in alcohol rehabilitation  Psych consult appreciated  Case management is working on inpatient psych placement    Chronic anemia  Assessment & Plan  · Acute on chronic anemia secondary to GI blood loss  · Hemoglobin 8 7 this admission, baseline appears to be 9 0-11 0  · Hemoglobin down to 7 4, currently hemoglobin is 8 1  · She follows with Dr Feliciano Escamilla outpatient for Venofer transfusions-patient missed some doses before  Will give 1 time IV Venofer in the hospital   · GI evaluation appreciated  · Transfuse for hemoglobin less than 7  · No further bleeding        VTE Pharmacologic Prophylaxis:   Pharmacologic: Pharmacologic VTE Prophylaxis contraindicated due to GI bleed  Mechanical VTE Prophylaxis in Place: Yes    Patient Centered Rounds: I have performed bedside rounds with nursing staff today  Discussions with Specialists or Other Care Team Provider:     Education and Discussions with Family / Patient:  Updated patient    Time Spent for Care: 30 minutes  More than 50% of total time spent on counseling and coordination of care as described above      Current Length of Stay: 4 day(s)    Current Patient Status: Inpatient   Certification Statement: The patient will continue to require additional inpatient hospital stay due to Monitor hemoglobin    Discharge Plan:     Code Status: Level 1 - Full Code      Subjective:   Patient seen and examined  Gastroenterology transition to p o  Protonix today  Patient is started on a diet, patient was come planing of some upper extremity cramping yesterday currently resolved  No further bowel movement  Patient reported that she was supposed to get 8 sessions of IV iron transfusion  Apparently she missed some doses and she got only 5 so far  Will give 1 time dose of IV iron in the hospital   She can follow up with Oncology as an outpatient for further transfusion  Objective:     Vitals:   Temp (24hrs), Av 2 °F (36 8 °C), Min:97 5 °F (36 4 °C), Max:99 °F (37 2 °C)    Temp:  [97 5 °F (36 4 °C)-99 °F (37 2 °C)] 97 8 °F (36 6 °C)  HR:  [82-97] 92  Resp:  [18-20] 18  BP: (110-126)/(54-65) 120/59  SpO2:  [94 %-100 %] 94 %  Body mass index is 42 16 kg/m²  Input and Output Summary (last 24 hours): Intake/Output Summary (Last 24 hours) at 7/10/2019 1155  Last data filed at 7/10/2019 0900  Gross per 24 hour   Intake 949 66 ml   Output 1176 ml   Net -226 34 ml       Physical Exam:     Physical Exam   Constitutional: She appears well-developed  No distress  HENT:   Head: Normocephalic  Eyes: Pupils are equal, round, and reactive to light  Neck: Normal range of motion  No JVD present  Cardiovascular: Normal rate and regular rhythm  No murmur heard  Pulmonary/Chest: Effort normal  No stridor  No respiratory distress  Abdominal: Soft  She exhibits no mass  There is no tenderness  There is no rebound  Ventral hernia present, no tenderness  No abdominal tenderness on palpation   Musculoskeletal: Normal range of motion  She exhibits no edema  Neurological: She is alert  No cranial nerve deficit  Coordination normal    Skin: Skin is warm         Additional Data:     Labs:    Results from last 7 days   Lab Units 07/10/19  3921 07/08/19  0517   WBC Thousand/uL 2 55*  --  2 33*   HEMOGLOBIN g/dL 8 1*   < > 7 4*   HEMATOCRIT % 26 9*   < > 23 8*   PLATELETS Thousands/uL 50*  --  43*   NEUTROS PCT %  --   --  57   LYMPHS PCT %  --   --  25   MONOS PCT %  --   --  13*   EOS PCT %  --   --  4    < > = values in this interval not displayed  Results from last 7 days   Lab Units 07/10/19  0459   POTASSIUM mmol/L 4 1   CHLORIDE mmol/L 110*   CO2 mmol/L 23   BUN mg/dL 11   CREATININE mg/dL 1 15   CALCIUM mg/dL 8 5   ALK PHOS U/L 132*   ALT U/L 39   AST U/L 95*     Results from last 7 days   Lab Units 07/09/19  0457   INR  1 63*       * I Have Reviewed All Lab Data Listed Above  * Additional Pertinent Lab Tests Reviewed: Rolandodayday Hitchcock Admission Reviewed    Imaging:    Imaging Reports Reviewed Today Include:   Imaging Personally Reviewed by Myself Includes:      Recent Cultures (last 7 days):           Last 24 Hours Medication List:     Current Facility-Administered Medications:  calcium carbonate 1 tablet Oral Daily With Breakfast Austin Marshall MD    folic acid 1 mg Oral Daily Austin Pill, MD    iron sucrose 200 mg Intravenous Once Fina Lino MD Last Rate: 200 mg (07/10/19 1136)   LORazepam 1 mg Intravenous Q6H PRN Austin Marshall MD    LORazepam 0 5 mg Oral TID Austin Marshall MD    multivitamin-minerals 1 tablet Oral Daily Austin Pill, MD    neomycin-polymyxin-gramicidin 2 drop Both Eyes 4x Daily Austin Pill, MD    pantoprazole 80 mg Intravenous Once Austin Pill, MD    pantoprazole 40 mg Oral BID RENÉ Valero    pregabalin 300 mg Oral BID Cyrus Pill, MD    thiamine 100 mg Oral Daily Austin Pill, MD    topiramate 25 mg Oral BID Austin Marshall MD         Today, Patient Was Seen By: Fina Lino MD    ** Please Note: Dictation voice to text software may have been used in the creation of this document   **

## 2019-07-10 NOTE — PLAN OF CARE
Problem: Potential for Falls  Goal: Patient will remain free of falls  Description  INTERVENTIONS:  - Assess patient frequently for physical needs  -  Identify cognitive and physical deficits and behaviors that affect risk of falls    -  Eldred fall precautions as indicated by assessment   - Educate patient/family on patient safety including physical limitations  - Instruct patient to call for assistance with activity based on assessment  - Modify environment to reduce risk of injury  - Consider OT/PT consult to assist with strengthening/mobility  Outcome: Progressing     Problem: PAIN - ADULT  Goal: Verbalizes/displays adequate comfort level or baseline comfort level  Description  Interventions:  - Encourage patient to monitor pain and request assistance  - Assess pain using appropriate pain scale  - Administer analgesics based on type and severity of pain and evaluate response  - Implement non-pharmacological measures as appropriate and evaluate response  - Consider cultural and social influences on pain and pain management  - Notify physician/advanced practitioner if interventions unsuccessful or patient reports new pain  Outcome: Progressing     Problem: INFECTION - ADULT  Goal: Absence or prevention of progression during hospitalization  Description  INTERVENTIONS:  - Assess and monitor for signs and symptoms of infection  - Monitor lab/diagnostic results  - Monitor all insertion sites, i e  indwelling lines, tubes, and drains  - Monitor endotracheal (as able) and nasal secretions for changes in amount and color  - Eldred appropriate cooling/warming therapies per order  - Administer medications as ordered  - Instruct and encourage patient and family to use good hand hygiene technique  - Identify and instruct in appropriate isolation precautions for identified infection/condition  Outcome: Progressing  Goal: Absence of fever/infection during neutropenic period  Description  INTERVENTIONS:  - Monitor WBC  - Implement neutropenic guidelines  Outcome: Progressing     Problem: CARDIOVASCULAR - ADULT  Goal: Maintains optimal cardiac output and hemodynamic stability  Description  INTERVENTIONS:  - Monitor I/O, vital signs and rhythm  - Monitor for S/S and trends of decreased cardiac output i e  bleeding, hypotension  - Administer and titrate ordered vasoactive medications to optimize hemodynamic stability  - Assess quality of pulses, skin color and temperature  - Assess for signs of decreased coronary artery perfusion - ex   Angina  - Instruct patient to report change in severity of symptoms  Outcome: Progressing  Goal: Absence of cardiac dysrhythmias or at baseline rhythm  Description  INTERVENTIONS:  - Continuous cardiac monitoring, monitor vital signs, obtain 12 lead EKG if indicated  - Administer antiarrhythmic and heart rate control medications as ordered  - Monitor electrolytes and administer replacement therapy as ordered  Outcome: Progressing     Problem: METABOLIC, FLUID AND ELECTROLYTES - ADULT  Goal: Electrolytes maintained within normal limits  Description  INTERVENTIONS:  - Monitor labs and assess patient for signs and symptoms of electrolyte imbalances  - Administer electrolyte replacement as ordered  - Monitor response to electrolyte replacements, including repeat lab results as appropriate  - Instruct patient on fluid and nutrition as appropriate  Outcome: Progressing  Goal: Fluid balance maintained  Description  INTERVENTIONS:  - Monitor labs and assess for signs and symptoms of volume excess or deficit  - Monitor I/O and WT  - Instruct patient on fluid and nutrition as appropriate  Outcome: Progressing  Goal: Glucose maintained within target range  Description  INTERVENTIONS:  - Monitor Blood Glucose as ordered  - Assess for signs and symptoms of hyperglycemia and hypoglycemia  - Administer ordered medications to maintain glucose within target range  - Assess nutritional intake and initiate nutrition service referral as needed  Outcome: Progressing     Problem: Prexisting or High Potential for Compromised Skin Integrity  Goal: Skin integrity is maintained or improved  Description  INTERVENTIONS:  - Identify patients at risk for skin breakdown  - Assess and monitor skin integrity  - Assess and monitor nutrition and hydration status  - Monitor labs (i e  albumin)  - Assess for incontinence   - Turn and reposition patient  - Assist with mobility/ambulation  - Relieve pressure over bony prominences  - Avoid friction and shearing  - Provide appropriate hygiene as needed including keeping skin clean and dry  - Evaluate need for skin moisturizer/barrier cream  - Collaborate with interdisciplinary team (i e  Nutrition, Rehabilitation, etc )   - Patient/family teaching  Outcome: Progressing     Problem: Nutrition/Hydration-ADULT  Goal: Nutrient/Hydration intake appropriate for improving, restoring or maintaining nutritional needs  Description  Monitor and assess patient's nutrition/hydration status for malnutrition (ex- brittle hair, bruises, dry skin, pale skin and conjunctiva, muscle wasting, smooth red tongue, and disorientation)  Collaborate with interdisciplinary team and initiate plan and interventions as ordered  Monitor patient's weight and dietary intake as ordered or per policy  Utilize nutrition screening tool and intervene per policy  Determine patient's food preferences and provide high-protein, high-caloric foods as appropriate       INTERVENTIONS:  - Monitor oral intake, urinary output, labs, and treatment plans  - Assess nutrition and hydration status and recommend course of action  - Evaluate amount of meals eaten  - Assist patient with eating if necessary   - Allow adequate time for meals  - Recommend/ encourage appropriate diets, oral nutritional supplements, and vitamin/mineral supplements  - Order, calculate, and assess calorie counts as needed  - Recommend, monitor, and adjust tube feedings and TPN/PPN based on assessed needs  - Assess need for intravenous fluids  - Provide specific nutrition/hydration education as appropriate  - Include patient/family/caregiver in decisions related to nutrition  Outcome: Progressing

## 2019-07-10 NOTE — PROGRESS NOTES
Progress note - Gastroenterology   Adonis Sara 61 y o  female MRN: [id number]  Unit/Bed#: -02 Encounter: [id number]    ASSESSMENT and PLAN    1  Acute gastrointestinal hemorrhage secondary to an anastomotic ulcer at gastric bypass site, no-further bleeding at biopsy site   Status post cautery and epinephrine injection  2  Anemia secondary to acute gastrointestinal blood loss  Hgb stable at 8 1  3  Thrombocytopenia secondary to cirrhosis and toxic affects of alcohol on the bone marrow  4  Alcohol use disorder severe  5  Alcoholic cirrhosis  6  Status post gastric bypass surgery Nicole-en-Y in place  7  Large incisional hernia      PLAN  1  Change Protonix to 40 mg orally bid  2  Advance diet to BLAND  3  Monitor H&H and signs of bleeding  4  Follow-up EGD 6-8 weeks to document healing of the ulcers  5  OOB and ambulate  6  Probably would benefit from psych consult for her alcohol use disorder    Chief Complaint   Patient presents with    Rectal Bleeding     To ED with c/o black stools with red diarrhea since thursday, now has weakness, dizziness, shaking patient is in withdraw from alcohol  Has abd  pain   Withdrawal - Alcohol       SUBJECTIVE/HPI   "hungry" no overt GI blood loss  "freaking out that she lost cell phone connection"    /65 (BP Location: Left arm)   Pulse 89   Temp 97 5 °F (36 4 °C) (Oral)   Resp 20   Ht 5' 4" (1 626 m)   Wt 111 kg (245 lb 9 5 oz)   SpO2 99%   Breastfeeding?  No   BMI 42 16 kg/m²     PHYSICALEXAM  General appearance: alert, appears stated age and cooperative  Head: Normocephalic, without obvious abnormality, atraumatic  Lungs: clear to auscultation bilaterally  Heart: regular rate and rhythm, S1, S2 normal, no murmur, click, rub or gallop  Abdomen: soft, non-tender; bowel sounds normal; no masses,  no organomegaly  Extremities: extremities normal, atraumatic, no cyanosis or edema  Neurologic: Grossly normal    Lab Results   Component Value Date    CALCIUM 8 5 07/10/2019    K 4 1 07/10/2019    CO2 23 07/10/2019     (H) 07/10/2019    BUN 11 07/10/2019    CREATININE 1 15 07/10/2019     Lab Results   Component Value Date    WBC 2 55 (L) 07/10/2019    HGB 8 1 (L) 07/10/2019    HCT 26 9 (L) 07/10/2019     (H) 07/10/2019    PLT 50 (L) 07/10/2019     Lab Results   Component Value Date    ALT 39 07/10/2019    AST 95 (H) 07/10/2019    ALKPHOS 132 (H) 07/10/2019     No results found for: AMYLASE  Lab Results   Component Value Date    LIPASE 198 07/06/2019     Lab Results   Component Value Date    IRON 36 (L) 05/18/2019    TIBC 382 05/18/2019    FERRITIN 92 05/18/2019     Lab Results   Component Value Date    INR 1 63 (H) 07/09/2019       Counseling / Coordination of Care  Total floor / unit time spent today 20 minutes

## 2019-07-10 NOTE — ASSESSMENT & PLAN NOTE
· Patient has chronic thrombocytopenia  · Follows with Dr Rory Castelan from Heme-Onc  · Plt this admission - 20, received platelet transfusion  · Most recent baseline appears to be 30-40s  · Remained stable

## 2019-07-10 NOTE — ASSESSMENT & PLAN NOTE
· Start back on Aldactone and Lasix tomorrow  · Mild to moderate ascites seen on CT of abdomen  · Strict I/O

## 2019-07-10 NOTE — ASSESSMENT & PLAN NOTE
· Acute on chronic anemia secondary to GI blood loss  · Hemoglobin 8 7 this admission, baseline appears to be 9 0-11 0  · Hemoglobin down to 7 4, currently hemoglobin is 8 1  · She follows with Dr Elaine Toth outpatient for Venofer transfusions-patient missed some doses before    Will give 1 time IV Venofer in the hospital   · GI evaluation appreciated  · Transfuse for hemoglobin less than 7  · No further bleeding

## 2019-07-10 NOTE — ASSESSMENT & PLAN NOTE
· Patient at some point was being evaluated for a transplant, however she is  currently drinking and states she quit this Thursday  · GI follow-up  · Continue rifaximin  · Restart back on the Lasix and spironolactone tomorrow  · It appears that the patient is interested in alcohol rehabilitation  Psych consult appreciated    Case management is working on inpatient psych placement

## 2019-07-10 NOTE — ASSESSMENT & PLAN NOTE
· thiamine and folic acid  · Placed on CIWA protocol  · As per patient's last drink was Thursday, she drinks 2 bottles of white wine daily  · Patient is interested in inpatient rehabilitation-likely DC to the inpatient alcohol rehabilitation by tomorrow if remained stable and cleared by Gastroenterology

## 2019-07-10 NOTE — SOCIAL WORK
Continuing to follow patient  Met with patient to discuss inpatient alcohol rehab and she has changed her mind  She now wants to go to her brother's 2 story [facility] in [facility]  She will stay on the first floor  She has SPC and RW  She is agreeable to [facility] for PT/OT/SN/HH/MSW  Listed provided and Freedom of Choice given and patient has no preference as long as they are contracted with [facility]  Referral sent to [facility] via [address] [place] [address] Drive

## 2019-07-10 NOTE — ASSESSMENT & PLAN NOTE
· CT of abdomen and pelvis- Ascending and proximal transverse colitis  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  A 2nd supraumbilical hernia containing mesenteric fat is noted  There is mild to moderate anasarca  Severely fatty infiltrated liver is again noted  Recanalization umbilical vein and esophageal varices again noted  Patient again noted to be post gastric bypass without evidence for obstruction  · Patient received a dose of Zosyn in the ED; observing off antibiotics  · GI follow-up appreciated  · Denies any pain

## 2019-07-10 NOTE — CONSULTS
[facility], [place] - [address] [place] [address] James 61 y o  female MRN: [id number]  Unit/Bed#: -02 Encounter: [id number]    Assessment/Plan     Assessment:  ETOH abuse  Alcohol withdrawal  Anxiety      Plan:   1  Continue medical treatment  2  Agree for referral to inpatient rehab for alcohol abuse  Will refer most likely require medical based rehab which will limit options  3   Continue CIWA protocol and taper Ativan per primary team   4   Patient does not meet criteria for psychiatric disorder or admission to [facility]  Psychiatry signing off  Risks, benefits and possible side effects of Medications:   Risks, benefits, and possible side effects of medications explained to patient and patient verbalizes understanding  Chief Complaint: "Alcohol abuse"    History of Present Illness   Physician Requesting Consult: Vidhya Turner MD  Reason for Consult / Principal Problem: Alcohol Abuse    Patient is a 26-year-old female who presented to [facility] due to gastrointestinal bleeding  She was subsequently admitted to ICU for further medical workup  Patient currently is being treated for acute gastrointestinal hemorrhage, pancytopenia, and chronic cirrhosis  Patient expressed she wanted to go to inpatient rehab in the near future  States she wants to go home (brother's house) and have skilled physical therapy due to current weakness  Explained she would need to discuss these options with her   Patient has no psychiatric history  She has seen a therapist in the past when she was having issues with anxiety  States she drinks alcohol to cope with her anxiety  Denied panic attacks  States most of her anxiety includes excessive worrying over situational stressors  Reports her current living situation is not the greatest, has numerous health problems, and is constantly traveling between [facility] area to her brother's house in [facility]    All her services are set up in [place]  Denied all depressive symptoms  Denies suicidal thoughts  Does not show signs of jaren  Denies psychosis  Does not show signs of alcohol withdrawal at this time  Drinks 2-3 bottles of wine daily  Psychosocial Stressors: alcohol abuse, living situation  Consults    Psychiatric Review Of Systems:  sleep: yes (currently due to being in hospital, fine at home)  appetite changes: no  weight changes: no  energy/anergy: yes (due to medical reasons)  interest/pleasure/anhedonia: no  somatic symptoms: no  anxiety/panic: yes  jaren: no  guilty/hopeless: no  self injurious behavior/risky behavior: no    Historical Information   Past Psychiatric History:   None  Currently in treatment with PCP  Past Suicide attempts: none  Past Violent behavior: none  Past Psychiatric medication trial: amitriptyline once for pain management    Substance Abuse History:  denied  Use of Alcohol: heavy    Longest clean time: unsure  History of IP/OP rehabilitation program: denied  Smoking history: denied  Use of Caffeine: denies use    Family Psychiatric History:   denied    Social History  Education: high school diploma/GED  Learning Disabilities: none  Marital history:   Living arrangement, social support: lives with  in [address] [facility]  Occupational History: disabled  Functioning Relationships: good support system    Other Pertinent History: None    Traumatic History:   Abuse: none  Other Traumatic Events: none    Past Medical History:   Diagnosis Date    Alcohol use disorder     Alcoholic hepatitis     Anastomotic ulcer     Anemia     Anxiety     Ascites     Breast cancer ([facility] [address] ) 2010    stage 0; bilateral mastectomy (prophylactic on left)    Chronic foot pain     Chronic narcotic dependence (HCC)     Chronic pain disorder     Cirrhosis, alcoholic (HCC)     Depression     Esophagitis     History of bilateral mastectomy     History of gastric bypass     Hypertension     Liver disease     Morbid obesity ([facility] )     Palpitations     Pancytopenia ([facility] )     Peripheral neuropathy     Rosacea        Medical Review Of Systems:  Review of Systems    Meds/Allergies   all current active meds have been reviewed and current meds:   Current Facility-Administered Medications   Medication Dose Route Frequency    calcium carbonate (OYSTER SHELL,OSCAL) 500 mg tablet 1 tablet  1 tablet Oral Daily With Breakfast    folic acid (FOLVITE) tablet 1 mg  1 mg Oral Daily    iron sucrose (VENOFER) 200 mg in sodium chloride 0 9 % 100 mL IVPB  200 mg Intravenous Once    LORazepam (ATIVAN) 2 mg/mL injection 1 mg  1 mg Intravenous Q6H PRN    LORazepam (ATIVAN) tablet 0 5 mg  0 5 mg Oral TID    multivitamin-minerals (CENTRUM ADULTS) tablet 1 tablet  1 tablet Oral Daily    neomycin-polymyxin-gramicidin (NEOSPORIN) 0 175%-10,000 units/mL-0 0025% ophthalmic solution 2 drop  2 drop Both Eyes 4x Daily    pantoprazole (PROTONIX) 80 mg in sodium chloride 0 9 % 100 mL IVPB  80 mg Intravenous Once    pantoprazole (PROTONIX) EC tablet 40 mg  40 mg Oral BID AC    pregabalin (LYRICA) capsule 300 mg  300 mg Oral BID    thiamine (VITAMIN B1) tablet 100 mg  100 mg Oral Daily    topiramate (TOPAMAX) tablet 25 mg  25 mg Oral BID     Allergies   Allergen Reactions    Acetaminophen Other (See Comments)     Liver function, s/p bariatric surgery    Cymbalta [Duloxetine Hcl] GI Intolerance    Duloxetine GI Intolerance       Objective   Vital signs in last 24 hours:  Temp:  [97 5 °F (36 4 °C)-99 °F (37 2 °C)] 97 5 °F (36 4 °C)  HR:  [82-97] 97  Resp:  [18-20] 20  BP: (110-126)/(54-65) 121/65      Intake/Output Summary (Last 24 hours) at 7/10/2019 1103  Last data filed at 7/10/2019 0900  Gross per 24 hour   Intake 949 66 ml   Output 1176 ml   Net -226 34 ml       Mental Status Evaluation:  Appearance:  in hospital attire   Behavior:  cooperative   Speech:  soft   Mood:  "alright"   Affect:  less anxious   Language: appropriate   Thought Process: normal   Thought Content:  normal   Perceptual Disturbances: None   Risk Potential: Denied SI/HI   Potential for aggression: No   Sensorium:  person, place and time/date   Cognition:  grossly intact   Consciousness:  alert and awake    Attention: attention span and concentration were age appropriate   Intellect: within normal limits   Fund of Knowledge: awareness of current events: yes   Insight:  fair   Judgment: fair   Gait/Station: normal gait/station and normal balance   Motor Activity: no abnormal movements     Lab Results: reviewed  Imaging Studies: reviewed  EKG, Pathology, and Other Studies: reviewed    Code Status: Level 1 - Full Code    Naya Landon PA-C

## 2019-07-11 VITALS
HEART RATE: 86 BPM | RESPIRATION RATE: 18 BRPM | BODY MASS INDEX: 41.93 KG/M2 | WEIGHT: 245.59 LBS | SYSTOLIC BLOOD PRESSURE: 119 MMHG | OXYGEN SATURATION: 96 % | DIASTOLIC BLOOD PRESSURE: 60 MMHG | HEIGHT: 64 IN | TEMPERATURE: 98.3 F

## 2019-07-11 PROBLEM — K52.9 COLITIS: Status: RESOLVED | Noted: 2017-10-21 | Resolved: 2019-07-11

## 2019-07-11 PROBLEM — K92.2 ACUTE UPPER GASTROINTESTINAL BLEEDING: Status: RESOLVED | Noted: 2019-07-06 | Resolved: 2019-07-11

## 2019-07-11 PROBLEM — F10.239 ALCOHOL WITHDRAWAL (HCC): Status: RESOLVED | Noted: 2017-03-16 | Resolved: 2019-07-11

## 2019-07-11 LAB
ALBUMIN SERPL BCP-MCNC: 2.6 G/DL (ref 3.5–5)
ALP SERPL-CCNC: 124 U/L (ref 46–116)
ALT SERPL W P-5'-P-CCNC: 39 U/L (ref 12–78)
ANION GAP SERPL CALCULATED.3IONS-SCNC: 8 MMOL/L (ref 4–13)
AST SERPL W P-5'-P-CCNC: 88 U/L (ref 5–45)
BILIRUB SERPL-MCNC: 2.9 MG/DL (ref 0.2–1)
BUN SERPL-MCNC: 10 MG/DL (ref 5–25)
CALCIUM SERPL-MCNC: 8.6 MG/DL (ref 8.3–10.1)
CHLORIDE SERPL-SCNC: 110 MMOL/L (ref 100–108)
CO2 SERPL-SCNC: 24 MMOL/L (ref 21–32)
CREAT SERPL-MCNC: 1.13 MG/DL (ref 0.6–1.3)
ERYTHROCYTE [DISTWIDTH] IN BLOOD BY AUTOMATED COUNT: 19.9 % (ref 11.6–15.1)
GFR SERPL CREATININE-BSD FRML MDRD: 53 ML/MIN/1.73SQ M
GLUCOSE SERPL-MCNC: 105 MG/DL (ref 65–140)
HCT VFR BLD AUTO: 27.1 % (ref 34.8–46.1)
HGB BLD-MCNC: 8.1 G/DL (ref 11.5–15.4)
MCH RBC QN AUTO: 31.6 PG (ref 26.8–34.3)
MCHC RBC AUTO-ENTMCNC: 29.9 G/DL (ref 31.4–37.4)
MCV RBC AUTO: 106 FL (ref 82–98)
PLATELET # BLD AUTO: 43 THOUSANDS/UL (ref 149–390)
PMV BLD AUTO: 11.7 FL (ref 8.9–12.7)
POTASSIUM SERPL-SCNC: 3.9 MMOL/L (ref 3.5–5.3)
PROT SERPL-MCNC: 6.5 G/DL (ref 6.4–8.2)
RBC # BLD AUTO: 2.56 MILLION/UL (ref 3.81–5.12)
SODIUM SERPL-SCNC: 142 MMOL/L (ref 136–145)
WBC # BLD AUTO: 2.26 THOUSAND/UL (ref 4.31–10.16)

## 2019-07-11 PROCEDURE — 85027 COMPLETE CBC AUTOMATED: CPT | Performed by: FAMILY MEDICINE

## 2019-07-11 PROCEDURE — 80053 COMPREHEN METABOLIC PANEL: CPT | Performed by: FAMILY MEDICINE

## 2019-07-11 PROCEDURE — 99239 HOSP IP/OBS DSCHRG MGMT >30: CPT | Performed by: GENERAL PRACTICE

## 2019-07-11 PROCEDURE — 99232 SBSQ HOSP IP/OBS MODERATE 35: CPT | Performed by: INTERNAL MEDICINE

## 2019-07-11 RX ORDER — PANTOPRAZOLE SODIUM 40 MG/1
40 TABLET, DELAYED RELEASE ORAL
Qty: 60 TABLET | Refills: 0 | Status: SHIPPED | OUTPATIENT
Start: 2019-07-11 | End: 2019-08-29 | Stop reason: SDUPTHER

## 2019-07-11 RX ORDER — POTASSIUM CHLORIDE 750 MG/1
10 TABLET, FILM COATED, EXTENDED RELEASE ORAL DAILY
Qty: 60 TABLET | Refills: 0 | Status: SHIPPED | OUTPATIENT
Start: 2019-07-11 | End: 2019-08-06 | Stop reason: SDUPTHER

## 2019-07-11 RX ORDER — SPIRONOLACTONE 25 MG/1
25 TABLET ORAL DAILY
Qty: 30 TABLET | Refills: 0 | Status: SHIPPED | OUTPATIENT
Start: 2019-07-11 | End: 2019-08-29 | Stop reason: SDUPTHER

## 2019-07-11 RX ORDER — FUROSEMIDE 20 MG/1
10 TABLET ORAL DAILY
Qty: 30 TABLET | Refills: 0 | Status: SHIPPED | OUTPATIENT
Start: 2019-07-11 | End: 2020-04-03

## 2019-07-11 RX ORDER — LANOLIN ALCOHOL/MO/W.PET/CERES
100 CREAM (GRAM) TOPICAL DAILY
Qty: 30 TABLET | Refills: 0 | Status: SHIPPED | OUTPATIENT
Start: 2019-07-12

## 2019-07-11 RX ADMIN — THIAMINE HCL TAB 100 MG 100 MG: 100 TAB at 08:10

## 2019-07-11 RX ADMIN — FOLIC ACID 1 MG: 1 TABLET ORAL at 08:10

## 2019-07-11 RX ADMIN — TOPIRAMATE 25 MG: 25 TABLET, FILM COATED ORAL at 08:15

## 2019-07-11 RX ADMIN — CALCIUM 1 TABLET: 500 TABLET ORAL at 08:10

## 2019-07-11 RX ADMIN — LORAZEPAM 0.5 MG: 0.5 TABLET ORAL at 08:10

## 2019-07-11 RX ADMIN — PREGABALIN 300 MG: 100 CAPSULE ORAL at 08:10

## 2019-07-11 RX ADMIN — NEOMYCIN SULFATE, POLYMYXIN B SULFATE AND GRAMICIDIN 2 DROP: 1.75; 10000; .025 SOLUTION/ DROPS OPHTHALMIC at 08:10

## 2019-07-11 RX ADMIN — Medication 1 TABLET: at 08:10

## 2019-07-11 RX ADMIN — PANTOPRAZOLE SODIUM 40 MG: 40 TABLET, DELAYED RELEASE ORAL at 06:15

## 2019-07-11 NOTE — ASSESSMENT & PLAN NOTE
· Patient with melena that started on Thursday  · Hemoglobin 8 6 this admission, trended down to 7 4, the latest hemoglobin is 8 point  · Has history of alcohol abuse and liver disease  · Status post EGD:  Anastomotic bleeding gastric bypass noted-is to continue with the IV PPI today and transition to p o  By tomorrow  Patient was taken off of the IV Protonix drip and transition to p  O  Protonix drip by Gastroenterology today  Advanced diet to known ulcerative neck today   · Transition to p o   Protonix   · GI follow-up as an outpatient

## 2019-07-11 NOTE — ASSESSMENT & PLAN NOTE
· Acute on chronic anemia secondary to GI blood loss  · Hemoglobin 8 7 this admission, baseline appears to be 9 0-11 0  · Hemoglobin down to 7 4, currently hemoglobin is 8 1  · She follows with Dr Elaine Toth outpatient for Venofer transfusions-patient missed some doses before    given 1 time IV Venofer in the hospital   · GI evaluation appreciated  · Transfuse for hemoglobin less than 7  · No further bleeding

## 2019-07-11 NOTE — PLAN OF CARE
Problem: Potential for Falls  Goal: Patient will remain free of falls  Description  INTERVENTIONS:  - Assess patient frequently for physical needs  -  Identify cognitive and physical deficits and behaviors that affect risk of falls    -  [place] fall precautions as indicated by assessment   - Educate patient/family on patient safety including physical limitations  - Instruct patient to call for assistance with activity based on assessment  - Modify environment to reduce risk of injury  - Consider OT/PT consult to assist with strengthening/mobility  Outcome: Completed     Problem: PAIN - ADULT  Goal: Verbalizes/displays adequate comfort level or baseline comfort level  Description  Interventions:  - Encourage patient to monitor pain and request assistance  - Assess pain using appropriate pain scale  - Administer analgesics based on type and severity of pain and evaluate response  - Implement non-pharmacological measures as appropriate and evaluate response  - Consider cultural and social influences on pain and pain management  - Notify physician/advanced practitioner if interventions unsuccessful or patient reports new pain  Outcome: Completed     Problem: INFECTION - ADULT  Goal: Absence or prevention of progression during hospitalization  Description  INTERVENTIONS:  - Assess and monitor for signs and symptoms of infection  - Monitor lab/diagnostic results  - Monitor all insertion sites, i e  indwelling lines, tubes, and drains  - Monitor endotracheal (as able) and nasal secretions for changes in amount and color  - [place] appropriate cooling/warming therapies per order  - Administer medications as ordered  - Instruct and encourage patient and family to use good hand hygiene technique  - Identify and instruct in appropriate isolation precautions for identified infection/condition  Outcome: Completed  Goal: Absence of fever/infection during neutropenic period  Description  INTERVENTIONS:  - Monitor WBC  - Implement neutropenic guidelines  Outcome: Completed     Problem: CARDIOVASCULAR - ADULT  Goal: Maintains optimal cardiac output and hemodynamic stability  Description  INTERVENTIONS:  - Monitor I/O, vital signs and rhythm  - Monitor for S/S and trends of decreased cardiac output i e  bleeding, hypotension  - Administer and titrate ordered vasoactive medications to optimize hemodynamic stability  - Assess quality of pulses, skin color and temperature  - Assess for signs of decreased coronary artery perfusion - ex   Angina  - Instruct patient to report change in severity of symptoms  Outcome: Completed  Goal: Absence of cardiac dysrhythmias or at baseline rhythm  Description  INTERVENTIONS:  - Continuous cardiac monitoring, monitor vital signs, obtain 12 lead EKG if indicated  - Administer antiarrhythmic and heart rate control medications as ordered  - Monitor electrolytes and administer replacement therapy as ordered  Outcome: Completed     Problem: METABOLIC, FLUID AND ELECTROLYTES - ADULT  Goal: Electrolytes maintained within normal limits  Description  INTERVENTIONS:  - Monitor labs and assess patient for signs and symptoms of electrolyte imbalances  - Administer electrolyte replacement as ordered  - Monitor response to electrolyte replacements, including repeat lab results as appropriate  - Instruct patient on fluid and nutrition as appropriate  Outcome: Completed  Goal: Fluid balance maintained  Description  INTERVENTIONS:  - Monitor labs and assess for signs and symptoms of volume excess or deficit  - Monitor I/O and WT  - Instruct patient on fluid and nutrition as appropriate  Outcome: Completed  Goal: Glucose maintained within target range  Description  INTERVENTIONS:  - Monitor Blood Glucose as ordered  - Assess for signs and symptoms of hyperglycemia and hypoglycemia  - Administer ordered medications to maintain glucose within target range  - Assess nutritional intake and initiate nutrition service referral as needed  Outcome: Completed     Problem: Prexisting or High Potential for Compromised Skin Integrity  Goal: Skin integrity is maintained or improved  Description  INTERVENTIONS:  - Identify patients at risk for skin breakdown  - Assess and monitor skin integrity  - Assess and monitor nutrition and hydration status  - Monitor labs (i e  albumin)  - Assess for incontinence   - Turn and reposition patient  - Assist with mobility/ambulation  - Relieve pressure over bony prominences  - Avoid friction and shearing  - Provide appropriate hygiene as needed including keeping skin clean and dry  - Evaluate need for skin moisturizer/barrier cream  - Collaborate with interdisciplinary team (i e  Nutrition, Rehabilitation, etc )   - Patient/family teaching  Outcome: Completed     Problem: Nutrition/Hydration-ADULT  Goal: Nutrient/Hydration intake appropriate for improving, restoring or maintaining nutritional needs  Description  Monitor and assess patient's nutrition/hydration status for malnutrition (ex- brittle hair, bruises, dry skin, pale skin and conjunctiva, muscle wasting, smooth red tongue, and disorientation)  Collaborate with interdisciplinary team and initiate plan and interventions as ordered  Monitor patient's weight and dietary intake as ordered or per policy  Utilize nutrition screening tool and intervene per policy  Determine patient's food preferences and provide high-protein, high-caloric foods as appropriate       INTERVENTIONS:  - Monitor oral intake, urinary output, labs, and treatment plans  - Assess nutrition and hydration status and recommend course of action  - Evaluate amount of meals eaten  - Assist patient with eating if necessary   - Allow adequate time for meals  - Recommend/ encourage appropriate diets, oral nutritional supplements, and vitamin/mineral supplements  - Order, calculate, and assess calorie counts as needed  - Recommend, monitor, and adjust tube feedings and TPN/PPN based on assessed needs  - Assess need for intravenous fluids  - Provide specific nutrition/hydration education as appropriate  - Include patient/family/caregiver in decisions related to nutrition  Outcome: Completed

## 2019-07-11 NOTE — ASSESSMENT & PLAN NOTE
· Patient has chronic thrombocytopenia  · Follows with Dr Clementina Thorpe from Heme-Onc  · Plt this admission - 20, received platelet transfusion  · Most recent baseline appears to be 30-40s  · Remained stable

## 2019-07-11 NOTE — DISCHARGE SUMMARY
Discharge- Juan eHaton 1960, 61 y o  female MRN: [id number]    Unit/Bed#: -02 Encounter: [id number]    Primary Care Provider: Jill Santoyo MD   Date and time admitted to hospital: 7/6/2019  6:45 PM        History of bariatric surgery  Assessment & Plan  · Outpatient follow-up    ETOH abuse  Assessment & Plan  · thiamine and folic acid  · Placed on CIWA protocol  · As per patient's last drink was Thursday, she drinks 2 bottles of white wine daily  · Patient decided she prefer to go home and get home PT and then go to rehab    Ascites  Assessment & Plan  · Pt was not taking her Lasix and Aldactone 2/2 cramping - I d/c on half her home doses of each diuretic along w/ Kdur  · Pt to check daily weights at home at call PCP if she gains 3 lbs in 24h or 5 lbs in 1 week  · Mild to moderate ascites seen on CT of abdomen    Thrombocytopenia (HCC)  Assessment & Plan  · Patient has chronic thrombocytopenia  · Follows with Dr Terrance Pozo from Heme-Onc  · Plt this admission - 20, received platelet transfusion  · Most recent baseline appears to be 30-40s  · Remained stable    Cirrhosis with alcoholism [facility])  Assessment & Plan  · Patient at some point was being evaluated for a transplant, however she is  currently drinking and states she quit this Thursday  · GI follow-up  · Continue rifaximin  · Restart back on the Lasix and spironolactone  at half doses  · It appears that the patient is interested in alcohol rehabilitation  Psych consult appreciated  However, pt prefers to get home PT first to get stronger and then consider inpt rehab    Chronic anemia  Assessment & Plan  · Acute on chronic anemia secondary to GI blood loss  · Hemoglobin 8 7 this admission, baseline appears to be 9 0-11 0  · Hemoglobin down to 7 4, currently hemoglobin is 8 1  · She follows with Dr Terrance Pozo outpatient for Venofer transfusions-patient missed some doses before    given 1 time IV Venofer in the hospital   · GI evaluation appreciated  · Transfuse for hemoglobin less than 7  · No further bleeding    Alcohol withdrawal (HCC)resolved as of 7/11/2019  Assessment & Plan  · CIWA protocol    Colitisresolved as of 7/11/2019  Assessment & Plan  · CT of abdomen and pelvis- Ascending and proximal transverse colitis  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  A 2nd supraumbilical hernia containing mesenteric fat is noted  There is mild to moderate anasarca  Severely fatty infiltrated liver is again noted  Recanalization umbilical vein and esophageal varices again noted  Patient again noted to be post gastric bypass without evidence for obstruction  · Patient received a dose of Zosyn in the ED; observing off antibiotics  · GI follow-up appreciated  · Denies any pain  Lactic acidosisresolved as of 7/7/2019  Assessment & Plan  · Lactic acid was 4 7 on admission, patient received IV fluids, lactic acid trending upward to 5 0  · Patient was given a bolus of LR in the ED, then resolved  · Patient does have liver cirrhosis due to alcohol abuse    * Acute upper gastrointestinal bleedingresolved as of 7/11/2019  Assessment & Plan  · Patient with melena that started on Thursday  · Hemoglobin 8 6 this admission, trended down to 7 4, the latest hemoglobin is 8 point  · Has history of alcohol abuse and liver disease  · Status post EGD:  Anastomotic bleeding gastric bypass noted-is to continue with the IV PPI today and transition to p o  By tomorrow  Patient was taken off of the IV Protonix drip and transition to p  O  Protonix drip by Gastroenterology today  Advanced diet to known ulcerative neck today   · Transition to p o   Protonix   · GI follow-up as an outpatient      Discharging Physician / Practitioner: Ros Estrada DO  PCP: Adina Holly MD  Admission Date:   Admission Orders (From admission, onward)    Ordered        07/06/19 2229  Inpatient Admission (expected length of stay for this patient Order details is greater than two midnights)  Once             Discharge Date: 07/11/19    Resolved Problems  Date Reviewed: 7/11/2019          Resolved    Lactic acidosis 7/7/2019     Resolved by  Marycruz Meza MD    Colitis 7/11/2019     Resolved by  Ilir Patricio DO    Alcohol withdrawal ([facility] [address] ) 7/11/2019     Resolved by  Ilir Patricio DO    * (Principal) Acute upper gastrointestinal bleeding 7/11/2019     Resolved by  Ilir Patricio DO    Redness of eye, right 7/7/2019     Resolved by  Marycruz Meza MD    Overview Signed 7/7/2019 12:50 AM by RENÉ Wyatt     · Saline drops PRN          Acute bacterial conjunctivitis of right eye 7/10/2019     Resolved by  Tc Adkins MD          Consultations During Hospital Stay:  · GI  · psych    Procedures Performed:   · EGD    Significant Findings / Test Results:   · See above    Incidental Findings:   · none     Test Results Pending at Discharge (will require follow up):   · none     Outpatient Tests Requested:  · PCP should check CBC-D, CMP, and INR  · EGD w/ Dr Evin Li 6-8 weeks    Complications:  none    Reason for Admission: GIB    Hospital Course:     Olayinka Clemens is a 61 y o  female patient who originally presented to the hospital on 7/6/2019 due to GIB  EGD as above  Pt encouraged to go to EtOH rehab but she preferred to go home w/ home PT and then go to rehab  BW stable at d/c        Please see above list of diagnoses and related plan for additional information  Condition at Discharge: fair     Discharge Day Visit / Exam:     Subjective:  No acute complaints  Vitals: Blood Pressure: 119/60 (07/11/19 1107)  Pulse: 86 (07/11/19 1107)  Temperature: 98 3 °F (36 8 °C) (07/11/19 1107)  Temp Source: Oral (07/11/19 1107)  Respirations: 18 (07/11/19 1107)  Height: 5' 4" (162 6 cm) (07/07/19 0002)  Weight - Scale: 111 kg (245 lb 9 5 oz) (07/07/19 0002)  SpO2: 96 % (07/11/19 1107)  Exam:   Physical Exam   Constitutional: She is oriented to person, place, and time   No distress  HENT:   Head: Normocephalic and atraumatic  Eyes: Conjunctivae and EOM are normal    Neck: Normal range of motion  Neck supple  Cardiovascular: Normal rate and regular rhythm  Pulmonary/Chest: Effort normal and breath sounds normal  She has no wheezes  She has no rales  Abdominal: Soft  Bowel sounds are normal  She exhibits no distension  There is no tenderness  Musculoskeletal: Normal range of motion  She exhibits no edema  Neurological: She is alert and oriented to person, place, and time  Skin: Skin is warm and dry  She is not diaphoretic  Discussion with Family: no    Discharge instructions/Information to patient and family:   See after visit summary for information provided to patient and family  Provisions for Follow-Up Care:  See after visit summary for information related to follow-up care and any pertinent home health orders  Disposition:     Home with VNA Services (Reminder: Complete face to face encounter)    For Discharges to [facility] SNF:   · Not Applicable to this Patient - Not Applicable to this Patient    Planned Readmission: no     Discharge Statement:  I spent 35 minutes discharging the patient  This time was spent on the day of discharge  I had direct contact with the patient on the day of discharge  Greater than 50% of the total time was spent examining patient, answering all patient questions, arranging and discussing plan of care with patient as well as directly providing post-discharge instructions  Additional time then spent on discharge activities  Discharge Medications:  See after visit summary for reconciled discharge medications provided to patient and family        ** Please Note: This note has been constructed using a voice recognition system **

## 2019-07-11 NOTE — ASSESSMENT & PLAN NOTE
· Pt was not taking her Lasix and Aldactone 2/2 cramping - I d/c on half her home doses of each diuretic along w/ Kdur      · Pt to check daily weights at home at call PCP if she gains 3 lbs in 24h or 5 lbs in 1 week  · Mild to moderate ascites seen on CT of abdomen

## 2019-07-11 NOTE — ASSESSMENT & PLAN NOTE
· Patient at some point was being evaluated for a transplant, however she is  currently drinking and states she quit this Thursday  · GI follow-up  · Continue rifaximin  · Restart back on the Lasix and spironolactone  at half doses  · It appears that the patient is interested in alcohol rehabilitation  Psych consult appreciated    However, pt prefers to get home PT first to get stronger and then consider inpt rehab

## 2019-07-11 NOTE — PROGRESS NOTES
Progress note - Gastroenterology   Olayinka Sarath 61 y o  female MRN: [id number]  Unit/Bed#: -02 Encounter: [id number]    ASSESSMENT and PLAN    ASSESSMENT  1  Acute gastrointestinal hemorrhage secondary to anastomotic ulcer at gastric bypass site  -further bleeding at biopsy site   Status post cautery and epinephrine injection  HGB stable 8  2  Anemia secondary to acute gastrointestinal blood loss  H/H drifting down but no obvious bleeding   3  Thrombocytopenia secondary to cirrhosis and toxic affects of alcohol on the bone marrow  4  Alcohol use disorder severe  5  Alcoholic cirrhosis  6  Status post gastric bypass surgery Nicole-en-Y in place  7  Large incisional hernia        PLAN  1  ContinuePPI  2  Regular diet  3  Monitor H&H and signs of bleeding  4  Follow-up EGD 6-8 weeks to document healing of the ulcers  5  OOB and ambulate  6  Probably would benefit from psych consult for her alcohol use disorder    Chief Complaint   Patient presents with    Rectal Bleeding     To ED with c/o black stools with red diarrhea since thursday, now has weakness, dizziness, shaking patient is in withdraw from alcohol  Has abd  pain   Withdrawal - Alcohol       SUBJECTIVE/HPI   No GI complaints  No GI bleeding    /60 (BP Location: Left arm)   Pulse 86   Temp 98 °F (36 7 °C) (Tympanic)   Resp 18   Ht 5' 4" (1 626 m)   Wt 111 kg (245 lb 9 5 oz)   SpO2 98%   Breastfeeding?  No   BMI 42 16 kg/m²     PHYSICALEXAM  General appearance: alert, appears stated age and cooperative  Head: Normocephalic, without obvious abnormality, atraumatic  Lungs: clear to auscultation bilaterally  Heart: regular rate and rhythm, S1, S2 normal, no murmur, click, rub or gallop  Abdomen: soft, non-tender; bowel sounds normal; no masses,  no organomegaly  Extremities: extremities normal, atraumatic, no cyanosis or edema  Neurologic: Grossly normal    Lab Results   Component Value Date    CALCIUM 8 6 07/11/2019    K 3 9 07/11/2019 CO2 24 07/11/2019     (H) 07/11/2019    BUN 10 07/11/2019    CREATININE 1 13 07/11/2019     Lab Results   Component Value Date    WBC 2 26 (L) 07/11/2019    HGB 8 1 (L) 07/11/2019    HCT 27 1 (L) 07/11/2019     (H) 07/11/2019    PLT 43 (LL) 07/11/2019     Lab Results   Component Value Date    ALT 39 07/11/2019    AST 88 (H) 07/11/2019    ALKPHOS 124 (H) 07/11/2019     No results found for: AMYLASE  Lab Results   Component Value Date    LIPASE 198 07/06/2019     Lab Results   Component Value Date    IRON 36 (L) 05/18/2019    TIBC 382 05/18/2019    FERRITIN 92 05/18/2019     Lab Results   Component Value Date    INR 1 63 (H) 07/09/2019       Counseling / Coordination of Care  Total floor / unit time spent today 30 minutes

## 2019-07-11 NOTE — ASSESSMENT & PLAN NOTE
· Lactic acid was 4 7 on admission, patient received IV fluids, lactic acid trending upward to 5 0  · Patient was given a bolus of LR in the ED, then resolved  · Patient does have liver cirrhosis due to alcohol abuse

## 2019-07-11 NOTE — DISCHARGE INSTRUCTIONS
Please check your weight daily  Please call your PCP if you gain 3 pounds in 1 day or 5 pounds in 1 week    PCP should check CBC w/ diff, CMP, and INR  Abuse of Alcohol   WHAT YOU NEED TO KNOW:   · Alcohol abuse is unhealthy drinking behavior  You may drink too much at one time once a week, or continue to drink too much daily  You continue to drink even though it causes problems  The problems can be alcohol related legal problems, or problems with work or relationships with family  · If you drink too much at one time, you are binge drinking  Binge drinking is when you have a large amount of alcohol in a short time  Your blood alcohol concentrations (VASU) goes above 0 08 g/dLlevel during binge drinking  For men, this usually happens with more than 4 drinks in 2 hours  For women, it is more than 3 drinks in 2 hours  A drink is 12 ounces of beer, 4 ounces of wine, or 1½ ounces of liquor  DISCHARGE INSTRUCTIONS:   Call 911 for the following:   · You have sudden chest pain or trouble breathing  · You have a seizure or have shaking or trembling  · You were in an accident because of alcohol  Seek care immediately if:   · You want to harm yourself or others  · You have hallucinations (you see or hear things that are not real)  · You cannot stop vomiting or you vomit blood  Contact your healthcare provider if:   · You need help to stop drinking alcohol  · You have questions or concerns about your condition or care  Medicines:   · Vitamin supplements  may be given to treat low vitamin levels  Alcohol can make it hard for your body to absorb enough vitamins such as B1  Vitamin supplements may also be given to prevent alcohol related brain damage  · Take your medicine as directed  Contact your healthcare provider if you think your medicine is not helping or if you have side effects  Tell him or her if you are allergic to any medicine  Keep a list of the medicines, vitamins, and herbs you take  Include the amounts, and when and why you take them  Bring the list or the pill bottles to follow-up visits  Carry your medicine list with you in case of an emergency  Treatments or therapies you may need:   · Detoxification (detox) and withdrawal  is a program that helps you to safely get alcohol out of your body  Detox can also help get rid of the physical need to drink  Healthcare providers monitor the physical symptoms of withdrawal  They may give you medicines to help decrease nausea, dehydration, and seizures  Healthcare providers will also monitor your blood pressure, heart and breathing rates, and your temperature  Symptoms of anxiety, depression, and suicidal thoughts are also monitored and managed during detox  Healthcare providers may give you medicines for these symptoms and therapy sessions will be available to you  Detox is usually done at a detox center or in a hospital  Healthcare providers do not recommend that you try to detox at home or by yourself  Withdrawal symptoms may become life-threatening  The center can help you find 12 step programs or an individual therapist to help with emotional support after detox  · Inpatient and outpatient treatment  focus on your personal needs to help you stop drinking  Treatment helps you understand the reasons you abuse alcohol  Counselors and therapists provide you with support and help you find ways to cope instead of drinking  You may need inpatient treatment to provide a controlled environment  You may need outpatient treatment after your inpatient treatment is complete  · Alcohol aversion therapy  takes away the desire to drink by causing a negative reaction when you drink  Healthcare providers may give you medicines that cause nausea and vomiting when you drink alcohol  They may instead give you a medicine that decreases your urge to drink alcohol  These medicines are used to help you stop drinking or reduce the amount you drink   They can also help you avoid relapse  Follow up with your healthcare provider as directed:  Write down your questions so you remember to ask them during your visits  Avoid alcohol:  You should stop drinking entirely  Alcohol can damage your brain, heart, and liver  It also increases your risk for injury, high blood pressure, and certain types of cancer  Alcohol is dangerous when you combine it with certain medicines  Do not drive if you drink alcohol:  Make sure someone who has not been drinking can help you get home  Get support:  Most people need support to stop drinking alcohol  Mental health providers, support groups, rehabilitation centers, and your healthcare provider can provide support  For more information:   · Alcoholics Anonymous  Web Address: http://[employer]/  · Substance Abuse and [address] [place]  Web Address: https://[employer])/  © 2017 [address] [employer] Information is for End User's use only and may not be sold, redistributed or otherwise used for commercial purposes  All illustrations and images included in CareNotes® are the copyrighted property of [employer]  or [employer]ar [employer]izvi  The above information is an  only  It is not intended as medical advice for individual conditions or treatments  Talk to your doctor, nurse or pharmacist before following any medical regimen to see if it is safe and effective for you

## 2019-07-11 NOTE — ASSESSMENT & PLAN NOTE
· thiamine and folic acid  · Placed on CIWA protocol  · As per patient's last drink was Thursday, she drinks 2 bottles of white wine daily  · Patient decided she prefer to go home and get home PT and then go to rehab

## 2019-07-12 ENCOUNTER — HOSPITAL ENCOUNTER (OUTPATIENT)
Dept: INFUSION CENTER | Facility: HOSPITAL | Age: 59
Discharge: HOME | End: 2019-07-12
Attending: INTERNAL MEDICINE

## 2019-07-12 NOTE — UTILIZATION REVIEW
Notification of Inpatient Admission/Inpatient Authorization Request  This is a Notification of Inpatient Admission/Request for Inpatient Authorization for our facility [facility] [address]  Be advised that this patient was admitted to our facility under Inpatient Status  Please contact the Utilization Review Department where the patient is receiving care services for additional admission information  Place of Service Code: 24   Place of Service Name: Inpatient Hospital  Presentation Date & Time: 7/6/2019  6:45 PM  Inpatient Admission Date & Time: 7/6/19 2229  Discharge Date & Time: 7/11/2019 12:21 PM   Discharge Disposition (if discharged): Home with [facility]  Attending Physician: Epifanio Pal, [address]re [[id number]]  Admission Orders (From admission, onward)    Ordered        07/06/19 2229  Inpatient Admission (expected length of stay for this patient Order details is greater than two midnights)  Once             Facility: [address] Utilization Review Department  Phone: [phone]; Fax [phone]  [email or web address]  ATTENTION: Please call with any questions or concerns to [phone]  and carefully listen to the prompts so that you are directed to the right person  Send all requests for admission clinical reviews, approved or denied determinations and any other requests to fax [phone]   All voicemails are confidential   Initial Clinical Review    Admission: Date/Time/Statement: Inpatient 7/6/19 @ 2229     Orders Placed This Encounter   Procedures    Inpatient Admission (expected length of stay for this patient Order details is greater than two midnights)     Standing Status:   Standing     Number of Occurrences:   1     Order Specific Question:   Admitting Physician     Answer:   Esther Lee [[id number]]     Order Specific Question:   Level of Care     Answer:   Med Surg [[id number]]     Order Specific Question:   Estimated length of stay     Answer:   More than 2 Midnights     Order Specific Question:   Certification     Answer:   I certify that inpatient services are medically necessary for this patient for a duration of greater than two midnights  See H&P and MD Progress Notes for additional information about the patient's course of treatment  ED Arrival Information     Expected Arrival Acuity Means of Arrival Escorted By Service Admission Type    - 7/6/2019 18:25 Urgent Wheelchair Family Member Hospitalist Urgent    Arrival Complaint    -        Chief Complaint   Patient presents with    Rectal Bleeding     To ED with c/o black stools with red diarrhea since thursday, now has weakness, dizziness, shaking patient is in withdraw from alcohol  Has abd  pain   Withdrawal - Alcohol     Assessment/Plan: 61year old female to the ED from home with complaints of black tarry stools for 3 days, dizziness, abdominal pain  Admitted to inpatient for Melena, colitis, lactic acidosis  H/O alcohol abuse, Nicole en Y surgery, GI bleed, cirrhosis  Chronic anemia,/ thrombocytopenia  Protonix IV given in the ED, NPO, GI consult  Colitis noted on CT of abdomen, given a dose of IV abx in ED  Elevated lactic of 4 7 in ED, giving fluids, repeat lactic 5 0  Patient transferred to Step down level of care for closer monitoring  Patient states her last drink was 3 days ago  [facility] protocol  Trend ammonia levels  Will trend hemoglobin q8 hrs in the setting of melena  She has abdominal pain, blood in her stool, leg swelling  Patient's patient's platelet count has drifted below 20,000 will give 2 units of platelets  patient has received one unit thus far of  FFP  GI consult:  Gastrointestinal hemorrhage suspect upper GI source  Differential diagnosis includes bleeding from esophageal varices  Stomal ulcer is a possibility  Less likely lower GI bleeding  Plan for urgent EGD  Need better IV access and then give IV ocreotide   Q 6 hour H&H  ED Triage Vitals [07/06/19 1854]   Temperature Pulse Respirations Blood Pressure SpO2   98 2 °F (36 8 °C) 74 20 151/60 100 %      Temp Source Heart Rate Source Patient Position - Orthostatic VS BP Location FiO2 (%)   Tympanic Monitor Lying Left arm --      Pain Score       6          Wt Readings from Last 1 Encounters:   07/07/19 111 kg (245 lb 9 5 oz)     Additional Vital Signs:   Date/Time Temp Pulse Resp BP MAP (mmHg) SpO2 O2 Device   07/07/19 0900  111Abnormal         07/07/19 0712 99 7 °F (37 6 °C) 101 20 124/60  96 % None (Room air)   07/07/19 0002 98 5 °F (36 9 °C) 96 20 139/70  98 % None (Room air)   07/06/19 2312 98 1 °F (36 7 °C) 98 20 158/68  96 % None (Room air)   07/06/19 2230  100  166/74 106 98 %    07/06/19 2215  115Abnormal   179/60Abnormal  107 98 %    07/06/19 2211    201/88Abnormal       CIWA-Ar Score     Row Name  07/07/19  0900   07/07/19  0500  07/07/19  0035    CIWA-Ar   BP     129/73      Pulse  111Abnormal    103      Nausea and Vomiting  0   0  0    Tactile Disturbances  0   0  0    Tremor  3   4  4    Auditory Disturbances  0   0  0    Paroxysmal Sweats  0   0  0    Visual Disturbances  0   0  2    Anxiety  0   0  0    Headache, Fullness in Head  0   0  0    Agitation  0   0  0    Orientation and Clouding of Sensorium  0   0  0    CIWA-Ar Total  3   4  6          Pertinent Labs/Diagnostic Test Results:   CT A/P;  Ascending and proximal transverse colitis  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     A 2nd supraumbilical hernia containing mesenteric fat is noted  There is mild to moderate anasarca  Severely fatty infiltrated liver is again noted   Recanalization umbilical vein and esophageal varices again noted  Patient again noted to be post gastric bypass without evidence for obstruction    Results from last 7 days   Lab Units 07/11/19  6931 07/10/19  0459 07/09/19 2055 07/09/19  0815 07/08/19  2040  07/08/19  6153  07/07/19  0915 07/07/19  0548   WBC Thousand/uL 2 26* 2 55*  --   --   --   --  2 33*  --  2 71* 2 34*   HEMOGLOBIN g/dL 8 1* 8 1* 7 7* 7 4* 7 5*   < > 7 4*   < > 7 9* 7 3*   HEMATOCRIT % 27 1* 26 9* 25 0* 24 8* 24 2*   < > 23 8*   < > 24 8* 23 6*   PLATELETS Thousands/uL 43* 50*  --   --   --   --  43*  --  20* 17*   NEUTROS ABS Thousands/µL  --   --   --   --   --   --  1 33*  --  1 79* 1 39*    < > = values in this interval not displayed           Results from last 7 days   Lab Units 07/11/19  0624 07/10/19  0459 07/09/19  0457 07/08/19  2041 07/08/19  0517 07/07/19  0548   SODIUM mmol/L 142 143 144 142 144 143   POTASSIUM mmol/L 3 9 4 1 4 1 3 5 3 5 3 6   CHLORIDE mmol/L 110* 110* 111* 109* 111* 110*   CO2 mmol/L 24 23 24 24 25 24   ANION GAP mmol/L 8 10 9 9 8 9   BUN mg/dL 10 11 12 14 15 10   CREATININE mg/dL 1 13 1 15 1 04 1 07 0 86 0 96   EGFR ml/min/1 73sq m 53 52 59 57 74 65   CALCIUM mg/dL 8 6 8 5 8 3 8 2* 7 5* 7 4*   MAGNESIUM mg/dL  --   --  2 2  --   --  1 5*     Results from last 7 days   Lab Units 07/11/19  0624 07/10/19  0459 07/09/19  0457 07/08/19  0517 07/07/19  0915 07/06/19  1929   AST U/L 88* 95*  --  101*  --  152*   ALT U/L 39 39  --  36  --  53   ALK PHOS U/L 124* 132*  --  96  --  136*   TOTAL PROTEIN g/dL 6 5 6 5  --  5 7*  --  6 8   ALBUMIN g/dL 2 6* 2 5*  --  2 2*  --  2 6*   TOTAL BILIRUBIN mg/dL 2 90* 3 30*  --  6 10*  --  4 60*   AMMONIA umol/L  --   --  44*  --  92*  --          Results from last 7 days   Lab Units 07/11/19  0624 07/10/19  0459 07/09/19  0457 07/08/19  2041 07/08/19  0517 07/07/19  0548 07/06/19  1929   GLUCOSE RANDOM mg/dL 105 94 90 101 83 89 75     Results from last 7 days   Lab Units 07/09/19  0457 07/07/19  0915   PROTIME seconds 19 0* 18 9*   INR  1 63* 1 62*         Results from last 7 days   Lab Units 07/07/19  0548 07/07/19  0030 07/06/19  2149 07/06/19  1929   LACTIC ACID mmol/L 1 7 4 9* 5 0* 4 7*       Results from last 7 days   Lab Units 07/06/19  1929   LIPASE u/L 198 ED Treatment:   Medication Administration from 07/06/2019 1824 to 07/06/2019 2328       Date/Time Order Dose Route Action     07/06/2019 1923 sodium chloride 0 9 % bolus 1,000 mL 1,000 mL Intravenous New Bag     07/06/2019 2150 lactated ringers bolus 1,000 mL 1,000 mL Intravenous New Bag     07/06/2019 2157 pantoprazole (PROTONIX) 80 mg in sodium chloride 0 9 % 100 mL IVPB 80 mg Intravenous New Bag     07/06/2019 2205 pregabalin (LYRICA) capsule 300 mg 300 mg Oral Given     07/06/2019 2257 piperacillin-tazobactam (ZOSYN) 3 375 g in sodium chloride 0 9 % 50 mL IVPB 3 375 g Intravenous New Bag        Past Medical History:   Diagnosis Date    Alcohol use disorder     Alcoholic hepatitis     Anastomotic ulcer     Anemia     Anxiety     Ascites     [facility]    stage 0; bilateral mastectomy (prophylactic on left)    Chronic foot pain     Chronic narcotic dependence (HCC)     Chronic pain disorder     Cirrhosis, alcoholic (HCC)     Depression     Esophagitis     History of bilateral mastectomy     History of gastric bypass     Hypertension     Liver disease     Morbid obesity ([facility] )     Palpitations     Pancytopenia (HCC)     Peripheral neuropathy     Rosacea      Admitting Diagnosis: Alcohol withdrawal ([facility] ) [F10 239]  Rectal pain [K62 89]  Colitis [K52 9]  Weakness [R53 1]  Thrombocytopenia (HCC) [D69 6]  GI bleed [K92 2]  Disorder of right eye [H57 9]  Age/Sex: 61 y o  female  Admission Orders:  CBC, CMP, H&H every 6 houres  NPO  Current Facility-Administered Medications:  calcium carbonate 1 tablet Oral Daily With Breakfast   folic acid 1 mg Oral Daily   LORazepam 1 mg Intravenous Q6H PRN   LORazepam 0 5 mg Oral TID   multivitamin-minerals 1 tablet Oral Daily   neomycin-polymyxin-gramicidin 2 drop Both Eyes 4x Daily   pantoprazole 40 mg Intravenous Q12H CASTRO   pregabalin 300 mg Oral BID   rifaximin 550 mg Oral Q12H [facility]   sodium chloride 0 9 % with KCl 20 mEq/L 100 mL/hr Intravenous Continuous   thiamine 100 mg Oral Daily   topiramate 25 mg Oral BID       IP CONSULT TO GASTROENTEROLOGY  IP CONSULT TO ALCOHOL BRIEF INTERVENTION TRAUMA  IP CONSULT TO PSYCHIATRY    [facility] Utilization Review Department  Phone: [phone]; Fax [phone]  [email or web address]  ATTENTION: Please call with any questions or concerns to [phone]  and carefully listen to the prompts so that you are directed to the right person  Send all requests for admission clinical reviews, approved or denied determinations and any other requests to fax [phone]  All voicemails are confidential     Notification of Discharge  This is a Notification of Discharge from our facility [address]  Please be advised that this patient has been discharge from our facility  Below you will find the admission and discharge date and time including the patients disposition  PRESENTATION DATE: 7/6/2019  6:45 PM  OBS ADMISSION DATE:   IP ADMISSION DATE: 7/6/19 2229   DISCHARGE DATE: 7/11/2019 12:21 PM  DISPOSITION: Home with [facility] with [facility]   Admission Orders listed below:  Admission Orders (From admission, onward)    Ordered        07/06/19 2229  Inpatient Admission (expected length of stay for this patient Order details is greater than two midnights)  Once             Please contact the UR Department if additional information is required to close this patient's authorization/case  [address]  Utilization Review Department  Phone: [phone]; Fax [phone]  [email or web address]  ATTENTION: Please call with any questions or concerns to [phone]  and carefully listen to the prompts so that you are directed to the right person  Send all requests for admission clinical reviews, approved or denied determinations and any other requests to fax [phone]   All voicemails are confidential

## 2019-07-17 ENCOUNTER — TELEPHONE (OUTPATIENT)
Dept: GASTROENTEROLOGY | Facility: CLINIC | Age: 59
End: 2019-07-17

## 2019-07-18 ENCOUNTER — TELEPHONE (OUTPATIENT)
Dept: HEMATOLOGY ONCOLOGY | Facility: CLINIC | Age: 59
End: 2019-07-18

## 2019-07-18 ENCOUNTER — TELEPHONE (OUTPATIENT)
Dept: GASTROENTEROLOGY | Facility: AMBULARY SURGERY CENTER | Age: 59
End: 2019-07-18

## 2019-07-18 NOTE — TELEPHONE ENCOUNTER
Melony patient    She needs a follow up appt , post egd ---please assist      Call back # [phone]

## 2019-07-18 NOTE — TELEPHONE ENCOUNTER
Patient called stating that she was recently in the hospital and believes she should be seen sooner than her 8/12 appt with Talita Cruz PA-C   Due to Dr Ofe Matthews having a new PA, unsure if that fu should be made with Elena Castillo or Imani Rodriguez

## 2019-07-18 NOTE — TELEPHONE ENCOUNTER
Called and [facility] for pt to come and see Estle Harden sooner than 8/12 in [place]

## 2019-07-18 NOTE — TELEPHONE ENCOUNTER
Patient had an EGD with Dr Fernanda Moses on 07/07 for GI Bleed  Patient was requesting an appointment for EGD follow up  Patient was scheduled 08/29 Would you like to see the patient sooner or should she follow up with Dr Fernanda Moses?

## 2019-07-19 NOTE — TELEPHONE ENCOUNTER
See other encounter- patient is already established with Dr Kathy Fabian  A message was sent to see if patient should follow up with our office or theirs

## 2019-07-22 ENCOUNTER — HOSPITAL ENCOUNTER (OUTPATIENT)
Dept: INFUSION CENTER | Facility: HOSPITAL | Age: 59
Discharge: HOME | End: 2019-07-22
Attending: INTERNAL MEDICINE
Payer: OTHER GOVERNMENT

## 2019-07-22 VITALS
TEMPERATURE: 99.6 F | SYSTOLIC BLOOD PRESSURE: 121 MMHG | HEART RATE: 84 BPM | DIASTOLIC BLOOD PRESSURE: 59 MMHG | OXYGEN SATURATION: 99 % | RESPIRATION RATE: 18 BRPM

## 2019-07-22 DIAGNOSIS — K70.30 ALCOHOLIC CIRRHOSIS OF LIVER WITHOUT ASCITES (HCC): ICD-10-CM

## 2019-07-22 DIAGNOSIS — K70.30 ALCOHOLIC CIRRHOSIS OF LIVER WITHOUT ASCITES (HCC): Primary | ICD-10-CM

## 2019-07-22 DIAGNOSIS — D50.9 IRON DEFICIENCY ANEMIA, UNSPECIFIED IRON DEFICIENCY ANEMIA TYPE: Primary | ICD-10-CM

## 2019-07-22 DIAGNOSIS — D50.9 IRON DEFICIENCY ANEMIA, UNSPECIFIED IRON DEFICIENCY ANEMIA TYPE: ICD-10-CM

## 2019-07-22 PROCEDURE — 96372 THER/PROPH/DIAG INJ SC/IM: CPT

## 2019-07-22 PROCEDURE — 96365 THER/PROPH/DIAG IV INF INIT: CPT

## 2019-07-22 RX ORDER — CYANOCOBALAMIN 1000 UG/ML
1000 INJECTION INTRAMUSCULAR; SUBCUTANEOUS ONCE
Status: CANCELLED | OUTPATIENT
Start: 2019-07-22

## 2019-07-22 RX ORDER — CYANOCOBALAMIN 1000 UG/ML
1000 INJECTION INTRAMUSCULAR; SUBCUTANEOUS ONCE
Status: CANCELLED | OUTPATIENT
Start: 2019-07-24

## 2019-07-22 RX ORDER — SODIUM CHLORIDE 9 MG/ML
20 INJECTION, SOLUTION INTRAVENOUS ONCE
Status: CANCELLED | OUTPATIENT
Start: 2019-07-24

## 2019-07-22 RX ORDER — SODIUM CHLORIDE 9 MG/ML
20 INJECTION, SOLUTION INTRAVENOUS ONCE
Status: COMPLETED | OUTPATIENT
Start: 2019-07-22 | End: 2019-07-22

## 2019-07-22 RX ORDER — SODIUM CHLORIDE 9 MG/ML
20 INJECTION, SOLUTION INTRAVENOUS ONCE
Status: CANCELLED | OUTPATIENT
Start: 2019-07-22

## 2019-07-22 RX ORDER — CYANOCOBALAMIN 1000 UG/ML
1000 INJECTION INTRAMUSCULAR; SUBCUTANEOUS ONCE
Status: COMPLETED | OUTPATIENT
Start: 2019-07-22 | End: 2019-07-22

## 2019-07-22 RX ADMIN — SODIUM CHLORIDE 20 ML/HR: 9 INJECTION, SOLUTION INTRAVENOUS at 13:10

## 2019-07-22 RX ADMIN — IRON SUCROSE 200 MG: 20 INJECTION, SOLUTION INTRAVENOUS at 13:10

## 2019-07-22 RX ADMIN — CYANOCOBALAMIN 1000 MCG: 1000 INJECTION, SOLUTION INTRAMUSCULAR at 13:10

## 2019-07-22 NOTE — SOCIAL WORK
MSW met with pt during treatment in infusion center today for anemia  Pt has history of breast cancer and is a survivor as of 2012 following surgeries and reconstruction  Pt was recently hospitalized due to gastrointestinal bleeding after noticing blood in the toilet around July 4th  Pt has history of alcohol abuse to cope with stress and "has not had a drop of alcohol" since the beginning of the month  MSW spoke with pt about her ADL's to gauge what assistance may be needed at home  Pt stated that she currently lives in an [address] with her  for the last 2 years while they are building a home in [address]  It is expected that they can move in Nov-Dec range  Pt has fallen off of the RV steps before and has also fallen in the shower  She wants to prevent this by installing stair railings and grab bars  Pt also uses a cane at night when it is dark due to peripheral neuropathy increasing her fall risk  Pt plans to continue coming to [place] and staying with her brother for treatment  A  visits the pt at home and will be visiting tomorrow to discuss care moving forward  Pt has not finished PT yet but believes strongly that it is helping her with ambulating independently  She plans on speaking with an OT about the home modalities so she can be safe when she is home  Pt mentioned attending [employer] more and the stress "increasing" her amalia  MSW provided emotional support to pt and supplied the support group flyer for her to hopefully attend  Pt was very receptive and asked if it was okay that she was a survivor  MSW reassured that persons and family members of any cancers past or present are welcome to the support group  MSW provided contact information for pt who was "very thankful" for the outreach

## 2019-07-23 NOTE — TELEPHONE ENCOUNTER
Dr Elodia Carbajal, other encounter involving Dr Enma King states patient should follow with their office    FYI

## 2019-07-24 ENCOUNTER — HOSPITAL ENCOUNTER (OUTPATIENT)
Dept: INFUSION CENTER | Facility: HOSPITAL | Age: 59
Discharge: HOME | End: 2019-07-24
Attending: INTERNAL MEDICINE
Payer: OTHER GOVERNMENT

## 2019-07-24 ENCOUNTER — TRANSCRIBE ORDERS (OUTPATIENT)
Dept: LAB | Facility: HOSPITAL | Age: 59
End: 2019-07-24

## 2019-07-24 ENCOUNTER — TELEPHONE (OUTPATIENT)
Dept: HEMATOLOGY ONCOLOGY | Facility: CLINIC | Age: 59
End: 2019-07-24

## 2019-07-24 VITALS
OXYGEN SATURATION: 100 % | TEMPERATURE: 98.5 F | RESPIRATION RATE: 18 BRPM | SYSTOLIC BLOOD PRESSURE: 94 MMHG | DIASTOLIC BLOOD PRESSURE: 51 MMHG | HEART RATE: 71 BPM

## 2019-07-24 DIAGNOSIS — K70.30 ALCOHOLIC CIRRHOSIS OF LIVER WITHOUT ASCITES (HCC): Primary | ICD-10-CM

## 2019-07-24 DIAGNOSIS — D50.9 IRON DEFICIENCY ANEMIA, UNSPECIFIED IRON DEFICIENCY ANEMIA TYPE: ICD-10-CM

## 2019-07-24 PROCEDURE — 96365 THER/PROPH/DIAG IV INF INIT: CPT

## 2019-07-24 RX ORDER — CYANOCOBALAMIN 1000 UG/ML
1000 INJECTION INTRAMUSCULAR; SUBCUTANEOUS ONCE
Status: CANCELLED | OUTPATIENT
Start: 2019-07-29

## 2019-07-24 RX ORDER — SODIUM CHLORIDE 9 MG/ML
20 INJECTION, SOLUTION INTRAVENOUS ONCE
Status: CANCELLED | OUTPATIENT
Start: 2019-07-29

## 2019-07-24 RX ORDER — SODIUM CHLORIDE 9 MG/ML
20 INJECTION, SOLUTION INTRAVENOUS ONCE
Status: COMPLETED | OUTPATIENT
Start: 2019-07-24 | End: 2019-07-24

## 2019-07-24 RX ADMIN — SODIUM CHLORIDE 20 ML/HR: 9 INJECTION, SOLUTION INTRAVENOUS at 13:13

## 2019-07-24 RX ADMIN — IRON SUCROSE 200 MG: 20 INJECTION, SOLUTION INTRAVENOUS at 13:12

## 2019-07-24 NOTE — PLAN OF CARE
Problem: Potential for Falls  Goal: Patient will remain free of falls  Description  INTERVENTIONS:  - Assess patient frequently for physical needs  -  Identify cognitive and physical deficits and behaviors that affect risk of falls    -  [place] fall precautions as indicated by assessment   - Educate patient/family on patient safety including physical limitations  - Instruct patient to call for assistance with activity based on assessment  - Modify environment to reduce risk of injury  - Consider OT/PT consult to assist with strengthening/mobility  Outcome: Progressing

## 2019-07-24 NOTE — TELEPHONE ENCOUNTER
Patient called stating that she would like to discuss her treatment schedule   Best call back [phone]

## 2019-07-26 NOTE — TELEPHONE ENCOUNTER
Called and spoke with pt  She wanted to come in on 8/12 which she was already scheduled for  As far as the rest of her appointments, she will have it scheduled after her visit with us

## 2019-07-31 ENCOUNTER — TELEPHONE (OUTPATIENT)
Dept: HEMATOLOGY ONCOLOGY | Facility: HOSPITAL | Age: 59
End: 2019-07-31

## 2019-07-31 NOTE — TELEPHONE ENCOUNTER
[facility] and [facility] for pt to see Hussein Yao on 8/12 at 1130 instead of seeing Lisbeth at 930

## 2019-08-06 ENCOUNTER — OFFICE VISIT (OUTPATIENT)
Dept: NEUROLOGY | Facility: CLINIC | Age: 59
End: 2019-08-06
Payer: OTHER GOVERNMENT

## 2019-08-06 ENCOUNTER — TELEPHONE (OUTPATIENT)
Dept: NEUROLOGY | Facility: CLINIC | Age: 59
End: 2019-08-06

## 2019-08-06 VITALS — DIASTOLIC BLOOD PRESSURE: 62 MMHG | SYSTOLIC BLOOD PRESSURE: 116 MMHG | HEART RATE: 79 BPM

## 2019-08-06 DIAGNOSIS — M79.2 NEUROPATHIC PAIN: ICD-10-CM

## 2019-08-06 DIAGNOSIS — M54.5 LOW BACK PAIN, UNSPECIFIED BACK PAIN LATERALITY, UNSPECIFIED CHRONICITY, WITH SCIATICA PRESENCE UNSPECIFIED: Primary | ICD-10-CM

## 2019-08-06 DIAGNOSIS — K70.30 ALCOHOLIC CIRRHOSIS OF LIVER WITHOUT ASCITES (HCC): ICD-10-CM

## 2019-08-06 DIAGNOSIS — B35.1 FUNGAL INFECTION OF NAIL: ICD-10-CM

## 2019-08-06 PROCEDURE — 99215 OFFICE O/P EST HI 40 MIN: CPT | Performed by: PHYSICIAN ASSISTANT

## 2019-08-06 RX ORDER — TOPIRAMATE 25 MG/1
CAPSULE, COATED PELLETS ORAL
Qty: 60 CAPSULE | Refills: 3 | Status: SHIPPED | OUTPATIENT
Start: 2019-08-06 | End: 2019-09-27 | Stop reason: DRUGHIGH

## 2019-08-06 RX ORDER — PREGABALIN 100 MG/1
CAPSULE ORAL
Qty: 60 CAPSULE | Refills: 2 | Status: SHIPPED | OUTPATIENT
Start: 2019-08-06 | End: 2019-09-25 | Stop reason: SDUPTHER

## 2019-08-06 RX ORDER — BACLOFEN 10 MG/1
TABLET ORAL
Qty: 30 TABLET | Refills: 0 | Status: SHIPPED | OUTPATIENT
Start: 2019-08-06 | End: 2019-09-25

## 2019-08-06 RX ORDER — POTASSIUM CHLORIDE 750 MG/1
10 TABLET, FILM COATED, EXTENDED RELEASE ORAL DAILY
Qty: 30 TABLET | Refills: 0 | Status: SHIPPED | OUTPATIENT
Start: 2019-08-06 | End: 2019-09-06 | Stop reason: SDUPTHER

## 2019-08-06 NOTE — PATIENT INSTRUCTIONS
Will order transdermal therapeutics cream  Increase topamax: 25 mg am/ 50 mg bed x 5 days, then 50 mg 2x/day x 1 week, then 75 mg 2x/day x 1 week, then 100 mg 2x/day  Decrease lyrica: 200 mg 2x/day x 1 week

## 2019-08-06 NOTE — TELEPHONE ENCOUNTER
Dominic from [address] called re: Sandra Juarez rx  Informing us that pt just got a  90 day supply of lyrica 300 mg 1 cap bid on 7/10/19  This was prescribed by Nacho Escalera, are you aware that pt is already on lyrica? Do you want me to cancel lyrica rx that you sent over today?               [phone]

## 2019-08-06 NOTE — TELEPHONE ENCOUNTER
I wanted her to decrease lyrica due to swelling  We are only decreasing to 200 mg BID and if pain returns or worsens, she will go back up to 300 mg BID  CC Dayrl Shahab Steele so he is aware

## 2019-08-06 NOTE — PROGRESS NOTES
Patient ID: Mikayla Hugo is a 61 y o  female  Assessment/Plan:     Problem List Items Addressed This Visit        Digestive    Cirrhosis with alcoholism ([facility] [address] )    Relevant Medications    potassium chloride (K-DUR) 10 mEq tablet       Musculoskeletal and Integument    Fungal infection of nail    Relevant Orders    Ambulatory referral to Podiatry       Other    Neuropathic pain    Relevant Medications    baclofen 10 mg tablet    pregabalin (LYRICA) 100 mg capsule    topiramate (TOPAMAX) 25 mg sprinkle capsule    Other Relevant Orders    EMG 2 limb lower extremity    Low back pain - Primary    Relevant Medications    baclofen 10 mg tablet    pregabalin (LYRICA) 100 mg capsule    topiramate (TOPAMAX) 25 mg sprinkle capsule    Other Relevant Orders    EMG 2 limb lower extremity           Painful peripheral neuropathy, mainly lower extremities, probably small fiber neuropathy secondary to alcohol overuse for many years  She is currently sober and I applauded her for this  She is benefitting from Lyrica 300 mg b i d  But this is possibly worsening swelling so I asked her to decrease the dose to 200 mg b i d  While titrating Topamax  I she cannot tolerate decreasing lyrica, she was instructed to call us and we will get her back up to 300 BID  She should continue to use the transdermal therapeutics cream and I will reorder this  This helps her foot pain  All reviewed the SPEP inflammatory pattern with Dr Pierre Leon and if necessary will have her see hematology  Serum copper is slow likely because of prior gastric bypass surgery  She should continue vitamins per weight management recs  Regarding worsening low back pain, continue formal physical therapy at home, and EMG was ordered to evaluate/ assess for radiculopathy  This was ordered in the past but she never had done  If necessary a lumbar MRI will be ordered    There are no myelopathic signs on exam except possible weakness in the hip flexors, however it is probably secondary to pain  Also can try the baclofen prn back spasm  The patient should not hesitate to call me prior to her follow up with any questions or concerns  The patient was instructed to urgently call 911 or present to the nearest emergency room with any new or worsening neurological deficits  Subjective:    HPI    Ms Cody Lino  Is here for neurological follow-up  She was last seen by Dr Thom yates a 11/13/2018 for painful peripheral neuropathy likely secondary to longstanding alcohol use  The patient did have blood work since last seen  This is under media tab and reveals slightly elevated sed rate 26, inflammatory pattern SPEP, slightly low serum copper 58 (RR )  Fasting glucose is normal and 2 hour postprandial glucose is normal  Prior labs unremarkable: B12, D, SAADIA, Lyme and RF  She continues to have painful neuropathy symptoms in her feet and numbness, also difficulty with balance   Today she reports that she has a new problem that she is dealing with and that is back pain  Back has always been painful but for the past several weeks that has been worsening  She has a hard time in any position: Sitting, standing or lying supine  She cannot stand for a long period of time due to the pain  When she stands she tends to lean forward for relief of pain  She is doing in-home PT for this now  Her therapist thinks she has a mechanical problem, thinks maybe spinal stenosis  She is doing several exercises such as dorsiflexion of her feet, forward flexion of her back/ torso in the chair  She also complains of muscle cramping in her calves and feet, which is worse when she elevates her legs with either sitting or supine  She complains of more weakness in the lower extremities  Denies incontinence or retention of the bowel or bladder  She has not had any falls but almost falls many times such as when in the shower and closing eyes  Cannot close eyes with feet together  Dr Alhaji Beauchamp ordered an EMG at her last visit but she did not get it done  She did have an EMG in 2012 when she lived in [place]  She saw a neurologist there  See last neurology office note: 11/13/18 by Dr Alhaji Beauchamp      · Also has h/o bariatric surgery in 2004- Nicole-en-y in [place], (has gained 30 lbs since January of this year)  · 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  Has had blood work for hepatitis panel, which was neg  · She was sober for many months / years and then started drinking again this past year  Since her admission in early July 2019 for GI bleed she states she has not been drinking any alcohol      Meds:  Has been on lyrica 300 mg bid  Incidentally she also notices more swelling since last seen  Lyrica was prescribed by pain management and this helps greatly  She thinks a big cause for some of her discomfort in the lower extremities is the swelling, and the swelling is worse on the right  She has had swelling for a long time- several months  It did not start acutely    Has been on amitriptyline (did not help), cymbalta (did help, but become allergic, had severe nausea and even blood in vomiting, Did have liver issues at the time)  She tried gabapentin but it was not very effective  She tried Topamax per Dr Alhaji Beauchamp since last seen and is now on 50 mg q h s  but minimally effective, denies side effects      Labs:  She has had blood work for vitamin B12, vitamin-D, Lyme, CCP, rheumatoid factor, SAADIA, CBC, comprehensive metabolic panel which were all unremarkable      The following portions of the patient's history were reviewed and updated as appropriate:   She  has a past medical history of Alcohol use disorder, Alcoholic hepatitis, Anastomotic ulcer, Anemia, Anxiety, Ascites, Breast cancer ([address] ) (2010), Chronic foot pain, Chronic narcotic dependence ([facility] [address] ), Chronic pain disorder, Cirrhosis, alcoholic ([address] ), Colitis, Depression, Esophagitis, History of bilateral mastectomy, History of gastric bypass, Hypertension, Liver disease, Morbid obesity ([facility] [address] Osteopenia, Palpitations, Pancytopenia ([address] ), Peripheral neuropathy, Pernicious anemia, and Rosacea  She   Patient Active Problem List    Diagnosis Date Noted    Fungal infection of nail 08/07/2019    Low back pain 08/07/2019    Neuropathic pain 08/06/2019    History of bariatric surgery 07/07/2019    ETOH abuse 07/06/2019    Pancytopenia ([address] ) 05/21/2019    Cirrhosis of liver without ascites ([facility] [address] ) 10/08/2018    [facility] 01/22/2018    Ulcers, marginal 01/15/2018    Ventral hernia without obstruction or gangrene 11/10/2017    Hypokalemia 10/24/2017    Ascites 10/21/2017    Right leg pain 10/21/2017    Septic shock due to undetermined organism [facility] 10/18/2017    Wound of abdomen 10/11/2017    Deconditioned low back 10/02/2017    Iron (Fe) deficiency anemia 10/02/2017    Dyspnea 12/24/8485    Alcoholic hepatitis 80/05/1744    Thrombocytopenia ([facility] [address] ) 08/05/2017    Encephalopathy, portal systemic ([address] ) 08/02/2017    Cirrhosis with alcoholism ([facility] [address] ) 08/02/2017    History of alcohol abuse 08/02/2017    Gastrointestinal hemorrhage associated with anorectal source 08/02/2017    History of gastric bypass 08/02/2017    Chronic anemia     Anastomotic ulcer 05/18/2017    Chronic anxiety 04/24/2017    Chronic depression 04/24/2017    Chronic foot pain 04/24/2017    Chronic hand pain 04/24/2017    Pain syndrome, chronic 04/24/2017    Breast cancer ([facility] [address] ) 03/16/2017    Fatty liver 03/16/2017    Foot fracture 03/16/2017    Liver disease 03/16/2017    Liver lesion 03/16/2017    Peripheral neuropathy 03/16/2017    Rosacea 03/16/2017     She  has a past surgical history that includes Nicole-en-y procedure (2005); Breast surgery; Hernia repair; Mastectomy; Abdominal surgery; Cholecystectomy; Gastric bypass; pr esophagogastroduodenoscopy transoral diagnostic (N/A, 5/18/2017);  Esophagogastroduodenoscopy (N/A, 8/2/2017); and pr esophagogastroduodenoscopy transoral diagnostic (N/A, 9/14/2017)  Her family history includes Alzheimer's disease in her mother; Breast cancer (age of onset: 62) in her sister; Diabetes in her mother; Fibrocystic breast disease in her sister; Hypertension in her father; Kidney disease in her father; Obesity in her sister  She  reports that she has never smoked  She has never used smokeless tobacco  She reports that she drank alcohol  She reports that she does not use drugs  Current Outpatient Medications   Medication Sig Dispense Refill    Calcium Citrate 250 MG TABS Take by mouth daily 500mg, takes 3 daily- 1500mg total      Cholecalciferol (VITAMIN D3) 5000 units TABS Take 5,000 Units by mouth daily      folic acid (FOLVITE) 1 mg tablet Take 1 tablet by mouth daily 30 tablet 0    furosemide (LASIX) 20 mg tablet Take 0 5 tablets (10 mg total) by mouth daily 30 tablet 0    Multiple Vitamins-Minerals (MULTIVITAMIN WITH MINERALS) tablet Take 1 tablet by mouth daily      pantoprazole (PROTONIX) 40 mg tablet Take 1 tablet (40 mg total) by mouth 2 (two) times a day before meals 60 tablet 0    potassium chloride (K-DUR) 10 mEq tablet Take 1 tablet (10 mEq total) by mouth daily 30 tablet 0    pregabalin (LYRICA) 300 MG capsule Take 1 capsule (300 mg total) by mouth 2 (two) times a day 180 capsule 1    spironolactone (ALDACTONE) 25 mg tablet Take 1 tablet (25 mg total) by mouth daily 30 tablet 0    thiamine 100 MG tablet Take 1 tablet (100 mg total) by mouth daily 30 tablet 0    topiramate (TOPAMAX) 25 mg sprinkle capsule 25 mg q a m  And 50 mg q h s  x5 days, then 50 mg b i d  x1 week, then 75 mg b i d  x1 week, then 100 mg b i d  60 capsule 3    XIFAXAN 550 MG tablet Take by mouth every 12 (twelve) hours Pt states not compliant with taking      baclofen 10 mg tablet 1 tab qhs prn back pain/ cramping, and up to TID if needed   30 tablet 0    pregabalin (LYRICA) 100 mg capsule 200 mg BID 60 capsule 2 No current facility-administered medications for this visit  She is allergic to acetaminophen; cymbalta [duloxetine hcl]; and duloxetine            Objective:    Blood pressure 116/62, pulse 79, not currently breastfeeding  Physical Exam    Neurological Exam  Vital signs reviewed  Well developed, well nourished  Head: Normocephalic, atraumatic  Neck: Neck flexors 5/5, No TTP  CN 2-12: intact and symmetric, including EOMs which are normal b/l and PERRL  MSK: 4/5 hip flexors b/l possibly 2/2 pain, DF 5/5 b/l  UEs 5/5  ROM normal x all 4 extr  No pronator drift  Pitting edema in the ankles and feet b/l, worse R>L  Sensation: LT and temp is decreased in a stocking distribution starting below the knees  She can barely feel pressure at the toes and dorsal aspect of foot per her  Romberg positive  Reflexes: 2+ and symmetric in all 4 extr  , except trace in the ankles b/l  Coordination: Nml x4 extr  Gait: WB gait  ROS:    Review of Systems   Constitutional: Negative  Negative for appetite change and fever  HENT: Negative  Negative for hearing loss, tinnitus, trouble swallowing and voice change  Eyes: Negative  Negative for photophobia and pain  Respiratory: Negative  Negative for shortness of breath  Cardiovascular: Negative  Negative for palpitations  Gastrointestinal: Negative  Negative for nausea and vomiting  Endocrine: Negative  Negative for cold intolerance and heat intolerance  Genitourinary: Negative  Negative for dysuria, frequency and urgency  Musculoskeletal: Positive for back pain  Negative for myalgias and neck pain  Skin: Negative  Negative for rash  Neurological: Positive for weakness  Negative for dizziness, tremors, seizures, syncope, facial asymmetry, speech difficulty, light-headedness, numbness and headaches  Hematological: Negative  Does not bruise/bleed easily  Psychiatric/Behavioral: Negative    Negative for confusion, hallucinations and sleep disturbance  The following portions of the patient's history were reviewed and updated as appropriate: allergies, current medications/ medication history, past family history, past medical history, past social history, past surgical history and problem list     Review of systems was reviewed and otherwise unremarkable from a neurological perspective

## 2019-08-07 ENCOUNTER — TELEPHONE (OUTPATIENT)
Dept: NEUROLOGY | Facility: CLINIC | Age: 59
End: 2019-08-07

## 2019-08-07 PROBLEM — B35.1 FUNGAL INFECTION OF NAIL: Status: ACTIVE | Noted: 2019-08-07

## 2019-08-07 PROBLEM — M54.50 LOW BACK PAIN: Status: ACTIVE | Noted: 2019-08-07

## 2019-08-08 ENCOUNTER — TRANSCRIBE ORDERS (OUTPATIENT)
Dept: NEUROLOGY | Facility: CLINIC | Age: 59
End: 2019-08-08

## 2019-08-08 DIAGNOSIS — M54.50 LOW BACK PAIN: ICD-10-CM

## 2019-08-08 DIAGNOSIS — M79.2 NEUROPATHIC PAIN: Primary | ICD-10-CM

## 2019-08-09 ENCOUNTER — TELEPHONE (OUTPATIENT)
Dept: HEMATOLOGY ONCOLOGY | Facility: CLINIC | Age: 59
End: 2019-08-09

## 2019-08-09 NOTE — TELEPHONE ENCOUNTER
Called and [facility] for pt to have labs drawn prior to her OV on Monday or we will have to reschedule

## 2019-08-12 ENCOUNTER — HOSPITAL ENCOUNTER (OUTPATIENT)
Dept: INFUSION CENTER | Facility: HOSPITAL | Age: 59
End: 2019-08-12
Attending: INTERNAL MEDICINE

## 2019-08-12 ENCOUNTER — APPOINTMENT (OUTPATIENT)
Dept: LAB | Facility: HOSPITAL | Age: 59
End: 2019-08-12
Payer: OTHER GOVERNMENT

## 2019-08-12 ENCOUNTER — TELEPHONE (OUTPATIENT)
Dept: HEMATOLOGY ONCOLOGY | Facility: CLINIC | Age: 59
End: 2019-08-12

## 2019-08-12 DIAGNOSIS — D69.6 THROMBOCYTOPENIA (HCC): ICD-10-CM

## 2019-08-12 DIAGNOSIS — K74.60 CIRRHOSIS OF LIVER WITHOUT ASCITES, UNSPECIFIED HEPATIC CIRRHOSIS TYPE (HCC): ICD-10-CM

## 2019-08-12 DIAGNOSIS — D50.9 IRON DEFICIENCY ANEMIA, UNSPECIFIED IRON DEFICIENCY ANEMIA TYPE: ICD-10-CM

## 2019-08-12 LAB
ALBUMIN SERPL BCP-MCNC: 2.6 G/DL (ref 3.5–5)
ALP SERPL-CCNC: 122 U/L (ref 46–116)
ALT SERPL W P-5'-P-CCNC: 30 U/L (ref 12–78)
ANION GAP SERPL CALCULATED.3IONS-SCNC: 10 MMOL/L (ref 4–13)
AST SERPL W P-5'-P-CCNC: 63 U/L (ref 5–45)
BASOPHILS # BLD AUTO: 0.02 THOUSANDS/ΜL (ref 0–0.1)
BASOPHILS NFR BLD AUTO: 1 % (ref 0–1)
BILIRUB SERPL-MCNC: 1.3 MG/DL (ref 0.2–1)
BUN SERPL-MCNC: 11 MG/DL (ref 5–25)
CALCIUM SERPL-MCNC: 8.7 MG/DL (ref 8.3–10.1)
CHLORIDE SERPL-SCNC: 111 MMOL/L (ref 100–108)
CO2 SERPL-SCNC: 22 MMOL/L (ref 21–32)
CREAT SERPL-MCNC: 1.26 MG/DL (ref 0.6–1.3)
EOSINOPHIL # BLD AUTO: 0.18 THOUSAND/ΜL (ref 0–0.61)
EOSINOPHIL NFR BLD AUTO: 5 % (ref 0–6)
ERYTHROCYTE [DISTWIDTH] IN BLOOD BY AUTOMATED COUNT: 16.2 % (ref 11.6–15.1)
FERRITIN SERPL-MCNC: 71 NG/ML (ref 8–388)
GFR SERPL CREATININE-BSD FRML MDRD: 47 ML/MIN/1.73SQ M
GLUCOSE SERPL-MCNC: 74 MG/DL (ref 65–140)
HCT VFR BLD AUTO: 33.1 % (ref 34.8–46.1)
HGB BLD-MCNC: 9.9 G/DL (ref 11.5–15.4)
IMM GRANULOCYTES # BLD AUTO: 0.01 THOUSAND/UL (ref 0–0.2)
IMM GRANULOCYTES NFR BLD AUTO: 0 % (ref 0–2)
IRON SATN MFR SERPL: 11 %
IRON SERPL-MCNC: 36 UG/DL (ref 50–170)
LYMPHOCYTES # BLD AUTO: 0.98 THOUSANDS/ΜL (ref 0.6–4.47)
LYMPHOCYTES NFR BLD AUTO: 26 % (ref 14–44)
MCH RBC QN AUTO: 29.7 PG (ref 26.8–34.3)
MCHC RBC AUTO-ENTMCNC: 29.9 G/DL (ref 31.4–37.4)
MCV RBC AUTO: 99 FL (ref 82–98)
MONOCYTES # BLD AUTO: 0.46 THOUSAND/ΜL (ref 0.17–1.22)
MONOCYTES NFR BLD AUTO: 12 % (ref 4–12)
NEUTROPHILS # BLD AUTO: 2.2 THOUSANDS/ΜL (ref 1.85–7.62)
NEUTS SEG NFR BLD AUTO: 56 % (ref 43–75)
NRBC BLD AUTO-RTO: 0 /100 WBCS
PLATELET # BLD AUTO: 79 THOUSANDS/UL (ref 149–390)
PMV BLD AUTO: 13 FL (ref 8.9–12.7)
POTASSIUM SERPL-SCNC: 3.9 MMOL/L (ref 3.5–5.3)
PROT SERPL-MCNC: 6.9 G/DL (ref 6.4–8.2)
RBC # BLD AUTO: 3.33 MILLION/UL (ref 3.81–5.12)
SODIUM SERPL-SCNC: 143 MMOL/L (ref 136–145)
TIBC SERPL-MCNC: 337 UG/DL (ref 250–450)
WBC # BLD AUTO: 3.85 THOUSAND/UL (ref 4.31–10.16)

## 2019-08-12 PROCEDURE — 83540 ASSAY OF IRON: CPT

## 2019-08-12 PROCEDURE — 83918 ORGANIC ACIDS TOTAL QUANT: CPT

## 2019-08-12 PROCEDURE — 85025 COMPLETE CBC W/AUTO DIFF WBC: CPT

## 2019-08-12 PROCEDURE — 36415 COLL VENOUS BLD VENIPUNCTURE: CPT

## 2019-08-12 PROCEDURE — 82728 ASSAY OF FERRITIN: CPT

## 2019-08-12 PROCEDURE — 80053 COMPREHEN METABOLIC PANEL: CPT

## 2019-08-12 PROCEDURE — 83550 IRON BINDING TEST: CPT

## 2019-08-12 NOTE — TELEPHONE ENCOUNTER
Patient called stating that she received the message regarding lab work but she was unaware of and blood work needing to be completed  She stated that she can go today and have them completed if there is any opening this week   Best call back [phone]

## 2019-08-12 NOTE — TELEPHONE ENCOUNTER
Noted  Called and spoke with pt, she will have her labs drawn today and see Rosa Goodwin on 8/14 at 1130

## 2019-08-14 ENCOUNTER — OFFICE VISIT (OUTPATIENT)
Dept: HEMATOLOGY ONCOLOGY | Facility: HOSPITAL | Age: 59
End: 2019-08-14
Payer: OTHER GOVERNMENT

## 2019-08-14 ENCOUNTER — OFFICE VISIT (OUTPATIENT)
Dept: URGENT CARE | Facility: CLINIC | Age: 59
End: 2019-08-14
Payer: OTHER GOVERNMENT

## 2019-08-14 VITALS
HEART RATE: 85 BPM | OXYGEN SATURATION: 100 % | SYSTOLIC BLOOD PRESSURE: 106 MMHG | HEIGHT: 64 IN | WEIGHT: 214 LBS | BODY MASS INDEX: 36.54 KG/M2 | DIASTOLIC BLOOD PRESSURE: 62 MMHG | RESPIRATION RATE: 16 BRPM | TEMPERATURE: 97.6 F

## 2019-08-14 VITALS
DIASTOLIC BLOOD PRESSURE: 64 MMHG | HEART RATE: 70 BPM | RESPIRATION RATE: 14 BRPM | OXYGEN SATURATION: 100 % | HEIGHT: 64 IN | SYSTOLIC BLOOD PRESSURE: 124 MMHG | WEIGHT: 214 LBS | BODY MASS INDEX: 36.54 KG/M2 | TEMPERATURE: 97.7 F

## 2019-08-14 DIAGNOSIS — K08.89 PAIN, DENTAL: Primary | ICD-10-CM

## 2019-08-14 DIAGNOSIS — D50.9 IRON DEFICIENCY ANEMIA, UNSPECIFIED IRON DEFICIENCY ANEMIA TYPE: Primary | ICD-10-CM

## 2019-08-14 DIAGNOSIS — K70.30 ALCOHOLIC CIRRHOSIS OF LIVER WITHOUT ASCITES (HCC): ICD-10-CM

## 2019-08-14 DIAGNOSIS — D61.818 PANCYTOPENIA (HCC): ICD-10-CM

## 2019-08-14 PROCEDURE — 99213 OFFICE O/P EST LOW 20 MIN: CPT | Performed by: NURSE PRACTITIONER

## 2019-08-14 PROCEDURE — 99214 OFFICE O/P EST MOD 30 MIN: CPT | Performed by: NURSE PRACTITIONER

## 2019-08-14 RX ORDER — AMOXICILLIN AND CLAVULANATE POTASSIUM 875; 125 MG/1; MG/1
1 TABLET, FILM COATED ORAL EVERY 12 HOURS SCHEDULED
Qty: 14 TABLET | Refills: 0 | Status: SHIPPED | OUTPATIENT
Start: 2019-08-14 | End: 2019-08-21

## 2019-08-14 RX ORDER — AMOXICILLIN AND CLAVULANATE POTASSIUM 875; 125 MG/1; MG/1
1 TABLET, FILM COATED ORAL EVERY 12 HOURS SCHEDULED
Qty: 14 TABLET | Refills: 0 | Status: SHIPPED | OUTPATIENT
Start: 2019-08-14 | End: 2019-08-14

## 2019-08-14 RX ORDER — SODIUM CHLORIDE 9 MG/ML
20 INJECTION, SOLUTION INTRAVENOUS ONCE
Status: CANCELLED | OUTPATIENT
Start: 2019-08-15

## 2019-08-14 RX ORDER — CYANOCOBALAMIN 1000 UG/ML
1000 INJECTION INTRAMUSCULAR; SUBCUTANEOUS ONCE
Status: CANCELLED | OUTPATIENT
Start: 2019-08-15

## 2019-08-14 NOTE — PROGRESS NOTES
[address] [employer] [facility]        NAME: Nikki Mcclure is a 61 y o  female  : 1960    MRN: [id number]  DATE: 2019  TIME: 2:29 PM    Assessment and Plan   Pain, dental [K08 89]  1  Pain, dental  amoxicillin-clavulanate (AUGMENTIN) 875-125 mg per tablet     Patient Instructions     Take antibiotic as directed  Recommend probiotics/yogurt for GI side effects  Follow up with oral surgeon on 19  Follow up with PCP in 3-5 days  Proceed to ER if symptoms worsen  Chief Complaint     Chief Complaint   Patient presents with    Dental Pain     59yof complaining of dental pain for a couple of weeks, pt  does have an appt    History of Present Illness       Patient presents with complaint of left sided dental pain x a couple weeks  Pt states that her left cheek feels hot and tender  Pt also reports associated left-sided earache, occasional lightheadedness, headache, and reports feeling "cold" but denies fever, chills, and night sweats  She has been seeing an oral surgeon and scheduled an evaluation for   Pt reports icing the left jaw but denies taking medication for the pain due to liver problems  Pt denies sore throat, cough, chest pain, and dyspnea  Review of Systems   Review of Systems   Constitutional: Negative for chills, fatigue and fever  HENT: Positive for dental problem and ear pain  Negative for sinus pressure and sore throat  Respiratory: Negative for cough, chest tightness, shortness of breath and wheezing  Cardiovascular: Negative for chest pain and palpitations  Musculoskeletal: Negative for myalgias, neck pain and neck stiffness  Skin: Negative for color change, rash and wound  Neurological: Positive for light-headedness  Negative for dizziness and headaches  All other systems reviewed and are negative          Current Medications       Current Outpatient Medications:     amoxicillin-clavulanate (AUGMENTIN) 875-125 mg per tablet, Take 1 tablet by mouth every 12 (twelve) hours for 7 days, Disp: 14 tablet, Rfl: 0    baclofen 10 mg tablet, 1 tab qhs prn back pain/ cramping, and up to TID if needed  , Disp: 30 tablet, Rfl: 0    Calcium Citrate 250 MG TABS, Take by mouth daily 500mg, takes 3 daily- 1500mg total, Disp: , Rfl:     Cholecalciferol (VITAMIN D3) 5000 units TABS, Take 5,000 Units by mouth daily, Disp: , Rfl:     folic acid (FOLVITE) 1 mg tablet, Take 1 tablet by mouth daily, Disp: 30 tablet, Rfl: 0    furosemide (LASIX) 20 mg tablet, Take 0 5 tablets (10 mg total) by mouth daily, Disp: 30 tablet, Rfl: 0    Multiple Vitamins-Minerals (MULTIVITAMIN WITH MINERALS) tablet, Take 1 tablet by mouth daily, Disp: , Rfl:     pantoprazole (PROTONIX) 40 mg tablet, Take 1 tablet (40 mg total) by mouth 2 (two) times a day before meals, Disp: 60 tablet, Rfl: 0    potassium chloride (K-DUR) 10 mEq tablet, Take 1 tablet (10 mEq total) by mouth daily, Disp: 30 tablet, Rfl: 0    pregabalin (LYRICA) 100 mg capsule, 200 mg BID, Disp: 60 capsule, Rfl: 2    pregabalin (LYRICA) 300 MG capsule, Take 1 capsule (300 mg total) by mouth 2 (two) times a day (Patient taking differently: Take 200 mg by mouth 2 (two) times a day ), Disp: 180 capsule, Rfl: 1    spironolactone (ALDACTONE) 25 mg tablet, Take 1 tablet (25 mg total) by mouth daily, Disp: 30 tablet, Rfl: 0    thiamine 100 MG tablet, Take 1 tablet (100 mg total) by mouth daily, Disp: 30 tablet, Rfl: 0    topiramate (TOPAMAX) 25 mg sprinkle capsule, 25 mg q a m   And 50 mg q h s  x5 days, then 50 mg b i d  x1 week, then 75 mg b i d  x1 week, then 100 mg b i d , Disp: 60 capsule, Rfl: 3    XIFAXAN 550 MG tablet, Take by mouth every 12 (twelve) hours Pt states not compliant with taking, Disp: , Rfl:     Current Allergies     Allergies as of 08/14/2019 - Reviewed 08/14/2019   Allergen Reaction Noted    Acetaminophen Other (See Comments) 03/13/2017    Cymbalta [duloxetine hcl] GI Intolerance 03/13/2017    Duloxetine GI Intolerance 11/06/2017            The following portions of the patient's history were reviewed and updated as appropriate: allergies, current medications, past family history, past medical history, past social history, past surgical history and problem list      Past Medical History:   Diagnosis Date    Alcohol use disorder     Alcoholic hepatitis     Anastomotic ulcer     Anemia     Anxiety     Ascites     Breast cancer ([facility] ) 2010    stage 0; bilateral mastectomy (prophylactic on left)    Chronic foot pain     Chronic narcotic dependence ([facility] )     pt denies    Chronic pain disorder     Cirrhosis, alcoholic ([facility] )     Colitis     Depression     situational    Esophagitis     History of bilateral mastectomy     History of gastric bypass     Hypertension     Liver disease     [facility]     Osteopenia     Palpitations     Pancytopenia ([place] [address] )     Peripheral neuropathy     Pernicious anemia     Rosacea        Past Surgical History:   Procedure Laterality Date    ABDOMINAL SURGERY      abdominoplasty    BREAST SURGERY      mastectomy    CHOLECYSTECTOMY      ESOPHAGOGASTRODUODENOSCOPY N/A 8/2/2017    Procedure: ESOPHAGOGASTRODUODENOSCOPY (EGD); Surgeon: Irma Jovel DO;  Location:  MAIN OR;  Service: Gastroenterology    GASTRIC BYPASS      HERNIA REPAIR      MASTECTOMY      NC ESOPHAGOGASTRODUODENOSCOPY TRANSORAL DIAGNOSTIC N/A 5/18/2017    Procedure: ESOPHAGOGASTRODUODENOSCOPY (EGD) with bx;  Surgeon: Autry Jeans, MD;  Location: [facility]; Service: Gastroenterology    NC ESOPHAGOGASTRODUODENOSCOPY TRANSORAL DIAGNOSTIC N/A 9/14/2017    Procedure: EGD with bx AND COLONOSCOPY with polypectomy;  Surgeon: Autry Jeans, MD;  Location: [facility];   Service: Gastroenterology    DECLAN-EN-Y PROCEDURE  2005       Family History   Problem Relation Age of Onset    Diabetes Mother     Alzheimer's disease Mother     Kidney disease Father     Hypertension Father    Leela Monaco Obesity Sister     Fibrocystic breast disease Sister     Breast cancer Sister 62    Colon cancer Neg Hx     Uterine cancer Neg Hx     Ovarian cancer Neg Hx          Medications have been verified  Objective   /64   Pulse 70   Temp 97 7 °F (36 5 °C)   Resp 14   Ht 5' 4" (1 626 m)   Wt 97 1 kg (214 lb)   SpO2 100%   BMI 36 73 kg/m²        Physical Exam     Physical Exam   Constitutional: She is oriented to person, place, and time  She appears well-developed and well-nourished  No distress  HENT:   Head: Normocephalic and atraumatic  Right Ear: External ear normal    Left Ear: External ear normal    Nose: Nose normal    Mouth/Throat: No oropharyngeal exudate  Poor dentition throughout, several missing teeth  Mild erythema of left cheek, TTP over left maxilla and mandible  TMs clear bilaterally   Eyes: Pupils are equal, round, and reactive to light  Conjunctivae and EOM are normal    Neck: Normal range of motion  Neck supple  Cardiovascular: Normal rate, regular rhythm and normal heart sounds  Pulmonary/Chest: Effort normal and breath sounds normal  No respiratory distress  She has no wheezes  Lymphadenopathy:     She has no cervical adenopathy  Neurological: She is alert and oriented to person, place, and time  No cranial nerve deficit or sensory deficit  Skin: Skin is warm and dry  Capillary refill takes less than 2 seconds  No rash noted  She is not diaphoretic  Psychiatric: She has a normal mood and affect  Her behavior is normal  Thought content normal    Nursing note and vitals reviewed

## 2019-08-14 NOTE — PROGRESS NOTES
Hematology/Oncology Outpatient Follow- up Note  Hollie Milan 61 y o  female MRN: @ Encounter: [id number]        Date:  8/14/2019    Presenting Complaint/Diagnosis : Pancytopenia in the setting of cirrhosis and Iron deficiency anemia    HPI:  Silas Lin is a 61year old   female, who was referred to our office for Pancytopenia, and had blood work drawn on 2/3/17, which showed WBC 3 4, Hgb 10 7, and Platelet count 97,271  Patient indicated she had history of cirrhosis, and was alcohol induced  She had a bone marrow biopsy which indicated no stored iron, with no other abnormalities otherwise  Previous Hematologic/ Oncologic History:    Venofer and B12    Current Hematologic/ Oncologic Treatment:    Venofer and B12    Interval History:    The patient is here for follow up regarding pancytopenia and iron deficiency anemia  Since she was last seen in our office, she was hospitalized for a GI bleed  She admitted that she had started drinking again in October 2018 and then stopped drinking 7/3/19  She was going through alcohol withdrawal just after she stopped drinking and then was hospitalized for GI bleed  She was discharged 7/11/19  It appears that she has had 2 iron infusions since she was discharged from the hospital   Overall, she states that she is feeling better  Denies chest pain, SOB, bloody/dark tarry stools, bleeding/bruising  She does still feel fatigued  She still does remain pancytopenic, however her counts have improved  WBC 3 85, hgb 9 9, MCV 99, plt 79, ANC 2 20  Iron sat 11%  Test Results:    Imaging: No results found      Labs:   Lab Results   Component Value Date    WBC 3 85 (L) 08/12/2019    HGB 9 9 (L) 08/12/2019    HCT 33 1 (L) 08/12/2019    MCV 99 (H) 08/12/2019    PLT 79 (L) 08/12/2019     Lab Results   Component Value Date    K 3 9 08/12/2019     (H) 08/12/2019    CO2 22 08/12/2019    BUN 11 08/12/2019    CREATININE 1 26 08/12/2019    GLUF 81 05/21/2019 CALCIUM 8 7 08/12/2019    AST 63 (H) 08/12/2019    ALT 30 08/12/2019    ALKPHOS 122 (H) 08/12/2019    EGFR 47 08/12/2019         No results found for: SPEP, UPEP    No results found for: PSA    No results found for: CEA    No results found for:     No results found for: AFP    Lab Results   Component Value Date    IRON 36 (L) 08/12/2019    TIBC 337 08/12/2019    FERRITIN 71 08/12/2019       Lab Results   Component Value Date    NPWLKERK61 613 (H) 06/15/2018         ROS: As stated in the history of present illness otherwise his 14 point review of systems today was negative        Active Problems:   Patient Active Problem List   Diagnosis    Chronic anemia    Encephalopathy, portal systemic (HCC)    Cirrhosis with alcoholism ([facility]    History of alcohol abuse    Gastrointestinal hemorrhage associated with anorectal source    History of gastric bypass    Thrombocytopenia (HCC)    Septic shock due to undetermined organism ([facility] [facility] )    Ascites    Right leg pain    Hypokalemia    [facility] kidney injury [facility]    Alcoholic hepatitis    Anastomotic ulcer    Breast cancer (HCC)    Chronic anxiety    Chronic depression    Chronic foot pain    Chronic hand pain    Deconditioned low back    Dyspnea    Fatty liver    Foot fracture    Iron (Fe) deficiency anemia    Liver disease    Liver lesion    Pain syndrome, chronic    Peripheral neuropathy    Rosacea    Ulcers, marginal    Ventral hernia without obstruction or gangrene    Wound of abdomen    Cirrhosis of liver without ascites (HCC)    Pancytopenia (HCC)    ETOH abuse    History of bariatric surgery    Neuropathic pain    Fungal infection of nail    Low back pain       Past Medical History:   Past Medical History:   Diagnosis Date    Alcohol use disorder     Alcoholic hepatitis     Anastomotic ulcer     Anemia     Anxiety     Ascites     Breast cancer ([facility] ) 2010    stage 0; bilateral mastectomy (prophylactic on left)    Chronic foot pain     Chronic narcotic dependence ([facility] )     pt denies    Chronic pain disorder     Cirrhosis, alcoholic (HCC)     Colitis     Depression     situational    Esophagitis     History of bilateral mastectomy     History of gastric bypass     Hypertension     Liver disease     Morbid obesity ([facility] )     Osteopenia     Palpitations     Pancytopenia (HCC)     Peripheral neuropathy     Pernicious anemia     Rosacea        Surgical History:   Past Surgical History:   Procedure Laterality Date    ABDOMINAL SURGERY      abdominoplasty    BREAST SURGERY      mastectomy    CHOLECYSTECTOMY      ESOPHAGOGASTRODUODENOSCOPY N/A 8/2/2017    Procedure: ESOPHAGOGASTRODUODENOSCOPY (EGD); Surgeon: Madan Ydaav DO;  Location:  MAIN OR;  Service: Gastroenterology    GASTRIC BYPASS      HERNIA REPAIR      MASTECTOMY      SC ESOPHAGOGASTRODUODENOSCOPY TRANSORAL DIAGNOSTIC N/A 5/18/2017    Procedure: ESOPHAGOGASTRODUODENOSCOPY (EGD) with bx;  Surgeon: Alonzo Aparicio MD;  Location: [facility]; Service: Gastroenterology    SC ESOPHAGOGASTRODUODENOSCOPY TRANSORAL DIAGNOSTIC N/A 9/14/2017    Procedure: EGD with bx AND COLONOSCOPY with polypectomy;  Surgeon: Alonzo Aparicio MD;  Location: [facility]; Service: Gastroenterology    DECLAN-EN-Y PROCEDURE  2005       Family History:    Family History   Problem Relation Age of Onset    Diabetes Mother     Alzheimer's disease Mother     Kidney disease Father     Hypertension Father     Obesity Sister     Fibrocystic breast disease Sister     Breast cancer Sister 62    Colon cancer Neg Hx     Uterine cancer Neg Hx     Ovarian cancer Neg Hx        Cancer-related family history includes Breast cancer (age of onset: 62) in her sister  There is no history of Colon cancer, Uterine cancer, or Ovarian cancer      Social History:   Social History     Socioeconomic History    Marital status: /Civil Union     Spouse name: Not on file    Number of children: Not on file    Years of education: Not on file    Highest education level: Not on file   Occupational History    Not on file   Social Needs    Financial resource strain: Not on file    Food insecurity:     Worry: Not on file     Inability: Not on file    Transportation needs:     Medical: Not on file     Non-medical: Not on file   Tobacco Use    Smoking status: Never Smoker    Smokeless tobacco: Never Used   Substance and Sexual Activity    Alcohol use: Not Currently    Drug use: No    Sexual activity: Not Currently     Partners: Male   Lifestyle    Physical activity:     Days per week: Not on file     Minutes per session: Not on file    Stress: Not on file   Relationships    Social connections:     Talks on phone: Not on file     Gets together: Not on file     Attends [employer] service: Not on file     Active member of club or organization: Not on file     Attends meetings of clubs or organizations: Not on file     Relationship status: Not on file    Intimate partner violence:     Fear of current or ex partner: Not on file     Emotionally abused: Not on file     Physically abused: Not on file     Forced sexual activity: Not on file   Other Topics Concern    Not on file   Social History Narrative    Not on file       Current Medications:   Current Outpatient Medications   Medication Sig Dispense Refill    baclofen 10 mg tablet 1 tab qhs prn back pain/ cramping, and up to TID if needed   30 tablet 0    Calcium Citrate 250 MG TABS Take by mouth daily 500mg, takes 3 daily- 1500mg total      Cholecalciferol (VITAMIN D3) 5000 units TABS Take 5,000 Units by mouth daily      folic acid (FOLVITE) 1 mg tablet Take 1 tablet by mouth daily 30 tablet 0    furosemide (LASIX) 20 mg tablet Take 0 5 tablets (10 mg total) by mouth daily 30 tablet 0    Multiple Vitamins-Minerals (MULTIVITAMIN WITH MINERALS) tablet Take 1 tablet by mouth daily      pantoprazole (PROTONIX) 40 mg tablet Take 1 tablet (40 mg total) by mouth 2 (two) times a day before meals 60 tablet 0    potassium chloride (K-DUR) 10 mEq tablet Take 1 tablet (10 mEq total) by mouth daily 30 tablet 0    pregabalin (LYRICA) 100 mg capsule 200 mg BID 60 capsule 2    pregabalin (LYRICA) 300 MG capsule Take 1 capsule (300 mg total) by mouth 2 (two) times a day (Patient taking differently: Take 200 mg by mouth 2 (two) times a day ) 180 capsule 1    spironolactone (ALDACTONE) 25 mg tablet Take 1 tablet (25 mg total) by mouth daily 30 tablet 0    thiamine 100 MG tablet Take 1 tablet (100 mg total) by mouth daily 30 tablet 0    topiramate (TOPAMAX) 25 mg sprinkle capsule 25 mg q a m  And 50 mg q h s  x5 days, then 50 mg b i d  x1 week, then 75 mg b i d  x1 week, then 100 mg b i d  60 capsule 3    XIFAXAN 550 MG tablet Take by mouth every 12 (twelve) hours Pt states not compliant with taking       No current facility-administered medications for this visit  Allergies: Allergies   Allergen Reactions    Acetaminophen Other (See Comments)     Liver function, s/p bariatric surgery    Cymbalta [Duloxetine Hcl] GI Intolerance    Duloxetine GI Intolerance       Physical Exam:    Body surface area is 2 01 meters squared  Wt Readings from Last 3 Encounters:   08/14/19 97 1 kg (214 lb)   07/07/19 111 kg (245 lb 9 5 oz)   05/20/19 101 kg (222 lb)        Temp Readings from Last 3 Encounters:   08/14/19 97 6 °F (36 4 °C) (Tympanic)   07/24/19 98 5 °F (36 9 °C) (Tympanic)   07/22/19 99 6 °F (37 6 °C) (Tympanic)        BP Readings from Last 3 Encounters:   08/14/19 106/62   08/06/19 116/62   07/24/19 94/51         Pulse Readings from Last 3 Encounters:   08/14/19 85   08/06/19 79   07/24/19 71     @LASTSAO2(3)@      Physical Exam     Constitutional   General appearance: No acute distress, well appearing and well nourished  Eyes   Conjunctiva and lids: No swelling, erythema or discharge  Pupils and irises: Equal, round and reactive to light      Ears, Nose, Mouth, and Throat   External inspection of ears and nose: Normal     Nasal mucosa, septum, and turbinates: Normal without edema or erythema  Oropharynx: Normal with no erythema, edema, exudate or lesions  Pulmonary   Respiratory effort: No increased work of breathing or signs of respiratory distress  Auscultation of lungs: Clear to auscultation  Cardiovascular   Palpation of heart: Normal PMI, no thrills  Auscultation of heart: Normal rate and rhythm, normal S1 and S2, without murmurs  Examination of extremities for edema and/or varicosities: Normal     Carotid pulses: Normal     Abdomen   Abdomen: Non-tender, no masses  Liver and spleen: No hepatomegaly or splenomegaly  Lymphatic   Palpation of lymph nodes in neck: No lymphadenopathy  Musculoskeletal   Gait and station: Normal     Digits and nails: Normal without clubbing or cyanosis  Inspection/palpation of joints, bones, and muscles: Normal     Skin   Skin and subcutaneous tissue: Normal without rashes or lesions  Neurologic   Cranial nerves: Cranial nerves 2-12 intact  Sensation: No sensory loss  Psychiatric   Orientation to person, place, and time: Normal     Mood and affect: Normal         Assessment / Plan:    The patient is a 65yo female with iron deficiency anemia and pancytopenia with cirrhosis  She was hospitalized last month with GI bleed  At this point, her counts are improving however she is still very anemic and low on iron  Her MMA has not resulted yet  I will plan to give her venofer 200mg IV biweekly for 4 weeks, ie a total of 8 doses  At this point, I will put in monthly B12 injections for her considering her past history, however this may need to be adjusted once her MMA level results  She will follow up with us in 3 months after having finished 8 doses of venofer    At this point, we may have to put her on maintenance as she has required this in the past  We will determine the appropriate plan at that time after we have new blood work  The patient is in agreement with the plan  Goals and Barriers:  Current Goal:  Prolong Survival from pancytopenia and iron def anemia  Barriers: None  Patient's Capacity to Self Care:  Patient able to self care  Portions of the record may have been created with voice recognition software   Occasional wrong word or "sound a like" substitutions may have occurred due to the inherent limitations of voice recognition software   Read the chart carefully and recognize, using context, where substitutions have occurred

## 2019-08-15 RX ORDER — CYANOCOBALAMIN 1000 UG/ML
1000 INJECTION INTRAMUSCULAR; SUBCUTANEOUS ONCE
Status: CANCELLED | OUTPATIENT
Start: 2019-08-22

## 2019-08-16 ENCOUNTER — APPOINTMENT (EMERGENCY)
Dept: RADIOLOGY | Facility: HOSPITAL | Age: 59
End: 2019-08-16
Payer: OTHER GOVERNMENT

## 2019-08-16 ENCOUNTER — APPOINTMENT (EMERGENCY)
Dept: CT IMAGING | Facility: HOSPITAL | Age: 59
End: 2019-08-16
Payer: OTHER GOVERNMENT

## 2019-08-16 ENCOUNTER — HOSPITAL ENCOUNTER (OUTPATIENT)
Facility: HOSPITAL | Age: 59
Setting detail: OBSERVATION
Discharge: HOME | End: 2019-08-17
Attending: EMERGENCY MEDICINE | Admitting: INTERNAL MEDICINE
Payer: OTHER GOVERNMENT

## 2019-08-16 ENCOUNTER — OFFICE VISIT (OUTPATIENT)
Dept: URGENT CARE | Facility: CLINIC | Age: 59
End: 2019-08-16
Payer: OTHER GOVERNMENT

## 2019-08-16 VITALS
WEIGHT: 214 LBS | BODY MASS INDEX: 36.54 KG/M2 | HEART RATE: 87 BPM | DIASTOLIC BLOOD PRESSURE: 78 MMHG | HEIGHT: 64 IN | SYSTOLIC BLOOD PRESSURE: 126 MMHG | RESPIRATION RATE: 16 BRPM | TEMPERATURE: 99.1 F

## 2019-08-16 DIAGNOSIS — R41.0 CONFUSION: Primary | ICD-10-CM

## 2019-08-16 DIAGNOSIS — E72.20 HYPERAMMONEMIA (HCC): ICD-10-CM

## 2019-08-16 DIAGNOSIS — R41.82 ALTERED MENTAL STATUS: Primary | ICD-10-CM

## 2019-08-16 DIAGNOSIS — R42 DIZZY: ICD-10-CM

## 2019-08-16 PROBLEM — K04.7 DENTAL INFECTION: Status: ACTIVE | Noted: 2019-08-16

## 2019-08-16 LAB
ALBUMIN SERPL BCP-MCNC: 2.8 G/DL (ref 3.5–5)
ALP SERPL-CCNC: 146 U/L (ref 46–116)
ALT SERPL W P-5'-P-CCNC: 35 U/L (ref 12–78)
AMMONIA PLAS-SCNC: 47 UMOL/L (ref 11–35)
AMPHETAMINES SERPL QL SCN: NEGATIVE
ANION GAP SERPL CALCULATED.3IONS-SCNC: 10 MMOL/L (ref 4–13)
APAP SERPL-MCNC: <2 UG/ML (ref 10–20)
APTT PPP: 36 SECONDS (ref 23–37)
AST SERPL W P-5'-P-CCNC: 64 U/L (ref 5–45)
BARBITURATES UR QL: NEGATIVE
BASOPHILS # BLD AUTO: 0.04 THOUSANDS/ΜL (ref 0–0.1)
BASOPHILS NFR BLD AUTO: 1 % (ref 0–1)
BENZODIAZ UR QL: NEGATIVE
BILIRUB SERPL-MCNC: 1.2 MG/DL (ref 0.2–1)
BILIRUB UR QL STRIP: NEGATIVE
BUN SERPL-MCNC: 11 MG/DL (ref 5–25)
CALCIUM SERPL-MCNC: 8.7 MG/DL (ref 8.3–10.1)
CHLORIDE SERPL-SCNC: 113 MMOL/L (ref 100–108)
CLARITY UR: CLEAR
CO2 SERPL-SCNC: 21 MMOL/L (ref 21–32)
COCAINE UR QL: NEGATIVE
COLOR UR: YELLOW
CREAT SERPL-MCNC: 1.03 MG/DL (ref 0.6–1.3)
EOSINOPHIL # BLD AUTO: 0.17 THOUSAND/ΜL (ref 0–0.61)
EOSINOPHIL NFR BLD AUTO: 4 % (ref 0–6)
ERYTHROCYTE [DISTWIDTH] IN BLOOD BY AUTOMATED COUNT: 16.1 % (ref 11.6–15.1)
ETHANOL SERPL-MCNC: <3 MG/DL (ref 0–3)
GFR SERPL CREATININE-BSD FRML MDRD: 60 ML/MIN/1.73SQ M
GLUCOSE SERPL-MCNC: 86 MG/DL (ref 65–140)
GLUCOSE UR STRIP-MCNC: NEGATIVE MG/DL
HCT VFR BLD AUTO: 34.8 % (ref 34.8–46.1)
HGB BLD-MCNC: 10.2 G/DL (ref 11.5–15.4)
HGB UR QL STRIP.AUTO: NEGATIVE
IMM GRANULOCYTES # BLD AUTO: 0.01 THOUSAND/UL (ref 0–0.2)
IMM GRANULOCYTES NFR BLD AUTO: 0 % (ref 0–2)
INR PPP: 1.41 (ref 0.84–1.19)
KETONES UR STRIP-MCNC: NEGATIVE MG/DL
LEUKOCYTE ESTERASE UR QL STRIP: NEGATIVE
LYMPHOCYTES # BLD AUTO: 1.38 THOUSANDS/ΜL (ref 0.6–4.47)
LYMPHOCYTES NFR BLD AUTO: 31 % (ref 14–44)
MAGNESIUM SERPL-MCNC: 1.6 MG/DL (ref 1.6–2.6)
MCH RBC QN AUTO: 29.1 PG (ref 26.8–34.3)
MCHC RBC AUTO-ENTMCNC: 29.3 G/DL (ref 31.4–37.4)
MCV RBC AUTO: 99 FL (ref 82–98)
METHADONE UR QL: NEGATIVE
METHYLMALONATE SERPL-SCNC: 187 NMOL/L (ref 0–378)
MONOCYTES # BLD AUTO: 0.41 THOUSAND/ΜL (ref 0.17–1.22)
MONOCYTES NFR BLD AUTO: 9 % (ref 4–12)
NEUTROPHILS # BLD AUTO: 2.41 THOUSANDS/ΜL (ref 1.85–7.62)
NEUTS SEG NFR BLD AUTO: 55 % (ref 43–75)
NITRITE UR QL STRIP: NEGATIVE
NRBC BLD AUTO-RTO: 0 /100 WBCS
OPIATES UR QL SCN: NEGATIVE
PCP UR QL: NEGATIVE
PH UR STRIP.AUTO: 5 [PH]
PHOSPHATE SERPL-MCNC: 3.4 MG/DL (ref 2.7–4.5)
PLATELET # BLD AUTO: 79 THOUSANDS/UL (ref 149–390)
PMV BLD AUTO: 12.6 FL (ref 8.9–12.7)
POTASSIUM SERPL-SCNC: 3.6 MMOL/L (ref 3.5–5.3)
PROT SERPL-MCNC: 7.1 G/DL (ref 6.4–8.2)
PROT UR STRIP-MCNC: NEGATIVE MG/DL
PROTHROMBIN TIME: 16.9 SECONDS (ref 11.6–14.5)
RBC # BLD AUTO: 3.5 MILLION/UL (ref 3.81–5.12)
SALICYLATES SERPL-MCNC: <3 MG/DL (ref 3–20)
SL AMB DISCLAIMER: NORMAL
SODIUM SERPL-SCNC: 144 MMOL/L (ref 136–145)
SP GR UR STRIP.AUTO: >=1.03 (ref 1–1.03)
THC UR QL: NEGATIVE
TROPONIN I SERPL-MCNC: <0.02 NG/ML
TSH SERPL DL<=0.05 MIU/L-ACNC: 2.41 UIU/ML (ref 0.36–3.74)
UROBILINOGEN UR QL STRIP.AUTO: 0.2 E.U./DL
WBC # BLD AUTO: 4.42 THOUSAND/UL (ref 4.31–10.16)

## 2019-08-16 PROCEDURE — 70496 CT ANGIOGRAPHY HEAD: CPT

## 2019-08-16 PROCEDURE — 85610 PROTHROMBIN TIME: CPT | Performed by: EMERGENCY MEDICINE

## 2019-08-16 PROCEDURE — 93005 ELECTROCARDIOGRAM TRACING: CPT

## 2019-08-16 PROCEDURE — 80053 COMPREHEN METABOLIC PANEL: CPT | Performed by: EMERGENCY MEDICINE

## 2019-08-16 PROCEDURE — 99285 EMERGENCY DEPT VISIT HI MDM: CPT

## 2019-08-16 PROCEDURE — 84443 ASSAY THYROID STIM HORMONE: CPT | Performed by: EMERGENCY MEDICINE

## 2019-08-16 PROCEDURE — 84484 ASSAY OF TROPONIN QUANT: CPT | Performed by: EMERGENCY MEDICINE

## 2019-08-16 PROCEDURE — 71045 X-RAY EXAM CHEST 1 VIEW: CPT

## 2019-08-16 PROCEDURE — 99284 EMERGENCY DEPT VISIT MOD MDM: CPT | Performed by: EMERGENCY MEDICINE

## 2019-08-16 PROCEDURE — 96360 HYDRATION IV INFUSION INIT: CPT

## 2019-08-16 PROCEDURE — 99220 PR INITIAL OBSERVATION CARE/DAY 70 MINUTES: CPT | Performed by: PHYSICIAN ASSISTANT

## 2019-08-16 PROCEDURE — 83735 ASSAY OF MAGNESIUM: CPT | Performed by: EMERGENCY MEDICINE

## 2019-08-16 PROCEDURE — 82140 ASSAY OF AMMONIA: CPT | Performed by: EMERGENCY MEDICINE

## 2019-08-16 PROCEDURE — 84100 ASSAY OF PHOSPHORUS: CPT | Performed by: EMERGENCY MEDICINE

## 2019-08-16 PROCEDURE — 85025 COMPLETE CBC W/AUTO DIFF WBC: CPT | Performed by: EMERGENCY MEDICINE

## 2019-08-16 PROCEDURE — 70498 CT ANGIOGRAPHY NECK: CPT

## 2019-08-16 PROCEDURE — 80329 ANALGESICS NON-OPIOID 1 OR 2: CPT | Performed by: EMERGENCY MEDICINE

## 2019-08-16 PROCEDURE — 80307 DRUG TEST PRSMV CHEM ANLYZR: CPT | Performed by: EMERGENCY MEDICINE

## 2019-08-16 PROCEDURE — 36415 COLL VENOUS BLD VENIPUNCTURE: CPT | Performed by: EMERGENCY MEDICINE

## 2019-08-16 PROCEDURE — 96361 HYDRATE IV INFUSION ADD-ON: CPT

## 2019-08-16 PROCEDURE — 80320 DRUG SCREEN QUANTALCOHOLS: CPT | Performed by: EMERGENCY MEDICINE

## 2019-08-16 PROCEDURE — 85730 THROMBOPLASTIN TIME PARTIAL: CPT | Performed by: EMERGENCY MEDICINE

## 2019-08-16 PROCEDURE — 81003 URINALYSIS AUTO W/O SCOPE: CPT | Performed by: EMERGENCY MEDICINE

## 2019-08-16 RX ORDER — PREGABALIN 100 MG/1
200 CAPSULE ORAL 2 TIMES DAILY
Status: DISCONTINUED | OUTPATIENT
Start: 2019-08-16 | End: 2019-08-17 | Stop reason: HOSPADM

## 2019-08-16 RX ORDER — THIAMINE MONONITRATE (VIT B1) 100 MG
100 TABLET ORAL DAILY
Status: DISCONTINUED | OUTPATIENT
Start: 2019-08-17 | End: 2019-08-17 | Stop reason: HOSPADM

## 2019-08-16 RX ORDER — ONDANSETRON 2 MG/ML
4 INJECTION INTRAMUSCULAR; INTRAVENOUS EVERY 6 HOURS PRN
Status: DISCONTINUED | OUTPATIENT
Start: 2019-08-16 | End: 2019-08-17 | Stop reason: HOSPADM

## 2019-08-16 RX ORDER — LACTULOSE 20 G/30ML
20 SOLUTION ORAL 3 TIMES DAILY
Status: DISCONTINUED | OUTPATIENT
Start: 2019-08-16 | End: 2019-08-17 | Stop reason: HOSPADM

## 2019-08-16 RX ORDER — POTASSIUM CHLORIDE 750 MG/1
10 TABLET, EXTENDED RELEASE ORAL DAILY
Status: DISCONTINUED | OUTPATIENT
Start: 2019-08-17 | End: 2019-08-17 | Stop reason: HOSPADM

## 2019-08-16 RX ORDER — SODIUM CHLORIDE, SODIUM GLUCONATE, SODIUM ACETATE, POTASSIUM CHLORIDE, MAGNESIUM CHLORIDE, SODIUM PHOSPHATE, DIBASIC, AND POTASSIUM PHOSPHATE .53; .5; .37; .037; .03; .012; .00082 G/100ML; G/100ML; G/100ML; G/100ML; G/100ML; G/100ML; G/100ML
75 INJECTION, SOLUTION INTRAVENOUS CONTINUOUS
Status: DISCONTINUED | OUTPATIENT
Start: 2019-08-16 | End: 2019-08-16 | Stop reason: SDUPTHER

## 2019-08-16 RX ORDER — POTASSIUM CHLORIDE 20 MEQ/1
20 TABLET, EXTENDED RELEASE ORAL ONCE
Status: COMPLETED | OUTPATIENT
Start: 2019-08-16 | End: 2019-08-16

## 2019-08-16 RX ORDER — SODIUM CHLORIDE, SODIUM GLUCONATE, SODIUM ACETATE, POTASSIUM CHLORIDE, MAGNESIUM CHLORIDE, SODIUM PHOSPHATE, DIBASIC, AND POTASSIUM PHOSPHATE .53; .5; .37; .037; .03; .012; .00082 G/100ML; G/100ML; G/100ML; G/100ML; G/100ML; G/100ML; G/100ML
50 INJECTION, SOLUTION INTRAVENOUS CONTINUOUS
Status: DISCONTINUED | OUTPATIENT
Start: 2019-08-16 | End: 2019-08-17 | Stop reason: HOSPADM

## 2019-08-16 RX ORDER — LACTULOSE 20 G/30ML
20 SOLUTION ORAL ONCE
Status: COMPLETED | OUTPATIENT
Start: 2019-08-16 | End: 2019-08-16

## 2019-08-16 RX ORDER — TOPIRAMATE 25 MG/1
25 TABLET ORAL 2 TIMES DAILY
Status: DISCONTINUED | OUTPATIENT
Start: 2019-08-16 | End: 2019-08-17 | Stop reason: HOSPADM

## 2019-08-16 RX ORDER — BACLOFEN 10 MG/1
10 TABLET ORAL DAILY PRN
Status: DISCONTINUED | OUTPATIENT
Start: 2019-08-16 | End: 2019-08-17 | Stop reason: HOSPADM

## 2019-08-16 RX ORDER — FOLIC ACID 1 MG/1
1 TABLET ORAL DAILY
Status: DISCONTINUED | OUTPATIENT
Start: 2019-08-17 | End: 2019-08-17 | Stop reason: HOSPADM

## 2019-08-16 RX ORDER — MULTIVITAMIN/IRON/FOLIC ACID 18MG-0.4MG
1 TABLET ORAL DAILY
Status: DISCONTINUED | OUTPATIENT
Start: 2019-08-17 | End: 2019-08-17 | Stop reason: HOSPADM

## 2019-08-16 RX ORDER — MAGNESIUM SULFATE 1 G/100ML
1 INJECTION INTRAVENOUS ONCE
Status: COMPLETED | OUTPATIENT
Start: 2019-08-16 | End: 2019-08-16

## 2019-08-16 RX ORDER — SODIUM CHLORIDE 9 MG/ML
125 INJECTION, SOLUTION INTRAVENOUS CONTINUOUS
Status: DISCONTINUED | OUTPATIENT
Start: 2019-08-16 | End: 2019-08-16

## 2019-08-16 RX ORDER — FUROSEMIDE 20 MG/1
20 TABLET ORAL DAILY
Status: DISCONTINUED | OUTPATIENT
Start: 2019-08-17 | End: 2019-08-17 | Stop reason: HOSPADM

## 2019-08-16 RX ORDER — AMOXICILLIN AND CLAVULANATE POTASSIUM 875; 125 MG/1; MG/1
1 TABLET, FILM COATED ORAL EVERY 12 HOURS SCHEDULED
Status: DISCONTINUED | OUTPATIENT
Start: 2019-08-16 | End: 2019-08-17 | Stop reason: HOSPADM

## 2019-08-16 RX ORDER — PANTOPRAZOLE SODIUM 40 MG/1
40 TABLET, DELAYED RELEASE ORAL
Status: DISCONTINUED | OUTPATIENT
Start: 2019-08-17 | End: 2019-08-17 | Stop reason: HOSPADM

## 2019-08-16 RX ADMIN — SODIUM CHLORIDE 125 ML/HR: 0.9 INJECTION, SOLUTION INTRAVENOUS at 18:11

## 2019-08-16 RX ADMIN — RIFAXIMIN 200 MG: 200 TABLET ORAL at 23:10

## 2019-08-16 RX ADMIN — PREGABALIN 200 MG: 100 CAPSULE ORAL at 22:32

## 2019-08-16 RX ADMIN — LACTULOSE 20 G: 10 SOLUTION ORAL at 22:31

## 2019-08-16 RX ADMIN — TOPIRAMATE 25 MG: 25 TABLET, FILM COATED ORAL at 23:10

## 2019-08-16 RX ADMIN — MAGNESIUM SULFATE HEPTAHYDRATE 1 G: 1 INJECTION, SOLUTION INTRAVENOUS at 22:47

## 2019-08-16 RX ADMIN — SODIUM CHLORIDE, SODIUM GLUCONATE, SODIUM ACETATE, POTASSIUM CHLORIDE, MAGNESIUM CHLORIDE, SODIUM PHOSPHATE, DIBASIC, AND POTASSIUM PHOSPHATE 50 ML/HR: .53; .5; .37; .037; .03; .012; .00082 INJECTION, SOLUTION INTRAVENOUS at 22:46

## 2019-08-16 RX ADMIN — AMOXICILLIN AND CLAVULANATE POTASSIUM 1 TABLET: 875; 125 TABLET, FILM COATED ORAL at 22:32

## 2019-08-16 RX ADMIN — IOHEXOL 85 ML: 350 INJECTION, SOLUTION INTRAVENOUS at 19:40

## 2019-08-16 RX ADMIN — POTASSIUM CHLORIDE 20 MEQ: 20 TABLET, EXTENDED RELEASE ORAL at 22:32

## 2019-08-16 RX ADMIN — LACTULOSE 20 G: 10 SOLUTION ORAL at 21:02

## 2019-08-16 NOTE — ED PROVIDER NOTES
History  Chief Complaint   Patient presents with    Altered Mental Status     Patient presents with three day history of confusion and increased feeling of being tired  Brother with patient and states she has a histroy of cirrhosis and this is how she gets      This is a 51-year-old female who presents with intermittent confusion and trouble with word finding over the past week currently she is awake alert orient x3 with a Nathan coma Scale of 15 and NIH stroke scale of 0 she does have a history of cirrhosis quit drinking alcohol in July  Does have a previous history of elevated ammonia levels and this is how she gets when her levels are elevated  No recent head injury no fevers chills nausea vomiting or diarrhea      History provided by:  Patient  Medical Problem   Location:  Confusion and trouble with word finding  Quality:  For 1 week  Severity:  Mild  Duration:  1 week  Timing:  Intermittent  Chronicity:  Recurrent  Context:  Confusion and word-finding difficulty similar to when she had hepatic encephalopathy in the past  Relieved by:  Nothing  Worsened by:  Nothing      Prior to Admission Medications   Prescriptions Last Dose Informant Patient Reported? Taking? Calcium Citrate 250 MG TABS   Yes Yes   Sig: Take by mouth daily 500mg, takes 3 daily- 1500mg total   Cholecalciferol (VITAMIN D3) 5000 units TABS   Yes Yes   Sig: Take 5,000 Units by mouth daily   Multiple Vitamins-Minerals (MULTIVITAMIN WITH MINERALS) tablet   Yes Yes   Sig: Take 1 tablet by mouth daily   XIFAXAN 550 MG tablet   Yes Yes   Sig: Take by mouth every 12 (twelve) hours Pt states not compliant with taking   amoxicillin-clavulanate (AUGMENTIN) 875-125 mg per tablet   No Yes   Sig: Take 1 tablet by mouth every 12 (twelve) hours for 7 days   baclofen 10 mg tablet   No Yes   Si tab qhs prn back pain/ cramping, and up to TID if needed     folic acid (FOLVITE) 1 mg tablet  Self No Yes   Sig: Take 1 tablet by mouth daily   furosemide (LASIX) 20 mg tablet   No Yes   Sig: Take 0 5 tablets (10 mg total) by mouth daily   pantoprazole (PROTONIX) 40 mg tablet   No Yes   Sig: Take 1 tablet (40 mg total) by mouth 2 (two) times a day before meals   potassium chloride (K-DUR) 10 mEq tablet   No Yes   Sig: Take 1 tablet (10 mEq total) by mouth daily   pregabalin (LYRICA) 100 mg capsule   No Yes   Si mg BID   spironolactone (ALDACTONE) 25 mg tablet Not Taking at Unknown time  No No   Sig: Take 1 tablet (25 mg total) by mouth daily   Patient not taking: Reported on 2019   thiamine 100 MG tablet   No Yes   Sig: Take 1 tablet (100 mg total) by mouth daily   topiramate (TOPAMAX) 25 mg sprinkle capsule   No Yes   Si mg q a m  And 50 mg q h s  x5 days, then 50 mg b i d  x1 week, then 75 mg b i d  x1 week, then 100 mg b i d  Facility-Administered Medications: None       Past Medical History:   Diagnosis Date    Alcohol use disorder     Alcoholic hepatitis     Anastomotic ulcer     Anemia     Anxiety     Ascites     Breast cancer ([facility] [address] )     stage 0; bilateral mastectomy (prophylactic on left)    Chronic foot pain     Chronic narcotic dependence ([facility] [address] )     pt denies    Chronic pain disorder     Cirrhosis, alcoholic (HCC)     Colitis     Depression     situational    Esophagitis     History of bilateral mastectomy     History of gastric bypass     Hypertension     Liver disease     Morbid obesity (HCC)     Osteopenia     Palpitations     Pancytopenia (HCC)     Peripheral neuropathy     Pernicious anemia     Rosacea        Past Surgical History:   Procedure Laterality Date    ABDOMINAL SURGERY      abdominoplasty    BREAST SURGERY      mastectomy    CHOLECYSTECTOMY      ESOPHAGOGASTRODUODENOSCOPY N/A 2017    Procedure: ESOPHAGOGASTRODUODENOSCOPY (EGD);   Surgeon: Madan Yadav DO;  Location:  MAIN OR;  Service: Gastroenterology    GASTRIC BYPASS      HERNIA REPAIR      MASTECTOMY      VT ESOPHAGOGASTRODUODENOSCOPY TRANSORAL DIAGNOSTIC N/A 5/18/2017    Procedure: ESOPHAGOGASTRODUODENOSCOPY (EGD) with bx;  Surgeon: Eloise Wilcox MD;  Location: [facility]; Service: Gastroenterology    ME ESOPHAGOGASTRODUODENOSCOPY TRANSORAL DIAGNOSTIC N/A 9/14/2017    Procedure: EGD with bx AND COLONOSCOPY with polypectomy;  Surgeon: Eloise Wilcox MD;  Location: [facility]; Service: Gastroenterology    DECLAN-EN-Y PROCEDURE  2005       Family History   Problem Relation Age of Onset    Diabetes Mother     Alzheimer's disease Mother     Kidney disease Father     Hypertension Father     Obesity Sister     Fibrocystic breast disease Sister     Breast cancer Sister 62    Colon cancer Neg Hx     Uterine cancer Neg Hx     Ovarian cancer Neg Hx      I have reviewed and agree with the history as documented  Social History     Tobacco Use    Smoking status: Never Smoker    Smokeless tobacco: Never Used   Substance Use Topics    Alcohol use: Not Currently    Drug use: No        Review of Systems   Neurological: Positive for speech difficulty  Confusion   All other systems reviewed and are negative  Physical Exam  Physical Exam   Constitutional: She is oriented to person, place, and time  She appears well-developed and well-nourished  No distress  HENT:   Head: Normocephalic and atraumatic  Right Ear: External ear normal    Left Ear: External ear normal    Nose: Nose normal    Mouth/Throat: Oropharynx is clear and moist    Eyes: Pupils are equal, round, and reactive to light  EOM are normal  Right eye exhibits no discharge  Left eye exhibits no discharge  No scleral icterus  Neck: Neck supple  No JVD present  No tracheal deviation present  Cardiovascular: Normal rate, regular rhythm and intact distal pulses  Exam reveals no gallop and no friction rub  No murmur heard  Pulmonary/Chest: Effort normal and breath sounds normal  No stridor  No respiratory distress  She has no wheezes   She has no rales    Abdominal: Soft  Bowel sounds are normal  She exhibits no distension and no mass  There is no tenderness  There is no rebound and no guarding  Musculoskeletal: Normal range of motion  She exhibits no edema, tenderness or deformity  Neurological: She is alert and oriented to person, place, and time  No cranial nerve deficit or sensory deficit  She exhibits normal muscle tone  Coordination normal    Skin: Skin is warm and dry  No rash noted  She is not diaphoretic  Psychiatric: She has a normal mood and affect  Her behavior is normal  Thought content normal    Nursing note and vitals reviewed        Vital Signs  ED Triage Vitals   Temperature Pulse Respirations Blood Pressure SpO2   08/16/19 1651 08/16/19 1651 08/16/19 1651 08/16/19 1651 08/16/19 1651   98 6 °F (37 °C) 94 20 121/60 100 %      Temp Source Heart Rate Source Patient Position - Orthostatic VS BP Location FiO2 (%)   08/16/19 2151 08/16/19 2103 08/16/19 2103 08/16/19 2103 --   Oral Monitor Lying Left arm       Pain Score       08/16/19 1651       6           Vitals:    08/16/19 1900 08/16/19 2103 08/16/19 2151 08/17/19 0737   BP: 126/67 146/66 143/82 120/70   Pulse: 74 88 79 80   Patient Position - Orthostatic VS:  Lying Sitting Lying         Visual Acuity  Visual Acuity      Most Recent Value   L Pupil Size (mm)  3   R Pupil Size (mm)  3          ED Medications  Medications   iohexol (OMNIPAQUE) 350 MG/ML injection (MULTI-DOSE) 85 mL (85 mL Intravenous Given 8/16/19 1940)   lactulose 20 g/30 mL oral solution 20 g (20 g Oral Given 8/16/19 2102)   potassium chloride (K-DUR,KLOR-CON) CR tablet 20 mEq (20 mEq Oral Given 8/16/19 2232)   magnesium sulfate IVPB (premix) SOLN 1 g (1 g Intravenous New Bag 8/16/19 2247)       Diagnostic Studies  Results Reviewed     Procedure Component Value Units Date/Time    CBC and differential [[id number]]  (Abnormal) Collected:  08/17/19 0512    Lab Status:  Final result Specimen:  Blood from Arm, Left Updated: 08/17/19 0611     WBC 3 60 Thousand/uL      RBC 3 01 Million/uL      Hemoglobin 8 7 g/dL      Hematocrit 29 1 %      MCV 97 fL      MCH 28 9 pg      MCHC 29 9 g/dL      RDW 15 9 %      MPV 12 3 fL      Platelets 81 Thousands/uL      nRBC 0 /100 WBCs      Neutrophils Relative 49 %      Immat GRANS % 0 %      Lymphocytes Relative 32 %      Monocytes Relative 13 %      Eosinophils Relative 5 %      Basophils Relative 1 %      Neutrophils Absolute 1 81 Thousands/µL      Immature Grans Absolute 0 00 Thousand/uL      Lymphocytes Absolute 1 14 Thousands/µL      Monocytes Absolute 0 45 Thousand/µL      Eosinophils Absolute 0 17 Thousand/µL      Basophils Absolute 0 03 Thousands/µL     Basic metabolic panel [[id number]]  (Abnormal) Collected:  08/17/19 0512    Lab Status:  Final result Specimen:  Blood from Arm, Left Updated:  08/17/19 0610     Sodium 144 mmol/L      Potassium 3 4 mmol/L      Chloride 114 mmol/L      CO2 19 mmol/L      ANION GAP 11 mmol/L      BUN 10 mg/dL      Creatinine 1 02 mg/dL      Glucose 77 mg/dL      Calcium 8 5 mg/dL      eGFR 60 ml/min/1 73sq m     Narrative:       [place] guidelines for Chronic Kidney Disease (CKD):     Stage 1 with normal or high GFR (GFR > 90 mL/min/1 73 square meters)    Stage 2 Mild CKD (GFR = 60-89 mL/min/1 73 square meters)    Stage 3A Moderate CKD (GFR = 45-59 mL/min/1 73 square meters)    Stage 3B Moderate CKD (GFR = 30-44 mL/min/1 73 square meters)    Stage 4 Severe CKD (GFR = 15-29 mL/min/1 73 square meters)    Stage 5 End Stage CKD (GFR <15 mL/min/1 73 square meters)  Note: GFR calculation is accurate only with a steady state creatinine    Magnesium [[id number]]  (Normal) Collected:  08/17/19 0512    Lab Status:  Final result Specimen:  Blood from Arm, Left Updated:  08/17/19 0610     Magnesium 1 9 mg/dL     Rapid drug screen, urine [[id number]]  (Normal) Collected:  08/16/19 1937    Lab Status:  Final result Specimen:  Urine, Clean Catch Updated:  08/16/19 2006     Amph/Meth UR Negative     Barbiturate Ur Negative     Benzodiazepine Urine Negative     Cocaine Urine Negative     Methadone Urine Negative     Opiate Urine Negative     PCP Ur Negative     THC Urine Negative    Narrative:       FOR MEDICAL PURPOSES ONLY  IF CONFIRMATION NEEDED PLEASE CONTACT THE LAB WITHIN 5 DAYS  Drug Screen Cutoff Levels:  AMPHETAMINE/METHAMPHETAMINES  1000 ng/mL  BARBITURATES     200 ng/mL  BENZODIAZEPINES     200 ng/mL  COCAINE      300 ng/mL  METHADONE      300 ng/mL  OPIATES      300 ng/mL  PHENCYCLIDINE     25 ng/mL  THC       50 ng/mL      UA w Reflex to Microscopic w Reflex to Culture [[id number]] Collected:  08/16/19 1938    Lab Status:  Final result Specimen:  Urine, Clean Catch Updated:  08/16/19 1951     Color, UA Yellow     Clarity, UA Clear     Specific Gravity, UA >=1 030     pH, UA 5 0     Leukocytes, UA Negative     Nitrite, UA Negative     Protein, UA Negative mg/dl      Glucose, UA Negative mg/dl      Ketones, UA Negative mg/dl      Urobilinogen, UA 0 2 E U /dl      Bilirubin, UA Negative     Blood, UA Negative    CBC and differential [[id number]]  (Abnormal) Collected:  08/16/19 1738    Lab Status:  Final result Specimen:  Blood from Arm, Left Updated:  08/16/19 1851     WBC 4 42 Thousand/uL      RBC 3 50 Million/uL      Hemoglobin 10 2 g/dL      Hematocrit 34 8 %      MCV 99 fL      MCH 29 1 pg      MCHC 29 3 g/dL      RDW 16 1 %      MPV 12 6 fL      Platelets 79 Thousands/uL      nRBC 0 /100 WBCs      Neutrophils Relative 55 %      Immat GRANS % 0 %      Lymphocytes Relative 31 %      Monocytes Relative 9 %      Eosinophils Relative 4 %      Basophils Relative 1 %      Neutrophils Absolute 2 41 Thousands/µL      Immature Grans Absolute 0 01 Thousand/uL      Lymphocytes Absolute 1 38 Thousands/µL      Monocytes Absolute 0 41 Thousand/µL      Eosinophils Absolute 0 17 Thousand/µL      Basophils Absolute 0 04 Thousands/µL     Narrative:        This is an appended report  These results have been appended to a previously verified report  Ammonia [[id number]]  (Abnormal) Collected:  08/16/19 1826    Lab Status:  Final result Specimen:  Blood from Arm, Left Updated:  08/16/19 1850     Ammonia 47 umol/L     TSH [[id number]]  (Normal) Collected:  08/16/19 1738    Lab Status:  Final result Specimen:  Blood from Arm, Left Updated:  08/16/19 1840     TSH 3RD GENERATON 2 412 uIU/mL     Narrative:       Patients undergoing fluorescein dye angiography may retain small amounts of fluorescein in the body for 48-72 hours post procedure  Samples containing fluorescein can produce falsely depressed TSH values  If the patient had this procedure,a specimen should be resubmitted post fluorescein clearance  Salicylate level [[id number]]  (Abnormal) Collected:  08/16/19 1738    Lab Status:  Final result Specimen:  Blood from Arm, Left Updated:  57/17/46 1940     Salicylate Lvl <6 9 mg/dL     Acetaminophen level-If concentration is detectable, please discuss with medical  on call   [[id number]]  (Abnormal) Collected:  08/16/19 1738    Lab Status:  Final result Specimen:  Blood from Arm, Left Updated:  08/16/19 1840     Acetaminophen Level <2 0 ug/mL     Ethanol [[id number]]  (Normal) Collected:  08/16/19 1738    Lab Status:  Final result Specimen:  Blood from Arm, Left Updated:  08/16/19 1818     Ethanol Lvl <3 mg/dL     Troponin I [[id number]]  (Normal) Collected:  08/16/19 1738    Lab Status:  Final result Specimen:  Blood from Arm, Left Updated:  08/16/19 1813     Troponin I <0 02 ng/mL     Comprehensive metabolic panel [[id number]]  (Abnormal) Collected:  08/16/19 1738    Lab Status:  Final result Specimen:  Blood from Arm, Left Updated:  08/16/19 1811     Sodium 144 mmol/L      Potassium 3 6 mmol/L      Chloride 113 mmol/L      CO2 21 mmol/L      ANION GAP 10 mmol/L      BUN 11 mg/dL      Creatinine 1 03 mg/dL      Glucose 86 mg/dL      Calcium 8 7 mg/dL      AST 64 U/L      ALT 35 U/L      Alkaline Phosphatase 146 U/L      Total Protein 7 1 g/dL      Albumin 2 8 g/dL      Total Bilirubin 1 20 mg/dL      eGFR 60 ml/min/1 73sq m     Narrative:       [place] guidelines for Chronic Kidney Disease (CKD):     Stage 1 with normal or high GFR (GFR > 90 mL/min/1 73 square meters)    Stage 2 Mild CKD (GFR = 60-89 mL/min/1 73 square meters)    Stage 3A Moderate CKD (GFR = 45-59 mL/min/1 73 square meters)    Stage 3B Moderate CKD (GFR = 30-44 mL/min/1 73 square meters)    Stage 4 Severe CKD (GFR = 15-29 mL/min/1 73 square meters)    Stage 5 End Stage CKD (GFR <15 mL/min/1 73 square meters)  Note: GFR calculation is accurate only with a steady state creatinine    Magnesium [[id number]]  (Normal) Collected:  08/16/19 1738    Lab Status:  Final result Specimen:  Blood from Arm, Left Updated:  08/16/19 1811     Magnesium 1 6 mg/dL     Phosphorus [[id number]]  (Normal) Collected:  08/16/19 1738    Lab Status:  Final result Specimen:  Blood from Arm, Left Updated:  08/16/19 1811     Phosphorus 3 4 mg/dL     Protime-INR [[id number]]  (Abnormal) Collected:  08/16/19 1738    Lab Status:  Final result Specimen:  Blood from Arm, Left Updated:  08/16/19 1809     Protime 16 9 seconds      INR 1 41    APTT [[id number]]  (Normal) Collected:  08/16/19 1738    Lab Status:  Final result Specimen:  Blood from Arm, Left Updated:  08/16/19 1809     PTT 36 seconds                  CTA head and neck with and without contrast   Final Result by Josephine Prasad MD (08/16 2048)         1  No evidence of acute vascular territorial infarction, intracranial hemorrhage or mass effect  2   No stenosis, dissection or occlusion of the carotid or vertebral arteries or major vessels of the [place]                    Workstation performed: [id number]         XR chest 1 view portable   ED Interpretation by Cristina Lynch DO (08/16 1820)   No acute infiltrate or cardiomegaly Final Result by Ward Zuniga MD (30/67 8499)      No acute cardiopulmonary disease  Workstation performed: [id number]                    Procedures  ECG 12 Lead Documentation Only  Date/Time: 8/16/2019 6:13 PM  Performed by: Aide Duffy DO  Authorized by: Aide Duffy DO     ECG reviewed by me, the ED Provider: yes    Patient location:  ED  Rate:     ECG rate:  71  Rhythm:     Rhythm: sinus rhythm    Conduction:     Conduction: normal    T waves:     T waves: normal    Other findings:     Other findings: prolonged qTc interval             ED Course         HEART Risk Score      Most Recent Value   History  0 Filed at: 08/16/2019 1816   ECG  1 Filed at: 08/16/2019 1816   Age  1 Filed at: 08/16/2019 1816   Risk Factors  1 Filed at: 08/16/2019 1816   Troponin  0 Filed at: 08/16/2019 1816   Heart Score Risk Calculator   History  0 Filed at: 08/16/2019 1816   ECG  1 Filed at: 08/16/2019 1816   Age  1 Filed at: 08/16/2019 1816   Risk Factors  1 Filed at: 08/16/2019 1816   Troponin  0 Filed at: 08/16/2019 1816   HEART Score  3 Filed at: 08/16/2019 1816   HEART Score  3 Filed at: 08/16/2019 [facility]                            [facility]  Number of Diagnoses or Management Options  Diagnosis management comments: Transient confusion and trouble with finding words may be secondary to elevated ammonia versus acute intracranial pathology but no neurologic deficits currently on examination will check labs and CT scan of the head and neck for further evaluation       Amount and/or Complexity of Data Reviewed  Clinical lab tests: ordered  Tests in the radiology section of CPT®: ordered        Disposition  Final diagnoses:    Altered mental status   Hyperammonemia ([address] )     Time reflects when diagnosis was documented in both MDM as applicable and the Disposition within this note     Time User Action Codes Description Comment    8/16/2019  8:59 PM Melly Laughlin Add [R45 82] Altered mental status     8/16/2019  8:59 PM Melly Laughlin Add [E72 20] Hyperammonemia [facility])       ED Disposition     ED Disposition Condition Date/Time Comment    Admit Stable Fri Aug 16, 2019  8:59 PM Case was discussed with medicine and the patient's admission status was agreed to be Admission Status: observation status to the service of Dr Carlos Pacheco           Follow-up Information     Follow up With Specialties Details Why Contact Info    Craig Sanchez MD Internal Medicine Call in 1 week(s)  [address] [facility] [address]  [phone]            Discharge Medication List as of 8/18/2019 11:40 AM      CONTINUE these medications which have NOT CHANGED    Details   amoxicillin-clavulanate (AUGMENTIN) 875-125 mg per tablet Take 1 tablet by mouth every 12 (twelve) hours for 7 days, Starting Wed 8/14/2019, Until Wed 8/21/2019, Normal      baclofen 10 mg tablet 1 tab qhs prn back pain/ cramping, and up to TID if needed , Normal      Calcium Citrate 250 MG TABS Take by mouth daily 500mg, takes 3 daily- 1500mg total, Historical Med      Cholecalciferol (VITAMIN D3) 5000 units TABS Take 5,000 Units by mouth daily, Historical Med      folic acid (FOLVITE) 1 mg tablet Take 1 tablet by mouth daily, Starting Wed 8/9/2017, Normal      furosemide (LASIX) 20 mg tablet Take 0 5 tablets (10 mg total) by mouth daily, Starting Thu 7/11/2019, Normal      Multiple Vitamins-Minerals (MULTIVITAMIN WITH MINERALS) tablet Take 1 tablet by mouth daily, Historical Med      pantoprazole (PROTONIX) 40 mg tablet Take 1 tablet (40 mg total) by mouth 2 (two) times a day before meals, Starting Thu 7/11/2019, Normal      potassium chloride (K-DUR) 10 mEq tablet Take 1 tablet (10 mEq total) by mouth daily, Starting Tue 8/6/2019, Normal      pregabalin (LYRICA) 100 mg capsule 200 mg BID, Normal      spironolactone (ALDACTONE) 25 mg tablet Take 1 tablet (25 mg total) by mouth daily, Starting Thu 7/11/2019, Normal      thiamine 100 MG tablet Take 1 tablet (100 mg total) by mouth daily, Starting Fri 7/12/2019, Normal      topiramate (TOPAMAX) 25 mg sprinkle capsule 25 mg q a m  And 50 mg q h s  x5 days, then 50 mg b i d  x1 week, then 75 mg b i d  x1 week, then 100 mg b i d , Normal      XIFAXAN 550 MG tablet Take by mouth every 12 (twelve) hours Pt states not compliant with taking, Starting Mon 8/20/2018, Historical Med           No discharge procedures on file      ED Provider  Electronically Signed by           Yanira Randolph DO  08/20/19 3798

## 2019-08-16 NOTE — PROGRESS NOTES
NAME: Hollie Milan is a 61 y o  female  : 1960    MRN: [id number]      Assessment and Plan   Confusion [R41 0]  1  Confusion  Transfer to other facility   2  Dizzy  Transfer to other facility           Patient Instructions   Patient Instructions   Patient referred to ER for further evaluation  Traveled by private vehicle driven by brother    Proceed to ER if symptoms worsen  Chief Complaint     Chief Complaint   Patient presents with    Dizziness     Dizziness x 1 week, worsening 3 days ago  Denies nausea and vomitting  Patient states she is unable to put 2 sentences together and is confused  Patients in house nurse told her to come in and be seen (she has an in house nurse because she was recently admitted to the hospital due to being"incoherent"         History of Present Illness   Patient with past medical history of cirrhosis secondary to alcohol use presents accompanied by brother complaining of one-week history of worsening dizziness  She states about a week ago she started to notice that she was having trouble walking at times and was feeling out of it    She also reports feeling confused and states that she often can't find the words that she is looking for  Brother reports increase in the normal amount of confusion she usually has and reports that she has been off for over a week  He cites that she will often speak of things that her off topic, incoherent at times and changing words for other words  Patient had similar symptoms about a year ago and was admitted to the hospital for elevated ammonia levels  Patient reports she is currently sober and her last drink was   Denies any recent alcohol use  States she did start a new medication this week but her brother reports that some of the symptoms were present before starting the medication  Patient also complains of fatigue that has also been worsening over the past 3 days    She states she is taking naps and does not usually do that    She denies chest pain, palpitations, shortness of breath, dyspnea, nausea, vomiting, changes in vision, fever or chills  She again states that she is not with it and also complains of walking funny        Review of Systems   Review of Systems   Constitutional: Positive for fatigue  Negative for chills and fever  Respiratory: Negative for chest tightness, shortness of breath and wheezing  Cardiovascular: Negative for chest pain, palpitations and leg swelling  Gastrointestinal: Negative for nausea and vomiting  Genitourinary: Negative for dysuria, frequency and urgency  Neurological: Positive for dizziness, speech difficulty and light-headedness  Negative for facial asymmetry and headaches  Psychiatric/Behavioral: Positive for confusion  Current Medications     No current facility-administered medications for this visit  Current Outpatient Medications:     amoxicillin-clavulanate (AUGMENTIN) 875-125 mg per tablet, Take 1 tablet by mouth every 12 (twelve) hours for 7 days, Disp: 14 tablet, Rfl: 0    baclofen 10 mg tablet, 1 tab qhs prn back pain/ cramping, and up to TID if needed  , Disp: 30 tablet, Rfl: 0    Calcium Citrate 250 MG TABS, Take by mouth daily 500mg, takes 3 daily- 1500mg total, Disp: , Rfl:     Cholecalciferol (VITAMIN D3) 5000 units TABS, Take 5,000 Units by mouth daily, Disp: , Rfl:     folic acid (FOLVITE) 1 mg tablet, Take 1 tablet by mouth daily, Disp: 30 tablet, Rfl: 0    furosemide (LASIX) 20 mg tablet, Take 0 5 tablets (10 mg total) by mouth daily, Disp: 30 tablet, Rfl: 0    Multiple Vitamins-Minerals (MULTIVITAMIN WITH MINERALS) tablet, Take 1 tablet by mouth daily, Disp: , Rfl:     pantoprazole (PROTONIX) 40 mg tablet, Take 1 tablet (40 mg total) by mouth 2 (two) times a day before meals, Disp: 60 tablet, Rfl: 0    potassium chloride (K-DUR) 10 mEq tablet, Take 1 tablet (10 mEq total) by mouth daily, Disp: 30 tablet, Rfl: 0    pregabalin (LYRICA) 100 mg capsule, 200 mg BID, Disp: 60 capsule, Rfl: 2    pregabalin (LYRICA) 300 MG capsule, Take 1 capsule (300 mg total) by mouth 2 (two) times a day (Patient not taking: Reported on 8/16/2019), Disp: 180 capsule, Rfl: 1    thiamine 100 MG tablet, Take 1 tablet (100 mg total) by mouth daily, Disp: 30 tablet, Rfl: 0    topiramate (TOPAMAX) 25 mg sprinkle capsule, 25 mg q a m   And 50 mg q h s  x5 days, then 50 mg b i d  x1 week, then 75 mg b i d  x1 week, then 100 mg b i d , Disp: 60 capsule, Rfl: 3    XIFAXAN 550 MG tablet, Take by mouth every 12 (twelve) hours Pt states not compliant with taking, Disp: , Rfl:     spironolactone (ALDACTONE) 25 mg tablet, Take 1 tablet (25 mg total) by mouth daily (Patient not taking: Reported on 8/16/2019), Disp: 30 tablet, Rfl: 0    Facility-Administered Medications Ordered in Other Visits:     sodium chloride 0 9 % infusion, 125 mL/hr, Intravenous, Continuous, Rosio Urban DO    Current Allergies     Allergies as of 08/16/2019 - Reviewed 08/16/2019   Allergen Reaction Noted    Acetaminophen Other (See Comments) 03/13/2017    Cymbalta [duloxetine hcl] GI Intolerance 03/13/2017    Duloxetine GI Intolerance 11/06/2017              Past Medical History:   Diagnosis Date    Alcohol use disorder     Alcoholic hepatitis     Anastomotic ulcer     Anemia     Anxiety     Ascites     Breast cancer ([address] ) 2010    stage 0; bilateral mastectomy (prophylactic on left)    Chronic foot pain     Chronic narcotic dependence ([address] )     pt denies    Chronic pain disorder     Cirrhosis, alcoholic ([address] )     Colitis     Depression     situational    Esophagitis     History of bilateral mastectomy     History of gastric bypass     Hypertension     Liver disease     Morbid obesity ([address] )     Osteopenia     Palpitations     Pancytopenia ([address] )     Peripheral neuropathy     Pernicious anemia     Rosacea        Past Surgical History:   Procedure Laterality Date    ABDOMINAL SURGERY      abdominoplasty    BREAST SURGERY      mastectomy    CHOLECYSTECTOMY      ESOPHAGOGASTRODUODENOSCOPY N/A 8/2/2017    Procedure: ESOPHAGOGASTRODUODENOSCOPY (EGD); Surgeon: Eli Blanchard DO;  Location: [facility];  Service: Gastroenterology    GASTRIC BYPASS      HERNIA REPAIR      MASTECTOMY      [place] ESOPHAGOGASTRODUODENOSCOPY TRANSORAL DIAGNOSTIC N/A 5/18/2017    Procedure: ESOPHAGOGASTRODUODENOSCOPY (EGD) with bx;  Surgeon: Tresa Whitaker MD;  Location: [facility]; Service: Gastroenterology    [place] ESOPHAGOGASTRODUODENOSCOPY TRANSORAL DIAGNOSTIC N/A 9/14/2017    Procedure: EGD with bx AND COLONOSCOPY with polypectomy;  Surgeon: Tresa Whitaker MD;  [facility]; Service: Gastroenterology    DECLAN-EN-Y PROCEDURE  2005       Family History   Problem Relation Age of Onset    Diabetes Mother     Alzheimer's disease Mother     Kidney disease Father     Hypertension Father     Obesity Sister     Fibrocystic breast disease Sister     Breast cancer Sister 62    Colon cancer Neg Hx     Uterine cancer Neg Hx     Ovarian cancer Neg Hx          Medications have been verified  The following portions of the patient's history were reviewed and updated as appropriate: allergies, current medications, past family history, past medical history, past social history, past surgical history and problem list     Objective   /78   Pulse 87   Temp 99 1 °F (37 3 °C)   Resp 16   Ht 5' 4" (1 626 m)   Wt 97 1 kg (214 lb)   BMI 36 73 kg/m²      Physical Exam     Physical Exam   Constitutional: She is oriented to person, place, and time  She appears well-developed and well-nourished  No distress  HENT:   TMs not visible bilaterally due to cerumen   Eyes: Pupils are equal, round, and reactive to light  EOM are normal    Cardiovascular: Normal rate, regular rhythm and normal heart sounds  Pulmonary/Chest: Effort normal and breath sounds normal  No stridor  No respiratory distress   She has no wheezes  She has no rales  Neurological: She is alert and oriented to person, place, and time  Patient taking pauses throughout conversation  Changing topic to unrelated things  Some sentences are incoherent  She is oriented to person place and time  Skin: She is not diaphoretic  Vitals reviewed

## 2019-08-17 VITALS
SYSTOLIC BLOOD PRESSURE: 120 MMHG | BODY MASS INDEX: 36.54 KG/M2 | DIASTOLIC BLOOD PRESSURE: 70 MMHG | OXYGEN SATURATION: 100 % | RESPIRATION RATE: 18 BRPM | TEMPERATURE: 97.6 F | HEIGHT: 64 IN | HEART RATE: 80 BPM | WEIGHT: 214 LBS

## 2019-08-17 LAB
AMMONIA PLAS-SCNC: 23 UMOL/L (ref 11–35)
ANION GAP SERPL CALCULATED.3IONS-SCNC: 11 MMOL/L (ref 4–13)
BASOPHILS # BLD AUTO: 0.03 THOUSANDS/ΜL (ref 0–0.1)
BASOPHILS NFR BLD AUTO: 1 % (ref 0–1)
BUN SERPL-MCNC: 10 MG/DL (ref 5–25)
CALCIUM SERPL-MCNC: 8.5 MG/DL (ref 8.3–10.1)
CHLORIDE SERPL-SCNC: 114 MMOL/L (ref 100–108)
CO2 SERPL-SCNC: 19 MMOL/L (ref 21–32)
CREAT SERPL-MCNC: 1.02 MG/DL (ref 0.6–1.3)
EOSINOPHIL # BLD AUTO: 0.17 THOUSAND/ΜL (ref 0–0.61)
EOSINOPHIL NFR BLD AUTO: 5 % (ref 0–6)
ERYTHROCYTE [DISTWIDTH] IN BLOOD BY AUTOMATED COUNT: 15.9 % (ref 11.6–15.1)
GFR SERPL CREATININE-BSD FRML MDRD: 60 ML/MIN/1.73SQ M
GLUCOSE SERPL-MCNC: 77 MG/DL (ref 65–140)
HCT VFR BLD AUTO: 29.1 % (ref 34.8–46.1)
HGB BLD-MCNC: 8.7 G/DL (ref 11.5–15.4)
IMM GRANULOCYTES # BLD AUTO: 0 THOUSAND/UL (ref 0–0.2)
IMM GRANULOCYTES NFR BLD AUTO: 0 % (ref 0–2)
LYMPHOCYTES # BLD AUTO: 1.14 THOUSANDS/ΜL (ref 0.6–4.47)
LYMPHOCYTES NFR BLD AUTO: 32 % (ref 14–44)
MAGNESIUM SERPL-MCNC: 1.9 MG/DL (ref 1.6–2.6)
MCH RBC QN AUTO: 28.9 PG (ref 26.8–34.3)
MCHC RBC AUTO-ENTMCNC: 29.9 G/DL (ref 31.4–37.4)
MCV RBC AUTO: 97 FL (ref 82–98)
MONOCYTES # BLD AUTO: 0.45 THOUSAND/ΜL (ref 0.17–1.22)
MONOCYTES NFR BLD AUTO: 13 % (ref 4–12)
NEUTROPHILS # BLD AUTO: 1.81 THOUSANDS/ΜL (ref 1.85–7.62)
NEUTS SEG NFR BLD AUTO: 49 % (ref 43–75)
NRBC BLD AUTO-RTO: 0 /100 WBCS
PLATELET # BLD AUTO: 81 THOUSANDS/UL (ref 149–390)
PMV BLD AUTO: 12.3 FL (ref 8.9–12.7)
POTASSIUM SERPL-SCNC: 3.4 MMOL/L (ref 3.5–5.3)
RBC # BLD AUTO: 3.01 MILLION/UL (ref 3.81–5.12)
SODIUM SERPL-SCNC: 144 MMOL/L (ref 136–145)
WBC # BLD AUTO: 3.6 THOUSAND/UL (ref 4.31–10.16)

## 2019-08-17 PROCEDURE — 99217 PR OBSERVATION CARE DISCHARGE MANAGEMENT: CPT | Performed by: INTERNAL MEDICINE

## 2019-08-17 PROCEDURE — 83735 ASSAY OF MAGNESIUM: CPT | Performed by: PHYSICIAN ASSISTANT

## 2019-08-17 PROCEDURE — 80048 BASIC METABOLIC PNL TOTAL CA: CPT | Performed by: PHYSICIAN ASSISTANT

## 2019-08-17 PROCEDURE — 85025 COMPLETE CBC W/AUTO DIFF WBC: CPT | Performed by: PHYSICIAN ASSISTANT

## 2019-08-17 PROCEDURE — 82140 ASSAY OF AMMONIA: CPT | Performed by: PHYSICIAN ASSISTANT

## 2019-08-17 RX ADMIN — FUROSEMIDE 20 MG: 20 TABLET ORAL at 08:40

## 2019-08-17 RX ADMIN — ENOXAPARIN SODIUM 40 MG: 40 INJECTION SUBCUTANEOUS at 08:40

## 2019-08-17 RX ADMIN — PREGABALIN 200 MG: 100 CAPSULE ORAL at 08:40

## 2019-08-17 RX ADMIN — FOLIC ACID 1 MG: 1 TABLET ORAL at 08:40

## 2019-08-17 RX ADMIN — LACTULOSE 20 G: 10 SOLUTION ORAL at 08:40

## 2019-08-17 RX ADMIN — AMOXICILLIN AND CLAVULANATE POTASSIUM 1 TABLET: 875; 125 TABLET, FILM COATED ORAL at 08:40

## 2019-08-17 RX ADMIN — THIAMINE HCL TAB 100 MG 100 MG: 100 TAB at 08:40

## 2019-08-17 RX ADMIN — PANTOPRAZOLE SODIUM 40 MG: 40 TABLET, DELAYED RELEASE ORAL at 05:40

## 2019-08-17 RX ADMIN — Medication 1 TABLET: at 08:40

## 2019-08-17 RX ADMIN — TOPIRAMATE 25 MG: 25 TABLET, FILM COATED ORAL at 08:41

## 2019-08-17 RX ADMIN — POTASSIUM CHLORIDE 10 MEQ: 750 TABLET, EXTENDED RELEASE ORAL at 08:40

## 2019-08-17 RX ADMIN — RIFAXIMIN 200 MG: 200 TABLET ORAL at 08:41

## 2019-08-17 NOTE — ASSESSMENT & PLAN NOTE
· Confusion resolved  · Ammonia now 23  · Medically stable for discharge  · Encouraged to be compliant with her lactulose and follow up with GI on discharge

## 2019-08-17 NOTE — PROGRESS NOTES
Progress Note - Gris Izquierdo 1960, 61 y o  female MRN: [id number]    Unit/Bed#: [address] [place] [address] Encounter: [id number]    Primary Care Provider: Horace Emerson MD   Date and time admitted to hospital: 8/16/2019  4:42 PM        Hyperammonemia (HCC)  Assessment & Plan  · Ammonia elevated on admission 47  · Ammonia levels now 23 within normal limit  · Confusion resolved, patient has had more than 8 bowel movements within the last 24hrs  · To resume home dose lactulose    * Confusion  Assessment & Plan  · Confusion resolved  · Ammonia now 23  · Medically stable for discharge  · Encouraged to be compliant with her lactulose and follow up with GI on discharge    Dental infection  Assessment & Plan  · Reported current dental infection with follow up oral surg appt scheduled  · Continue Augmentin 875mg    Peripheral neuropathy  Assessment & Plan  · Continue Lyrica and topiramate    Hypokalemia  Assessment & Plan  · Replete potassium     Thrombocytopenia (HCC)  Assessment & Plan  · Chronic in the setting of alcoholism / cirrhosis  · Remains stable  · Will place on Lovenox - chemical DVT PPx    [address]osis with [address]ism [address] [address] )  Assessment & Plan  · Continue Lasix 20mg daily   · Continue 2g sodium diet  · Continue multivitamin, thiamine, and folic acid      VTE Pharmacologic Prophylaxis:   Pharmacologic: Enoxaparin (Lovenox)  Mechanical VTE Prophylaxis in Place: Yes    Patient Centered Rounds: I have performed bedside rounds with nursing staff today  Discussions with Specialists or Other Care Team Provider:  None consulted    Education and Discussions with Family / Patient:  Patient updated    Time Spent for Care: 20 minutes  More than 50% of total time spent on counseling and coordination of care as described above  Current Length of Stay: 0 day(s)    Current Patient Status: Observation   Certification Statement:     Discharge Plan:   Today    Code Status: Level 1 - Full Code      Subjective:   No overnight events  Ammonia noted to have trended down to within normal limits  Patient reports regular loose bowel movements since she had the lactulose  Objective:     Vitals:   Temp (24hrs), Av 4 °F (36 9 °C), Min:97 6 °F (36 4 °C), Max:99 1 °F (37 3 °C)    Temp:  [97 6 °F (36 4 °C)-99 1 °F (37 3 °C)] 97 6 °F (36 4 °C)  HR:  [72-94] 80  Resp:  [16-20] 18  BP: (120-146)/(59-82) 120/70  SpO2:  [99 %-100 %] 100 %  Body mass index is 36 73 kg/m²  Input and Output Summary (last 24 hours): Intake/Output Summary (Last 24 hours) at 2019 1103  Last data filed at 2019 2246  Gross per 24 hour   Intake 1000 ml   Output    Net 1000 ml       Physical Exam:     Physical Exam   Constitutional: She is oriented to person, place, and time  She appears well-developed  No distress  Cardiovascular: Normal rate, regular rhythm, normal heart sounds and intact distal pulses  Exam reveals no gallop and no friction rub  No murmur heard  Pulmonary/Chest: Effort normal and breath sounds normal  No stridor  No respiratory distress  She has no wheezes  She has no rales  She exhibits no tenderness  Abdominal: Soft  Bowel sounds are normal  She exhibits distension (Presence of abdominal hernia)  Neurological: She is alert and oriented to person, place, and time  No cranial nerve deficit  Coordination normal    Skin: Skin is warm       Additional Data:     Labs:    Results from last 7 days   Lab Units 19  0512   WBC Thousand/uL 3 60*   HEMOGLOBIN g/dL 8 7*   HEMATOCRIT % 29 1*   PLATELETS Thousands/uL 81*   NEUTROS PCT % 49   LYMPHS PCT % 32   MONOS PCT % 13*   EOS PCT % 5     Results from last 7 days   Lab Units 19  0512 19  1738   SODIUM mmol/L 144 144   POTASSIUM mmol/L 3 4* 3 6   CHLORIDE mmol/L 114* 113*   CO2 mmol/L 19* 21   BUN mg/dL 10 11   CREATININE mg/dL 1 02 1 03   ANION GAP mmol/L 11 10   CALCIUM mg/dL 8 5 8 7   ALBUMIN g/dL  --  2 8*   TOTAL BILIRUBIN mg/dL  --  1 20*   ALK PHOS U/L  -- 146*   ALT U/L  --  35   AST U/L  --  64*   GLUCOSE RANDOM mg/dL 77 86     Results from last 7 days   Lab Units 08/16/19  1738   INR  1 41*                       * I Have Reviewed All Lab Data Listed Above  * Additional Pertinent Lab Tests Reviewed: Jaimee 66 Admission Reviewed    Imaging:    Imaging Reports Reviewed Today Include:  None done today  Imaging Personally Reviewed by Myself Includes:      Recent Cultures (last 7 days):           Last 24 Hours Medication List:     Current Facility-Administered Medications:  amoxicillin-clavulanate 1 tablet Oral Q12H Silvestre Jorge PA-C    baclofen 10 mg Oral Daily PRN Paolo Holden, PA-C    enoxaparin 40 mg Subcutaneous Daily Paolo Holden, PA-C    folic acid 1 mg Oral Daily Paolo Holden, PA-C    furosemide 20 mg Oral Daily Paolo Holden, PA-C    lactulose 20 g Oral TID Paolo Holden, PA-C    multi-electrolyte 50 mL/hr Intravenous Continuous Paolo Holden, PA-C Last Rate: 50 mL/hr (08/16/19 2246)   multivitamin-minerals 1 tablet Oral Daily Paolo Holden, PA-C    ondansetron 4 mg Intravenous Q6H PRN Paolo Holden, PA-C    pantoprazole 40 mg Oral BID AC Paolo Holden, PA-C    potassium chloride 10 mEq Oral Daily Paolo Holden, PA-C    pregabalin 200 mg Oral BID Paolo Holden, PA-C    rifaximin 200 mg Oral Q12H [address] Paolo Holden, PA-C    thiamine 100 mg Oral Daily Paolo Holden, PA-C    topiramate 25 mg Oral BID Paolo Holden, PA-C         Today, Patient Was Seen By: Edward Aggarwal MD    ** Please Note: Dictation voice to text software may have been used in the creation of this document   **

## 2019-08-17 NOTE — DISCHARGE INSTR - AVS FIRST PAGE
Dear Cici Singh,     It was our pleasure to care for you here at [facility]   It is our hope that we were always able to meet and exceed the expected standards for your care during your stay  You were hospitalized due to confusion after missing your lactulose dosage  On presentation, you had elevated ammonia levels of 43  We started you on lactulose and your confusion resolved, your ammonia levels are now within normal limits  You were cared for on the general medical floor under the service of Kialyn Pollack MD with the [facility] who covers for your primary care physician (PCP), Ton Parks MD, while you were hospitalized  If you have any questions or concerns related to this hospitalization, you may contact us at [phone]  For follow up, we recommend that you follow up with your primary care physician  Please review this entire discharge summary as additional general instructions may be provided later as well  However, at this time we provide for you here, the most important instructions / recommendations at discharge:     · Follow up the primary care physician within 1 week of discharge    · Follow-up the gastroenterologist as scheduled on Friday, 08/23/2019  · Continue taking your medications as prescribed  · Follow-up with hematologist to have your regular IV Venofer infusions      Sincerely,     Kailyn Pollack MD

## 2019-08-17 NOTE — ASSESSMENT & PLAN NOTE
· Continue Lasix 20mg daily   · Continue 2g sodium diet  · Continue multivitamin, thiamine, and folic acid

## 2019-08-17 NOTE — ED NOTES
Patient mentioned tooth pain and needing to take augmentin and an upcoming oral surgery consult  On 8/29/2019       Giancarlo Macias RN  08/16/19 8799

## 2019-08-17 NOTE — UTILIZATION REVIEW
Initial Clinical Review    Admission: Date/Time/Statement: 08/16/19 2100    Orders Placed This Encounter   Procedures    Place in Observation     Standing Status:   Standing     Number of Occurrences:   1     Order Specific Question:   Admitting Physician     Answer:   Kelsy Ruiz [[id number]]     Order Specific Question:   Level of Care     Answer:   Med Surg [[id number]]     ED Arrival Information     Expected Arrival Acuity Means of Arrival Escorted By Service Admission Type    8/16/2019 8/16/2019 16:20 Urgent Walk-In Family Member General Medicine Urgent    Arrival Complaint    MENTAL STATUS CHANGES        Chief Complaint   Patient presents with    Altered Mental Status     Patient presents with three day history of confusion and increased feeling of being tired  Brother with patient and states she has a histroy of cirrhosis and this is how she gets      Assessment/Plan:     [address] [facility] presents to ed from home for evaluation and treatment of intermittent confusion and word finding difficulty  gcs 15 on arrival NIH =0  PMHX of alcohol cirrhosis  On arrival examination shows elevated ammonia level of 47  Treated in ed with lactulose and iv fluids  Admit to observation medical surgical for altered mental status  Plan to monitor mental status,  Repeat ammonia level  Consult neurology if not improvement  Continue iv fluids  Lactulose tid with increase dose titrated to bowel movements  Patient states she no longer drinks alcohol since gi bleed in July    ( incidentally patient has dental infection and is on antibiotic with oral surgery appt already scheduled as op)         ED Triage Vitals   08/16/19 1651 08/16/19 1651 08/16/19 1651 08/16/19 1651 08/16/19 1651   98 6 °F (37 °C) 94 20 121/60 100 %         Pain Score       08/16/19 1651       6       08/16/19 97 1 kg (214 lb)     Additional Vital Signs:     Date/Time  Temp  Pulse  Resp  BP  SpO2  O2 Device   08/17/19 0737  97 6 °F (36 4 °C)  80  18  120/70 100 %  None (Room air)   08/16/19 2300            None (Room air)   08/16/19 2151  98 2 °F (36 8 °C)  79  16  143/82    None (Room air)   08/16/19 2103    88  16  146/66  100 %  None (Room air)   08/16/19 1900    74    126/67  100 %     08/16/19 1815    80  17    100 %     08/16/19 1800    74  20  131/62  100 %     08/16/19 1745    72  20  133/60  99 %     08/16/19 1730    78  20  121/60  100 %     08/16/19 1700    79  20  127/59  100 %       Date and Time Eye Opening Best Verbal Response Best Motor Response Nathan Coma Scale Score   08/17/19 0840 4 5 6 15   08/16/19 2300 4 5 6 15   08/16/19 1728 4 5 6 15         Pertinent Labs/Diagnostic Test Results:     CTA HEAD AND NECK  NO ACUTE FINDING  CHEST X RAY  NO ACUTE FINDING       ECG rate:  71  Rhythm:     Rhythm: sinus rhythm    Conduction:     Conduction: normal    T waves:     T waves: normal    Other findings:     Other findings: prolonged qTc interval      Results from last 7 days   Lab Units 08/17/19  0512 08/16/19  1738 08/12/19  1322   WBC Thousand/uL 3 60* 4 42 3 85*   HEMOGLOBIN g/dL 8 7* 10 2* 9 9*   HEMATOCRIT % 29 1* 34 8 33 1*   PLATELETS Thousands/uL 81* 79* 79*   NEUTROS ABS Thousands/µL 1 81* 2 41 2 20         Results from last 7 days   Lab Units 08/17/19  0512 08/16/19  1738 08/12/19  1322   SODIUM mmol/L 144 144 143   POTASSIUM mmol/L 3 4* 3 6 3 9   CHLORIDE mmol/L 114* 113* 111*   CO2 mmol/L 19* 21 22   ANION GAP mmol/L 11 10 10   BUN mg/dL 10 11 11   CREATININE mg/dL 1 02 1 03 1 26   EGFR ml/min/1 73sq m 60 60 47   CALCIUM mg/dL 8 5 8 7 8 7   MAGNESIUM mg/dL 1 9 1 6  --    PHOSPHORUS mg/dL  --  3 4  --      Results from last 7 days   Lab Units 08/17/19  0643 08/16/19  1826 08/16/19  1738 08/12/19  1322   AST U/L  --   --  64* 63*   ALT U/L  --   --  35 30   ALK PHOS U/L  --   --  146* 122*   TOTAL PROTEIN g/dL  --   --  7 1 6 9   ALBUMIN g/dL  --   --  2 8* 2 6*   TOTAL BILIRUBIN mg/dL  --   --  1 20* 1 30*   AMMONIA umol/L 23 47*  --   --          Results from last 7 days   Lab Units 08/17/19  0512 08/16/19  1738 08/12/19  1322   GLUCOSE RANDOM mg/dL 77 86 74     Results from last 7 days   Lab Units 08/16/19  1738   TROPONIN I ng/mL <0 02         Results from last 7 days   Lab Units 08/16/19  1738   PROTIME seconds 16 9*   INR  1 41*   PTT seconds 36     Results from last 7 days   Lab Units 08/16/19  1738   TSH 3RD GENERATON uIU/mL 2 412     Results from last 7 days   Lab Units 08/12/19  1322   FERRITIN ng/mL 71     Results from last 7 days   Lab Units 08/16/19  1938   CLARITY UA  Clear   COLOR UA  Yellow   SPEC GRAV UA  >=1 030   PH UA  5 0   GLUCOSE UA mg/dl Negative   KETONES UA mg/dl Negative   BLOOD UA  Negative   PROTEIN UA mg/dl Negative   NITRITE UA  Negative   BILIRUBIN UA  Negative   UROBILINOGEN UA E U /dl 0 2   LEUKOCYTES UA  Negative         Results from last 7 days   Lab Units 08/16/19  1937   AMPH/METH  Negative   BARBITURATE UR  Negative   BENZODIAZEPINE UR  Negative   COCAINE UR  Negative   METHADONE URINE  Negative   OPIATE UR  Negative   PCP UR  Negative   THC UR  Negative     Results from last 7 days   Lab Units 08/16/19  1738   ETHANOL LVL mg/dL <3   ACETAMINOPHEN LVL ug/mL <5 1*   SALICYLATE LVL mg/dL <7 5*     ED Treatment:   Medication Administration from 08/16/2019 1620 to 08/16/2019 2143       Date/Time Order Dose Route     08/16/2019 1811 sodium chloride 0 9 % infusion 125 mL/hr Intravenous     08/16/2019 2102 lactulose 20 g/30 mL oral solution 20 g 20 g Oral        Past Medical History:   Diagnosis Date    Alcohol use disorder     Alcoholic hepatitis     Anastomotic ulcer     Anemia     Anxiety     Ascites     Breast cancer ([address] ) 2010    stage 0; bilateral mastectomy (prophylactic on left)    Chronic foot pain     Chronic narcotic dependence ([address] )     pt denies    Chronic pain disorder     Cirrhosis, alcoholic ([address] )     Colitis     Depression     situational    Esophagitis     History of bilateral mastectomy     History of gastric bypass     Hypertension     Liver disease     Morbid obesity ([facility] [address]     Osteopenia     Palpitations     Pancytopenia (HCC)     Peripheral neuropathy     Pernicious anemia     Rosacea      Present on Admission:   Cirrhosis with alcoholism ([facility] [address] )   Hypokalemia   Confusion   Hyperammonemia (HCC)   Thrombocytopenia (HCC)   Dental infection   Peripheral neuropathy      Admitting Diagnosis:   Hyperammonemia (HCC) [E72 20]  Altered mental status [R41 82]  Mental status change resolved [Z86 59]    Age/Sex: 61 y o  female     Admission Orders:    Current Facility-Administered Medications:  amoxicillin-clavulanate 1 tablet Oral Q12H [facility]    baclofen 10 mg Oral Daily PRN    enoxaparin 40 mg Subcutaneous Daily    folic acid 1 mg Oral Daily    furosemide 20 mg Oral Daily    lactulose 20 g Oral TID    multi-electrolyte 50 mL/hr Intravenous Continuous Last Rate: 50 mL/hr (08/16/19 2246)   multivitamin-minerals 1 tablet Oral Daily    ondansetron 4 mg Intravenous Q6H PRN    pantoprazole 40 mg Oral BID AC    potassium chloride 10 mEq Oral Daily    pregabalin 200 mg Oral BID    rifaximin 200 mg Oral Q12H [facility]    thiamine 100 mg Oral Daily    topiramate 25 mg Oral BID        Network Utilization Review Department  Phone: [phone]; Fax [phone]  [email or web address]  ATTENTION: Please call with any questions or concerns to [phone]  and carefully listen to the prompts so that you are directed to the right person  Send all requests for admission clinical reviews, approved or denied determinations and any other requests to fax [phone]   All voicemails are confidential

## 2019-08-17 NOTE — H&P
H&P- Gris Roots 1960, 61 y o  female MRN: [id number]    Unit/Bed#: [address] [place] [address] Encounter: [id number]    Primary Care Provider: Horace Emerson MD   Date and time admitted to hospital: 8/16/2019  4:42 PM        * Confusion  Assessment & Plan  · Patient reports worsening confusion over the course of the past week  · Alcoholism history with known cirrhosis  · Last reported drink beginning of July after sustaining a GI bleed  · Not compliant with lactulose  · Ammonia level elevated to 47 on admission, may be contributing to confusion  · CT Head and Neck obtained in ER without acute findings  · Continue to monitor mental status  · Repeat Ammonia in AM  · Consider Neurology consultation if no improvement with normalization of ammonia level    Hyperammonemia (HCC)  Assessment & Plan  · Ammonia elevated on admission 47  · Suspect most likely cause of reported confusion  · Will order lactulose tid, may need to increase or titrate to BMs    Cirrhosis with alcoholism ([facility] [address] )  Assessment & Plan  · Patient reports was drinking again up until beginning of July when she sustained a GI bleed  · Continue Lasix 20mg daily which was previously 10mg however patient confirms taking 20mg daily  · No longer taking spironolactone  · Continue 2g sodium diet  · Continue multivitamin, thiamine, and folic acid    Hypokalemia  Assessment & Plan  · Replete potassium     Thrombocytopenia (HCC)  Assessment & Plan  · Chronic in the setting of alcoholism / cirrhosis  · Remains stable  · Will place on Lovenox - chemical DVT PPx    Peripheral neuropathy  Assessment & Plan  · Continue Lyrica and topiramate    Dental infection  Assessment & Plan  · Reported current dental infection with follow up oral surg appt scheduled  · Continue Augmentin 875mg      VTE Prophylaxis: Enoxaparin (Lovenox)  / sequential compression device   Code Status: Level 1 Full Code  POLST: POLST form is not discussed and not completed at this time    Discussion with family: Discussed with patient, no family at bedside    Anticipated Length of Stay:  Patient will be admitted on an Observation basis with an anticipated length of stay of  < 2 midnights  Justification for Hospital Stay: confusion evaluation / workup / elevated ammonia level     Total Time for Visit, including Counseling / Coordination of Care: 40 minutes  Greater than 50% of this total time spent on direct patient counseling and coordination of care  Chief Complaint:   I feel confused and tired    History of Present Illness:    Lila Hightower is a 61 y o  female who presented to [address] [place] [address] ER from home with the above chief complaint  Patient reports an extensive PMH of alcohol abuse with cirrhosis, lactulose non-compliance  She has had prior drinking cessation; however had started drinking again end of 2018 through July 2019  She reports again stopping beginning of July after a GI bleed  Patient reports over the past 3-4 days worsening confusion as well as increasing feeling of fatigue  She reports that this has happened in the past when her Ammonia level became elevated  Patient denies headache, dizziness, or any visual changes other than her normal visual change for which she wears corrective lenses  Patient denied recent trauma, slurred speech or weakness  She also denies chest pains, shortness of breath, abdominal pain, nausea and or vomiting  She presented to ER today  Workup performed included CTA Head and neck without acute findings  Lab work revealed sodium 144, potassium 3 6, chloride 113, bicarb 21, BUN 11, creatinine 1 03, glucose 86  AST 64, ALT 35, T bili 1 2  Magnesium 1 6  With an ammonia 47  CBC reveals a hemoglobin 10 2, platelets 79  Patient was ordered lactulose within the emergency department and presented to the medical team for further evaluation and admission  Review of Systems:    Review of Systems   Constitutional: Positive for fatigue   Negative for activity change, appetite change, chills, diaphoresis, fever and unexpected weight change  HENT: Positive for dental problem  Negative for congestion, facial swelling, sinus pressure, sinus pain and trouble swallowing  Eyes: Negative for photophobia and visual disturbance  Respiratory: Negative  Cardiovascular: Positive for leg swelling  Negative for chest pain and palpitations  Gastrointestinal: Negative for abdominal pain, anal bleeding, blood in stool, diarrhea, nausea and vomiting  Endocrine: Negative  Genitourinary: Negative for dysuria, frequency and urgency  Musculoskeletal: Positive for back pain  Negative for arthralgias, gait problem, joint swelling, myalgias, neck pain and neck stiffness  Skin: Negative  Allergic/Immunologic: Negative  Neurological: Negative for dizziness, tremors, seizures, syncope, facial asymmetry, speech difficulty, light-headedness and numbness  Hematological: Negative  Psychiatric/Behavioral: Positive for confusion  Negative for agitation         Past Medical and Surgical History:     Past Medical History:   Diagnosis Date    Alcohol use disorder     Alcoholic hepatitis     Anastomotic ulcer     Anemia     Anxiety     Ascites     [facility] 2010    stage 0; bilateral mastectomy (prophylactic on left)    Chronic foot pain     Chronic narcotic dependence ([facility] [address] )     pt denies    Chronic pain disorder     Cirrhosis, alcoholic (HCC)     Colitis     Depression     situational    Esophagitis     History of bilateral mastectomy     History of gastric bypass     Hypertension     Liver disease     Morbid obesity (HCC)     Osteopenia     Palpitations     Pancytopenia (HCC)     Peripheral neuropathy     Pernicious anemia     Rosacea        Past Surgical History:   Procedure Laterality Date    ABDOMINAL SURGERY      abdominoplasty    BREAST SURGERY      mastectomy    CHOLECYSTECTOMY      ESOPHAGOGASTRODUODENOSCOPY N/A 8/2/2017    Procedure: ESOPHAGOGASTRODUODENOSCOPY (EGD); Surgeon: Trina Davey DO;  Location: [facility];  Service: Gastroenterology    GASTRIC BYPASS      HERNIA REPAIR      MASTECTOMY      GA ESOPHAGOGASTRODUODENOSCOPY TRANSORAL DIAGNOSTIC N/A 5/18/2017    Procedure: ESOPHAGOGASTRODUODENOSCOPY (EGD) with bx;  Surgeon: Basim Taylor MD;  Location: [facility]; Service: Gastroenterology    GA ESOPHAGOGASTRODUODENOSCOPY TRANSORAL DIAGNOSTIC N/A 9/14/2017    Procedure: EGD with bx AND COLONOSCOPY with polypectomy;  Surgeon: Basim Taylor MD;  Location: [facility]; Service: Gastroenterology    DECLAN-EN-Y PROCEDURE  2005       Meds/Allergies:    Prior to Admission medications    Medication Sig Start Date End Date Taking? Authorizing Provider   amoxicillin-clavulanate (AUGMENTIN) 875-125 mg per tablet Take 1 tablet by mouth every 12 (twelve) hours for 7 days 8/14/19 8/21/19 Yes Meredith Cerna PA-C   baclofen 10 mg tablet 1 tab qhs prn back pain/ cramping, and up to TID if needed   8/6/19  Yes Ran Orr PA-C   Calcium Citrate 250 MG TABS Take by mouth daily 500mg, takes 3 daily- 1500mg total   Yes Historical Provider, MD   Cholecalciferol (VITAMIN D3) 5000 units TABS Take 5,000 Units by mouth daily   Yes Historical Provider, MD   folic acid (FOLVITE) 1 mg tablet Take 1 tablet by mouth daily 8/9/17  Yes Jim Bocanegra   furosemide (LASIX) 20 mg tablet Take 0 5 tablets (10 mg total) by mouth daily 7/11/19  Yes Mortimer Bayley, DO   Multiple Vitamins-Minerals (MULTIVITAMIN WITH MINERALS) tablet Take 1 tablet by mouth daily   Yes Historical Provider, MD   pantoprazole (PROTONIX) 40 mg tablet Take 1 tablet (40 mg total) by mouth 2 (two) times a day before meals 7/11/19  Yes Mortimer Bayley, DO   potassium chloride (K-DUR) 10 mEq tablet Take 1 tablet (10 mEq total) by mouth daily 8/6/19  Yes Ran Orr PA-C   pregabalin (LYRICA) 100 mg capsule 200 mg BID 8/6/19  Yes Ran Orr PA-C   thiamine 100 MG tablet Take 1 tablet (100 mg total) by mouth daily 7/12/19  Yes Magdi Murrieta DO   topiramate (TOPAMAX) 25 mg sprinkle capsule 25 mg q a m  And 50 mg q h s  x5 days, then 50 mg b i d  x1 week, then 75 mg b i d  x1 week, then 100 mg b i d  8/6/19  Yes Leoncio Tomas PA-C   XIFAXAN 550 MG tablet Take by mouth every 12 (twelve) hours Pt states not compliant with taking 8/20/18  Yes Historical Provider, MD   spironolactone (ALDACTONE) 25 mg tablet Take 1 tablet (25 mg total) by mouth daily  Patient not taking: Reported on 8/16/2019 7/11/19   Magdi Murrieta DO   pregabalin (LYRICA) 300 MG capsule Take 1 capsule (300 mg total) by mouth 2 (two) times a day  Patient not taking: Reported on 8/16/2019 4/10/19 8/16/19  RENÉ Vega     I have reviewed home medications with patient personally  Allergies:    Allergies   Allergen Reactions    Acetaminophen Other (See Comments)     Liver function, s/p bariatric surgery    Cymbalta [Duloxetine Hcl] GI Intolerance    Duloxetine GI Intolerance       Social History:     Marital Status: /Civil Union   Occupation: Not working  Patient Pre-hospital Living Situation: Lives at home with , reports feeling safe however reports  "isn't nice"  Patient Pre-hospital Level of Mobility: Performs own ADLs  Patient Pre-hospital Diet Restrictions: None; however chart reveals low sodium diet  Substance Use History:   Social History     Substance and Sexual Activity   Alcohol Use Not Currently     Social History     Tobacco Use   Smoking Status Never Smoker   Smokeless Tobacco Never Used     Social History     Substance and Sexual Activity   Drug Use No       Family History:    non-contributory    Physical Exam:     Vitals:   Blood Pressure: 143/82 (08/16/19 2151)  Pulse: 79 (08/16/19 2151)  Temperature: 98 2 °F (36 8 °C) (08/16/19 2151)  Temp Source: Oral (08/16/19 2151)  Respirations: 16 (08/16/19 2151)  Height: 5' 4" (162 6 cm) (08/16/19 2151)  Weight - Scale: 97 1 kg (214 lb) (08/16/19 2151)  SpO2: 100 % (08/16/19 2103)    Physical Exam    General: 61year old female in ER bed 4  She is awake, alert, and oriented, following commands appropriately  HEENT: Normocephalic, atraumatic  PERRL, EOMI, sclera anicteric, conjunctiva pink, no nystagmus, oropharynx patent, membranes dry, tolerating secretions  No facial asymmetry, tongue is midline on protrusion  Neck: supple, trachea midline, no bruits  Heart: RRR without murmur, rub, or gallop  Lungs: clear and equal all fields bilaterally, no wheezing, rales, or rhonchi  Abd: soft, non-tender, no guarding, rebound or peritoneal signs, active BS noted  : no huber  Ext: FROM of upper and lower extremities, no deficits, +1 lower extremity edema  Back: no CVA tenderness  Neuro: GCS 15, NIH 0, conscious, alert, and oriented, non-focal exam just seems to have some difficulties in word finding as takes a little longer; however speech not garbled or slurred    Additional Data:     Lab Results: I have personally reviewed pertinent reports  Results from last 7 days   Lab Units 08/16/19  1738   WBC Thousand/uL 4 42   HEMOGLOBIN g/dL 10 2*   HEMATOCRIT % 34 8   PLATELETS Thousands/uL 79*   NEUTROS PCT % 55   LYMPHS PCT % 31   MONOS PCT % 9   EOS PCT % 4     Results from last 7 days   Lab Units 08/16/19  1738   SODIUM mmol/L 144   POTASSIUM mmol/L 3 6   CHLORIDE mmol/L 113*   CO2 mmol/L 21   BUN mg/dL 11   CREATININE mg/dL 1 03   ANION GAP mmol/L 10   CALCIUM mg/dL 8 7   ALBUMIN g/dL 2 8*   TOTAL BILIRUBIN mg/dL 1 20*   ALK PHOS U/L 146*   ALT U/L 35   AST U/L 64*   GLUCOSE RANDOM mg/dL 86     Results from last 7 days   Lab Units 08/16/19  1738   INR  1 41*                   Imaging: I have personally reviewed pertinent reports  CTA head and neck with and without contrast   Final Result by Alexander Soto MD (08/16 2048)         1  No evidence of acute vascular territorial infarction, intracranial hemorrhage or mass effect     2   No stenosis, dissection or occlusion of the carotid or vertebral arteries or major vessels of the [place]  Workstation performed: [id number]         XR chest 1 view portable   ED Interpretation by Roger Kraus DO (08/16 1820)   No acute infiltrate or cardiomegaly          EKG, Pathology, and Other Studies Reviewed on Admission:   · EKG: Telemetry monitor reviewed noting NSR without ectopy    Allscripts / Epic Records Reviewed: Yes     ** Please Note: This note has been constructed using a voice recognition system   **

## 2019-08-17 NOTE — ASSESSMENT & PLAN NOTE
· Ammonia elevated on admission 47  · Suspect most likely cause of reported confusion  · Will order lactulose tid, may need to increase or titrate to BMs

## 2019-08-17 NOTE — ASSESSMENT & PLAN NOTE
· Patient reports worsening confusion over the course of the past week  · Alcoholism history with known cirrhosis  · Last reported drink beginning of July after sustaining a GI bleed  · Not compliant with lactulose  · Ammonia level elevated to 47 on admission, may be contributing to confusion  · CT Head and Neck obtained in ER without acute findings  · Continue to monitor mental status  · Repeat Ammonia in AM  · Consider Neurology consultation if no improvement with normalization of ammonia level

## 2019-08-17 NOTE — ASSESSMENT & PLAN NOTE
· Reported current dental infection with follow up oral surg appt scheduled  · Continue Augmentin 875mg

## 2019-08-17 NOTE — ASSESSMENT & PLAN NOTE
· Chronic in the setting of alcoholism / cirrhosis  · Remains stable  · Will place on Lovenox - chemical DVT PPx

## 2019-08-17 NOTE — DISCHARGE SUMMARY
Discharge- Yolis Phelan 1960, 61 y o  female MRN: [id number]    Unit/Bed#: [address] [place] [address] Encounter: [id number]    Primary Care Provider: Chetan Lester MD   Date and time admitted to hospital: 8/16/2019  4:42 PM        Hyperammonemia (HCC)  Assessment & Plan  · Ammonia elevated on admission 47  · Ammonia levels now 23 within normal limit  · Confusion resolved, patient has had more than 8 bowel movements within the last 24hrs  · To resume home dose lactulose    * Confusion  Assessment & Plan  · Confusion resolved  · Ammonia now 23  · Medically stable for discharge  · Encouraged to be compliant with her lactulose and follow up with GI on discharge    Dental infection  Assessment & Plan  · Reported current dental infection with follow up oral surg appt scheduled  · Continue Augmentin 875mg    Peripheral neuropathy  Assessment & Plan  · Continue Lyrica and topiramate    Hypokalemia  Assessment & Plan  · Replete potassium     Thrombocytopenia (HCC)  Assessment & Plan  · Chronic in the setting of alcoholism / cirrhosis  · Remains stable  · Will place on Lovenox - chemical DVT PPx    Cirrhosis with alcoholism ([facility] [address] )  Assessment & Plan  · Continue Lasix 20mg daily   · Continue 2g sodium diet  · Continue multivitamin, thiamine, and folic acid      Discharging Physician / Practitioner: Hawa Page MD  PCP: Chetan Lester MD  Admission Date:   Admission Orders (From admission, onward)     Ordered        08/16/19 2100  Place in Observation  Once                   Discharge Date: 08/17/19    Resolved Problems  Date Reviewed: 8/16/2019    None          Consultations During Hospital Stay:  · None    Procedures Performed:   · None    Significant Findings / Test Results:   · Ammonia 43, now 23    Incidental Findings:   · None     Test Results Pending at Discharge (will require follow up):    · None     Outpatient Tests Requested:  · None    Complications:  None    Reason for Admission:  [place] Course:     Aristides Whiting is a 61 y o  female patient with past medical history of alcoholic liver cirrhosis on lactulose who originally presented to the hospital on 8/16/2019 due to confusion and was found to have elevated ammonia levels of 43  Condition noted to start after missing the lactulose dose  She was placed on lactulose TID, and she has had more than 8 bowel movements, confusion has resolved, ammonia level now 23  Please see above list of diagnoses and related plan for additional information  Condition at Discharge: good     Discharge Day Visit / Exam:     * Please refer to separate progress note for these details *    Discussion with Family:  Patient updated    Discharge instructions/Information to patient and family:   See after visit summary for information provided to patient and family  Provisions for Follow-Up Care:  See after visit summary for information related to follow-up care and any pertinent home health orders  Disposition:     Home    For Discharges to [facility] SNF:   · Not Applicable to this Patient - Not Applicable to this Patient    Planned Readmission:  No     Discharge Statement:  I spent 25 minutes discharging the patient  This time was spent on the day of discharge  I had direct contact with the patient on the day of discharge  Greater than 50% of the total time was spent examining patient, answering all patient questions, arranging and discussing plan of care with patient as well as directly providing post-discharge instructions  Additional time then spent on discharge activities  Discharge Medications:  See after visit summary for reconciled discharge medications provided to patient and family        ** Please Note: This note has been constructed using a voice recognition system **

## 2019-08-17 NOTE — ASSESSMENT & PLAN NOTE
· Ammonia elevated on admission 47  · Ammonia levels now 23 within normal limit  · Confusion resolved, patient has had more than 8 bowel movements within the last 24hrs  · To resume home dose lactulose

## 2019-08-18 ENCOUNTER — TELEPHONE (OUTPATIENT)
Dept: MEDSURG UNIT | Facility: HOSPITAL | Age: 59
End: 2019-08-18

## 2019-08-19 ENCOUNTER — TELEPHONE (OUTPATIENT)
Dept: NEUROLOGY | Facility: CLINIC | Age: 59
End: 2019-08-19

## 2019-08-19 DIAGNOSIS — M79.2 NEUROPATHIC PAIN: ICD-10-CM

## 2019-08-19 DIAGNOSIS — B35.1 FUNGAL INFECTION OF NAIL: Primary | ICD-10-CM

## 2019-08-19 LAB
ATRIAL RATE: 71 BPM
P AXIS: 40 DEGREES
PR INTERVAL: 150 MS
QRS AXIS: 29 DEGREES
QRSD INTERVAL: 84 MS
QT INTERVAL: 452 MS
QTC INTERVAL: 491 MS
T WAVE AXIS: 23 DEGREES
VENTRICULAR RATE: 71 BPM

## 2019-08-19 PROCEDURE — 93010 ELECTROCARDIOGRAM REPORT: CPT | Performed by: INTERNAL MEDICINE

## 2019-08-20 ENCOUNTER — HOSPITAL ENCOUNTER (OUTPATIENT)
Dept: INFUSION CENTER | Facility: HOSPITAL | Age: 59
Discharge: HOME | End: 2019-08-20
Payer: OTHER GOVERNMENT

## 2019-08-20 VITALS
HEART RATE: 79 BPM | DIASTOLIC BLOOD PRESSURE: 55 MMHG | SYSTOLIC BLOOD PRESSURE: 102 MMHG | TEMPERATURE: 98.5 F | RESPIRATION RATE: 16 BRPM

## 2019-08-20 DIAGNOSIS — K70.30 ALCOHOLIC CIRRHOSIS OF LIVER WITHOUT ASCITES (HCC): ICD-10-CM

## 2019-08-20 DIAGNOSIS — D50.9 IRON DEFICIENCY ANEMIA, UNSPECIFIED IRON DEFICIENCY ANEMIA TYPE: Primary | ICD-10-CM

## 2019-08-20 PROCEDURE — 96365 THER/PROPH/DIAG IV INF INIT: CPT

## 2019-08-20 RX ORDER — SODIUM CHLORIDE 9 MG/ML
20 INJECTION, SOLUTION INTRAVENOUS ONCE
Status: COMPLETED | OUTPATIENT
Start: 2019-08-20 | End: 2019-08-20

## 2019-08-20 RX ORDER — SODIUM CHLORIDE 9 MG/ML
20 INJECTION, SOLUTION INTRAVENOUS ONCE
Status: CANCELLED | OUTPATIENT
Start: 2019-08-20

## 2019-08-20 RX ORDER — CYANOCOBALAMIN 1000 UG/ML
1000 INJECTION INTRAMUSCULAR; SUBCUTANEOUS ONCE
Status: CANCELLED | OUTPATIENT
Start: 2019-08-21

## 2019-08-20 RX ADMIN — IRON SUCROSE 200 MG: 20 INJECTION, SOLUTION INTRAVENOUS at 14:20

## 2019-08-20 RX ADMIN — SODIUM CHLORIDE 20 ML/HR: 9 INJECTION, SOLUTION INTRAVENOUS at 14:00

## 2019-08-20 NOTE — PLAN OF CARE
Problem: Potential for Falls  Goal: Patient will remain free of falls  Description  INTERVENTIONS:  - Assess patient frequently for physical needs  -  Identify cognitive and physical deficits and behaviors that affect risk of falls  -  [place] fall precautions as indicated by assessment   - Educate patient/family on patient safety including physical limitations  - Instruct patient to call for assistance with activity based on assessment  - Modify environment to reduce risk of injury  - Consider OT/PT consult to assist with strengthening/mobility  Outcome: Progressing     Problem: Knowledge Deficit  Goal: Patient/family/caregiver demonstrates understanding of disease process, treatment plan, medications, and discharge instructions  Description  Complete learning assessment and assess knowledge base    Interventions:  - Provide teaching at level of understanding  - Provide teaching via preferred learning methods  Outcome: Progressing

## 2019-08-22 ENCOUNTER — HOSPITAL ENCOUNTER (OUTPATIENT)
Dept: INFUSION CENTER | Facility: HOSPITAL | Age: 59
Discharge: HOME | End: 2019-08-22
Payer: OTHER GOVERNMENT

## 2019-08-22 VITALS
HEART RATE: 86 BPM | SYSTOLIC BLOOD PRESSURE: 100 MMHG | DIASTOLIC BLOOD PRESSURE: 48 MMHG | RESPIRATION RATE: 18 BRPM | OXYGEN SATURATION: 100 % | TEMPERATURE: 98.5 F

## 2019-08-22 DIAGNOSIS — K70.30 ALCOHOLIC CIRRHOSIS OF LIVER WITHOUT ASCITES (HCC): ICD-10-CM

## 2019-08-22 DIAGNOSIS — D50.9 IRON DEFICIENCY ANEMIA, UNSPECIFIED IRON DEFICIENCY ANEMIA TYPE: Primary | ICD-10-CM

## 2019-08-22 PROCEDURE — 96372 THER/PROPH/DIAG INJ SC/IM: CPT

## 2019-08-22 PROCEDURE — 96365 THER/PROPH/DIAG IV INF INIT: CPT

## 2019-08-22 RX ORDER — TOBRAMYCIN AND DEXAMETHASONE 3; 1 MG/ML; MG/ML
1 SUSPENSION/ DROPS OPHTHALMIC 3 TIMES DAILY
COMMUNITY

## 2019-08-22 RX ORDER — SODIUM CHLORIDE 9 MG/ML
20 INJECTION, SOLUTION INTRAVENOUS ONCE
Status: CANCELLED | OUTPATIENT
Start: 2019-08-28

## 2019-08-22 RX ORDER — CYANOCOBALAMIN 1000 UG/ML
1000 INJECTION INTRAMUSCULAR; SUBCUTANEOUS ONCE
Status: CANCELLED | OUTPATIENT
Start: 2019-08-23

## 2019-08-22 RX ORDER — CYANOCOBALAMIN 1000 UG/ML
1000 INJECTION INTRAMUSCULAR; SUBCUTANEOUS ONCE
Status: COMPLETED | OUTPATIENT
Start: 2019-08-22 | End: 2019-08-22

## 2019-08-22 RX ORDER — SODIUM CHLORIDE 9 MG/ML
20 INJECTION, SOLUTION INTRAVENOUS ONCE
Status: COMPLETED | OUTPATIENT
Start: 2019-08-22 | End: 2019-08-22

## 2019-08-22 RX ADMIN — SODIUM CHLORIDE 20 ML/HR: 0.9 INJECTION, SOLUTION INTRAVENOUS at 14:02

## 2019-08-22 RX ADMIN — IRON SUCROSE 200 MG: 20 INJECTION, SOLUTION INTRAVENOUS at 14:02

## 2019-08-22 RX ADMIN — CYANOCOBALAMIN 1000 MCG: 1000 INJECTION, SOLUTION INTRAMUSCULAR at 14:17

## 2019-08-22 NOTE — PLAN OF CARE
Problem: Potential for Falls  Goal: Patient will remain free of falls  Description  INTERVENTIONS:  - Assess patient frequently for physical needs  -  Identify cognitive and physical deficits and behaviors that affect risk of falls  -  [place] fall precautions as indicated by assessment   - Educate patient/family on patient safety including physical limitations  - Instruct patient to call for assistance with activity based on assessment  - Modify environment to reduce risk of injury  - Consider OT/PT consult to assist with strengthening/mobility  Outcome: Progressing     Problem: Knowledge Deficit  Goal: Patient/family/caregiver demonstrates understanding of disease process, treatment plan, medications, and discharge instructions  Description  Complete learning assessment and assess knowledge base    Interventions:  - Provide teaching at level of understanding  - Provide teaching via preferred learning methods  Outcome: Progressing

## 2019-08-26 ENCOUNTER — TELEPHONE (OUTPATIENT)
Dept: GASTROENTEROLOGY | Facility: CLINIC | Age: 59
End: 2019-08-26

## 2019-08-26 NOTE — TELEPHONE ENCOUNTER
I spoke to the nurse and she is aware, the patient told her that the medication came in the mail and she started taking it only 4-7 days ago  So we don't know when this med was taking off her list  She has an appt with Dr Harrison Carroll this week

## 2019-08-26 NOTE — TELEPHONE ENCOUNTER
Melony pt    Visiting nurse - shweta - would like to know if the pt should be on the medicaiton lactulose   Please advise [phone]

## 2019-08-27 ENCOUNTER — OFFICE VISIT (OUTPATIENT)
Dept: PAIN MEDICINE | Facility: CLINIC | Age: 59
End: 2019-08-27
Payer: OTHER GOVERNMENT

## 2019-08-27 VITALS
SYSTOLIC BLOOD PRESSURE: 108 MMHG | HEIGHT: 64 IN | HEART RATE: 83 BPM | BODY MASS INDEX: 36.37 KG/M2 | WEIGHT: 213 LBS | DIASTOLIC BLOOD PRESSURE: 60 MMHG

## 2019-08-27 DIAGNOSIS — R41.0 CONFUSION: ICD-10-CM

## 2019-08-27 DIAGNOSIS — M54.50 LEFT-SIDED LOW BACK PAIN WITHOUT SCIATICA, UNSPECIFIED CHRONICITY: Primary | ICD-10-CM

## 2019-08-27 DIAGNOSIS — K76.9 LIVER DISEASE: ICD-10-CM

## 2019-08-27 DIAGNOSIS — G62.1 ALCOHOL-INDUCED POLYNEUROPATHY (HCC): ICD-10-CM

## 2019-08-27 DIAGNOSIS — K70.30 ALCOHOLIC CIRRHOSIS OF LIVER WITHOUT ASCITES (HCC): ICD-10-CM

## 2019-08-27 PROCEDURE — 99214 OFFICE O/P EST MOD 30 MIN: CPT | Performed by: ANESTHESIOLOGY

## 2019-08-27 RX ORDER — METHYLPREDNISOLONE 4 MG/1
TABLET ORAL
Qty: 21 TABLET | Refills: 0 | Status: SHIPPED | OUTPATIENT
Start: 2019-08-27 | End: 2019-09-25

## 2019-08-27 NOTE — PROGRESS NOTES
Assessment  1  Left-sided low back pain without sciatica, unspecified chronicity    2  Alcoholic cirrhosis of liver without ascites ([facility] )    3  Liver disease    4  Al[facility]-induced polyneuropathy ([facility] )    5  Confusion        Plan    At this point the patient's pain persists despite time, relative rest, activity modification, and nonsteroidal anti-inflammatories  Her pain is significantly interfering with her daily living activities  I will start her on a titrating dose of oral methylprednisolone to address any inflammatory component of the patient's pain  She understands she should not take nonsteroidal anti-inflammatories until she is finished with this steroid  If she has any problems or questions she will give us a call  She is due to have an EMG in the near future  I did discuss with her secondary to her liver disease her increased PTT/INR and decreased plate level that she most likely will not be able to have a neuro axial technique such as an epidural steroid injection  Does have a follow-up with us in approximately five weeks time  My impressions and treatment recommendations were discussed in detail with the patient who verbalized understanding and had no further questions  Discharge instructions were provided  I personally saw and examined the patient and I agree with the above discussed plan of care  25 minutes was spent with the patient greater than 50% of the time counseling/coordinating care  New Medications Ordered This Visit   Medications    methylPREDNISolone 4 MG tablet therapy pack     Sig: Use as directed on package     Dispense:  21 tablet     Refill:  0       History of Present Illness    Stacey Peres is a 61 y o  female who somewhat confusing today's visit  However she does have left-sided low back pain radiating down the posterior aspect of her left leg which is 6/10 on the visual analog scale is constant describes dull achy constant pressure-like    She is due to have any EMG in the near future  Her pain is almost completely interfering with her daily living activities  I have personally reviewed and/or updated the patient's past medical history, past surgical history, family history, social history, current medications, allergies, and vital signs today  Review of Systems   Constitutional: Negative for fever and unexpected weight change  HENT: Negative for trouble swallowing  Eyes: Negative for visual disturbance  Respiratory: Negative for shortness of breath and wheezing  Cardiovascular: Negative for chest pain and palpitations  Gastrointestinal: Negative for constipation, diarrhea, nausea and vomiting  Endocrine: Negative for cold intolerance, heat intolerance and polydipsia  Genitourinary: Negative for difficulty urinating and frequency  Musculoskeletal: Positive for gait problem (difficulty walking )  Negative for arthralgias, joint swelling and myalgias  Skin: Negative for rash  Neurological: Negative for dizziness, seizures, syncope, weakness and headaches  Hematological: Does not bruise/bleed easily  Psychiatric/Behavioral: Negative for dysphoric mood  All other systems reviewed and are negative        Patient Active Problem List   Diagnosis    Chronic anemia    Encephalopathy, portal systemic (HCC)    [facility]    History of alcohol abuse    Gastrointestinal hemorrhage associated with anorectal source    History of gastric bypass    Thrombocytopenia (HCC)    Septic shock due to undetermined organism ([facility] )    Ascites    Right leg pain    Hypokalemia    Acute kidney injury ([facility] )    Alcoholic hepatitis    Anastomotic ulcer    Breast cancer (HCC)    Chronic anxiety    Chronic depression    Chronic foot pain    Chronic hand pain    Deconditioned low back    Dyspnea    Fatty liver    Foot fracture    Iron (Fe) deficiency anemia    Liver disease    Liver lesion    Pain syndrome, chronic    Peripheral neuropathy    Rosacea    Ulcers, marginal    Ventral hernia without obstruction or gangrene    Wound of abdomen    Cirrhosis of liver without ascites (HCC)    Pancytopenia (HCC)    ETOH abuse    History of bariatric surgery    Neuropathic pain    Fungal infection of nail    Low back pain    Confusion    Hyperammonemia (HCC)    Dental infection       Past Medical History:   Diagnosis Date    Alcohol use disorder     Alcoholic hepatitis     Anastomotic ulcer     Anemia     Anxiety     Ascites     Breast cancer ([facility] ) 2010    stage 0; bilateral mastectomy (prophylactic on left)    Chronic foot pain     Chronic narcotic dependence (HCC)     pt denies    Chronic pain disorder     Cirrhosis, alcoholic (HCC)     Colitis     Depression     situational    Esophagitis     History of bilateral mastectomy     History of gastric bypass     Hypertension     Liver disease     Morbid obesity (HCC)     Osteopenia     Palpitations     Pancytopenia (HCC)     Peripheral neuropathy     Pernicious anemia     Rosacea        Past Surgical History:   Procedure Laterality Date    ABDOMINAL SURGERY      abdominoplasty    BREAST SURGERY      mastectomy    CHOLECYSTECTOMY      ESOPHAGOGASTRODUODENOSCOPY N/A 8/2/2017    Procedure: ESOPHAGOGASTRODUODENOSCOPY (EGD); Surgeon: Joshua Daniels DO;  Location:  MAIN OR;  Service: Gastroenterology    GASTRIC BYPASS      HERNIA REPAIR      MASTECTOMY      MT ESOPHAGOGASTRODUODENOSCOPY TRANSORAL DIAGNOSTIC N/A 5/18/2017    Procedure: ESOPHAGOGASTRODUODENOSCOPY (EGD) with bx;  Surgeon: Coy Alegria MD;  Location: [facility]; Service: Gastroenterology    MT ESOPHAGOGASTRODUODENOSCOPY TRANSORAL DIAGNOSTIC N/A 9/14/2017    Procedure: EGD with bx AND COLONOSCOPY with polypectomy;  Surgeon: Coy Alegria MD;  Location: [facility];   Service: Gastroenterology    DECLAN-EN-Y PROCEDURE  2005       Family History   Problem Relation Age of Onset    Diabetes Mother     Alzheimer's disease Mother     Kidney disease Father     Hypertension Father     Obesity Sister     Fibrocystic breast disease Sister     Breast cancer Sister 62    Colon cancer Neg Hx     Uterine cancer Neg Hx     Ovarian cancer Neg Hx        Social History     Occupational History    Not on file   Tobacco Use    Smoking status: Never Smoker    Smokeless tobacco: Never Used   Substance and Sexual Activity    Alcohol use: Not Currently    Drug use: No    Sexual activity: Not Currently     Partners: Male       Current Outpatient Medications on File Prior to Visit   Medication Sig    baclofen 10 mg tablet 1 tab qhs prn back pain/ cramping, and up to TID if needed   Calcium Citrate 250 MG TABS Take by mouth daily 500mg, takes 3 daily- 1500mg total    Cholecalciferol (VITAMIN D3) 5000 units TABS Take 5,000 Units by mouth daily    folic acid (FOLVITE) 1 mg tablet Take 1 tablet by mouth daily    furosemide (LASIX) 20 mg tablet Take 0 5 tablets (10 mg total) by mouth daily    Multiple Vitamins-Minerals (MULTIVITAMIN WITH MINERALS) tablet Take 1 tablet by mouth daily    pantoprazole (PROTONIX) 40 mg tablet Take 1 tablet (40 mg total) by mouth 2 (two) times a day before meals    potassium chloride (K-DUR) 10 mEq tablet Take 1 tablet (10 mEq total) by mouth daily    pregabalin (LYRICA) 100 mg capsule 200 mg BID    spironolactone (ALDACTONE) 25 mg tablet Take 1 tablet (25 mg total) by mouth daily    thiamine 100 MG tablet Take 1 tablet (100 mg total) by mouth daily    tobramycin-dexamethasone (TOBRADEX) ophthalmic suspension Administer 1 drop to the right eye 3 (three) times a day    topiramate (TOPAMAX) 25 mg sprinkle capsule 25 mg q a m   And 50 mg q h s  x5 days, then 50 mg b i d  x1 week, then 75 mg b i d  x1 week, then 100 mg b i d     XIFAXAN 550 MG tablet Take by mouth every 12 (twelve) hours Pt states not compliant with taking     No current facility-administered medications on file prior to visit  Allergies   Allergen Reactions    Acetaminophen Other (See Comments)     Liver function, s/p bariatric surgery    Cymbalta [Duloxetine Hcl] GI Intolerance    Duloxetine GI Intolerance       Physical Exam    /60   Pulse 83   Ht 5' 4" (1 626 m)   Wt 96 6 kg (213 lb)   BMI 36 56 kg/m²     Constitutional: normal, well developed, well nourished, alert, in no distress and non-toxic and no overt pain behavior   and obese  Eyes: anicteric  HEENT: grossly intact  Neck: supple, symmetric, trachea midline and no masses   Pulmonary:even and unlabored  Cardiovascular:No edema or pitting edema present  Skin:Normal without rashes or lesions and well hydrated  Psychiatric:  Somewhat confused  Neurologic:Cranial Nerves II-XII grossly intact  Musculoskeletal:normal

## 2019-08-28 ENCOUNTER — HOSPITAL ENCOUNTER (OUTPATIENT)
Dept: INFUSION CENTER | Facility: HOSPITAL | Age: 59
Discharge: HOME | End: 2019-08-28
Payer: OTHER GOVERNMENT

## 2019-08-28 ENCOUNTER — TELEPHONE (OUTPATIENT)
Dept: GASTROENTEROLOGY | Facility: AMBULARY SURGERY CENTER | Age: 59
End: 2019-08-28

## 2019-08-28 VITALS
SYSTOLIC BLOOD PRESSURE: 132 MMHG | HEART RATE: 103 BPM | OXYGEN SATURATION: 98 % | DIASTOLIC BLOOD PRESSURE: 68 MMHG | TEMPERATURE: 98.3 F | RESPIRATION RATE: 18 BRPM

## 2019-08-28 DIAGNOSIS — K70.30 ALCOHOLIC CIRRHOSIS OF LIVER WITHOUT ASCITES (HCC): ICD-10-CM

## 2019-08-28 DIAGNOSIS — D50.9 IRON DEFICIENCY ANEMIA, UNSPECIFIED IRON DEFICIENCY ANEMIA TYPE: Primary | ICD-10-CM

## 2019-08-28 PROCEDURE — 96365 THER/PROPH/DIAG IV INF INIT: CPT

## 2019-08-28 RX ORDER — SODIUM CHLORIDE 9 MG/ML
20 INJECTION, SOLUTION INTRAVENOUS ONCE
Status: CANCELLED | OUTPATIENT
Start: 2019-08-30

## 2019-08-28 RX ORDER — SODIUM CHLORIDE 9 MG/ML
20 INJECTION, SOLUTION INTRAVENOUS ONCE
Status: COMPLETED | OUTPATIENT
Start: 2019-08-28 | End: 2019-08-28

## 2019-08-28 RX ORDER — CYANOCOBALAMIN 1000 UG/ML
1000 INJECTION INTRAMUSCULAR; SUBCUTANEOUS ONCE
Status: CANCELLED | OUTPATIENT
Start: 2019-08-29

## 2019-08-28 RX ADMIN — IRON SUCROSE 200 MG: 20 INJECTION, SOLUTION INTRAVENOUS at 13:44

## 2019-08-28 RX ADMIN — SODIUM CHLORIDE 20 ML/HR: 9 INJECTION, SOLUTION INTRAVENOUS at 13:30

## 2019-08-28 NOTE — PLAN OF CARE
Problem: Potential for Falls  Goal: Patient will remain free of falls  Description  INTERVENTIONS:  - Assess patient frequently for physical needs  -  Identify cognitive and physical deficits and behaviors that affect risk of falls  -  [place] fall precautions as indicated by assessment   - Educate patient/family on patient safety including physical limitations  - Instruct patient to call for assistance with activity based on assessment  - Modify environment to reduce risk of injury  - Consider OT/PT consult to assist with strengthening/mobility  Outcome: Progressing     Problem: Knowledge Deficit  Goal: Patient/family/caregiver demonstrates understanding of disease process, treatment plan, medications, and discharge instructions  Description  Complete learning assessment and assess knowledge base    Interventions:  - Provide teaching at level of understanding  - Provide teaching via preferred learning methods  Outcome: Progressing

## 2019-08-28 NOTE — PROGRESS NOTES
Pt here for Venofer infusion thru PIV Lt forearm  Ángel well  Pt c/o back pain today, takes no meds for it  Unsteady on her feet, encouraged her to use cane or walker next time she comes  PIV removed, dsd applied  Disch amb to home

## 2019-08-28 NOTE — TELEPHONE ENCOUNTER
Message sent to Dr Nyla More to update [facility] Liver transplant status  Evaluation ended due to relapse with alcohol

## 2019-08-29 ENCOUNTER — OFFICE VISIT (OUTPATIENT)
Dept: GASTROENTEROLOGY | Facility: MEDICAL CENTER | Age: 59
End: 2019-08-29
Payer: OTHER GOVERNMENT

## 2019-08-29 VITALS
BODY MASS INDEX: 37.56 KG/M2 | DIASTOLIC BLOOD PRESSURE: 80 MMHG | WEIGHT: 220 LBS | HEART RATE: 90 BPM | SYSTOLIC BLOOD PRESSURE: 130 MMHG | TEMPERATURE: 97.8 F | HEIGHT: 64 IN

## 2019-08-29 DIAGNOSIS — K62.5 GASTROINTESTINAL HEMORRHAGE ASSOCIATED WITH ANORECTAL SOURCE: ICD-10-CM

## 2019-08-29 DIAGNOSIS — D61.818 PANCYTOPENIA (HCC): ICD-10-CM

## 2019-08-29 DIAGNOSIS — K70.11 ASCITES DUE TO ALCOHOLIC HEPATITIS: Chronic | ICD-10-CM

## 2019-08-29 DIAGNOSIS — F10.11 HISTORY OF ALCOHOL ABUSE: Primary | Chronic | ICD-10-CM

## 2019-08-29 DIAGNOSIS — K28.9 ANASTOMOTIC ULCER: ICD-10-CM

## 2019-08-29 DIAGNOSIS — R41.0 CONFUSION: ICD-10-CM

## 2019-08-29 DIAGNOSIS — K92.2 ACUTE UPPER GASTROINTESTINAL BLEEDING: ICD-10-CM

## 2019-08-29 DIAGNOSIS — K74.60 CIRRHOSIS OF LIVER WITHOUT ASCITES, UNSPECIFIED HEPATIC CIRRHOSIS TYPE (HCC): ICD-10-CM

## 2019-08-29 DIAGNOSIS — D50.0 IRON DEFICIENCY ANEMIA DUE TO CHRONIC BLOOD LOSS: ICD-10-CM

## 2019-08-29 PROCEDURE — 99215 OFFICE O/P EST HI 40 MIN: CPT | Performed by: INTERNAL MEDICINE

## 2019-08-29 RX ORDER — SPIRONOLACTONE 25 MG/1
25 TABLET ORAL DAILY
Qty: 30 TABLET | Refills: 0 | Status: SHIPPED | OUTPATIENT
Start: 2019-08-29 | End: 2019-09-06 | Stop reason: SDUPTHER

## 2019-08-29 RX ORDER — LACTULOSE 10 G/10G
10 SOLUTION ORAL 3 TIMES DAILY
Qty: 30 EACH | Refills: 7 | Status: SHIPPED | OUTPATIENT
Start: 2019-08-29

## 2019-08-29 RX ORDER — RIFAXIMIN 550 MG/1
550 TABLET ORAL EVERY 12 HOURS SCHEDULED
Qty: 180 TABLET | Refills: 4 | Status: SHIPPED | OUTPATIENT
Start: 2019-08-29 | End: 2019-09-06 | Stop reason: SDUPTHER

## 2019-08-29 RX ORDER — PANTOPRAZOLE SODIUM 20 MG/1
20 TABLET, DELAYED RELEASE ORAL
Qty: 180 TABLET | Refills: 1 | Status: SHIPPED | OUTPATIENT
Start: 2019-08-29 | End: 2019-09-06 | Stop reason: SDUPTHER

## 2019-08-29 NOTE — PATIENT INSTRUCTIONS
Please restart Aldactone  Continue Lasix 20 mg  Low-sodium diet  Restart lactulose with a goal of 2-3 bowel movements daily  Continue rifaximin  Check hemoglobin  Further management based on repeat hemoglobin

## 2019-08-29 NOTE — PROGRESS NOTES
[facility] - Outpatient Follow-up Note  Yolis Phelan 61 y o  female MRN: [id number]  Encounter: [id number]          ASSESSMENT AND PLAN:      1  Ascites due to alcoholic hepatitis  2  History of alcohol abuse  3  Gastrointestinal hemorrhage associated with anorectal source  4  Anastomotic ulcer  5  Pancytopenia ([address] )  6  Cirrhosis of liver without ascites, unspecified hepatic cirrhosis type (HCC)  - spironolactone (ALDACTONE) 25 mg tablet; Take 1 tablet (25 mg total) by mouth daily  Dispense: 30 tablet; Refill: 0    7  Confusion  - XIFAXAN 550 MG tablet; Take 1 tablet (550 mg total) by mouth every 12 (twelve) hours for 90 days Pt states not compliant with taking  Dispense: 180 tablet; Refill: 4  - lactulose (CEPHULAC) 10 g packet; Take 1 packet (10 g total) by mouth 3 (three) times a day Titrate up or down to obtain goal of 2-3 bowel movements per day  Dispense: 30 each; Refill: 7    8  Acute upper gastrointestinal bleeding  - pantoprazole (PROTONIX) 20 mg tablet; Take 1 tablet (20 mg total) by mouth 2 (two) times a day before meals for 90 days  Dispense: 180 tablet; Refill: 1    9  Iron deficiency anemia due to chronic blood loss  - CBC and differential; Future    She is a 59-year-old female presents here for follow-up visit  She has had recent hospitalization due to upper GI bleed secondary to anastomotic ulceration required 1 unit of packed red blood cell  This led to confusion and hepatic encephalopathy  She has also relapse and actively consuming alcohol  She recently has decided to be abstinent from all alcohol use again  She was being worked up for liver transplant  at [facility] but has been taken off due to relapse and alcohol use  She has been under lot of stress likely related to social responsibilities  She is in the process of building a house  Cirrhosis is complicated by thrombocytopenia, ascites, edema, hepatic encephalopathy    She has history of gastric bypass  She is currently taking Lasix 20 mg and not taking spironolactone  She is also taking lactulose and rifaximin  Most recent hemoglobin on August 17 was approximately 8 7 lower than hemoglobin 9-10  She was recently identified for an upper GI bleed required 1 unit of packed red blood cell and 3 units of platelets  She denies any signs overt bleeding at this time  She denies NSAID use  I would like to repeat CBC for further evaluation of her hemoglobin  If CBC is much lower I will recommend repeat endoscopy in even going to the ER  I have emphasize low-sodium diet as this has been very difficult for her   -she is up to date on [facility]  screening with recent CT scan  on 07/06/2019  I would plan for repeat MRI in the next 3-6 months due to previous history of 6 mm lesion which was identified on MRI  -we can consider endoscopic evaluation approximately a year unless she has decompensation  -we have discussed continuing Lasix 20 mg and considering up titrating it but she is currently more interested in the avoiding sodium  -I also discuss restarting spironolactone 25 mg and will up titrate to 50 mg  -importance of alcohol abstinence  -continuing lactulose and rifaximin  -I will order MELD labs prior to her next visit  -reviewed her recent CT scan and spent over an hour with the patient  Also spent additional 30 minutes coordinating care  ______________________________________________________________________    SUBJECTIVE:      She presents here for follow-up after recent hospitalization for upper GI bleed  She currently denies any overt signs of bleeding  There is no evidence of hepatic encephalopathy at this time either  She does have lower extremity edema this is likely secondary to noncompliance with low sodium intake  She has been trying to avoid alcohol intake and plans to be abstinent from all alcohol use    She was unfortunately removed from transplant evaluation workup due to relapse with alcohol  REVIEW OF SYSTEMS IS OTHERWISE NEGATIVE  Historical Information   Past Medical History:   Diagnosis Date    Alcohol use disorder     Alcoholic hepatitis     Anastomotic ulcer     Anemia     Anxiety     Ascites     Breast cancer ([facility] ) 2010    stage 0; bilateral mastectomy (prophylactic on left)    Chronic foot pain     Chronic narcotic dependence ([facility] )     pt denies    Chronic pain disorder     Cirrhosis, alcoholic (HCC)     Colitis     Depression     situational    Esophagitis     History of bilateral mastectomy     History of gastric bypass     Hypertension     Liver disease     Morbid obesity (HCC)     Osteopenia     Palpitations     Pancytopenia (HCC)     Peripheral neuropathy     Pernicious anemia     Rosacea      Past Surgical History:   Procedure Laterality Date    ABDOMINAL SURGERY      abdominoplasty    BREAST SURGERY      mastectomy    CHOLECYSTECTOMY      ESOPHAGOGASTRODUODENOSCOPY N/A 8/2/2017    Procedure: ESOPHAGOGASTRODUODENOSCOPY (EGD); Surgeon: Finis Goltz, DO;  Location:  MAIN OR;  Service: Gastroenterology    GASTRIC BYPASS      HERNIA REPAIR      MASTECTOMY      [place] ESOPHAGOGASTRODUODENOSCOPY TRANSORAL DIAGNOSTIC N/A 5/18/2017    Procedure: ESOPHAGOGASTRODUODENOSCOPY (EGD) with bx;  Surgeon: Ana Palacios MD;  [facility]; Service: Gastroenterology    OK ESOPHAGOGASTRODUODENOSCOPY TRANSORAL DIAGNOSTIC N/A 9/14/2017    Procedure: EGD with bx AND COLONOSCOPY with polypectomy;  Surgeon: Ana Palacios MD;  Location: [facility];   Service: Gastroenterology    DECLAN-EN-Y PROCEDURE  2005     Social History   Social History     Substance and Sexual Activity   Alcohol Use Not Currently     Social History     Substance and Sexual Activity   Drug Use No     Social History     Tobacco Use   Smoking Status Never Smoker   Smokeless Tobacco Never Used     Family History   Problem Relation Age of Onset    Diabetes Mother     Alzheimer's disease Mother     Kidney disease Father     Hypertension Father     Obesity Sister     Fibrocystic breast disease Sister     Breast cancer Sister 62    Colon cancer Neg Hx     Uterine cancer Neg Hx     Ovarian cancer Neg Hx        Meds/Allergies       Current Outpatient Medications:     baclofen 10 mg tablet    Calcium Citrate 250 MG TABS    Cholecalciferol (VITAMIN D3) 5000 units TABS    folic acid (FOLVITE) 1 mg tablet    furosemide (LASIX) 20 mg tablet    lactulose (CEPHULAC) 10 g packet    methylPREDNISolone 4 MG tablet therapy pack    Multiple Vitamins-Minerals (MULTIVITAMIN WITH MINERALS) tablet    pantoprazole (PROTONIX) 20 mg tablet    potassium chloride (K-DUR) 10 mEq tablet    pregabalin (LYRICA) 100 mg capsule    spironolactone (ALDACTONE) 25 mg tablet    thiamine 100 MG tablet    tobramycin-dexamethasone (TOBRADEX) ophthalmic suspension    topiramate (TOPAMAX) 25 mg sprinkle capsule    XIFAXAN 550 MG tablet  No current facility-administered medications for this visit  Allergies   Allergen Reactions    Acetaminophen Other (See Comments)     Liver function, s/p bariatric surgery    Cymbalta [Duloxetine Hcl] GI Intolerance    Duloxetine GI Intolerance           Objective     Blood pressure 130/80, pulse 90, temperature 97 8 °F (36 6 °C), temperature source Tympanic, height 5' 4" (1 626 m), weight 99 8 kg (220 lb), not currently breastfeeding  Body mass index is 37 76 kg/m²  PHYSICAL EXAM:      General Appearance:   Alert, cooperative, no distress   HEENT:   Normocephalic, atraumatic, anicteric      Neck:  Supple, symmetrical, trachea midline   Lungs:   Clear to auscultation bilaterally; no rales, rhonchi or wheezing; respirations unlabored    Heart[de-identified]   Regular rate and rhythm; no murmur, rub, or gallop     Abdomen:   Soft, non-tender, non-distended; normal bowel sounds; no masses, no organomegaly    Genitalia:   Deferred    Rectal:   Deferred    Extremities:  No cyanosis, clubbing or edema    Pulses:  2+ and symmetric    Skin:  No jaundice, rashes, or lesions    Lymph nodes:  No palpable cervical lymphadenopathy        Lab Results:   No visits with results within 1 Day(s) from this visit     Latest known visit with results is:   Admission on 08/16/2019, Discharged on 08/17/2019   Component Date Value    WBC 08/16/2019 4 42     RBC 08/16/2019 3 50*    Hemoglobin 08/16/2019 10 2*    Hematocrit 08/16/2019 34 8     MCV 08/16/2019 99*    MCH 08/16/2019 29 1     MCHC 08/16/2019 29 3*    RDW 08/16/2019 16 1*    MPV 08/16/2019 12 6     Platelets 36/58/4629 79*    nRBC 08/16/2019 0     Neutrophils Relative 08/16/2019 55     Immat GRANS % 08/16/2019 0     Lymphocytes Relative 08/16/2019 31     Monocytes Relative 08/16/2019 9     Eosinophils Relative 08/16/2019 4     Basophils Relative 08/16/2019 1     Neutrophils Absolute 08/16/2019 2 41     Immature Grans Absolute 08/16/2019 0 01     Lymphocytes Absolute 08/16/2019 1 38     Monocytes Absolute 08/16/2019 0 41     Eosinophils Absolute 08/16/2019 0 17     Basophils Absolute 08/16/2019 0 04     Sodium 08/16/2019 144     Potassium 08/16/2019 3 6     Chloride 08/16/2019 113*    CO2 08/16/2019 21     ANION GAP 08/16/2019 10     BUN 08/16/2019 11     Creatinine 08/16/2019 1 03     Glucose 08/16/2019 86     Calcium 08/16/2019 8 7     AST 08/16/2019 64*    ALT 08/16/2019 35     Alkaline Phosphatase 08/16/2019 146*    Total Protein 08/16/2019 7 1     Albumin 08/16/2019 2 8*    Total Bilirubin 08/16/2019 1 20*    eGFR 08/16/2019 60     Protime 08/16/2019 16 9*    INR 08/16/2019 1 41*    PTT 08/16/2019 36     TSH 3RD GENERATON 08/16/2019 2 412     Ammonia 08/16/2019 47*    Troponin I 08/16/2019 <0 02     Amph/Meth UR 08/16/2019 Negative     Barbiturate Ur 08/16/2019 Negative     Benzodiazepine Urine 08/16/2019 Negative     Cocaine Urine 08/16/2019 Negative     Methadone Urine 08/16/2019 Negative     Opiate Urine 08/16/2019 Negative     PCP Ur 08/16/2019 Negative     THC Urine 08/16/2019 Negative     Color, UA 08/16/2019 Yellow     Clarity, UA 08/16/2019 Clear     Specific Gravity, UA 08/16/2019 >=1 030     pH, UA 08/16/2019 5 0     Leukocytes, UA 08/16/2019 Negative     Nitrite, UA 08/16/2019 Negative     Protein, UA 08/16/2019 Negative     Glucose, UA 08/16/2019 Negative     Ketones, UA 08/16/2019 Negative     Urobilinogen, UA 08/16/2019 0 2     Bilirubin, UA 08/16/2019 Negative     Blood, UA 08/16/2019 Negative     Magnesium 08/16/2019 1 6     Phosphorus 08/16/2019 3 4     Ethanol Lvl 07/82/0064 <3     Salicylate Lvl 30/17/0543 <3 0*    Acetaminophen Level 08/16/2019 <2 0*    Sodium 08/17/2019 144     Potassium 08/17/2019 3 4*    Chloride 08/17/2019 114*    CO2 08/17/2019 19*    ANION GAP 08/17/2019 11     BUN 08/17/2019 10     Creatinine 08/17/2019 1 02     Glucose 08/17/2019 77     Calcium 08/17/2019 8 5     eGFR 08/17/2019 60     WBC 08/17/2019 3 60*    RBC 08/17/2019 3 01*    Hemoglobin 08/17/2019 8 7*    Hematocrit 08/17/2019 29 1*    MCV 08/17/2019 97     MCH 08/17/2019 28 9     MCHC 08/17/2019 29 9*    RDW 08/17/2019 15 9*    MPV 08/17/2019 12 3     Platelets 56/08/1874 81*    nRBC 08/17/2019 0     Neutrophils Relative 08/17/2019 49     Immat GRANS % 08/17/2019 0     Lymphocytes Relative 08/17/2019 32     Monocytes Relative 08/17/2019 13*    Eosinophils Relative 08/17/2019 5     Basophils Relative 08/17/2019 1     Neutrophils Absolute 08/17/2019 1 81*    Immature Grans Absolute 08/17/2019 0 00     Lymphocytes Absolute 08/17/2019 1 14     Monocytes Absolute 08/17/2019 0 45     Eosinophils Absolute 08/17/2019 0 17     Basophils Absolute 08/17/2019 0 03     Magnesium 08/17/2019 1 9     Ammonia 08/17/2019 23     Ventricular Rate 08/16/2019 71     Atrial Rate 08/16/2019 71     NC Interval 08/16/2019 150     QRSD Interval 08/16/2019 84     QT Interval 08/16/2019 452     QTC Interval 08/16/2019 491     P Axis 08/16/2019 40     QRS Axis 08/16/2019 29     T Wave Axis 08/16/2019 23          Radiology Results:   Cta Head And Neck With And Without Contrast    Result Date: 8/16/2019  Narrative: CTA NECK AND BRAIN WITH AND WITHOUT CONTRAST INDICATION: Intermittent confusion and difficulty with word finding COMPARISON:   8/2/2017  TECHNIQUE:  Routine CT imaging of the Brain without contrast   Post contrast imaging was performed after administration of iodinated contrast through the neck and brain  Post contrast axial 0 625 mm images timed to opacify the arterial system  3D rendering was performed on an independent workstation  MIP reconstructions performed  Coronal reconstructions were performed of the noncontrast portion of the brain  Radiation dose length product (DLP) for this visit:  1466 mGy-cm   This examination, like all CT scans performed in the [facility], was performed utilizing techniques to minimize radiation dose exposure, including the use of iterative reconstruction and automated exposure control  IV Contrast:  85 mL of iohexol (OMNIPAQUE)  IMAGE QUALITY:   Diagnostic FINDINGS: NONCONTRAST BRAIN PARENCHYMA:  Stable punctate, 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  No evidence of acute vascular territorial infarction  No intracranial hemorrhage or mass effect  VENTRICLES AND EXTRA-AXIAL SPACES:  No hydrocephalus or extra-axial collection  VISUALIZED ORBITS AND PARANASAL SINUSES:  Intact globes and orbits  No paranasal sinus disease  CERVICAL VASCULATURE AORTIC ARCH AND GREAT VESSELS:  Three-vessel configuration aortic arch  No stenosis in the subclavian arteries  RIGHT VERTEBRAL ARTERY CERVICAL SEGMENT:  Normal origin  The vessel is normal in caliber throughout the neck  LEFT VERTEBRAL ARTERY CERVICAL SEGMENT:  Normal origin  The vessel is normal in caliber throughout the neck   RIGHT EXTRACRANIAL CAROTID SEGMENT: Normal caliber common carotid artery  Normal bifurcation and cervical internal carotid artery  No stenosis or dissection  LEFT EXTRACRANIAL CAROTID SEGMENT:  Normal caliber common carotid artery  Normal bifurcation and cervical internal carotid artery  No stenosis or dissection  NASCET criteria was used to determine the degree of internal carotid artery diameter stenosis  INTRACRANIAL VASCULATURE INTERNAL CAROTID ARTERIES:  Normal enhancement of the intracranial portions of the internal carotid arteries  Normal ophthalmic artery origins  Normal ICA terminus  ANTERIOR CIRCULATION:  Symmetric A1 segments and anterior cerebral arteries with normal enhancement  Normal anterior communicating artery  MIDDLE CEREBRAL ARTERY CIRCULATION:  M1 segment and middle cerebral artery branches demonstrate normal enhancement bilaterally  DISTAL VERTEBRAL ARTERIES:  No stenosis within the distal vertebral arteries  Posterior inferior cerebellar artery origins are normal  Normal vertebral basilar junction  BASILAR ARTERY:  Basilar artery is normal in caliber  Patent superior cerebellar arteries  POSTERIOR CEREBRAL ARTERIES: Nearly fetal type posterior cerebral arteries without stenosis  DURAL VENOUS SINUSES:  Patent  NON VASCULAR ANATOMY BONY STRUCTURES:  No fracture, lytic or blastic lesion  SOFT TISSUES OF THE NECK:  No soft tissue mass or lymphadenopathy  THORACIC INLET:  Clear lung apices  Impression: 1  No evidence of acute vascular territorial infarction, intracranial hemorrhage or mass effect  2   No stenosis, dissection or occlusion of the carotid or vertebral arteries or major vessels of the [place]  Workstation performed: [id number]     Xr Chest 1 View Portable    Result Date: 8/17/2019  Narrative: CHEST INDICATION:   Confusion  COMPARISON:  07/07/2019 EXAM PERFORMED/VIEWS:  XR CHEST PORTABLE Images: 2 FINDINGS: Cardiomediastinal silhouette appears unremarkable    The pulmonary vessels are normal  The lungs are clear  No pneumothorax or pleural effusion  Osseous structures appear within normal limits for patient age  Impression: No acute cardiopulmonary disease   Workstation performed: [id number]

## 2019-08-30 ENCOUNTER — TELEPHONE (OUTPATIENT)
Dept: GASTROENTEROLOGY | Facility: MEDICAL CENTER | Age: 59
End: 2019-08-30

## 2019-08-30 ENCOUNTER — HOSPITAL ENCOUNTER (OUTPATIENT)
Dept: INFUSION CENTER | Facility: HOSPITAL | Age: 59
Discharge: HOME | End: 2019-08-30
Payer: OTHER GOVERNMENT

## 2019-08-30 VITALS
OXYGEN SATURATION: 98 % | SYSTOLIC BLOOD PRESSURE: 133 MMHG | DIASTOLIC BLOOD PRESSURE: 67 MMHG | TEMPERATURE: 99 F | RESPIRATION RATE: 18 BRPM | HEART RATE: 105 BPM

## 2019-08-30 DIAGNOSIS — D50.9 IRON DEFICIENCY ANEMIA, UNSPECIFIED IRON DEFICIENCY ANEMIA TYPE: Primary | ICD-10-CM

## 2019-08-30 DIAGNOSIS — K70.30 ALCOHOLIC CIRRHOSIS OF LIVER WITHOUT ASCITES (HCC): ICD-10-CM

## 2019-08-30 DIAGNOSIS — K21.9 GASTROESOPHAGEAL REFLUX DISEASE, ESOPHAGITIS PRESENCE NOT SPECIFIED: Primary | ICD-10-CM

## 2019-08-30 PROCEDURE — 96365 THER/PROPH/DIAG IV INF INIT: CPT

## 2019-08-30 RX ORDER — SUCRALFATE ORAL 1 G/10ML
1 SUSPENSION ORAL 4 TIMES DAILY
Qty: 420 ML | Refills: 0 | Status: SHIPPED | OUTPATIENT
Start: 2019-08-30 | End: 2019-09-05 | Stop reason: SDUPTHER

## 2019-08-30 RX ORDER — SODIUM CHLORIDE 9 MG/ML
20 INJECTION, SOLUTION INTRAVENOUS ONCE
Status: CANCELLED | OUTPATIENT
Start: 2019-09-04

## 2019-08-30 RX ORDER — CYANOCOBALAMIN 1000 UG/ML
1000 INJECTION INTRAMUSCULAR; SUBCUTANEOUS ONCE
Status: CANCELLED | OUTPATIENT
Start: 2019-09-03

## 2019-08-30 RX ORDER — SODIUM CHLORIDE 9 MG/ML
20 INJECTION, SOLUTION INTRAVENOUS ONCE
Status: COMPLETED | OUTPATIENT
Start: 2019-08-30 | End: 2019-08-30

## 2019-08-30 RX ADMIN — SODIUM CHLORIDE 20 ML/HR: 9 INJECTION, SOLUTION INTRAVENOUS at 13:44

## 2019-08-30 RX ADMIN — IRON SUCROSE 200 MG: 20 INJECTION, SOLUTION INTRAVENOUS at 13:44

## 2019-08-30 NOTE — TELEPHONE ENCOUNTER
Belinda Cast from [facility] [facility] called stated lactulose packet was back-ordered  Belinda Cast would like to know if pre-mix liquid can be subtituted   Belinda Cast can be reached at [phone]

## 2019-08-30 NOTE — PLAN OF CARE
Problem: Potential for Falls  Goal: Patient will remain free of falls  Description  INTERVENTIONS:  - Assess patient frequently for physical needs  -  Identify cognitive and physical deficits and behaviors that affect risk of falls  -  [place] fall precautions as indicated by assessment   - Educate patient/family on patient safety including physical limitations  - Instruct patient to call for assistance with activity based on assessment  - Modify environment to reduce risk of injury  - Consider OT/PT consult to assist with strengthening/mobility  Outcome: Progressing     Problem: Knowledge Deficit  Goal: Patient/family/caregiver demonstrates understanding of disease process, treatment plan, medications, and discharge instructions  Description  Complete learning assessment and assess knowledge base    Interventions:  - Provide teaching at level of understanding  - Provide teaching via preferred learning methods  Outcome: Progressing

## 2019-09-03 ENCOUNTER — TELEPHONE (OUTPATIENT)
Dept: PAIN MEDICINE | Facility: CLINIC | Age: 59
End: 2019-09-03

## 2019-09-03 NOTE — TELEPHONE ENCOUNTER
Pt is asking if provider can prescribe her a sustainable lower dose of the methylprednisolone? Until she gets her back issue figured out by neurology and so that she can walk  It has been helping her  Pt uses [facility] in [facility] [address]  Pt states she see's the neurologist on 11/5  And she has an EMG scheduled on 9/26   CALL BACK# [phone]

## 2019-09-03 NOTE — TELEPHONE ENCOUNTER
RN s/w pt and informed her of previous task  Pt verbalized understanding and appreciative of the call

## 2019-09-03 NOTE — TELEPHONE ENCOUNTER
Prednisone is only meant for short term use for her kind of pain and we can not keep her on even small doses longer than a week or so  For now she will just have to continue the Lyrica and use rest, ice, heat, and simple stretching to try to help with the low back pain

## 2019-09-04 ENCOUNTER — HOSPITAL ENCOUNTER (OUTPATIENT)
Dept: INFUSION CENTER | Facility: HOSPITAL | Age: 59
Discharge: HOME | End: 2019-09-04
Payer: OTHER GOVERNMENT

## 2019-09-04 ENCOUNTER — LAB (OUTPATIENT)
Dept: LAB | Facility: HOSPITAL | Age: 59
End: 2019-09-04
Attending: INTERNAL MEDICINE
Payer: OTHER GOVERNMENT

## 2019-09-04 VITALS
TEMPERATURE: 98.7 F | SYSTOLIC BLOOD PRESSURE: 127 MMHG | RESPIRATION RATE: 18 BRPM | OXYGEN SATURATION: 98 % | DIASTOLIC BLOOD PRESSURE: 62 MMHG | HEART RATE: 105 BPM

## 2019-09-04 DIAGNOSIS — D50.9 IRON DEFICIENCY ANEMIA, UNSPECIFIED IRON DEFICIENCY ANEMIA TYPE: Primary | ICD-10-CM

## 2019-09-04 DIAGNOSIS — K70.30 ALCOHOLIC CIRRHOSIS OF LIVER WITHOUT ASCITES (HCC): ICD-10-CM

## 2019-09-04 DIAGNOSIS — D50.0 IRON DEFICIENCY ANEMIA DUE TO CHRONIC BLOOD LOSS: ICD-10-CM

## 2019-09-04 LAB
BASOPHILS # BLD AUTO: 0.01 THOUSANDS/ΜL (ref 0–0.1)
BASOPHILS NFR BLD AUTO: 0 % (ref 0–1)
EOSINOPHIL # BLD AUTO: 0.12 THOUSAND/ΜL (ref 0–0.61)
EOSINOPHIL NFR BLD AUTO: 3 % (ref 0–6)
ERYTHROCYTE [DISTWIDTH] IN BLOOD BY AUTOMATED COUNT: 19.9 % (ref 11.6–15.1)
HCT VFR BLD AUTO: 32 % (ref 34.8–46.1)
HGB BLD-MCNC: 9.6 G/DL (ref 11.5–15.4)
IMM GRANULOCYTES # BLD AUTO: 0.02 THOUSAND/UL (ref 0–0.2)
IMM GRANULOCYTES NFR BLD AUTO: 1 % (ref 0–2)
LYMPHOCYTES # BLD AUTO: 0.78 THOUSANDS/ΜL (ref 0.6–4.47)
LYMPHOCYTES NFR BLD AUTO: 20 % (ref 14–44)
MCH RBC QN AUTO: 29.9 PG (ref 26.8–34.3)
MCHC RBC AUTO-ENTMCNC: 30 G/DL (ref 31.4–37.4)
MCV RBC AUTO: 100 FL (ref 82–98)
MONOCYTES # BLD AUTO: 0.41 THOUSAND/ΜL (ref 0.17–1.22)
MONOCYTES NFR BLD AUTO: 11 % (ref 4–12)
NEUTROPHILS # BLD AUTO: 2.5 THOUSANDS/ΜL (ref 1.85–7.62)
NEUTS SEG NFR BLD AUTO: 65 % (ref 43–75)
NRBC BLD AUTO-RTO: 0 /100 WBCS
PLATELET # BLD AUTO: 63 THOUSANDS/UL (ref 149–390)
PMV BLD AUTO: 12.8 FL (ref 8.9–12.7)
RBC # BLD AUTO: 3.21 MILLION/UL (ref 3.81–5.12)
WBC # BLD AUTO: 3.84 THOUSAND/UL (ref 4.31–10.16)

## 2019-09-04 PROCEDURE — 85025 COMPLETE CBC W/AUTO DIFF WBC: CPT

## 2019-09-04 PROCEDURE — 36415 COLL VENOUS BLD VENIPUNCTURE: CPT

## 2019-09-04 PROCEDURE — 96365 THER/PROPH/DIAG IV INF INIT: CPT

## 2019-09-04 RX ORDER — SODIUM CHLORIDE 9 MG/ML
20 INJECTION, SOLUTION INTRAVENOUS ONCE
Status: COMPLETED | OUTPATIENT
Start: 2019-09-04 | End: 2019-09-04

## 2019-09-04 RX ORDER — SODIUM CHLORIDE 9 MG/ML
20 INJECTION, SOLUTION INTRAVENOUS ONCE
Status: CANCELLED | OUTPATIENT
Start: 2019-09-06

## 2019-09-04 RX ORDER — CYANOCOBALAMIN 1000 UG/ML
1000 INJECTION INTRAMUSCULAR; SUBCUTANEOUS ONCE
Status: CANCELLED | OUTPATIENT
Start: 2019-09-05

## 2019-09-04 RX ADMIN — SODIUM CHLORIDE 20 ML/HR: 9 INJECTION, SOLUTION INTRAVENOUS at 13:53

## 2019-09-04 RX ADMIN — IRON SUCROSE 200 MG: 20 INJECTION, SOLUTION INTRAVENOUS at 13:53

## 2019-09-05 ENCOUNTER — TELEPHONE (OUTPATIENT)
Dept: GASTROENTEROLOGY | Facility: MEDICAL CENTER | Age: 59
End: 2019-09-05

## 2019-09-05 DIAGNOSIS — K21.9 GASTROESOPHAGEAL REFLUX DISEASE, ESOPHAGITIS PRESENCE NOT SPECIFIED: ICD-10-CM

## 2019-09-05 RX ORDER — SUCRALFATE 1 G/10ML
SUSPENSION ORAL
Qty: 1200 ML | Refills: 5 | Status: SHIPPED | OUTPATIENT
Start: 2019-09-05

## 2019-09-05 NOTE — TELEPHONE ENCOUNTER
Spoke to patient she is aware of her results  She also wanted to know if you feel her platelet level is good enough to get an infected tooth pulled   Please advise

## 2019-09-06 ENCOUNTER — HOSPITAL ENCOUNTER (OUTPATIENT)
Dept: INFUSION CENTER | Facility: HOSPITAL | Age: 59
Discharge: HOME | End: 2019-09-06
Payer: OTHER GOVERNMENT

## 2019-09-06 VITALS
OXYGEN SATURATION: 100 % | SYSTOLIC BLOOD PRESSURE: 122 MMHG | RESPIRATION RATE: 16 BRPM | DIASTOLIC BLOOD PRESSURE: 71 MMHG | HEART RATE: 93 BPM | TEMPERATURE: 98.3 F

## 2019-09-06 DIAGNOSIS — K70.30 ALCOHOLIC CIRRHOSIS OF LIVER WITHOUT ASCITES (HCC): ICD-10-CM

## 2019-09-06 DIAGNOSIS — R41.0 CONFUSION: ICD-10-CM

## 2019-09-06 DIAGNOSIS — K74.60 CIRRHOSIS OF LIVER WITHOUT ASCITES, UNSPECIFIED HEPATIC CIRRHOSIS TYPE (HCC): ICD-10-CM

## 2019-09-06 DIAGNOSIS — D50.9 IRON DEFICIENCY ANEMIA, UNSPECIFIED IRON DEFICIENCY ANEMIA TYPE: ICD-10-CM

## 2019-09-06 DIAGNOSIS — K70.30 ALCOHOLIC CIRRHOSIS OF LIVER WITHOUT ASCITES (HCC): Primary | ICD-10-CM

## 2019-09-06 DIAGNOSIS — E87.6 HYPOKALEMIA: Primary | ICD-10-CM

## 2019-09-06 DIAGNOSIS — K92.2 ACUTE UPPER GASTROINTESTINAL BLEEDING: ICD-10-CM

## 2019-09-06 PROCEDURE — 96365 THER/PROPH/DIAG IV INF INIT: CPT

## 2019-09-06 RX ORDER — PANTOPRAZOLE SODIUM 20 MG/1
20 TABLET, DELAYED RELEASE ORAL
Qty: 180 TABLET | Refills: 3 | Status: SHIPPED | OUTPATIENT
Start: 2019-09-06 | End: 2019-09-25

## 2019-09-06 RX ORDER — SPIRONOLACTONE 25 MG/1
25 TABLET ORAL DAILY
Qty: 90 TABLET | Refills: 3 | Status: SHIPPED | OUTPATIENT
Start: 2019-09-06 | End: 2020-04-03 | Stop reason: SDUPTHER

## 2019-09-06 RX ORDER — CYANOCOBALAMIN 1000 UG/ML
1000 INJECTION INTRAMUSCULAR; SUBCUTANEOUS ONCE
Status: CANCELLED | OUTPATIENT
Start: 2019-09-24

## 2019-09-06 RX ORDER — RIFAXIMIN 550 MG/1
550 TABLET ORAL EVERY 12 HOURS SCHEDULED
Qty: 180 TABLET | Refills: 3 | Status: SHIPPED | OUTPATIENT
Start: 2019-09-06 | End: 2020-03-04

## 2019-09-06 RX ORDER — POTASSIUM CHLORIDE 750 MG/1
10 TABLET, FILM COATED, EXTENDED RELEASE ORAL DAILY
Qty: 90 TABLET | Refills: 0 | Status: SHIPPED | OUTPATIENT
Start: 2019-09-06 | End: 2019-11-20 | Stop reason: SDUPTHER

## 2019-09-06 RX ORDER — SODIUM CHLORIDE 9 MG/ML
20 INJECTION, SOLUTION INTRAVENOUS ONCE
Status: CANCELLED | OUTPATIENT
Start: 2019-09-06

## 2019-09-06 RX ORDER — SODIUM CHLORIDE 9 MG/ML
20 INJECTION, SOLUTION INTRAVENOUS ONCE
Status: COMPLETED | OUTPATIENT
Start: 2019-09-06 | End: 2019-09-06

## 2019-09-06 RX ADMIN — SODIUM CHLORIDE 20 ML/HR: 9 INJECTION, SOLUTION INTRAVENOUS at 14:00

## 2019-09-06 RX ADMIN — IRON SUCROSE 200 MG: 20 INJECTION, SOLUTION INTRAVENOUS at 14:04

## 2019-09-06 NOTE — TELEPHONE ENCOUNTER
K-Dur was originally recommended for hypokalemia I believe, part of her hospital admission in July  I would like her to recheck BMP to make sure potassium is back to where it should be  I would also like to consult Dr Enma King- with aldactone on board should she be taking the potassium? Thanks

## 2019-09-06 NOTE — PROGRESS NOTES
Pt here for Venofer , infused, and flushed with NSS, yoan well  PIV removed intact  Dsd applied  Disch amb to home, steady gait

## 2019-09-06 NOTE — TELEPHONE ENCOUNTER
I did speak with Iraida Tate and she will follow up on this with Dr Ruth Elise, she just saw him 2 weeks ago  No further action from our part required at this time  She does have a follow up appointment with you in November

## 2019-09-06 NOTE — TELEPHONE ENCOUNTER
Medication refill check list    Correct patient? yes   Correct medication name, dose, and pill size? yes   Correct provider? yes   Last and Next appt  scheduled? Yes, last date 08/06/19 & next date 11/05/19   Right pharmacy listed? yes   Correct quantity for 30 or 90 days? yes   Is the patient out of refills? When was it last prescribed? Yes, last date 08/06/19   Directions match what the patient says they are taking?  yes   Enough refills? (none for controlled substances, 1 year for routine medications) yes     Received prescription request for 90 day supply via fax#[phone] from [facility] #[phone] on potassium chloride (K-DUR) 10 mEq tablet

## 2019-09-06 NOTE — PLAN OF CARE
Problem: Potential for Falls  Goal: Patient will remain free of falls  Description  INTERVENTIONS:  - Assess patient frequently for physical needs  -  Identify cognitive and physical deficits and behaviors that affect risk of falls  -  [facility] fall precautions as indicated by assessment   - Educate patient/family on patient safety including physical limitations  - Instruct patient to call for assistance with activity based on assessment  - Modify environment to reduce risk of injury  - Consider OT/PT consult to assist with strengthening/mobility  Outcome: Progressing     Problem: Knowledge Deficit  Goal: Patient/family/caregiver demonstrates understanding of disease process, treatment plan, medications, and discharge instructions  Description  Complete learning assessment and assess knowledge base    Interventions:  - Provide teaching at level of understanding  - Provide teaching via preferred learning methods  Outcome: Progressing

## 2019-09-20 RX ORDER — SODIUM CHLORIDE 9 MG/ML
20 INJECTION, SOLUTION INTRAVENOUS ONCE
Status: CANCELLED | OUTPATIENT
Start: 2019-09-24

## 2019-09-20 RX ORDER — CYANOCOBALAMIN 1000 UG/ML
1000 INJECTION INTRAMUSCULAR; SUBCUTANEOUS ONCE
Status: CANCELLED | OUTPATIENT
Start: 2019-09-24

## 2019-09-24 ENCOUNTER — HOSPITAL ENCOUNTER (OUTPATIENT)
Dept: INFUSION CENTER | Facility: HOSPITAL | Age: 59
Discharge: HOME | End: 2019-09-24
Attending: INTERNAL MEDICINE

## 2019-09-25 ENCOUNTER — HOSPITAL ENCOUNTER (OUTPATIENT)
Dept: INFUSION CENTER | Facility: HOSPITAL | Age: 59
Discharge: HOME | End: 2019-09-25
Attending: INTERNAL MEDICINE

## 2019-09-25 ENCOUNTER — OFFICE VISIT (OUTPATIENT)
Dept: PAIN MEDICINE | Facility: CLINIC | Age: 59
End: 2019-09-25
Payer: OTHER GOVERNMENT

## 2019-09-25 ENCOUNTER — HOSPITAL ENCOUNTER (OUTPATIENT)
Dept: INFUSION CENTER | Facility: HOSPITAL | Age: 59
Discharge: HOME | End: 2019-09-25
Attending: INTERNAL MEDICINE
Payer: OTHER GOVERNMENT

## 2019-09-25 VITALS
HEART RATE: 108 BPM | RESPIRATION RATE: 18 BRPM | TEMPERATURE: 99.1 F | OXYGEN SATURATION: 97 % | DIASTOLIC BLOOD PRESSURE: 66 MMHG | SYSTOLIC BLOOD PRESSURE: 127 MMHG

## 2019-09-25 VITALS
WEIGHT: 222 LBS | HEIGHT: 64 IN | HEART RATE: 93 BPM | DIASTOLIC BLOOD PRESSURE: 74 MMHG | SYSTOLIC BLOOD PRESSURE: 134 MMHG | BODY MASS INDEX: 37.9 KG/M2

## 2019-09-25 DIAGNOSIS — M79.643 CHRONIC HAND PAIN, UNSPECIFIED LATERALITY: ICD-10-CM

## 2019-09-25 DIAGNOSIS — K70.30 ALCOHOLIC CIRRHOSIS OF LIVER WITHOUT ASCITES (HCC): Primary | ICD-10-CM

## 2019-09-25 DIAGNOSIS — G89.4 PAIN SYNDROME, CHRONIC: Primary | ICD-10-CM

## 2019-09-25 DIAGNOSIS — M79.2 NEUROPATHIC PAIN: ICD-10-CM

## 2019-09-25 DIAGNOSIS — M79.673 CHRONIC FOOT PAIN, UNSPECIFIED LATERALITY: ICD-10-CM

## 2019-09-25 DIAGNOSIS — G62.1 ALCOHOL-INDUCED POLYNEUROPATHY (HCC): ICD-10-CM

## 2019-09-25 DIAGNOSIS — M54.5 LOW BACK PAIN, UNSPECIFIED BACK PAIN LATERALITY, UNSPECIFIED CHRONICITY, WITH SCIATICA PRESENCE UNSPECIFIED: ICD-10-CM

## 2019-09-25 DIAGNOSIS — G89.29 CHRONIC FOOT PAIN, UNSPECIFIED LATERALITY: ICD-10-CM

## 2019-09-25 DIAGNOSIS — R29.898 DECONDITIONED LOW BACK: ICD-10-CM

## 2019-09-25 DIAGNOSIS — M54.50 LEFT-SIDED LOW BACK PAIN WITHOUT SCIATICA, UNSPECIFIED CHRONICITY: ICD-10-CM

## 2019-09-25 DIAGNOSIS — G89.29 CHRONIC HAND PAIN, UNSPECIFIED LATERALITY: ICD-10-CM

## 2019-09-25 DIAGNOSIS — D50.9 IRON DEFICIENCY ANEMIA, UNSPECIFIED IRON DEFICIENCY ANEMIA TYPE: ICD-10-CM

## 2019-09-25 PROCEDURE — 96372 THER/PROPH/DIAG INJ SC/IM: CPT

## 2019-09-25 PROCEDURE — 96365 THER/PROPH/DIAG IV INF INIT: CPT

## 2019-09-25 PROCEDURE — 99214 OFFICE O/P EST MOD 30 MIN: CPT | Performed by: NURSE PRACTITIONER

## 2019-09-25 RX ORDER — SODIUM CHLORIDE 9 MG/ML
20 INJECTION, SOLUTION INTRAVENOUS ONCE
Status: COMPLETED | OUTPATIENT
Start: 2019-09-25 | End: 2019-09-25

## 2019-09-25 RX ORDER — CYANOCOBALAMIN 1000 UG/ML
1000 INJECTION INTRAMUSCULAR; SUBCUTANEOUS ONCE
Status: CANCELLED | OUTPATIENT
Start: 2019-09-27

## 2019-09-25 RX ORDER — CYANOCOBALAMIN 1000 UG/ML
1000 INJECTION INTRAMUSCULAR; SUBCUTANEOUS ONCE
Status: COMPLETED | OUTPATIENT
Start: 2019-09-25 | End: 2019-09-25

## 2019-09-25 RX ORDER — SODIUM CHLORIDE 9 MG/ML
20 INJECTION, SOLUTION INTRAVENOUS ONCE
Status: CANCELLED | OUTPATIENT
Start: 2019-09-27

## 2019-09-25 RX ORDER — PREGABALIN 200 MG/1
CAPSULE ORAL
Qty: 60 CAPSULE | Refills: 0 | Status: SHIPPED | OUTPATIENT
Start: 2019-09-25 | End: 2019-12-12 | Stop reason: SDUPTHER

## 2019-09-25 RX ADMIN — IRON SUCROSE 200 MG: 20 INJECTION, SOLUTION INTRAVENOUS at 09:38

## 2019-09-25 RX ADMIN — CYANOCOBALAMIN 1000 MCG: 1000 INJECTION, SOLUTION INTRAMUSCULAR at 10:57

## 2019-09-25 RX ADMIN — SODIUM CHLORIDE 20 ML/HR: 9 INJECTION, SOLUTION INTRAVENOUS at 09:40

## 2019-09-25 NOTE — PLAN OF CARE
Problem: Potential for Falls  Goal: Patient will remain free of falls  Description  INTERVENTIONS:  - Assess patient frequently for physical needs  -  Identify cognitive and physical deficits and behaviors that affect risk of falls  -  [place] fall precautions as indicated by assessment   - Educate patient/family on patient safety including physical limitations  - Instruct patient to call for assistance with activity based on assessment  - Modify environment to reduce risk of injury  - Consider OT/PT consult to assist with strengthening/mobility  Outcome: Progressing     Problem: Potential for Falls  Goal: Patient will remain free of falls  Description  INTERVENTIONS:  - Assess patient frequently for physical needs  -  Identify cognitive and physical deficits and behaviors that affect risk of falls  -  [place] fall precautions as indicated by assessment   - Educate patient/family on patient safety including physical limitations  - Instruct patient to call for assistance with activity based on assessment  - Modify environment to reduce risk of injury  - Consider OT/PT consult to assist with strengthening/mobility  Outcome: Progressing     Problem: Knowledge Deficit  Goal: Patient/family/caregiver demonstrates understanding of disease process, treatment plan, medications, and discharge instructions  Description  Complete learning assessment and assess knowledge base    Interventions:  - Provide teaching at level of understanding  - Provide teaching via preferred learning methods  Outcome: Progressing

## 2019-09-25 NOTE — PROGRESS NOTES
Assessment:  1  Pain syndrome, chronic    2  Chronic hand pain, unspecified laterality    3  Chronic foot pain, unspecified laterality    4  Deconditioned low back    5  Left-sided low back pain without sciatica, unspecified chronicity    6  Neuropathic pain    7  Alcohol-induced polyneuropathy ([address] )    8  Low back pain, unspecified back pain laterality, unspecified chronicity, with sciatica presence unspecified        Plan:  While the patient was in the office today, I did have a thorough conversation with the patient regarding her chronic pain syndrome, symptoms, and medication regimen  I discussed with the patient that at this point I think it is in her best interest to proceed with the EMG is as scheduled tomorrow as this will only give us better information as to what exactly is causing her pain and any other treatment plan options  The patient was agreeable and verbalized an understanding  In the meantime we can try to decrease the Lyrica down to 400 mg a day and I did give her a 30 day prescription for her to fill at a local pharmacy so she can try the decreased Lyrica and see how she does  With regards to the Topamax, I explained the patient that I do not prescribe this medication and she would have to discuss that with Neurology  I advised the patient that if they experience any side effects or issues with the changes in their medication regiment, they should give our office a call to discuss  I also advised the patient not to drive or operate machinery until they see how the changes in the medication regimen affects them  The patient is to call our office before she is out of medications so we can send a 90 day supply to her mail order pharmacy to get her to her next office visit  The patient was agreeable and verbalized an understanding  The patient will follow-up in 12 weeks for medication prescription refill and reevaluation   The patient was advised to contact the office should their symptoms worsen in the interim  The patient was agreeable and verbalized an understanding  History of Present Illness: The patient is a 61 y o  female last seen on 8/27/19 who presents for a follow up office visit in regards to chronic pain syndrome secondary to alcohol-induced polyneuropathy of the hands and feet  The patient currently reports that since her last office visit she actually feels that things have been better and at this point she would like to discuss the possibility of decreasing her Lyrica as in the future she would like to try to get off of it because of the edema it causes in her legs and increasing her Topamax  She is scheduled for a bilateral upper and lower extremity EMG tomorrow with Neurology and is hopeful that this will provide better overall information as to her treatment plan options  Current pain medications includes:   Lyrica 300 mg b i d     The patient reports that this regimen is providing 70% pain relief  The patient is reporting bilateral lower extremity edema from this pain medication regimen  I have personally reviewed and/or updated the patient's past medical history, past surgical history, family history, social history, current medications, allergies, and vital signs today  Review of Systems:    Review of Systems   Respiratory: Negative for shortness of breath  Cardiovascular: Negative for chest pain  Gastrointestinal: Negative for constipation, diarrhea, nausea and vomiting  Musculoskeletal: Positive for gait problem and joint swelling (joint stiffness)  Negative for arthralgias and myalgias  Skin: Negative for rash  Neurological: Positive for weakness  Negative for dizziness and seizures  All other systems reviewed and are negative          Past Medical History:   Diagnosis Date    Alcohol use disorder     Alcoholic hepatitis     Anastomotic ulcer     Anemia     Anxiety     Ascites     Breast cancer ([facility] [address] ) 2010    stage 0; bilateral mastectomy (prophylactic on left)    Chronic foot pain     Chronic narcotic dependence ([facility] [address] )     pt denies    Chronic pain disorder     Cirrhosis, alcoholic (HCC)     Colitis     Depression     situational    Esophagitis     History of bilateral mastectomy     History of gastric bypass     Hypertension     Liver disease     Morbid obesity ([address] )     Osteopenia     Palpitations     Pancytopenia (HCC)     Peripheral neuropathy     Pernicious anemia     Rosacea        Past Surgical History:   Procedure Laterality Date    ABDOMINAL SURGERY      abdominoplasty    BREAST SURGERY      mastectomy    CHOLECYSTECTOMY      ESOPHAGOGASTRODUODENOSCOPY N/A 8/2/2017    Procedure: ESOPHAGOGASTRODUODENOSCOPY (EGD); Surgeon: Elvi Torres DO;  Location:  MAIN OR;  Service: Gastroenterology    GASTRIC BYPASS      HERNIA REPAIR      MASTECTOMY      OR ESOPHAGOGASTRODUODENOSCOPY TRANSORAL DIAGNOSTIC N/A 5/18/2017    Procedure: ESOPHAGOGASTRODUODENOSCOPY (EGD) with bx;  Surgeon: Perry Garcia MD;  Location: [facility]; Service: Gastroenterology    OR ESOPHAGOGASTRODUODENOSCOPY TRANSORAL DIAGNOSTIC N/A 9/14/2017    Procedure: EGD with bx AND COLONOSCOPY with polypectomy;  Surgeon: Perry Garcia MD;  Location: [facility];   Service: Gastroenterology    DECLAN-EN-Y PROCEDURE  2005       Family History   Problem Relation Age of Onset    Diabetes Mother     Alzheimer's disease Mother     Kidney disease Father     Hypertension Father     Obesity Sister     Fibrocystic breast disease Sister     Breast cancer Sister 62    Colon cancer Neg Hx     Uterine cancer Neg Hx     Ovarian cancer Neg Hx        Social History     Occupational History    Not on file   Tobacco Use    Smoking status: Never Smoker    Smokeless tobacco: Never Used   Substance and Sexual Activity    Alcohol use: Not Currently    Drug use: No    Sexual activity: Not Currently     Partners: Male         Current Outpatient Medications:    Calcium Citrate 250 MG TABS, Take by mouth daily 500mg, takes 3 daily- 1500mg total, Disp: , Rfl:     Cholecalciferol (VITAMIN D3) 5000 units TABS, Take 5,000 Units by mouth daily, Disp: , Rfl:     folic acid (FOLVITE) 1 mg tablet, Take 1 tablet by mouth daily, Disp: 30 tablet, Rfl: 0    furosemide (LASIX) 20 mg tablet, Take 0 5 tablets (10 mg total) by mouth daily, Disp: 30 tablet, Rfl: 0    lactulose (CEPHULAC) 10 g packet, Take 1 packet (10 g total) by mouth 3 (three) times a day Titrate up or down to obtain goal of 2-3 bowel movements per day, Disp: 30 each, Rfl: 7    Multiple Vitamins-Minerals (MULTIVITAMIN WITH MINERALS) tablet, Take 1 tablet by mouth daily, Disp: , Rfl:     potassium chloride (K-DUR) 10 mEq tablet, Take 1 tablet (10 mEq total) by mouth daily, Disp: 90 tablet, Rfl: 0    pregabalin (LYRICA) 200 MG capsule, Take 1 PO BID , Disp: 60 capsule, Rfl: 0    spironolactone (ALDACTONE) 25 mg tablet, Take 1 tablet (25 mg total) by mouth daily, Disp: 90 tablet, Rfl: 3    thiamine 100 MG tablet, Take 1 tablet (100 mg total) by mouth daily, Disp: 30 tablet, Rfl: 0    XIFAXAN 550 MG tablet, Take 1 tablet (550 mg total) by mouth every 12 (twelve) hours for 180 days Pt states not compliant with taking, Disp: 180 tablet, Rfl: 3    CARAFATE 1 GM/10ML suspension, TAKE 10 ML (1 GRAM) FOUR TIMES A DAY (Patient not taking: Reported on 9/25/2019), Disp: 1200 mL, Rfl: 5    tobramycin-dexamethasone (TOBRADEX) ophthalmic suspension, Administer 1 drop to the right eye 3 (three) times a day, Disp: , Rfl:     topiramate (TOPAMAX) 25 mg sprinkle capsule, 25 mg q a m  And 50 mg q h s  x5 days, then 50 mg b i d  x1 week, then 75 mg b i d  x1 week, then 100 mg b i d  (Patient not taking: Reported on 9/25/2019), Disp: 60 capsule, Rfl: 3  No current facility-administered medications for this visit       Allergies   Allergen Reactions    Acetaminophen Other (See Comments)     Liver function, s/p bariatric surgery    Cymbalta [Duloxetine Hcl] GI Intolerance    Duloxetine GI Intolerance       Physical Exam:    /74 (BP Location: Left arm, Patient Position: Sitting, Cuff Size: Large)   Pulse 93   Ht 5' 4" (1 626 m)   Wt 101 kg (222 lb)   BMI 38 11 kg/m²     Constitutional:normal, well developed, well nourished, alert, in no distress and non-toxic and no overt pain behavior  and overweight  Eyes:anicteric  HEENT:grossly intact  Neck:supple, symmetric, trachea midline and no masses   Pulmonary:even and unlabored  Cardiovascular:No edema or pitting edema present  Skin:Normal without rashes or lesions and well hydrated  Psychiatric:Mood and affect appropriate  Neurologic:Cranial Nerves II-XII grossly intact  Musculoskeletal:The patient's gait is slightly antalgic, but steady without the use of any assistive devices  Imaging  No orders to display         No orders of the defined types were placed in this encounter

## 2019-09-26 ENCOUNTER — TELEPHONE (OUTPATIENT)
Dept: NEUROLOGY | Facility: CLINIC | Age: 59
End: 2019-09-26

## 2019-09-26 ENCOUNTER — DOCUMENTATION (OUTPATIENT)
Dept: OTHER | Facility: HOSPITAL | Age: 59
End: 2019-09-26

## 2019-09-26 DIAGNOSIS — M79.2 NEUROPATHIC PAIN: Primary | ICD-10-CM

## 2019-09-26 NOTE — PROGRESS NOTES
Pt arrived for Emg testing  However she has 3 LE edema ( worse on left c/w right)   It is thought to be due to the use of LYrica and she is decreasing the doses  The nerve conduction and emg testing is inaccurate in these situations  Therefore the testing was cxl     I asked her to r/s once the swelling improves     She asked for a topomax refill    I informed her that she needs to contact the office

## 2019-09-26 NOTE — TELEPHONE ENCOUNTER
Patient called requesting medication changes  Followed up with PM doctor and due to swelling in extremities(assumed to be due to lyrica) PM doc would like to decrease lyrica dose  He is requesting topamax be increased  Topamax was ordered in August 2019 with weekly titration schedule  Patient took Topamax 25mg BID for a short time without up titration and and then stopped about 1 month ago because she felt it was not working  Patient is asking for new script with new titration schedule to follow  Please enter script for topamax if agreeable  Please send to [facility]  Patient is okay with detailed message left on machine

## 2019-09-27 ENCOUNTER — HOSPITAL ENCOUNTER (OUTPATIENT)
Dept: INFUSION CENTER | Facility: HOSPITAL | Age: 59
Discharge: HOME | End: 2019-09-27
Attending: INTERNAL MEDICINE
Payer: OTHER GOVERNMENT

## 2019-09-27 DIAGNOSIS — D50.9 IRON DEFICIENCY ANEMIA, UNSPECIFIED IRON DEFICIENCY ANEMIA TYPE: ICD-10-CM

## 2019-09-27 DIAGNOSIS — K70.30 ALCOHOLIC CIRRHOSIS OF LIVER WITHOUT ASCITES (HCC): Primary | ICD-10-CM

## 2019-09-27 PROCEDURE — 96365 THER/PROPH/DIAG IV INF INIT: CPT

## 2019-09-27 RX ORDER — CYANOCOBALAMIN 1000 UG/ML
1000 INJECTION INTRAMUSCULAR; SUBCUTANEOUS ONCE
Status: CANCELLED | OUTPATIENT
Start: 2019-10-01

## 2019-09-27 RX ORDER — TOPIRAMATE 25 MG/1
TABLET ORAL
Qty: 120 TABLET | Refills: 2 | Status: SHIPPED | OUTPATIENT
Start: 2019-09-27 | End: 2019-09-30 | Stop reason: SDUPTHER

## 2019-09-27 RX ORDER — SODIUM CHLORIDE 9 MG/ML
20 INJECTION, SOLUTION INTRAVENOUS ONCE
Status: CANCELLED | OUTPATIENT
Start: 2019-10-01

## 2019-09-27 RX ORDER — SODIUM CHLORIDE 9 MG/ML
20 INJECTION, SOLUTION INTRAVENOUS ONCE
Status: COMPLETED | OUTPATIENT
Start: 2019-09-27 | End: 2019-09-27

## 2019-09-27 RX ADMIN — IRON SUCROSE 200 MG: 20 INJECTION, SOLUTION INTRAVENOUS at 13:49

## 2019-09-27 RX ADMIN — SODIUM CHLORIDE 20 ML/HR: 9 INJECTION, SOLUTION INTRAVENOUS at 13:49

## 2019-09-27 NOTE — TELEPHONE ENCOUNTER
Patient states she is starting to wear compression socks and her ankles are looking better  Will start to wean lyrica and will start topiramate

## 2019-09-27 NOTE — TELEPHONE ENCOUNTER
Sent topamax  Will take just at night for now  Dr Cristian Mora agreed with lyrica wean  Due to swelling emg was difficult to do  Thanks

## 2019-09-27 NOTE — PLAN OF CARE
Problem: Potential for Falls  Goal: Patient will remain free of falls  Description  INTERVENTIONS:  - Assess patient frequently for physical needs  -  Identify cognitive and physical deficits and behaviors that affect risk of falls    -  [place] fall precautions as indicated by assessment   - Educate patient/family on patient safety including physical limitations  - Instruct patient to call for assistance with activity based on assessment  - Modify environment to reduce risk of injury  - Consider OT/PT consult to assist with strengthening/mobility  Outcome: Progressing

## 2019-09-30 ENCOUNTER — TELEPHONE (OUTPATIENT)
Dept: NEUROLOGY | Facility: CLINIC | Age: 59
End: 2019-09-30

## 2019-09-30 DIAGNOSIS — M79.2 NEUROPATHIC PAIN: ICD-10-CM

## 2019-09-30 RX ORDER — TOPIRAMATE 25 MG/1
TABLET ORAL
Qty: 288 TABLET | Refills: 0 | Status: SHIPPED | OUTPATIENT
Start: 2019-09-30 | End: 2019-12-03 | Stop reason: SDUPTHER

## 2019-09-30 NOTE — TELEPHONE ENCOUNTER
Pt [facility] mail order pharmacy faxed a Rx clarification request  Pharmacy is requesting quantity clarification for pt Topiramate 25 mg tabs    [facility] is requesting 288 tab  As per the pharmacy the quantity originally prescibed is less than allowed on the patients prescription plan  A new Rx for the new quantity needs to be sent to [facility]

## 2019-10-31 RX ORDER — CYANOCOBALAMIN 1000 UG/ML
1000 INJECTION INTRAMUSCULAR; SUBCUTANEOUS ONCE
Status: CANCELLED | OUTPATIENT
Start: 2019-11-07

## 2019-11-01 ENCOUNTER — TELEPHONE (OUTPATIENT)
Dept: NEUROLOGY | Facility: CLINIC | Age: 59
End: 2019-11-01

## 2019-11-01 NOTE — TELEPHONE ENCOUNTER
[facility]- [facility]# [id number]  [place] UNTIL 7/31/2020  SEE SCAN IN MEDIA 8/7/19 OP VISIT APPROVED ([facility])

## 2019-11-04 ENCOUNTER — TELEPHONE (OUTPATIENT)
Dept: HEMATOLOGY ONCOLOGY | Facility: CLINIC | Age: 59
End: 2019-11-04

## 2019-11-04 NOTE — TELEPHONE ENCOUNTER
Patient will need to set up care with a provider who is local to her  Dr Yuniel Pool does not have privileges at a center that far away

## 2019-11-04 NOTE — TELEPHONE ENCOUNTER
Patient called to see if she could have her venofer orders moved to an infusion center closer to where she lives currently  Patient is currently living in [address]  She could go to an infusion center at [facility] as long as they are in network with her insurance  I recommended to the patient to reach out to her insurance first and see what infusion center closeby is in network and to call us then back so we can place an order and fax it over  Patient verbalized understanding and will call us back with the information

## 2019-11-07 ENCOUNTER — HOSPITAL ENCOUNTER (OUTPATIENT)
Dept: INFUSION CENTER | Facility: HOSPITAL | Age: 59
End: 2019-11-07
Attending: INTERNAL MEDICINE

## 2019-11-12 ENCOUNTER — TELEPHONE (OUTPATIENT)
Dept: HEMATOLOGY ONCOLOGY | Facility: CLINIC | Age: 59
End: 2019-11-12

## 2019-11-12 NOTE — TELEPHONE ENCOUNTER
[facility] for pt to be seen on 12/11 instead of 11/20 with Maggie Méndez in [facility]

## 2019-11-18 ENCOUNTER — APPOINTMENT (OUTPATIENT)
Dept: LAB | Facility: HOSPITAL | Age: 59
End: 2019-11-18
Payer: OTHER GOVERNMENT

## 2019-11-18 ENCOUNTER — TELEPHONE (OUTPATIENT)
Dept: HEMATOLOGY ONCOLOGY | Facility: CLINIC | Age: 59
End: 2019-11-18

## 2019-11-18 DIAGNOSIS — D61.818 PANCYTOPENIA (HCC): ICD-10-CM

## 2019-11-18 DIAGNOSIS — D50.9 IRON DEFICIENCY ANEMIA, UNSPECIFIED IRON DEFICIENCY ANEMIA TYPE: ICD-10-CM

## 2019-11-18 DIAGNOSIS — K70.30 ALCOHOLIC CIRRHOSIS OF LIVER WITHOUT ASCITES (HCC): ICD-10-CM

## 2019-11-18 DIAGNOSIS — K76.9 LIVER LESION: ICD-10-CM

## 2019-11-18 DIAGNOSIS — E87.6 HYPOKALEMIA: ICD-10-CM

## 2019-11-18 LAB
ALBUMIN SERPL BCP-MCNC: 2.5 G/DL (ref 3.5–5)
ALP SERPL-CCNC: 141 U/L (ref 46–116)
ALT SERPL W P-5'-P-CCNC: 48 U/L (ref 12–78)
ANION GAP SERPL CALCULATED.3IONS-SCNC: 8 MMOL/L (ref 4–13)
AST SERPL W P-5'-P-CCNC: 97 U/L (ref 5–45)
BASOPHILS # BLD AUTO: 0.06 THOUSANDS/ΜL (ref 0–0.1)
BASOPHILS NFR BLD AUTO: 2 % (ref 0–1)
BILIRUB SERPL-MCNC: 3.06 MG/DL (ref 0.2–1)
BUN SERPL-MCNC: 6 MG/DL (ref 5–25)
CALCIUM SERPL-MCNC: 8.3 MG/DL (ref 8.3–10.1)
CHLORIDE SERPL-SCNC: 116 MMOL/L (ref 100–108)
CO2 SERPL-SCNC: 20 MMOL/L (ref 21–32)
CREAT SERPL-MCNC: 0.9 MG/DL (ref 0.6–1.3)
EOSINOPHIL # BLD AUTO: 0.11 THOUSAND/ΜL (ref 0–0.61)
EOSINOPHIL NFR BLD AUTO: 4 % (ref 0–6)
ERYTHROCYTE [DISTWIDTH] IN BLOOD BY AUTOMATED COUNT: 17.4 % (ref 11.6–15.1)
FERRITIN SERPL-MCNC: 117 NG/ML (ref 8–388)
GFR SERPL CREATININE-BSD FRML MDRD: 70 ML/MIN/1.73SQ M
GLUCOSE P FAST SERPL-MCNC: 93 MG/DL (ref 65–99)
HCT VFR BLD AUTO: 32.8 % (ref 34.8–46.1)
HGB BLD-MCNC: 9.8 G/DL (ref 11.5–15.4)
IMM GRANULOCYTES # BLD AUTO: 0 THOUSAND/UL (ref 0–0.2)
IMM GRANULOCYTES NFR BLD AUTO: 0 % (ref 0–2)
INR PPP: 1.64 (ref 0.84–1.19)
IRON SATN MFR SERPL: 23 %
IRON SERPL-MCNC: 54 UG/DL (ref 50–170)
LYMPHOCYTES # BLD AUTO: 0.79 THOUSANDS/ΜL (ref 0.6–4.47)
LYMPHOCYTES NFR BLD AUTO: 27 % (ref 14–44)
MCH RBC QN AUTO: 31.9 PG (ref 26.8–34.3)
MCHC RBC AUTO-ENTMCNC: 29.9 G/DL (ref 31.4–37.4)
MCV RBC AUTO: 107 FL (ref 82–98)
MONOCYTES # BLD AUTO: 0.34 THOUSAND/ΜL (ref 0.17–1.22)
MONOCYTES NFR BLD AUTO: 12 % (ref 4–12)
NEUTROPHILS # BLD AUTO: 1.61 THOUSANDS/ΜL (ref 1.85–7.62)
NEUTS SEG NFR BLD AUTO: 55 % (ref 43–75)
NRBC BLD AUTO-RTO: 0 /100 WBCS
PLATELET # BLD AUTO: 47 THOUSANDS/UL (ref 149–390)
PMV BLD AUTO: 13.3 FL (ref 8.9–12.7)
POTASSIUM SERPL-SCNC: 3.8 MMOL/L (ref 3.5–5.3)
PROT SERPL-MCNC: 6.5 G/DL (ref 6.4–8.2)
PROTHROMBIN TIME: 19 SECONDS (ref 11.6–14.5)
RBC # BLD AUTO: 3.07 MILLION/UL (ref 3.81–5.12)
SODIUM SERPL-SCNC: 144 MMOL/L (ref 136–145)
TIBC SERPL-MCNC: 239 UG/DL (ref 250–450)
WBC # BLD AUTO: 2.91 THOUSAND/UL (ref 4.31–10.16)

## 2019-11-18 PROCEDURE — 85610 PROTHROMBIN TIME: CPT

## 2019-11-18 PROCEDURE — 36415 COLL VENOUS BLD VENIPUNCTURE: CPT

## 2019-11-18 PROCEDURE — 83540 ASSAY OF IRON: CPT

## 2019-11-18 PROCEDURE — 82728 ASSAY OF FERRITIN: CPT

## 2019-11-18 PROCEDURE — 83918 ORGANIC ACIDS TOTAL QUANT: CPT

## 2019-11-18 PROCEDURE — 80053 COMPREHEN METABOLIC PANEL: CPT

## 2019-11-18 PROCEDURE — 85025 COMPLETE CBC W/AUTO DIFF WBC: CPT

## 2019-11-18 PROCEDURE — 83550 IRON BINDING TEST: CPT

## 2019-11-20 ENCOUNTER — OFFICE VISIT (OUTPATIENT)
Dept: HEMATOLOGY ONCOLOGY | Facility: HOSPITAL | Age: 59
End: 2019-11-20
Payer: OTHER GOVERNMENT

## 2019-11-20 VITALS
TEMPERATURE: 97.6 F | SYSTOLIC BLOOD PRESSURE: 136 MMHG | WEIGHT: 218 LBS | BODY MASS INDEX: 37.22 KG/M2 | HEIGHT: 64 IN | OXYGEN SATURATION: 95 % | RESPIRATION RATE: 16 BRPM | DIASTOLIC BLOOD PRESSURE: 88 MMHG | HEART RATE: 95 BPM

## 2019-11-20 DIAGNOSIS — D50.9 IRON DEFICIENCY ANEMIA, UNSPECIFIED IRON DEFICIENCY ANEMIA TYPE: ICD-10-CM

## 2019-11-20 DIAGNOSIS — D61.818 PANCYTOPENIA (HCC): ICD-10-CM

## 2019-11-20 DIAGNOSIS — K70.30 ALCOHOLIC CIRRHOSIS OF LIVER WITHOUT ASCITES (HCC): ICD-10-CM

## 2019-11-20 DIAGNOSIS — D69.6 THROMBOCYTOPENIA (HCC): ICD-10-CM

## 2019-11-20 DIAGNOSIS — K70.30 ALCOHOLIC CIRRHOSIS OF LIVER WITHOUT ASCITES (HCC): Primary | ICD-10-CM

## 2019-11-20 LAB
METHYLMALONATE SERPL-SCNC: 155 NMOL/L (ref 0–378)
SL AMB DISCLAIMER: NORMAL

## 2019-11-20 PROCEDURE — 99214 OFFICE O/P EST MOD 30 MIN: CPT | Performed by: INTERNAL MEDICINE

## 2019-11-20 RX ORDER — POTASSIUM CHLORIDE 750 MG/1
TABLET, EXTENDED RELEASE ORAL
Qty: 90 TABLET | Refills: 8 | Status: SHIPPED | OUTPATIENT
Start: 2019-11-20 | End: 2020-04-03 | Stop reason: SDUPTHER

## 2019-11-20 NOTE — PROGRESS NOTES
Hematology/Oncology Outpatient Follow- up Note  Galindo Camara 61 y o  female MRN: @ Encounter: [id number]        Date:  11/20/2019    Presenting Complaint/Diagnosis : Pancytopenia in the setting of cirrhosis and Iron deficiency anemia    HPI:    Brigida Yoon is a 61year old   female, who was referred to our office for Pancytopenia, and had blood work drawn on 2/3/17, which showed WBC 3 4, Hgb 10 7, and Platelet count 24,797   Patient indicated she had history of cirrhosis, and was alcohol induced   She had a bone marrow biopsy which indicated no stored iron, with no other abnormalities otherwise       Previous Hematologic/ Oncologic History:    Venofer and B12    Current Hematologic/ Oncologic Treatment:    Patient states she finished up her iron  Interval History:    Patient returns for follow-up visit  She states she was actively drinking in October and was actually hospitalized for ascites  Her bilirubin has gone up to 3  I think this is all related to her drinking  Her platelet count is 47  I advised her that I suspect her blood counts are now abnormal secondary to her heavy drinking  She did get Venofer and her hemoglobin improved slightly but her indices are now macrocytic which is probably related to her alcohol  The patient admits to drinking actively but states she just talk to somebody in her primary care physician's office and after talking to them she is thinking of stopping drinking  I explained her if she does not stop drinking she will die of a complication of drinking  Denies any nausea denies any vomiting denies any black stools  The rest of her 14 point review of systems today was negative  Test Results:    Imaging: No results found      Labs:   Lab Results   Component Value Date    WBC 2 91 (L) 11/18/2019    HGB 9 8 (L) 11/18/2019    HCT 32 8 (L) 11/18/2019     (H) 11/18/2019    PLT 47 (LL) 11/18/2019     Lab Results   Component Value Date    K 3 8 11/18/2019     (H) 11/18/2019    CO2 20 (L) 11/18/2019    BUN 6 11/18/2019    CREATININE 0 90 11/18/2019    GLUF 93 11/18/2019    CALCIUM 8 3 11/18/2019    AST 97 (H) 11/18/2019    ALT 48 11/18/2019    ALKPHOS 141 (H) 11/18/2019    EGFR 70 11/18/2019       Lab Results   Component Value Date    IRON 54 11/18/2019    TIBC 239 (L) 11/18/2019    FERRITIN 117 11/18/2019       Lab Results   Component Value Date    BTCRLOEC19 991 (H) 06/15/2018         ROS: As stated in the history of present illness otherwise his 14 point review of systems today was negative        Active Problems:   Patient Active Problem List   Diagnosis    Chronic anemia    Encephalopathy, portal systemic (HCC)    Cirrhosis with alcoholism ([facility] [address] )    History of alcohol abuse    Gastrointestinal hemorrhage associated with anorectal source    History of gastric bypass    Thrombocytopenia (HCC)    Septic shock due to undetermined organism ([facility] [address] )    Ascites    Right leg pain    Hypokalemia    Acute kidney injury ([facility] [address] )    Alcoholic hepatitis    Anastomotic ulcer    Breast cancer (HCC)    Chronic anxiety    Chronic depression    Chronic foot pain    Chronic hand pain    Deconditioned low back    Dyspnea    Fatty liver    Foot fracture    Iron (Fe) deficiency anemia    Liver disease    Liver lesion    Pain syndrome, chronic    Peripheral neuropathy    Rosacea    Ulcers, marginal    Ventral hernia without obstruction or gangrene    Wound of abdomen    Cirrhosis of liver without ascites (HCC)    Pancytopenia (HCC)    ETOH abuse    History of bariatric surgery    Neuropathic pain    Fungal infection of nail    Low back pain    Confusion    Hyperammonemia (HCC)    Dental infection    Acute upper gastrointestinal bleeding       Past Medical History:   Past Medical History:   Diagnosis Date    Alcohol use disorder     Alcoholic hepatitis     Anastomotic ulcer     Anemia     Anxiety     Ascites     Breast cancer ([facility] [address] ) 2010    stage 0; bilateral mastectomy (prophylactic on left)    Chronic foot pain     Chronic narcotic dependence ([address] )     pt denies    Chronic pain disorder     Cirrhosis, alcoholic (HCC)     Colitis     Depression     situational    Esophagitis     History of bilateral mastectomy     History of gastric bypass     Hypertension     Liver disease     Morbid obesity ([address] )     Osteopenia     Palpitations     Pancytopenia (HCC)     Peripheral neuropathy     Pernicious anemia     Rosacea        Surgical History:   Past Surgical History:   Procedure Laterality Date    ABDOMINAL SURGERY      abdominoplasty    BREAST SURGERY      mastectomy    CHOLECYSTECTOMY      ESOPHAGOGASTRODUODENOSCOPY N/A 8/2/2017    Procedure: ESOPHAGOGASTRODUODENOSCOPY (EGD); Surgeon: Donivan Goldmann, DO;  Location: [facility];  Service: Gastroenterology    GASTRIC BYPASS      HERNIA REPAIR      MASTECTOMY      MN ESOPHAGOGASTRODUODENOSCOPY TRANSORAL DIAGNOSTIC N/A 5/18/2017    Procedure: ESOPHAGOGASTRODUODENOSCOPY (EGD) with bx;  Surgeon: Brook Mcnamara MD;  Location: [facility]; Service: Gastroenterology    MN ESOPHAGOGASTRODUODENOSCOPY TRANSORAL DIAGNOSTIC N/A 9/14/2017    Procedure: EGD with bx AND COLONOSCOPY with polypectomy;  Surgeon: Brook Mcnamara MD;  Location: [facility]; Service: Gastroenterology    DECLAN-EN-Y PROCEDURE  2005       Family History:    Family History   Problem Relation Age of Onset    Diabetes Mother     Alzheimer's disease Mother     Kidney disease Father     Hypertension Father     Obesity Sister     Fibrocystic breast disease Sister     Breast cancer Sister 62    Colon cancer Neg Hx     Uterine cancer Neg Hx     Ovarian cancer Neg Hx        Cancer-related family history includes Breast cancer (age of onset: 62) in her sister  There is no history of Colon cancer, Uterine cancer, or Ovarian cancer      Social History:   Social History     Socioeconomic History    Marital status: /Civil Union Spouse name: Not on file    Number of children: Not on file    Years of education: Not on file    Highest education level: Not on file   Occupational History    Not on file   Social Needs    Financial resource strain: Not on file    Food insecurity:     Worry: Not on file     Inability: Not on file    Transportation needs:     Medical: Not on file     Non-medical: Not on file   Tobacco Use    Smoking status: Never Smoker    Smokeless tobacco: Never Used   Substance and Sexual Activity    Alcohol use: Not Currently    Drug use: No    Sexual activity: Not Currently     Partners: Male   Lifestyle    Physical activity:     Days per week: Not on file     Minutes per session: Not on file    Stress: Not on file   Relationships    Social connections:     Talks on phone: Not on file     Gets together: Not on file     Attends [employer] service: Not on file     Active member of club or organization: Not on file     Attends meetings of clubs or organizations: Not on file     Relationship status: Not on file    Intimate partner violence:     Fear of current or ex partner: Not on file     Emotionally abused: Not on file     Physically abused: Not on file     Forced sexual activity: Not on file   Other Topics Concern    Not on file   Social History Narrative    Not on file       Current Medications:   Current Outpatient Medications   Medication Sig Dispense Refill    Calcium Citrate 250 MG TABS Take by mouth daily 500mg, takes 3 daily- 1500mg total      CARAFATE 1 GM/10ML suspension TAKE 10 ML (1 GRAM) FOUR TIMES A DAY 1200 mL 5    Cholecalciferol (VITAMIN D3) 5000 units TABS Take 5,000 Units by mouth daily      folic acid (FOLVITE) 1 mg tablet Take 1 tablet by mouth daily 30 tablet 0    furosemide (LASIX) 20 mg tablet Take 0 5 tablets (10 mg total) by mouth daily 30 tablet 0    KLOR-CON M10 10 MEQ tablet TAKE 1 TABLET DAILY 90 tablet 8    lactulose (CEPHULAC) 10 g packet Take 1 packet (10 g total) by mouth 3 (three) times a day Titrate up or down to obtain goal of 2-3 bowel movements per day 30 each 7    Multiple Vitamins-Minerals (MULTIVITAMIN WITH MINERALS) tablet Take 1 tablet by mouth daily      pregabalin (LYRICA) 200 MG capsule Take 1 PO BID  60 capsule 0    spironolactone (ALDACTONE) 25 mg tablet Take 1 tablet (25 mg total) by mouth daily 90 tablet 3    thiamine 100 MG tablet Take 1 tablet (100 mg total) by mouth daily 30 tablet 0    tobramycin-dexamethasone (TOBRADEX) ophthalmic suspension Administer 1 drop to the right eye 3 (three) times a day      topiramate (TOPAMAX) 25 mg tablet 1 tab qhs x 5 days, then 2 tabs qhs x 5 days, then 3 tabs qhs x 5 days, then 4 tabs qhs 288 tablet 0    XIFAXAN 550 MG tablet Take 1 tablet (550 mg total) by mouth every 12 (twelve) hours for 180 days Pt states not compliant with taking 180 tablet 3     No current facility-administered medications for this visit  Allergies: Allergies   Allergen Reactions    Acetaminophen Other (See Comments)     Liver function, s/p bariatric surgery    Cymbalta [Duloxetine Hcl] GI Intolerance    Duloxetine GI Intolerance       Physical Exam:    Body surface area is 2 03 meters squared  Wt Readings from Last 3 Encounters:   11/20/19 98 9 kg (218 lb)   09/25/19 101 kg (222 lb)   08/29/19 99 8 kg (220 lb)        Temp Readings from Last 3 Encounters:   11/20/19 97 6 °F (36 4 °C) (Tympanic)   09/25/19 99 1 °F (37 3 °C) (Temporal)   09/06/19 98 3 °F (36 8 °C) (Tympanic)        BP Readings from Last 3 Encounters:   11/20/19 136/88   09/25/19 127/66   09/25/19 134/74         Pulse Readings from Last 3 Encounters:   11/20/19 95   09/25/19 (!) 108   09/25/19 93          Physical Exam     Constitutional   General appearance: No acute distress, well appearing and well nourished  Eyes   Conjunctiva and lids: No swelling, erythema or discharge  Pupils and irises: Equal, round and reactive to light      Ears, Nose, Mouth, and Throat   External inspection of ears and nose: Normal     Nasal mucosa, septum, and turbinates: Normal without edema or erythema  Oropharynx: Normal with no erythema, edema, exudate or lesions  Pulmonary   Respiratory effort: No increased work of breathing or signs of respiratory distress  Auscultation of lungs: Clear to auscultation  Cardiovascular   Palpation of heart: Normal PMI, no thrills  Auscultation of heart: Normal rate and rhythm, normal S1 and S2, without murmurs  Examination of extremities for edema and/or varicosities: Normal     Carotid pulses: Normal     Abdomen   Abdomen: Non-tender, no masses  Liver and spleen: No hepatomegaly or splenomegaly  Lymphatic   Palpation of lymph nodes in neck: No lymphadenopathy  Musculoskeletal   Gait and station: Normal     Digits and nails: Normal without clubbing or cyanosis  Inspection/palpation of joints, bones, and muscles: Normal     Skin   Skin and subcutaneous tissue: Normal without rashes or lesions  Neurologic   Cranial nerves: Cranial nerves 2-12 intact  Sensation: No sensory loss  Psychiatric   Orientation to person, place, and time: Normal     Mood and affect: Normal       Assessment / Plan:    The patient is a 65yo female with iron deficiency anemia and pancytopenia with cirrhosis  She was hospitalized last month with GI bleed  She states she was recently admitted locally around where she lives for ascites  She has moved to [email or web address]  She continues to actively drink  She knows this could be fatal  She states she is thinking of stopping now that she has spoken with someone in her primary care physician's office  I advised her whatever the reason she needs to stop drinking  She understands this  I suspect her low platelet count is secondary to alcoholism and a liver disease along with toxicity of the alcohol itself on the bone marrow  I'll see her back in 4 months with blood work   She has gotten IV Venofer and B12 at this point I suspect her counts are more related to alcohol  I'll see her back in 4 months  Goals and Barriers:  Current Goal:  Prolong Survival from Thrombocytopenia and alcoholism  Barriers: None  Patient's Capacity to Self Care:  Patient  able to self care  Portions of the record may have been created with voice recognition software   Occasional wrong word or "sound a like" substitutions may have occurred due to the inherent limitations of voice recognition software   Read the chart carefully and recognize, using context, where substitutions have occurred

## 2019-11-21 ENCOUNTER — TELEPHONE (OUTPATIENT)
Dept: GASTROENTEROLOGY | Facility: MEDICAL CENTER | Age: 59
End: 2019-11-21

## 2019-11-21 NOTE — TELEPHONE ENCOUNTER
----- Message from Sabine Scott MD sent at 11/21/2019  2:10 PM EST -----  Bilirubin is approximately 3  ALT is 141  Otherwise labs are normal

## 2019-12-03 DIAGNOSIS — M79.2 NEUROPATHIC PAIN: ICD-10-CM

## 2019-12-03 RX ORDER — TOPIRAMATE 25 MG/1
TABLET ORAL
Qty: 360 TABLET | Refills: 1 | Status: SHIPPED | OUTPATIENT
Start: 2019-12-03 | End: 2019-12-12

## 2019-12-12 ENCOUNTER — TELEPHONE (OUTPATIENT)
Dept: PAIN MEDICINE | Facility: MEDICAL CENTER | Age: 59
End: 2019-12-12

## 2019-12-12 DIAGNOSIS — M54.50 LOW BACK PAIN: ICD-10-CM

## 2019-12-12 DIAGNOSIS — M79.2 NEUROPATHIC PAIN: ICD-10-CM

## 2019-12-12 RX ORDER — PREGABALIN 200 MG/1
CAPSULE ORAL
Qty: 270 CAPSULE | Refills: 0 | Status: SHIPPED | OUTPATIENT
Start: 2019-12-12 | End: 2020-01-31 | Stop reason: SDUPTHER

## 2019-12-12 NOTE — TELEPHONE ENCOUNTER
S/w pt to confirm lyrica frequency as last ov states bid not tid  Pt states she could not tolerate topamax and disontinued that, so she started taking 200mg lyrica tid  States she has noticed improvement in pain and it works better for her than lyrica 300mg bid  Pt states she has 2 weeks remaining and a f/u ov 1/31  [facility] because she is worried about it getting to her on time

## 2019-12-12 NOTE — TELEPHONE ENCOUNTER
Pt contacted Call Center requested refill of their medication  Medication Name:  Lyrica     Dosage of Med:  200 mg     Frequency of Med:  Three times a day     Remaining Medication:  20 days left     [facility]       Pt  Preferred Callback Phone Number:  [phone]    Thank you

## 2019-12-12 NOTE — TELEPHONE ENCOUNTER
I changed the Lyrica to 200 mg TID and sent a 90 day supply to [facility]  She should f/u as scheduled  Thank you

## 2020-01-22 ENCOUNTER — TELEPHONE (OUTPATIENT)
Dept: HEMATOLOGY ONCOLOGY | Facility: CLINIC | Age: 60
End: 2020-01-22

## 2020-01-22 ENCOUNTER — TELEPHONE (OUTPATIENT)
Dept: GASTROENTEROLOGY | Facility: AMBULARY SURGERY CENTER | Age: 60
End: 2020-01-22

## 2020-01-22 NOTE — TELEPHONE ENCOUNTER
Patients GI provider:  Dr Jason Levo    Number to return call: ( fax # [phone] [facility]    Reason for call: Pt has ov 1-31-20 and would like her lab orders faxed to a local lab in the town she is currently in  She needs to get them done prior to office visit      Scheduled procedure/appointment date if applicable: Apt/procedure 1-31-20

## 2020-01-22 NOTE — TELEPHONE ENCOUNTER
Left voicemail for patient with details that she will have to call oncology and have them fax her labs, we didn't order them

## 2020-01-22 NOTE — TELEPHONE ENCOUNTER
Spoke with Jet Rodriguez, was hospitalized around January 6th at [facility] in [place] for a week  Patient had to receive blood, iron, magnesium, potassium and has a follow up with Zoe Garcia on 3/25  She would like to know if she should be seen sooner and if she should get labs done   Please call her back on [phone]

## 2020-01-23 NOTE — TELEPHONE ENCOUNTER
Called and scheduled the patient on 2/3 with [facility]  She's going to her her records faxed

## 2020-01-27 ENCOUNTER — TELEPHONE (OUTPATIENT)
Dept: INFUSION CENTER | Facility: HOSPITAL | Age: 60
End: 2020-01-27

## 2020-01-31 ENCOUNTER — OFFICE VISIT (OUTPATIENT)
Dept: GASTROENTEROLOGY | Facility: MEDICAL CENTER | Age: 60
End: 2020-01-31
Payer: OTHER GOVERNMENT

## 2020-01-31 ENCOUNTER — OFFICE VISIT (OUTPATIENT)
Dept: PAIN MEDICINE | Facility: CLINIC | Age: 60
End: 2020-01-31
Payer: OTHER GOVERNMENT

## 2020-01-31 VITALS
WEIGHT: 215 LBS | HEIGHT: 64 IN | TEMPERATURE: 97.8 F | BODY MASS INDEX: 36.7 KG/M2 | HEART RATE: 100 BPM | SYSTOLIC BLOOD PRESSURE: 130 MMHG | DIASTOLIC BLOOD PRESSURE: 78 MMHG

## 2020-01-31 VITALS
HEIGHT: 64 IN | WEIGHT: 217 LBS | SYSTOLIC BLOOD PRESSURE: 128 MMHG | BODY MASS INDEX: 37.05 KG/M2 | HEART RATE: 82 BPM | DIASTOLIC BLOOD PRESSURE: 78 MMHG

## 2020-01-31 DIAGNOSIS — R13.10 ODYNOPHAGIA: ICD-10-CM

## 2020-01-31 DIAGNOSIS — Z86.010 HISTORY OF COLON POLYPS: ICD-10-CM

## 2020-01-31 DIAGNOSIS — K28.9 ANASTOMOTIC ULCER: ICD-10-CM

## 2020-01-31 DIAGNOSIS — K70.31 ALCOHOLIC CIRRHOSIS OF LIVER WITH ASCITES (HCC): Primary | ICD-10-CM

## 2020-01-31 DIAGNOSIS — K74.60 CIRRHOSIS OF LIVER WITHOUT ASCITES, UNSPECIFIED HEPATIC CIRRHOSIS TYPE (HCC): ICD-10-CM

## 2020-01-31 DIAGNOSIS — R79.89 ELEVATED SERUM CREATININE: ICD-10-CM

## 2020-01-31 DIAGNOSIS — D64.9 CHRONIC ANEMIA: ICD-10-CM

## 2020-01-31 DIAGNOSIS — K72.90 HEPATIC ENCEPHALOPATHY (HCC): ICD-10-CM

## 2020-01-31 DIAGNOSIS — M54.50 LEFT-SIDED LOW BACK PAIN WITHOUT SCIATICA, UNSPECIFIED CHRONICITY: ICD-10-CM

## 2020-01-31 DIAGNOSIS — G89.4 PAIN SYNDROME, CHRONIC: Primary | ICD-10-CM

## 2020-01-31 DIAGNOSIS — M79.671 BILATERAL FOOT PAIN: ICD-10-CM

## 2020-01-31 DIAGNOSIS — M79.2 NEUROPATHIC PAIN: ICD-10-CM

## 2020-01-31 DIAGNOSIS — M79.672 BILATERAL FOOT PAIN: ICD-10-CM

## 2020-01-31 DIAGNOSIS — D69.6 THROMBOCYTOPENIA (HCC): ICD-10-CM

## 2020-01-31 DIAGNOSIS — G62.1 ALCOHOL-INDUCED POLYNEUROPATHY (HCC): ICD-10-CM

## 2020-01-31 DIAGNOSIS — R60.0 LOWER EXTREMITY EDEMA: ICD-10-CM

## 2020-01-31 DIAGNOSIS — R29.898 DECONDITIONED LOW BACK: ICD-10-CM

## 2020-01-31 PROCEDURE — 99214 OFFICE O/P EST MOD 30 MIN: CPT | Performed by: PHYSICIAN ASSISTANT

## 2020-01-31 PROCEDURE — 99214 OFFICE O/P EST MOD 30 MIN: CPT | Performed by: NURSE PRACTITIONER

## 2020-01-31 RX ORDER — BUMETANIDE 2 MG/1
2 TABLET ORAL DAILY
COMMUNITY
Start: 2020-01-11 | End: 2020-04-03 | Stop reason: SDUPTHER

## 2020-01-31 RX ORDER — MAGNESIUM CHLORIDE 64 MG
64 TABLET, DELAYED RELEASE (ENTERIC COATED) ORAL
COMMUNITY
Start: 2020-01-11

## 2020-01-31 RX ORDER — OMEPRAZOLE 20 MG/1
20 CAPSULE, DELAYED RELEASE ORAL DAILY
COMMUNITY

## 2020-01-31 RX ORDER — PREGABALIN 200 MG/1
CAPSULE ORAL
Qty: 270 CAPSULE | Refills: 1 | Status: SHIPPED | OUTPATIENT
Start: 2020-01-31 | End: 2020-07-20 | Stop reason: SDUPTHER

## 2020-01-31 NOTE — PATIENT INSTRUCTIONS
The patient is scheduled at  [facility] for an EGD with Dr Ty on 03/18/2020   prep instructions have been gone over in the office, with the patient, by the MA  The patient is aware that they will receive a call with the arrival time the day prior to procedure and that they will need a  the day of the procedure  I have asked the patient to call with any questions that they might have prior to procedure

## 2020-01-31 NOTE — PROGRESS NOTES
Assessment:  1  Pain syndrome, chronic    2  Bilateral foot pain    3  Neuropathic pain    4  Alcohol-induced polyneuropathy ([facility] [address] )    5  Left-sided low back pain without sciatica, unspecified chronicity    6  Deconditioned low back    7  Cirrhosis of liver without ascites, unspecified hepatic cirrhosis type ([facility] [address] )        Plan:  While the patient was in the office today, I did have a thorough conversation with the patient regarding their chronic pain syndrome, symptoms, medication regimen, and treatment plan  I discussed with the patient that at this point time with regards to the Topamax, she should discuss it 1st with her neurologist to see if it would be okay to take the Topamax at nighttime while she is taking the Lyrica as well  I also encouraged the patient to follow up with Nephrology and Cardiology as well  The patient was agreeable and verbalized an understanding  With regards to the Lyrica, for now, since she is noting moderate to significant relief and all of her liver functions are at least stable, we will continue the Lyrica as prescribed  Our goal would be in the future to try to titrate down on the Lyrica of possible  However, currently the patient does not feel that she would be able to do that  [place] Prescription Drug Monitoring Program report was reviewed and was appropriate     The patient will follow-up in 6 months for medication prescription refill and reevaluation  The patient was advised to contact the office should their symptoms worsen in the interim  The patient was agreeable and verbalized an understanding  History of Present Illness: The patient is a 61 y o  female last seen on 9/25/19 who presents for a follow up office visit in regards to chronic pain syndrome secondary to bilateral foot alcohol-induced polyneuropathy with chronic low back pain    The patient currently reports that since her last office visit overall her symptoms have remained relatively stable and her pain has remained relatively manageable with her current medication regimen  However, the patient reports that she is no longer taking the Topamax as she was working with Neurology as they were trying to see if the Topamax could take place of the Lyrica, however, she could not tolerate the pain with just the Topamax on its own without the Lyrica so that is why she is back on Lyrica and at this point has stop the Topamax as she was not sure she should take it with the Lyrica because she did not want to overload her liver, but did feel the Topamax did help her pain a little bit more and was interested in maybe discussing it with her neurologist to see she could at least take it at bedtime as that would help her sleep a little bit better  Current pain medications includes:  Lyrica 200 mg t i d   The patient reports that this regimen is providing 70% pain relief  The patient is reporting no side effects from this pain medication regimen  I have personally reviewed and/or updated the patient's past medical history, past surgical history, family history, social history, current medications, allergies, and vital signs today  Review of Systems:    Review of Systems   Respiratory: Negative for shortness of breath  Cardiovascular: Negative for chest pain  Gastrointestinal: Negative for constipation, diarrhea, nausea and vomiting  Musculoskeletal: Positive for gait problem and joint swelling (joint stiffness)  Negative for arthralgias and myalgias  Skin: Negative for rash  Neurological: Positive for weakness  Negative for dizziness and seizures  All other systems reviewed and are negative          Past Medical History:   Diagnosis Date    Alcohol use disorder     Alcoholic hepatitis     Anastomotic ulcer     Anemia     Anxiety     Ascites     Breast cancer ([facility] [address] ) 2010    stage 0; bilateral mastectomy (prophylactic on left)    Chronic foot pain     Chronic narcotic dependence [facility])     pt denies    Chronic pain disorder     Cirrhosis, alcoholic ([facility] [address] )     Colitis     Depression     situational    Esophagitis     History of bilateral mastectomy     History of gastric bypass     Hypertension     Liver disease     [facility]     Osteopenia     Palpitations     Pancytopenia (HCC)     Peripheral neuropathy     Pernicious anemia     Rosacea        Past Surgical History:   Procedure Laterality Date    ABDOMINAL SURGERY      abdominoplasty    BREAST SURGERY      mastectomy    CHOLECYSTECTOMY      COLONOSCOPY  2017    ESOPHAGOGASTRODUODENOSCOPY N/A 8/2/2017    Procedure: ESOPHAGOGASTRODUODENOSCOPY (EGD); Surgeon: Salud Jimenes DO;  [facility];  Service: Gastroenterology    GASTRIC BYPASS      HERNIA REPAIR      MASTECTOMY      DE ESOPHAGOGASTRODUODENOSCOPY TRANSORAL DIAGNOSTIC N/A 5/18/2017    Procedure: ESOPHAGOGASTRODUODENOSCOPY (EGD) with bx;  Surgeon: Navi Tierney MD;  [facility]: [facility]; Service: Gastroenterology    DE ESOPHAGOGASTRODUODENOSCOPY TRANSORAL DIAGNOSTIC N/A 9/14/2017    Procedure: EGD with bx AND COLONOSCOPY with polypectomy;  Surgeon: Navi Tierney MD;  [facility];   Service: Gastroenterology    DECLAN-EN-Y PROCEDURE  2005    UPPER GASTROINTESTINAL ENDOSCOPY  2017       Family History   Problem Relation Age of Onset    Diabetes Mother     Alzheimer's disease Mother     Kidney disease Father     Hypertension Father     Obesity Sister     Fibrocystic breast disease Sister     Breast cancer Sister 62    Colon cancer Neg Hx     Uterine cancer Neg Hx     Ovarian cancer Neg Hx        Social History     Occupational History    Not on file   Tobacco Use    Smoking status: Never Smoker    Smokeless tobacco: Never Used   Substance and Sexual Activity    Alcohol use: Not Currently    Drug use: No    Sexual activity: Not Currently     Partners: Male         Current Outpatient Medications:     bumetanide (BUMEX) 2 mg tablet, Take 2 mg by mouth daily, Disp: , Rfl:     Cholecalciferol (VITAMIN D3) 5000 units TABS, Take 5,000 Units by mouth daily, Disp: , Rfl:     folic acid (FOLVITE) 1 mg tablet, Take 1 tablet by mouth daily, Disp: 30 tablet, Rfl: 0    KLOR-CON M10 10 MEQ tablet, TAKE 1 TABLET DAILY, Disp: 90 tablet, Rfl: 8    lactulose (CEPHULAC) 10 g packet, Take 1 packet (10 g total) by mouth 3 (three) times a day Titrate up or down to obtain goal of 2-3 bowel movements per day, Disp: 30 each, Rfl: 7    magnesium chloride (MAG64) 64 MG TBEC EC tablet, Take 64 mg by mouth, Disp: , Rfl:     Multiple Vitamins-Minerals (MULTIVITAMIN WITH MINERALS) tablet, Take 1 tablet by mouth daily, Disp: , Rfl:     omeprazole (PriLOSEC) 20 mg delayed release capsule, Take 20 mg by mouth daily, Disp: , Rfl:     pregabalin (LYRICA) 200 MG capsule, Take 1 PO TID, Disp: 270 capsule, Rfl: 1    spironolactone (ALDACTONE) 25 mg tablet, Take 1 tablet (25 mg total) by mouth daily (Patient taking differently: Take 100 mg by mouth daily ), Disp: 90 tablet, Rfl: 3    thiamine 100 MG tablet, Take 1 tablet (100 mg total) by mouth daily, Disp: 30 tablet, Rfl: 0    XIFAXAN 550 MG tablet, Take 1 tablet (550 mg total) by mouth every 12 (twelve) hours for 180 days Pt states not compliant with taking, Disp: 180 tablet, Rfl: 3    Calcium Citrate 250 MG TABS, Take by mouth daily 500mg, takes 3 daily- 1500mg total, Disp: , Rfl:     CARAFATE 1 GM/10ML suspension, TAKE 10 ML (1 GRAM) FOUR TIMES A DAY (Patient not taking: Reported on 1/31/2020), Disp: 1200 mL, Rfl: 5    furosemide (LASIX) 20 mg tablet, Take 0 5 tablets (10 mg total) by mouth daily (Patient not taking: Reported on 1/31/2020), Disp: 30 tablet, Rfl: 0    tobramycin-dexamethasone (TOBRADEX) ophthalmic suspension, Administer 1 drop to the right eye 3 (three) times a day, Disp: , Rfl:     Allergies   Allergen Reactions    Acetaminophen Other (See Comments)     Liver function, s/p bariatric surgery    Cymbalta [Duloxetine Hcl] GI Intolerance    Duloxetine GI Intolerance       Physical Exam:    /78 (BP Location: Left arm, Patient Position: Sitting, Cuff Size: Standard)   Pulse 82   Ht 5' 4" (1 626 m)   Wt 98 4 kg (217 lb)   BMI 37 25 kg/m²     Constitutional:normal, well developed, well nourished, alert, in no distress and non-toxic and no overt pain behavior  and overweight  Eyes:anicteric  HEENT:grossly intact  Neck:supple, symmetric, trachea midline and no masses   Pulmonary:even and unlabored  Cardiovascular:No edema or pitting edema present  Skin:Normal without rashes or lesions and well hydrated  Psychiatric:Mood and affect appropriate  Neurologic:Cranial Nerves II-XII grossly intact  Musculoskeletal:The patient's gait is antalgic, but steady without the use of any assistive devices  Imaging  No orders to display         No orders of the defined types were placed in this encounter

## 2020-01-31 NOTE — PROGRESS NOTES
[facility] - Outpatient Follow-up Note  Meron Ramos 61 y o  female MRN: [id number]  Encounter: [id number]          ASSESSMENT AND PLAN:    1   Alcoholic cirrhosis  2  Ascites  3  Hepatic encephalopathy  4  Thrombocytopenia              -She has been referred to [facility] for consideration of a liver transplant and is planning to follow up with them; she is actively drinking  I strongly recommended she see a psychiatrist regarding her mental health and social issues  -LFTs stable on most recent labs              -Repeat MELD labs              -she is taking Bumex 2 mg daily and Aldactone 100 mg daily but does continue to have some lower extremity edema, she denies ascites  I have referred her to a nephrologist for management of her diuretics as her creatinine is elevated, she would prefer to see someone at [facility] which is closer to her home               -continue Xifaxan b i d               -she is currently using lactulose as needed but states she is having 2-3 bowel movements per day, we discussed the importance of avoiding constipation               -2 g sodium diet              -ordered AFP, recent ultrasound a few weeks ago was without suspicious liver masses  This was done with Dopplers which were normal as well    -we discussed that alcohol cessation is imperative to her survival    5  Chronic anemia  6   Anastomotic ulcer              -her chronic anemia is likely multifactorial due to alcohol abuse and chronic disease               -No signs of overt GI bleeding currently              -Celiac serology was normal    -hgb low but stable at 7 9   -her last upper endoscopy in July 2019 showed 2 anastomotic ulcers, recommend repeating the upper endoscopy to evaluate for healing, this will be planned as below    -continue to monitor   -she is following up with Hematology and is receiving iron infusions      6  Odynophagia   -she reports a scraping sensation when she swallows that began several weeks ago  Differential diagnosis is broad but does include obstructive lesion or esophageal candidiasis or possibly HSV/CMV  Recommend an upper endoscopy for further evaluation, we discussed the risks and benefits and she is agreeable  7  History of colon polyps   -her last colonoscopy was in 2017 with 2 polyps removed, these were tubular adenomas on pathology so repeat was recommended in 5 years for screening purposes  Follow-up in the office after the EGD  ____________________________________________________________    SUBJECTIVE:    63-year-old female with past medical history of alcoholic cirrhosis complicated by thrombocytopenia, ascites, edema, hepatic encephalopathy, gastric bypass with anastomotic ulcer last year presents to the office for follow-up  She reports that she has been having difficulty with leg swelling and recently she was hospitalized for this  She is currently taking diuretics but has edema despite this  She states she was told that her kidneys were not functioning correctly  She denies any ascites  She states she is taking the Xifaxan and uses lactulose as needed, she is having 2-3 soft bowel movements daily  She denies any hematochezia, melena or diarrhea   She reports a scraping sensation when she swallows which began a few weeks ago , she denies food getting stuck   She denies any melena , change in appetite or unintended weight loss   She recently saw transplant team at [facility] and plans to follow up with them  She does report that she continues to actively drink 1 bottle of liquor per night , she states that sometimes she only drinks 1 glass of wine   She is under lot of stress at home because her  has PTSD          Historical Information   Past Medical History:   Diagnosis Date    Alcohol use disorder     Alcoholic hepatitis     Anastomotic ulcer     Anemia     Anxiety     Ascites     Breast cancer ([facility] [address] ) 2010    stage 0; bilateral mastectomy (prophylactic on left)    Chronic foot pain     Chronic narcotic dependence ([address] )     pt denies    Chronic pain disorder     Cirrhosis, alcoholic (HCC)     Colitis     Depression     situational    Esophagitis     History of bilateral mastectomy     History of gastric bypass     Hypertension     Liver disease     Morbid obesity ([address] )     Osteopenia     Palpitations     Pancytopenia (HCC)     Peripheral neuropathy     Pernicious anemia     Rosacea      Past Surgical History:   Procedure Laterality Date    ABDOMINAL SURGERY      abdominoplasty    BREAST SURGERY      mastectomy    CHOLECYSTECTOMY      ESOPHAGOGASTRODUODENOSCOPY N/A 8/2/2017    Procedure: ESOPHAGOGASTRODUODENOSCOPY (EGD); Surgeon: Julián Leyva DO;  Location:  MAIN OR;  Service: Gastroenterology    GASTRIC BYPASS      HERNIA REPAIR      MASTECTOMY      IL ESOPHAGOGASTRODUODENOSCOPY TRANSORAL DIAGNOSTIC N/A 5/18/2017    Procedure: ESOPHAGOGASTRODUODENOSCOPY (EGD) with bx;  Surgeon: Yuriy Lambert MD;  [facility]; Service: Gastroenterology    IL ESOPHAGOGASTRODUODENOSCOPY TRANSORAL DIAGNOSTIC N/A 9/14/2017    Procedure: EGD with bx AND COLONOSCOPY with polypectomy;  Surgeon: Yuriy Lambert MD;  [facility];   Service: Gastroenterology    DECLAN-EN-Y PROCEDURE  2005     Social History   Social History     Substance and Sexual Activity   Alcohol Use Not Currently     Social History     Substance and Sexual Activity   Drug Use No     Social History     Tobacco Use   Smoking Status Never Smoker   Smokeless Tobacco Never Used     Family History   Problem Relation Age of Onset    Diabetes Mother     Alzheimer's disease Mother    Weir Kidney disease Father     Hypertension Father     Obesity Sister     Fibrocystic breast disease Sister     Breast cancer Sister 62    Colon cancer Neg Hx     Uterine cancer Neg Hx     Ovarian cancer Neg Hx        Meds/Allergies       Current Outpatient Medications:     Calcium Citrate 250 MG TABS    CARAFATE 1 GM/10ML suspension    Cholecalciferol (VITAMIN D3) 5000 units TABS    folic acid (FOLVITE) 1 mg tablet    furosemide (LASIX) 20 mg tablet    KLOR-CON M10 10 MEQ tablet    lactulose (CEPHULAC) 10 g packet    Multiple Vitamins-Minerals (MULTIVITAMIN WITH MINERALS) tablet    pregabalin (LYRICA) 200 MG capsule    spironolactone (ALDACTONE) 25 mg tablet    thiamine 100 MG tablet    tobramycin-dexamethasone (TOBRADEX) ophthalmic suspension    XIFAXAN 550 MG tablet    Allergies   Allergen Reactions    Acetaminophen Other (See Comments)     Liver function, s/p bariatric surgery    Cymbalta [Duloxetine Hcl] GI Intolerance    Duloxetine GI Intolerance           Objective     not currently breastfeeding  PHYSICAL EXAM:      Physical Exam   Constitutional: She is oriented to person, place, and time  She appears well-developed  No distress  Appears chronically ill   HENT:   Head: Normocephalic and atraumatic  Eyes: Right eye exhibits no discharge  Left eye exhibits no discharge  Scleral icterus is present  Neck: Neck supple  No tracheal deviation present  Cardiovascular: Normal rate, regular rhythm and normal heart sounds  Exam reveals no gallop and no friction rub  No murmur heard  Pulmonary/Chest: Effort normal  No respiratory distress  She has no wheezes  She has no rales  Abdominal: Soft  Bowel sounds are normal  She exhibits no distension  There is no tenderness  There is no rebound and no guarding  A hernia ([place]) is present  Midline scar noted   Neurological: She is alert and oriented to person, place, and time  Skin: Skin is warm and dry  Global jaundice   Psychiatric:   Anxious, tearful at times             Lab Results:   No visits with results within 1 Day(s) from this visit     Latest known visit with results is:   Appointment on 11/18/2019   Component Date Value    WBC 11/18/2019 2 91*    RBC 11/18/2019 3 07*    Hemoglobin 11/18/2019 9 8*    Hematocrit 11/18/2019 32 8*    MCV 11/18/2019 107*    MCH 11/18/2019 31 9     MCHC 11/18/2019 29 9*    RDW 11/18/2019 17 4*    MPV 11/18/2019 13 3*    Platelets 57/41/3106 47*    nRBC 11/18/2019 0     Neutrophils Relative 11/18/2019 55     Immat GRANS % 11/18/2019 0     Lymphocytes Relative 11/18/2019 27     Monocytes Relative 11/18/2019 12     Eosinophils Relative 11/18/2019 4     Basophils Relative 11/18/2019 2*    Neutrophils Absolute 11/18/2019 1 61*    Immature Grans Absolute 11/18/2019 0 00     Lymphocytes Absolute 11/18/2019 0 79     Monocytes Absolute 11/18/2019 0 34     Eosinophils Absolute 11/18/2019 0 11     Basophils Absolute 11/18/2019 0 06     Sodium 11/18/2019 144     Potassium 11/18/2019 3 8     Chloride 11/18/2019 116*    CO2 11/18/2019 20*    ANION GAP 11/18/2019 8     BUN 11/18/2019 6     Creatinine 11/18/2019 0 90     Glucose, Fasting 11/18/2019 93     Calcium 11/18/2019 8 3     AST 11/18/2019 97*    ALT 11/18/2019 48     Alkaline Phosphatase 11/18/2019 141*    Total Protein 11/18/2019 6 5     Albumin 11/18/2019 2 5*    Total Bilirubin 11/18/2019 3 06*    eGFR 11/18/2019 70     Methylmalonic Acid, S 11/18/2019 155     Disclaimer: 11/18/2019 Comment     Iron Saturation 11/18/2019 23     TIBC 11/18/2019 239*    Iron 11/18/2019 54     Ferritin 11/18/2019 117          Radiology Results:   No results found

## 2020-02-03 ENCOUNTER — OFFICE VISIT (OUTPATIENT)
Dept: HEMATOLOGY ONCOLOGY | Facility: HOSPITAL | Age: 60
End: 2020-02-03
Payer: OTHER GOVERNMENT

## 2020-02-03 ENCOUNTER — TELEPHONE (OUTPATIENT)
Dept: HEMATOLOGY ONCOLOGY | Facility: CLINIC | Age: 60
End: 2020-02-03

## 2020-02-03 VITALS
DIASTOLIC BLOOD PRESSURE: 78 MMHG | HEIGHT: 64 IN | HEART RATE: 115 BPM | OXYGEN SATURATION: 99 % | RESPIRATION RATE: 16 BRPM | WEIGHT: 226 LBS | BODY MASS INDEX: 38.58 KG/M2 | SYSTOLIC BLOOD PRESSURE: 132 MMHG | TEMPERATURE: 97 F

## 2020-02-03 DIAGNOSIS — D50.9 IRON DEFICIENCY ANEMIA, UNSPECIFIED IRON DEFICIENCY ANEMIA TYPE: ICD-10-CM

## 2020-02-03 DIAGNOSIS — K70.30 ALCOHOLIC CIRRHOSIS OF LIVER WITHOUT ASCITES (HCC): ICD-10-CM

## 2020-02-03 DIAGNOSIS — D69.6 THROMBOCYTOPENIA (HCC): Primary | ICD-10-CM

## 2020-02-03 PROBLEM — D50.8 IRON DEFICIENCY ANEMIA SECONDARY TO INADEQUATE DIETARY IRON INTAKE: Status: ACTIVE | Noted: 2020-02-03

## 2020-02-03 PROCEDURE — 99214 OFFICE O/P EST MOD 30 MIN: CPT | Performed by: NURSE PRACTITIONER

## 2020-02-03 RX ORDER — SODIUM CHLORIDE 9 MG/ML
20 INJECTION, SOLUTION INTRAVENOUS ONCE
Status: CANCELLED | OUTPATIENT
Start: 2020-02-03

## 2020-02-03 NOTE — PROGRESS NOTES
Hematology/Oncology Outpatient Follow- up Note  Susi Rivera 61 y o  female MRN: @ Encounter: [id number]        Date:  2/3/2020    Presenting Complaint/Diagnosis : Pancytopenia in the setting of cirrhosis and Iron deficiency anemia    HPI:    Robert Sloan is a 61year old   female, who was referred to our office for Pancytopenia, and had blood work drawn on 2/3/17, which showed WBC 3 4, Hgb 10 7, and Platelet count 08,233   Patient indicated she had history of cirrhosis, and was alcohol induced   She had a bone marrow biopsy which indicated no stored iron, with no other abnormalities otherwise       Previous Hematologic/ Oncologic History:    Venofer and B12    Current Hematologic/ Oncologic Treatment:    Venofer and B12    Interval History:    The patient returns for follow up visit  She reports that she was recently hospitalized at the beginning of January at [address] in [place]  She was having issues with shortness of breath and fluid overload  Hemoglobin at that time was 7 9   She reports that she was given blood and iron as an inpatient at [facility]  She reports that she is feeling better today but does continue to drink alcohol  She is trying to stop  She reports that she is following with a counselor at her [facility] and attending alcoholics anonymous  As far symptoms are concerned she it does have fatigue and shortness of breath with activity  Otherwise denies chest pain , nausea , vomiting , diarrhea, bleeding /bruising , and fevers/infection  She is following with vascular for a right femoral aneurysm and is following with GI and hepatology  She reports that she does have an upcoming EGD scheduled  Test Results:    Imaging: No results found      Labs:   Lab Results   Component Value Date    WBC 2 91 (L) 11/18/2019    HGB 9 8 (L) 11/18/2019    HCT 32 8 (L) 11/18/2019     (H) 11/18/2019    PLT 47 (LL) 11/18/2019     Lab Results   Component Value Date    K 3 8 11/18/2019     (H) 11/18/2019    CO2 20 (L) 11/18/2019    BUN 6 11/18/2019    CREATININE 0 90 11/18/2019    GLUF 93 11/18/2019    CALCIUM 8 3 11/18/2019    AST 97 (H) 11/18/2019    ALT 48 11/18/2019    ALKPHOS 141 (H) 11/18/2019    EGFR 70 11/18/2019         No results found for: SPEP, UPEP    No results found for: PSA    No results found for: CEA    No results found for:     No results found for: AFP    Lab Results   Component Value Date    IRON 54 11/18/2019    TIBC 239 (L) 11/18/2019    FERRITIN 117 11/18/2019       Lab Results   Component Value Date    YDHSYOUY80 703 (H) 06/15/2018         ROS: As stated in the history of present illness otherwise his 14 point review of systems today was negative        Active Problems:   Patient Active Problem List   Diagnosis    Chronic anemia    Encephalopathy, portal systemic (HCC)    Cirrhosis with alcoholism ([facility] )    History of alcohol abuse    Gastrointestinal hemorrhage associated with anorectal source    History of gastric bypass    Thrombocytopenia (HCC)    Septic shock due to undetermined organism ([facility] )    Ascites    Right leg pain    Hypokalemia    Acute kidney injury ([facility] )    Alcoholic hepatitis    Anastomotic ulcer    Breast cancer (HCC)    Chronic anxiety    Chronic depression    Chronic foot pain    Chronic hand pain    Deconditioned low back    Dyspnea    Fatty liver    Foot fracture    Iron (Fe) deficiency anemia    Liver disease    Liver lesion    Pain syndrome, chronic    Peripheral neuropathy    Rosacea    Ulcers, marginal    Ventral hernia without obstruction or gangrene    Wound of abdomen    Cirrhosis of liver without ascites (HCC)    Pancytopenia (HCC)    ETOH abuse    History of bariatric surgery    Neuropathic pain    Fungal infection of nail    Low back pain    Confusion    Hyperammonemia (HCC)    Dental infection    Acute upper gastrointestinal bleeding       Past Medical History:   Past Medical History:   Diagnosis Date    Alcohol use disorder     Alcoholic hepatitis     Anastomotic ulcer     Anemia     Anxiety     Ascites     Breast cancer ([facility] ) 2010    stage 0; bilateral mastectomy (prophylactic on left)    Chronic foot pain     Chronic narcotic dependence ([facility] )     pt denies    Chronic pain disorder     Cirrhosis, alcoholic (HCC)     Colitis     Depression     situational    Esophagitis     History of bilateral mastectomy     History of gastric bypass     Hypertension     Liver disease     Morbid obesity (HCC)     Osteopenia     Palpitations     Pancytopenia (HCC)     Peripheral neuropathy     Pernicious anemia     Rosacea        Surgical History:   Past Surgical History:   Procedure Laterality Date    ABDOMINAL SURGERY      abdominoplasty    BREAST SURGERY      mastectomy    CHOLECYSTECTOMY      COLONOSCOPY  2017    ESOPHAGOGASTRODUODENOSCOPY N/A 8/2/2017    Procedure: ESOPHAGOGASTRODUODENOSCOPY (EGD); Surgeon: Doron Yao DO;  [facility]: [facility];  Service: Gastroenterology    GASTRIC BYPASS      HERNIA REPAIR      MASTECTOMY      ND ESOPHAGOGASTRODUODENOSCOPY TRANSORAL DIAGNOSTIC N/A 5/18/2017    Procedure: ESOPHAGOGASTRODUODENOSCOPY (EGD) with bx;  Surgeon: Re Hamilton MD;  [facility]; Service: Gastroenterology    ND ESOPHAGOGASTRODUODENOSCOPY TRANSORAL DIAGNOSTIC N/A 9/14/2017    Procedure: EGD with bx AND COLONOSCOPY with polypectomy;  Surgeon: Re Hamilton MD;  [facility];   Service: Gastroenterology    DECLAN-EN-Y PROCEDURE  2005    UPPER GASTROINTESTINAL ENDOSCOPY  2017       Family History:    Family History   Problem Relation Age of Onset    Diabetes Mother     Alzheimer's disease Mother     Kidney disease Father     Hypertension Father     Obesity Sister     Fibrocystic breast disease Sister     Breast cancer Sister 62    Colon cancer Neg Hx     Uterine cancer Neg Hx     Ovarian cancer Neg Hx        Cancer-related family history includes Breast cancer (age of onset: 62) in her sister  There is no history of Colon cancer, Uterine cancer, or Ovarian cancer      Social History:   Social History     Socioeconomic History    Marital status: /Civil Union     Spouse name: Not on file    Number of children: Not on file    Years of education: Not on file    Highest education level: Not on file   Occupational History    Not on file   Social Needs    Financial resource strain: Not on file    Food insecurity:     Worry: Not on file     Inability: Not on file    Transportation needs:     Medical: Not on file     Non-medical: Not on file   Tobacco Use    Smoking status: Never Smoker    Smokeless tobacco: Never Used   Substance and Sexual Activity    Alcohol use: Not Currently    Drug use: No    Sexual activity: Not Currently     Partners: Male   Lifestyle    Physical activity:     Days per week: Not on file     Minutes per session: Not on file    Stress: Not on file   Relationships    Social connections:     Talks on phone: Not on file     Gets together: Not on file     Attends Hindu service: Not on file     Active member of club or organization: Not on file     Attends meetings of clubs or organizations: Not on file     Relationship status: Not on file    Intimate partner violence:     Fear of current or ex partner: Not on file     Emotionally abused: Not on file     Physically abused: Not on file     Forced sexual activity: Not on file   Other Topics Concern    Not on file   Social History Narrative    Not on file       Current Medications:   Current Outpatient Medications   Medication Sig Dispense Refill    bumetanide (BUMEX) 2 mg tablet Take 2 mg by mouth daily      Calcium Citrate 250 MG TABS Take by mouth daily 500mg, takes 3 daily- 1500mg total      CARAFATE 1 GM/10ML suspension TAKE 10 ML (1 GRAM) FOUR TIMES A DAY (Patient not taking: Reported on 1/31/2020) 1200 mL 5    Cholecalciferol (VITAMIN D3) 5000 units TABS Take 5,000 Units by mouth daily      folic acid (FOLVITE) 1 mg tablet Take 1 tablet by mouth daily 30 tablet 0    furosemide (LASIX) 20 mg tablet Take 0 5 tablets (10 mg total) by mouth daily (Patient not taking: Reported on 1/31/2020) 30 tablet 0    KLOR-CON M10 10 MEQ tablet TAKE 1 TABLET DAILY 90 tablet 8    lactulose (CEPHULAC) 10 g packet Take 1 packet (10 g total) by mouth 3 (three) times a day Titrate up or down to obtain goal of 2-3 bowel movements per day 30 each 7    magnesium chloride (MAG64) 64 MG TBEC EC tablet Take 64 mg by mouth      Multiple Vitamins-Minerals (MULTIVITAMIN WITH MINERALS) tablet Take 1 tablet by mouth daily      omeprazole (PriLOSEC) 20 mg delayed release capsule Take 20 mg by mouth daily      pregabalin (LYRICA) 200 MG capsule Take 1 PO  capsule 1    spironolactone (ALDACTONE) 25 mg tablet Take 1 tablet (25 mg total) by mouth daily (Patient taking differently: Take 100 mg by mouth daily ) 90 tablet 3    thiamine 100 MG tablet Take 1 tablet (100 mg total) by mouth daily 30 tablet 0    tobramycin-dexamethasone (TOBRADEX) ophthalmic suspension Administer 1 drop to the right eye 3 (three) times a day      XIFAXAN 550 MG tablet Take 1 tablet (550 mg total) by mouth every 12 (twelve) hours for 180 days Pt states not compliant with taking 180 tablet 3     No current facility-administered medications for this visit  Allergies: Allergies   Allergen Reactions    Acetaminophen Other (See Comments)     Liver function, s/p bariatric surgery    Cymbalta [Duloxetine Hcl] GI Intolerance    Duloxetine GI Intolerance       Physical Exam:    There is no height or weight on file to calculate BSA      Wt Readings from Last 3 Encounters:   01/31/20 98 4 kg (217 lb)   01/31/20 97 5 kg (215 lb)   11/20/19 98 9 kg (218 lb)        Temp Readings from Last 3 Encounters:   01/31/20 97 8 °F (36 6 °C) (Tympanic)   11/20/19 97 6 °F (36 4 °C) (Tympanic)   09/25/19 99 1 °F (37 3 °C) (Temporal)        BP Readings from Last 3 Encounters:   01/31/20 128/78   01/31/20 130/78   11/20/19 136/88         Pulse Readings from Last 3 Encounters:   01/31/20 82   01/31/20 100   11/20/19 95     @LASTSAO2(3)@      Physical Exam     Constitutional   General appearance: No acute distress, well appearing and well nourished  Eyes   Conjunctiva and lids: No swelling, erythema or discharge  Pupils and irises: Equal, round and reactive to light  Ears, Nose, Mouth, and Throat   External inspection of ears and nose: Normal     Nasal mucosa, septum, and turbinates: Normal without edema or erythema  Oropharynx: Normal with no erythema, edema, exudate or lesions  Pulmonary   Respiratory effort: No increased work of breathing or signs of respiratory distress  Auscultation of lungs: Clear to auscultation  Cardiovascular   Palpation of heart: Normal PMI, no thrills  Auscultation of heart: Normal rate and rhythm, normal S1 and S2, without murmurs  Examination of extremities for edema and/or varicosities: Normal     Carotid pulses: Normal     Abdomen   Abdomen: Non-tender, no masses  Liver and spleen: No hepatomegaly or splenomegaly  Lymphatic   Palpation of lymph nodes in neck: No lymphadenopathy  Musculoskeletal   Gait and station: Normal     Digits and nails: Normal without clubbing or cyanosis  Inspection/palpation of joints, bones, and muscles: Normal     Skin   Skin and subcutaneous tissue: Normal without rashes or lesions  Neurologic   Cranial nerves: Cranial nerves 2-12 intact  Sensation: No sensory loss  Psychiatric   Orientation to person, place, and time: Normal     Mood and affect: Normal         Assessment / Plan:    The patient is a 65yo female with iron deficiency anemia and pancytopenia with cirrhosis  She was hospitalized the first part of this year at [address] [place] [address] in [facility] [place]    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 for which she is following with vascular  She also follows with Gastroenterology and reports that she has an upcoming visit and plans to have an EGD done  I did also encourage her to have a colonoscopy and pill study as her hemoglobin has decreased from 9 8-8 8 and she is low on iron  Iron saturation is 13%  She has not quit drinking alcohol and we discussed again today that this will eventually lead to death  She is aware of this and is working on quitting and reports that she is following with a [employer] counselor and alcoholics anonymous  She has been receiving monthly Venofer and B12 although given her recent hospitalization and decrease in hemoglobin and iron she could benefit from iron infusions  Today we will set her up for 8 weekly Venofer 200 mg IV infusions and then she will resume her monthly Venofer and B12 after that  Her WBC and platelets remain chronically on the low side which is likely related to her cirrhotic liver and alcoholism  She is considering moving her care closer to home as she lives in [facility]  She will plan to look for a hematologist in that area and let us know if she plans to transfer her care  At this point we will plan to see her back in 4 months with blood work  She did not have her AFP or CMP drawn prior to today's visit  These will be reordered in addition to CBC, iron panel, ferritin, and MMA  In the meantime she will call if any questions or concerns  Goals and Barriers:  Current Goal:  Prolong Survival from thrombocytopenia and alcoholism  Barriers: None  Patient's Capacity to Self Care:  Patient able to self care  Portions of the record may have been created with voice recognition software   Occasional wrong word or "sound a like" substitutions may have occurred due to the inherent limitations of voice recognition software   Read the chart carefully and recognize, using context, where substitutions have occurred

## 2020-02-03 NOTE — TELEPHONE ENCOUNTER
Kathleen Mayorga called stating that she was just in to see Hemalatha Camacho earlier today  She stated she is going to need all medical records faxed over to [address] [place] [address]  (Fax # [phone]) She stated she called our medical records department, but then was told by them to call us to have us send over the medical release form  I looked in patient's chart but could not find such form  Please Review and contact patient with any additional questions

## 2020-02-06 ENCOUNTER — TELEPHONE (OUTPATIENT)
Dept: HEMATOLOGY ONCOLOGY | Facility: CLINIC | Age: 60
End: 2020-02-06

## 2020-02-06 NOTE — TELEPHONE ENCOUNTER
Called patient's PCP in regards to insurance referral and spoke with Christian Amador from the Referral Dept  Christian Amador is going to try to backdate the referrals for 11/20/19 and 2/3/2020  It will take a couple of days  Once approved she will fax the referral to [phone]

## 2020-02-10 ENCOUNTER — TELEPHONE (OUTPATIENT)
Dept: HEMATOLOGY ONCOLOGY | Facility: CLINIC | Age: 60
End: 2020-02-10

## 2020-02-10 NOTE — TELEPHONE ENCOUNTER
Jes Valenzuela from [facility] called to get fax # for Gladys Momin office   I provided [phone]

## 2020-02-17 ENCOUNTER — TELEPHONE (OUTPATIENT)
Dept: HEMATOLOGY ONCOLOGY | Facility: HOSPITAL | Age: 60
End: 2020-02-17

## 2020-02-17 NOTE — TELEPHONE ENCOUNTER
Received fax from Julio Cesar Suggs at [facility] in [facility] asking for records that were previously faxed to their [place] [facility] [place] location to be faxed to her location in [facility]  Patient moved their and is going to do her infusions there  Sent all office notes & testing to [phone]

## 2020-02-18 ENCOUNTER — PROCEDURE VISIT (OUTPATIENT)
Dept: NEUROLOGY | Facility: CLINIC | Age: 60
End: 2020-02-18

## 2020-02-18 DIAGNOSIS — G62.9 NEUROPATHY: Primary | ICD-10-CM

## 2020-02-18 NOTE — PROGRESS NOTES
EMG 2 limb lower extremity     Date/Time 2/18/2020 1:10 PM     Performed by  Domi Fernando MD     Authorized by Brad Xiao PA-C      Universal Protocol Consent: Verbal consent obtained  Risks and benefits: risks, benefits and alternatives were discussed  Consent given by: patient  Patient understanding: patient states understanding of the procedure being performed  Patient consent: the patient's understanding of the procedure matches consent given  Patient identity confirmed: verbally with patient               EMG RIGHT LOWER EXTREMITY    Motor  conduction studies were attempted on the right peroneal nerve  No response was obtained even with maximal stimulation  Patient was noted to have 3+ bilateral lower extremity edema  The nerve conduction and EMG testing would be inaccurate in this situation  Hence patient was advised to reschedule once the edema improves        MAICO Matute

## 2020-03-14 ENCOUNTER — TRANSCRIBE ORDERS (OUTPATIENT)
Dept: GASTROENTEROLOGY | Facility: CLINIC | Age: 60
End: 2020-03-14

## 2020-03-16 ENCOUNTER — TELEPHONE (OUTPATIENT)
Dept: GASTROENTEROLOGY | Facility: MEDICAL CENTER | Age: 60
End: 2020-03-16

## 2020-03-16 NOTE — TELEPHONE ENCOUNTER
Pt called to reschedule procedure due to corona virus, stating it is not urgent and she would rather wait  I offered 5/19, 5/28, 6/11, 7/14 pt declined all  She would like to just cancel procedure

## 2020-03-18 ENCOUNTER — TELEPHONE (OUTPATIENT)
Dept: GASTROENTEROLOGY | Facility: AMBULARY SURGERY CENTER | Age: 60
End: 2020-03-18

## 2020-03-18 NOTE — TELEPHONE ENCOUNTER
Patients GI provider:  Dr Mary Seals    Number to return call: (  [phone]    Reason for call: Pt calling to ask dr Mary Seals to prescribe baclofen 10 mg for her leg cramps, was given to her while inpatient a couple months ago    Scheduled procedure/appointment date if applicable: Apt/procedure 4-10-20

## 2020-03-19 DIAGNOSIS — G47.62 CRAMP IN LOWER EXTREMITY ASSOCIATED WITH SLEEP: Primary | ICD-10-CM

## 2020-03-19 DIAGNOSIS — K70.31 ALCOHOLIC CIRRHOSIS OF LIVER WITH ASCITES (HCC): Primary | ICD-10-CM

## 2020-03-19 RX ORDER — BACLOFEN 10 MG/1
10 TABLET ORAL DAILY
Qty: 30 TABLET | Refills: 3 | Status: SHIPPED | OUTPATIENT
Start: 2020-03-19 | End: 2020-04-27 | Stop reason: SDUPTHER

## 2020-03-19 NOTE — TELEPHONE ENCOUNTER
I worry about drowsiness associated with medication but I did prescribe it  Can we just tell her she should take it only at night

## 2020-03-19 NOTE — TELEPHONE ENCOUNTER
Spoke with patient  She knows baclofen has been sent to her pharmacy and that she should take it at night d/t causing drowsiness  Patient knows to complete lab work, she does not go to a [facility] and requested I mail it to her  She would like lab slips mailed to [address] [place] [address],[phone] [id number]  Lab slips mailed at this time

## 2020-03-30 ENCOUNTER — TELEPHONE (OUTPATIENT)
Dept: GASTROENTEROLOGY | Facility: AMBULARY SURGERY CENTER | Age: 60
End: 2020-03-30

## 2020-03-30 NOTE — TELEPHONE ENCOUNTER
Pt is set up for VR visit with dr Maureen Ramirez on 4/10/20 at 140 pm  Pt email address and telephone number has been verified   Pt expressed understanding in VR visit

## 2020-04-03 ENCOUNTER — TELEMEDICINE (OUTPATIENT)
Dept: GASTROENTEROLOGY | Facility: MEDICAL CENTER | Age: 60
End: 2020-04-03
Payer: OTHER GOVERNMENT

## 2020-04-03 DIAGNOSIS — R25.2 CRAMP IN LIMB: ICD-10-CM

## 2020-04-03 DIAGNOSIS — K70.30 ALCOHOLIC CIRRHOSIS OF LIVER WITHOUT ASCITES (HCC): ICD-10-CM

## 2020-04-03 DIAGNOSIS — K28.9 ANASTOMOTIC ULCER: ICD-10-CM

## 2020-04-03 DIAGNOSIS — K72.90 ENCEPHALOPATHY, PORTAL SYSTEMIC (HCC): ICD-10-CM

## 2020-04-03 DIAGNOSIS — K74.60 CIRRHOSIS OF LIVER WITHOUT ASCITES, UNSPECIFIED HEPATIC CIRRHOSIS TYPE (HCC): ICD-10-CM

## 2020-04-03 DIAGNOSIS — E87.8 ELECTROLYTE ABNORMALITY: ICD-10-CM

## 2020-04-03 DIAGNOSIS — K70.10 ALCOHOLIC HEPATITIS WITHOUT ASCITES: Primary | ICD-10-CM

## 2020-04-03 DIAGNOSIS — D69.6 THROMBOCYTOPENIA (HCC): ICD-10-CM

## 2020-04-03 DIAGNOSIS — R60.0 LOCALIZED EDEMA: ICD-10-CM

## 2020-04-03 PROCEDURE — 99214 OFFICE O/P EST MOD 30 MIN: CPT | Performed by: INTERNAL MEDICINE

## 2020-04-03 RX ORDER — SPIRONOLACTONE 25 MG/1
100 TABLET ORAL DAILY
Qty: 360 TABLET | Refills: 4 | Status: SHIPPED | OUTPATIENT
Start: 2020-04-03 | End: 2020-07-09

## 2020-04-03 RX ORDER — BUMETANIDE 2 MG/1
2 TABLET ORAL DAILY
Qty: 90 TABLET | Refills: 3 | Status: SHIPPED | OUTPATIENT
Start: 2020-04-03 | End: 2020-06-18 | Stop reason: SDUPTHER

## 2020-04-03 RX ORDER — POTASSIUM CHLORIDE 750 MG/1
10 TABLET, EXTENDED RELEASE ORAL DAILY
Qty: 90 TABLET | Refills: 0 | Status: SHIPPED | OUTPATIENT
Start: 2020-04-03 | End: 2020-12-04 | Stop reason: SDUPTHER

## 2020-04-06 ENCOUNTER — TELEPHONE (OUTPATIENT)
Dept: GASTROENTEROLOGY | Facility: MEDICAL CENTER | Age: 60
End: 2020-04-06

## 2020-04-27 DIAGNOSIS — G47.62 CRAMP IN LOWER EXTREMITY ASSOCIATED WITH SLEEP: ICD-10-CM

## 2020-04-27 RX ORDER — BACLOFEN 10 MG/1
10 TABLET ORAL DAILY
Qty: 90 TABLET | Refills: 3 | Status: SHIPPED | OUTPATIENT
Start: 2020-04-27 | End: 2020-07-26

## 2020-05-13 ENCOUNTER — TELEPHONE (OUTPATIENT)
Dept: PAIN MEDICINE | Facility: CLINIC | Age: 60
End: 2020-05-13

## 2020-06-01 ENCOUNTER — TELEPHONE (OUTPATIENT)
Dept: HEMATOLOGY ONCOLOGY | Facility: MEDICAL CENTER | Age: 60
End: 2020-06-01

## 2020-06-03 NOTE — ADDENDUM NOTE
Encounter addended by: Tisha Villa RN on: 6/3/2020 1:25 PM   Actions taken: Specialty comments modified

## 2020-06-17 LAB — EXT SARS-COV-2: NEGATIVE

## 2020-06-18 DIAGNOSIS — R60.0 LOCALIZED EDEMA: ICD-10-CM

## 2020-06-18 RX ORDER — BUMETANIDE 2 MG/1
2 TABLET ORAL DAILY
Qty: 90 TABLET | Refills: 3 | Status: SHIPPED | OUTPATIENT
Start: 2020-06-18 | End: 2020-07-09 | Stop reason: SDUPTHER

## 2020-06-23 ENCOUNTER — TELEPHONE (OUTPATIENT)
Dept: GASTROENTEROLOGY | Facility: AMBULARY SURGERY CENTER | Age: 60
End: 2020-06-23

## 2020-06-24 ENCOUNTER — PREP FOR PROCEDURE (OUTPATIENT)
Dept: GASTROENTEROLOGY | Facility: MEDICAL CENTER | Age: 60
End: 2020-06-24

## 2020-06-24 DIAGNOSIS — D64.9 CHRONIC ANEMIA: Primary | ICD-10-CM

## 2020-06-24 DIAGNOSIS — D64.9 ANEMIA, UNSPECIFIED TYPE: Primary | ICD-10-CM

## 2020-07-09 ENCOUNTER — TELEMEDICINE (OUTPATIENT)
Dept: GASTROENTEROLOGY | Facility: MEDICAL CENTER | Age: 60
End: 2020-07-09
Payer: OTHER GOVERNMENT

## 2020-07-09 ENCOUNTER — TELEPHONE (OUTPATIENT)
Dept: GASTROENTEROLOGY | Facility: MEDICAL CENTER | Age: 60
End: 2020-07-09

## 2020-07-09 DIAGNOSIS — D50.0 IRON DEFICIENCY ANEMIA DUE TO CHRONIC BLOOD LOSS: Primary | ICD-10-CM

## 2020-07-09 DIAGNOSIS — K74.60 CIRRHOSIS OF LIVER WITHOUT ASCITES, UNSPECIFIED HEPATIC CIRRHOSIS TYPE (HCC): Primary | ICD-10-CM

## 2020-07-09 DIAGNOSIS — K74.60 CIRRHOSIS OF LIVER WITHOUT ASCITES, UNSPECIFIED HEPATIC CIRRHOSIS TYPE (HCC): ICD-10-CM

## 2020-07-09 DIAGNOSIS — K76.9 LIVER DISEASE: ICD-10-CM

## 2020-07-09 DIAGNOSIS — K70.10 ALCOHOLIC HEPATITIS WITHOUT ASCITES: ICD-10-CM

## 2020-07-09 DIAGNOSIS — D50.9 IRON DEFICIENCY ANEMIA, UNSPECIFIED IRON DEFICIENCY ANEMIA TYPE: Primary | ICD-10-CM

## 2020-07-09 DIAGNOSIS — R60.0 LOCALIZED EDEMA: ICD-10-CM

## 2020-07-09 DIAGNOSIS — D69.6 THROMBOCYTOPENIA (HCC): ICD-10-CM

## 2020-07-09 DIAGNOSIS — E87.8 ELECTROLYTE ABNORMALITY: ICD-10-CM

## 2020-07-09 DIAGNOSIS — Z98.84 HISTORY OF GASTRIC BYPASS: ICD-10-CM

## 2020-07-09 PROCEDURE — 99214 OFFICE O/P EST MOD 30 MIN: CPT | Performed by: INTERNAL MEDICINE

## 2020-07-09 RX ORDER — SPIRONOLACTONE 25 MG/1
100 TABLET ORAL DAILY
Qty: 360 TABLET | Refills: 4 | Status: SHIPPED | OUTPATIENT
Start: 2020-07-09 | End: 2020-09-03 | Stop reason: SDUPTHER

## 2020-07-09 RX ORDER — BUMETANIDE 2 MG/1
2 TABLET ORAL DAILY
Qty: 90 TABLET | Refills: 3 | Status: SHIPPED | OUTPATIENT
Start: 2020-07-09 | End: 2020-09-10 | Stop reason: SDUPTHER

## 2020-07-09 NOTE — PROGRESS NOTES
Virtual Regular Visit      Assessment/Plan:    Problem List Items Addressed This Visit     None          1  Iron deficiency anemia, unspecified iron deficiency anemia type  2  Liver disease  3  Cirrhosis of liver without ascites, unspecified hepatic cirrhosis type ([address] )  4  Alcoholic hepatitis without ascites  5  Anastomotic ulcer  6  Thrombocytopenia    Mychal Carter is a 79-year-old female with past medical history of alcoholic cirrhosis complicated by thrombocytopenia, ascites, edema, hepatic encephalopathy, gastric bypass with anastomotic ulcer recently admitted at OSH for severe anemia with Hg between 5-6      Cirrhosis is complicated by thrombocytopenia, ascites, edema, hepatic encephalopathy  She has history of gastric bypass  She is currently taking Bumex 2 mg BID and 100mg spironolactone  She is also taking lactulose and rifaximin  She denies any signs overt bleeding at this time  She denies NSAID use      -discussed low-sodium diet  -discuss avoiding raw foods  -discuss avoiding NSAIDs  -discuss not taking Tylenol more than 1 g  -will need monitoring for [address]  but does not want to go the hospital   -Discussed avoidance strict avoidance of driving   -continue multivitamin  -check mag and potassium levels  - EGD and colonoscopy for further eval of anemia   - Pillcam evaluation   - MELD labs    No confusion  Reason for visit is   Chief Complaint   Patient presents with    Virtual Regular Visit        Encounter provider Harrold Buerger, MD    Provider located at [address] [address]      Recent Visits  No visits were found meeting these conditions  Showing recent visits within past 7 days and meeting all other requirements     Future Appointments  No visits were found meeting these conditions     Showing future appointments within next 150 days and meeting all other requirements        The patient was identified by name and date of birth  Javier Barajas was informed that this is a telemedicine visit and that the visit is being conducted through [facility] and patient was informed that this is a secure, HIPAA-compliant platform  She agrees to proceed     My office door was closed  No one else was in the room  She acknowledged consent and understanding of privacy and security of the video platform  The patient has agreed to participate and understands they can discontinue the visit at any time  Patient is aware this is a billable service  Past Medical History:   Diagnosis Date    Alcohol use disorder     Alcoholic hepatitis     Anastomotic ulcer     Anemia     Anxiety     Ascites     Breast cancer ([facility] [address] ) 2010    stage 0; bilateral mastectomy (prophylactic on left)    Chronic foot pain     Chronic narcotic dependence ([facility] [address] )     pt denies    Chronic pain disorder     Cirrhosis, alcoholic (HCC)     Colitis     Depression     situational    Esophagitis     History of bilateral mastectomy     History of gastric bypass     Hypertension     Liver disease     Morbid obesity ([facility] [address] )     Osteopenia     Palpitations     Pancytopenia (HCC)     Peripheral neuropathy     Pernicious anemia     Rosacea        Past Surgical History:   Procedure Laterality Date    ABDOMINAL SURGERY      abdominoplasty    BREAST SURGERY      mastectomy    CHOLECYSTECTOMY      COLONOSCOPY  2017    ESOPHAGOGASTRODUODENOSCOPY N/A 8/2/2017    Procedure: ESOPHAGOGASTRODUODENOSCOPY (EGD); Surgeon: Zoila Mcgovern DO;  Location:  MAIN OR;  Service: Gastroenterology    GASTRIC BYPASS      HERNIA REPAIR      MASTECTOMY      IN ESOPHAGOGASTRODUODENOSCOPY TRANSORAL DIAGNOSTIC N/A 5/18/2017    Procedure: ESOPHAGOGASTRODUODENOSCOPY (EGD) with bx;  Surgeon: Jamie Adams MD;  [facility];   Service: Gastroenterology    IN ESOPHAGOGASTRODUODENOSCOPY TRANSORAL DIAGNOSTIC N/A 9/14/2017    Procedure: EGD with bx AND COLONOSCOPY with polypectomy;  Surgeon: Donovan Cheema MD;  [facility]: [facility]; Service: Gastroenterology    DECLAN-EN-Y PROCEDURE  2005    UPPER GASTROINTESTINAL ENDOSCOPY  2017       Current Outpatient Medications   Medication Sig Dispense Refill    baclofen 10 mg tablet Take 1 tablet (10 mg total) by mouth daily 90 tablet 3    bumetanide (BUMEX) 2 mg tablet Take 1 tablet (2 mg total) by mouth daily 90 tablet 3    Calcium Citrate 250 MG TABS Take by mouth daily 500mg, takes 3 daily- 1500mg total      CARAFATE 1 GM/10ML suspension TAKE 10 ML (1 GRAM) FOUR TIMES A DAY 1200 mL 5    Cholecalciferol (VITAMIN D3) 5000 units TABS Take 5,000 Units by mouth daily      folic acid (FOLVITE) 1 mg tablet Take 1 tablet by mouth daily 30 tablet 0    lactulose (CEPHULAC) 10 g packet Take 1 packet (10 g total) by mouth 3 (three) times a day Titrate up or down to obtain goal of 2-3 bowel movements per day 30 each 7    magnesium chloride (MAG64) 64 MG TBEC EC tablet Take 64 mg by mouth      magnesium oxide (MAG-OX) 400 mg Take 1 tablet (400 mg total) by mouth daily 90 tablet 3    Multiple Vitamins-Minerals (MULTIVITAMIN WITH MINERALS) tablet Take 1 tablet by mouth daily      omeprazole (PriLOSEC) 20 mg delayed release capsule Take 20 mg by mouth daily      polyethylene glycol (GOLYTELY) 4000 mL solution Take 4,000 mL by mouth once for 1 dose 4000 mL 0    potassium chloride (Klor-Con M10) 10 mEq tablet Take 1 tablet (10 mEq total) by mouth daily 90 tablet 0    pregabalin (LYRICA) 200 MG capsule Take 1 PO  capsule 1    spironolactone (ALDACTONE) 25 mg tablet Take 4 tablets (100 mg total) by mouth daily 360 tablet 4    thiamine 100 MG tablet Take 1 tablet (100 mg total) by mouth daily 30 tablet 0    tobramycin-dexamethasone (TOBRADEX) ophthalmic suspension Administer 1 drop to the right eye 3 (three) times a day       No current facility-administered medications for this visit           Allergies   Allergen Reactions    Acetaminophen Other (See Comments)     Liver function, s/p bariatric surgery    Cymbalta [Duloxetine Hcl] GI Intolerance    Duloxetine GI Intolerance       Review of Systems    Video Exam    There were no vitals filed for this visit  REVIEW OF SYSTEMS:    CONSTITUTIONAL: Denies any fever, chills, rigors, and weight loss  HEENT: No earache or tinnitus  Denies hearing loss or visual disturbances  CARDIOVASCULAR: No chest pain or palpitations  RESPIRATORY: Denies any cough, hemoptysis, shortness of breath or dyspnea on exertion  GASTROINTESTINAL: As noted in the History of Present Illness  GENITOURINARY: No problems with urination  Denies any hematuria or dysuria  NEUROLOGIC: No dizziness or vertigo, denies headaches  MUSCULOSKELETAL: Denies any muscle or joint pain  SKIN: Denies skin rashes or itching  ENDOCRINE: Denies excessive thirst  Denies intolerance to heat or cold  PSYCHOSOCIAL: Denies depression or anxiety  Denies any recent memory loss  PHYSICAL EXAMINATION:  Appearance and vitals taken from home devices    General Appearance:   Alert, cooperative, no distress   HEENT:  Normocephalic, atraumatic, anicteric  Neck supple, symmetrical, trachea midline  Lungs:   Equal chest rise and unlabored breathing, normal effort, no coughing  Cardiovascular:   No visualized JVD  Abdomen:   No abdominal distension  Skin:   No jaundice, rashes, or lesions  Musculoskeletal:   Normal range of motion visualized  Psych:  Normal affect and normal insight  Neuro:  Alert and appropriate  Mild LE edema         I spent 50 minutes directly with the patient during this visit      VIRTUAL VISIT DISCLAIMER    Radha Sánchez acknowledges that she has consented to an online visit or consultation   She understands that the online visit is based solely on information provided by her, and that, in the absence of a face-to-face physical evaluation by the physician, the diagnosis she receives is both limited and provisional in terms of accuracy and completeness  This is not intended to replace a full medical face-to-face evaluation by the physician  Williams Clark understands and accepts these terms  63.5

## 2020-07-14 ENCOUNTER — TELEPHONE (OUTPATIENT)
Dept: HEMATOLOGY ONCOLOGY | Facility: CLINIC | Age: 60
End: 2020-07-14

## 2020-07-14 NOTE — TELEPHONE ENCOUNTER
Patient called to cancel appt with Dr Hugo on 7-  Patient moved to [place] , [place]    Patient will no longer be seeing Isaias Lu

## 2020-07-20 ENCOUNTER — OFFICE VISIT (OUTPATIENT)
Dept: PAIN MEDICINE | Facility: CLINIC | Age: 60
End: 2020-07-20
Payer: OTHER GOVERNMENT

## 2020-07-20 ENCOUNTER — TELEPHONE (OUTPATIENT)
Dept: OBGYN CLINIC | Facility: CLINIC | Age: 60
End: 2020-07-20

## 2020-07-20 VITALS
HEIGHT: 64 IN | WEIGHT: 257 LBS | SYSTOLIC BLOOD PRESSURE: 130 MMHG | BODY MASS INDEX: 43.87 KG/M2 | TEMPERATURE: 98.9 F | HEART RATE: 80 BPM | DIASTOLIC BLOOD PRESSURE: 80 MMHG

## 2020-07-20 DIAGNOSIS — R29.898 DECONDITIONED LOW BACK: ICD-10-CM

## 2020-07-20 DIAGNOSIS — G89.29 CHRONIC HAND PAIN, UNSPECIFIED LATERALITY: ICD-10-CM

## 2020-07-20 DIAGNOSIS — M79.2 NEUROPATHIC PAIN: ICD-10-CM

## 2020-07-20 DIAGNOSIS — M79.643 CHRONIC HAND PAIN, UNSPECIFIED LATERALITY: ICD-10-CM

## 2020-07-20 DIAGNOSIS — G62.1 ALCOHOL-INDUCED POLYNEUROPATHY (HCC): ICD-10-CM

## 2020-07-20 DIAGNOSIS — M54.50 LEFT-SIDED LOW BACK PAIN WITHOUT SCIATICA, UNSPECIFIED CHRONICITY: ICD-10-CM

## 2020-07-20 DIAGNOSIS — G89.4 CHRONIC PAIN SYNDROME: Primary | ICD-10-CM

## 2020-07-20 DIAGNOSIS — G89.29 CHRONIC FOOT PAIN, UNSPECIFIED LATERALITY: ICD-10-CM

## 2020-07-20 DIAGNOSIS — M79.673 CHRONIC FOOT PAIN, UNSPECIFIED LATERALITY: ICD-10-CM

## 2020-07-20 PROCEDURE — 99214 OFFICE O/P EST MOD 30 MIN: CPT | Performed by: NURSE PRACTITIONER

## 2020-07-20 RX ORDER — CARVEDILOL 3.12 MG/1
TABLET ORAL
COMMUNITY
Start: 2020-02-19

## 2020-07-20 RX ORDER — PREGABALIN 200 MG/1
CAPSULE ORAL
Qty: 90 CAPSULE | Refills: 0 | Status: SHIPPED | OUTPATIENT
Start: 2020-07-20 | End: 2020-08-03 | Stop reason: SDUPTHER

## 2020-07-20 NOTE — PROGRESS NOTES
Assessment:  1  Chronic pain syndrome    2  Chronic hand pain, unspecified laterality    3  Chronic foot pain, unspecified laterality    4  Left-sided low back pain without sciatica, unspecified chronicity    5  Deconditioned low back    6  Alcohol-induced polyneuropathy ([address] )    7  Neuropathic pain        Plan:  While the patient was in the office today, I did have a thorough conversation with the patient regarding their chronic pain syndrome, symptoms, medication regimen, and treatment plan  I discussed with the patient at this point time since she is noting moderate to significant overall relief, without any side effects or issues, that it would be reasonable and appropriate to continue with the Lyrica as prescribed  The patient was agreeable and verbalized an understanding  I encouraged the patient continue to follow-up with her hematologist regarding her anemia  The patient was agreeable and verbalized an understanding  The patient will follow-up in 6 months for medication prescription refill and reevaluation  The patient was advised to contact the office should their symptoms worsen in the interim  The patient was agreeable and verbalized an understanding  History of Present Illness: The patient is a 61 y o  female last seen on 1/31/2020 who presents for a follow up office visit in regards to chronic pain syndrome of the bilateral hands and feet secondary to alcohol-induced polyneuropathy  The patient currently reports that since her last office visit her pain symptoms have remained relatively stable and manageable with her current medication regimen  She does report she continues to have issues with her hemoglobin and hematocrit and has recently had a blood transfusion of 3 units because her hemoglobin and hematocrit was so low  Current pain medications includes:  Lyrica 200 mg t i d  The patient reports that this regimen is providing 70% pain relief    The patient is reporting no side effects from this pain medication regimen  I have personally reviewed and/or updated the patient's past medical history, past surgical history, family history, social history, current medications, allergies, and vital signs today  Review of Systems:    Review of Systems   Respiratory: Negative for shortness of breath  Cardiovascular: Negative for chest pain  Gastrointestinal: Negative for constipation, diarrhea, nausea and vomiting  Musculoskeletal: Positive for gait problem and joint swelling  Negative for arthralgias and myalgias  Skin: Negative for rash  Neurological: Positive for dizziness and weakness  Negative for seizures  All other systems reviewed and are negative  Past Medical History:   Diagnosis Date    Alcohol use disorder     Alcoholic hepatitis     Anastomotic ulcer     Anemia     Anxiety     Ascites     Breast cancer ([facility] [address] ) 2010    stage 0; bilateral mastectomy (prophylactic on left)    Chronic foot pain     Chronic narcotic dependence ([facility] [address] )     pt denies    Chronic pain disorder     Cirrhosis, alcoholic (HCC)     Colitis     Depression     situational    Esophagitis     History of bilateral mastectomy     History of gastric bypass     Hypertension     Liver disease     Morbid obesity ([facility] [address] )     Osteopenia     Palpitations     Pancytopenia (HCC)     Peripheral neuropathy     Pernicious anemia     Rosacea        Past Surgical History:   Procedure Laterality Date    ABDOMINAL SURGERY      abdominoplasty    BREAST SURGERY      mastectomy    CHOLECYSTECTOMY      COLONOSCOPY  2017    ESOPHAGOGASTRODUODENOSCOPY N/A 8/2/2017    Procedure: ESOPHAGOGASTRODUODENOSCOPY (EGD);   Surgeon: Margie Love DO;  [facility]: [facility];  Service: Gastroenterology    GASTRIC BYPASS      HERNIA REPAIR      MASTECTOMY      MD ESOPHAGOGASTRODUODENOSCOPY TRANSORAL DIAGNOSTIC N/A 5/18/2017    Procedure: ESOPHAGOGASTRODUODENOSCOPY (EGD) with bx;  Surgeon: Leesa Taylor MD;  [facility]; Service: Gastroenterology    [place] ESOPHAGOGASTRODUODENOSCOPY TRANSORAL DIAGNOSTIC N/A 9/14/2017    Procedure: EGD with bx AND COLONOSCOPY with polypectomy;  Surgeon: Leesa Taylor MD;  [facility];   Service: Gastroenterology    DECLAN-EN-Y PROCEDURE  2005    UPPER GASTROINTESTINAL ENDOSCOPY  2017       Family History   Problem Relation Age of Onset    Diabetes Mother     Alzheimer's disease Mother     Kidney disease Father     Hypertension Father     Obesity Sister     Fibrocystic breast disease Sister     Breast cancer Sister 62    Colon cancer Neg Hx     Uterine cancer Neg Hx     Ovarian cancer Neg Hx        Social History     Occupational History    Not on file   Tobacco Use    Smoking status: Never Smoker    Smokeless tobacco: Never Used   Substance and Sexual Activity    Alcohol use: Not Currently    Drug use: No    Sexual activity: Not Currently     Partners: Male         Current Outpatient Medications:     baclofen 10 mg tablet, Take 1 tablet (10 mg total) by mouth daily, Disp: 90 tablet, Rfl: 3    bumetanide (BUMEX) 2 mg tablet, Take 1 tablet (2 mg total) by mouth daily, Disp: 90 tablet, Rfl: 3    carvedilol (COREG) 3 125 mg tablet, , Disp: , Rfl:     Cholecalciferol (VITAMIN D3) 5000 units TABS, Take 5,000 Units by mouth daily, Disp: , Rfl:     folic acid (FOLVITE) 1 mg tablet, Take 1 tablet by mouth daily, Disp: 30 tablet, Rfl: 0    lactulose (CEPHULAC) 10 g packet, Take 1 packet (10 g total) by mouth 3 (three) times a day Titrate up or down to obtain goal of 2-3 bowel movements per day, Disp: 30 each, Rfl: 7    magnesium chloride (MAG64) 64 MG TBEC EC tablet, Take 64 mg by mouth, Disp: , Rfl:     magnesium oxide (MAG-OX) 400 mg, Take 1 tablet (400 mg total) by mouth daily, Disp: 90 tablet, Rfl: 3    Multiple Vitamins-Minerals (MULTIVITAMIN WITH MINERALS) tablet, Take 1 tablet by mouth daily, Disp: , Rfl:     omeprazole (PriLOSEC) 20 mg delayed release capsule, Take 20 mg by mouth daily, Disp: , Rfl:     potassium chloride (Klor-Con M10) 10 mEq tablet, Take 1 tablet (10 mEq total) by mouth daily, Disp: 90 tablet, Rfl: 0    pregabalin (LYRICA) 200 MG capsule, Take 1 PO TID, Disp: 90 capsule, Rfl: 0    spironolactone (ALDACTONE) 25 mg tablet, Take 4 tablets (100 mg total) by mouth daily, Disp: 360 tablet, Rfl: 4    thiamine 100 MG tablet, Take 1 tablet (100 mg total) by mouth daily, Disp: 30 tablet, Rfl: 0    Calcium Citrate 250 MG TABS, Take by mouth daily 500mg, takes 3 daily- 1500mg total, Disp: , Rfl:     CARAFATE 1 GM/10ML suspension, TAKE 10 ML (1 GRAM) FOUR TIMES A DAY (Patient not taking: Reported on 7/20/2020), Disp: 1200 mL, Rfl: 5    tobramycin-dexamethasone (TOBRADEX) ophthalmic suspension, Administer 1 drop to the right eye 3 (three) times a day, Disp: , Rfl:     Allergies   Allergen Reactions    Acetaminophen Other (See Comments)     Liver function, s/p bariatric surgery    Cymbalta [Duloxetine Hcl] GI Intolerance    Duloxetine GI Intolerance       Physical Exam:    /80 (BP Location: Left arm, Patient Position: Sitting, Cuff Size: Standard)   Pulse 80   Temp 98 9 °F (37 2 °C)   Ht 5' 4" (1 626 m)   Wt 117 kg (257 lb)   BMI 44 11 kg/m²     Constitutional:normal, well developed, well nourished, alert, in no distress and non-toxic and no overt pain behavior  and overweight  Eyes:anicteric  HEENT:grossly intact  Neck:supple, symmetric, trachea midline and no masses   Pulmonary:even and unlabored  Cardiovascular:No edema or pitting edema present  Skin:Normal without rashes or lesions and well hydrated  Psychiatric:Mood and affect appropriate  Neurologic:Cranial Nerves II-XII grossly intact  Musculoskeletal:The patient's gait is slightly antalgic, but steady without the use of any assistive devices  Imaging  No orders to display         No orders of the defined types were placed in this encounter

## 2020-07-21 ENCOUNTER — APPOINTMENT (EMERGENCY)
Dept: RADIOLOGY | Facility: HOSPITAL | Age: 60
End: 2020-07-21
Payer: OTHER GOVERNMENT

## 2020-07-21 ENCOUNTER — HOSPITAL ENCOUNTER (EMERGENCY)
Facility: HOSPITAL | Age: 60
Discharge: HOME | End: 2020-07-21
Attending: EMERGENCY MEDICINE | Admitting: EMERGENCY MEDICINE
Payer: OTHER GOVERNMENT

## 2020-07-21 VITALS
WEIGHT: 260 LBS | HEIGHT: 64 IN | DIASTOLIC BLOOD PRESSURE: 63 MMHG | BODY MASS INDEX: 44.39 KG/M2 | OXYGEN SATURATION: 100 % | HEART RATE: 89 BPM | SYSTOLIC BLOOD PRESSURE: 143 MMHG | RESPIRATION RATE: 18 BRPM | TEMPERATURE: 98.2 F

## 2020-07-21 DIAGNOSIS — D64.9 SYMPTOMATIC ANEMIA: Primary | ICD-10-CM

## 2020-07-21 DIAGNOSIS — K70.30 ALCOHOLIC CIRRHOSIS (HCC): ICD-10-CM

## 2020-07-21 DIAGNOSIS — D61.818 PANCYTOPENIA (HCC): ICD-10-CM

## 2020-07-21 LAB
ABO GROUP BLD: NORMAL
ALBUMIN SERPL BCP-MCNC: 2.5 G/DL (ref 3.5–5)
ALP SERPL-CCNC: 158 U/L (ref 46–116)
ALT SERPL W P-5'-P-CCNC: 24 U/L (ref 12–78)
ANION GAP SERPL CALCULATED.3IONS-SCNC: 8 MMOL/L (ref 4–13)
APTT PPP: 34 SECONDS (ref 23–37)
AST SERPL W P-5'-P-CCNC: 67 U/L (ref 5–45)
BASOPHILS # BLD AUTO: 0.01 THOUSANDS/ΜL (ref 0–0.1)
BASOPHILS NFR BLD AUTO: 1 % (ref 0–1)
BILIRUB SERPL-MCNC: 2.5 MG/DL (ref 0.2–1)
BLD GP AB SCN SERPL QL: NEGATIVE
BUN SERPL-MCNC: 10 MG/DL (ref 5–25)
CALCIUM SERPL-MCNC: 8.1 MG/DL (ref 8.3–10.1)
CHLORIDE SERPL-SCNC: 108 MMOL/L (ref 100–108)
CO2 SERPL-SCNC: 23 MMOL/L (ref 21–32)
CREAT SERPL-MCNC: 0.82 MG/DL (ref 0.6–1.3)
EOSINOPHIL # BLD AUTO: 0.06 THOUSAND/ΜL (ref 0–0.61)
EOSINOPHIL NFR BLD AUTO: 3 % (ref 0–6)
ERYTHROCYTE [DISTWIDTH] IN BLOOD BY AUTOMATED COUNT: 21.8 % (ref 11.6–15.1)
GFR SERPL CREATININE-BSD FRML MDRD: 78 ML/MIN/1.73SQ M
GLUCOSE SERPL-MCNC: 94 MG/DL (ref 65–140)
HCT VFR BLD AUTO: 25.2 % (ref 34.8–46.1)
HCT VFR BLD AUTO: 27.2 % (ref 34.8–46.1)
HGB BLD-MCNC: 7.2 G/DL (ref 11.5–15.4)
HGB BLD-MCNC: 8 G/DL (ref 11.5–15.4)
IMM GRANULOCYTES # BLD AUTO: 0.01 THOUSAND/UL (ref 0–0.2)
IMM GRANULOCYTES NFR BLD AUTO: 1 % (ref 0–2)
INR PPP: 1.58 (ref 0.84–1.19)
LYMPHOCYTES # BLD AUTO: 0.44 THOUSANDS/ΜL (ref 0.6–4.47)
LYMPHOCYTES NFR BLD AUTO: 24 % (ref 14–44)
MCH RBC QN AUTO: 24.4 PG (ref 26.8–34.3)
MCHC RBC AUTO-ENTMCNC: 28.6 G/DL (ref 31.4–37.4)
MCV RBC AUTO: 85 FL (ref 82–98)
MONOCYTES # BLD AUTO: 0.26 THOUSAND/ΜL (ref 0.17–1.22)
MONOCYTES NFR BLD AUTO: 14 % (ref 4–12)
NEUTROPHILS # BLD AUTO: 1.05 THOUSANDS/ΜL (ref 1.85–7.62)
NEUTS SEG NFR BLD AUTO: 57 % (ref 43–75)
NRBC BLD AUTO-RTO: 0 /100 WBCS
NT-PROBNP SERPL-MCNC: 188 PG/ML
PLATELET # BLD AUTO: 22 THOUSANDS/UL (ref 149–390)
POTASSIUM SERPL-SCNC: 4.2 MMOL/L (ref 3.5–5.3)
PROT SERPL-MCNC: 6.7 G/DL (ref 6.4–8.2)
PROTHROMBIN TIME: 18.5 SECONDS (ref 11.6–14.5)
RBC # BLD AUTO: 2.95 MILLION/UL (ref 3.81–5.12)
RH BLD: POSITIVE
SODIUM SERPL-SCNC: 139 MMOL/L (ref 136–145)
SPECIMEN EXPIRATION DATE: NORMAL
TROPONIN I SERPL-MCNC: <0.02 NG/ML
WBC # BLD AUTO: 1.83 THOUSAND/UL (ref 4.31–10.16)

## 2020-07-21 PROCEDURE — 36415 COLL VENOUS BLD VENIPUNCTURE: CPT | Performed by: PHYSICIAN ASSISTANT

## 2020-07-21 PROCEDURE — 85610 PROTHROMBIN TIME: CPT | Performed by: PHYSICIAN ASSISTANT

## 2020-07-21 PROCEDURE — 80053 COMPREHEN METABOLIC PANEL: CPT | Performed by: PHYSICIAN ASSISTANT

## 2020-07-21 PROCEDURE — 86901 BLOOD TYPING SEROLOGIC RH(D): CPT | Performed by: PHYSICIAN ASSISTANT

## 2020-07-21 PROCEDURE — 85014 HEMATOCRIT: CPT | Performed by: PHYSICIAN ASSISTANT

## 2020-07-21 PROCEDURE — 85018 HEMOGLOBIN: CPT | Performed by: PHYSICIAN ASSISTANT

## 2020-07-21 PROCEDURE — 86900 BLOOD TYPING SEROLOGIC ABO: CPT | Performed by: PHYSICIAN ASSISTANT

## 2020-07-21 PROCEDURE — 99285 EMERGENCY DEPT VISIT HI MDM: CPT

## 2020-07-21 PROCEDURE — 83880 ASSAY OF NATRIURETIC PEPTIDE: CPT | Performed by: PHYSICIAN ASSISTANT

## 2020-07-21 PROCEDURE — 36430 TRANSFUSION BLD/BLD COMPNT: CPT

## 2020-07-21 PROCEDURE — 86850 RBC ANTIBODY SCREEN: CPT | Performed by: PHYSICIAN ASSISTANT

## 2020-07-21 PROCEDURE — 85025 COMPLETE CBC W/AUTO DIFF WBC: CPT | Performed by: PHYSICIAN ASSISTANT

## 2020-07-21 PROCEDURE — 85730 THROMBOPLASTIN TIME PARTIAL: CPT | Performed by: PHYSICIAN ASSISTANT

## 2020-07-21 PROCEDURE — P9040 RBC LEUKOREDUCED IRRADIATED: HCPCS

## 2020-07-21 PROCEDURE — 99284 EMERGENCY DEPT VISIT MOD MDM: CPT | Performed by: PHYSICIAN ASSISTANT

## 2020-07-21 PROCEDURE — 93005 ELECTROCARDIOGRAM TRACING: CPT

## 2020-07-21 PROCEDURE — 86923 COMPATIBILITY TEST ELECTRIC: CPT

## 2020-07-21 PROCEDURE — 84484 ASSAY OF TROPONIN QUANT: CPT | Performed by: PHYSICIAN ASSISTANT

## 2020-07-21 PROCEDURE — 71046 X-RAY EXAM CHEST 2 VIEWS: CPT

## 2020-07-21 NOTE — ED NOTES
Patient ambulated to restroom independently at this time  Reports "feeling winded"  Steady gait noted        Shonda Quezada RN  07/21/20 [id number]

## 2020-07-21 NOTE — ED PROVIDER NOTES
History  Chief Complaint   Patient presents with    Shortness of Breath     patient presents to the ED with c/o SOB for the past few days with wheezing  states that she has a history of anemia      60 yo female w/ hx of alcoholic cirrhosis with ascites, EtoH abuse, pernicious anemia, GERD, HTN and chronic pain presents to the Emergency Department for evaluation of exertional dyspnea, increased leg edema, generalized fatigue and malaise x 3 days  States symptoms are similar to those of her recent hospitalization for anemia  She was hospitalized at [facility] in [facility] in May for anemia requiring blood transfusion, at that time underwent a reportedly normal EGD (per patient)  Follows with [facility] in [facility], was previously with [facility] Dr Tod Gowers  Was previously being treated for pernicious anemia w/ B12 and Venofer infusions  Denies chest pain, melena, hematochezia, hematemesis, abd pain  Does admit to intermittent EtOH use since February  Last CBC in June shows WBC of 2 5, Hgb of 8 0, platelets of 40  Prior to Admission Medications   Prescriptions Last Dose Informant Patient Reported? Taking?    CARAFATE 1 GM/10ML suspension   No No   Sig: TAKE 10 ML (1 GRAM) FOUR TIMES A DAY   Patient not taking: Reported on 7/20/2020   Calcium Citrate 250 MG TABS   Yes No   Sig: Take by mouth daily 500mg, takes 3 daily- 1500mg total   Cholecalciferol (VITAMIN D3) 5000 units TABS   Yes No   Sig: Take 5,000 Units by mouth daily   Multiple Vitamins-Minerals (MULTIVITAMIN WITH MINERALS) tablet   Yes No   Sig: Take 1 tablet by mouth daily   baclofen 10 mg tablet   No No   Sig: Take 1 tablet (10 mg total) by mouth daily   bumetanide (BUMEX) 2 mg tablet   No No   Sig: Take 1 tablet (2 mg total) by mouth daily   carvedilol (COREG) 3 125 mg tablet   Yes No   folic acid (FOLVITE) 1 mg tablet  Self No No   Sig: Take 1 tablet by mouth daily   lactulose (CEPHULAC) 10 g packet   No No   Sig: Take 1 packet (10 g total) by mouth 3 (three) times a day Titrate up or down to obtain goal of 2-3 bowel movements per day   magnesium chloride (MAG64) 64 MG TBEC EC tablet   Yes No   Sig: Take 64 mg by mouth   magnesium oxide (MAG-OX) 400 mg   No No   Sig: Take 1 tablet (400 mg total) by mouth daily   omeprazole (PriLOSEC) 20 mg delayed release capsule   Yes No   Sig: Take 20 mg by mouth daily   potassium chloride (Klor-Con M10) 10 mEq tablet   No No   Sig: Take 1 tablet (10 mEq total) by mouth daily   pregabalin (LYRICA) 200 MG capsule   No No   Sig: Take 1 PO TID   spironolactone (ALDACTONE) 25 mg tablet   No No   Sig: Take 4 tablets (100 mg total) by mouth daily   thiamine 100 MG tablet   No No   Sig: Take 1 tablet (100 mg total) by mouth daily   tobramycin-dexamethasone (TOBRADEX) ophthalmic suspension   Yes No   Sig: Administer 1 drop to the right eye 3 (three) times a day      Facility-Administered Medications: None       Past Medical History:   Diagnosis Date    Alcohol use disorder     Alcoholic hepatitis     Anastomotic ulcer     Anemia     Anxiety     Ascites     Breast cancer ([facility] [address] ) 2010    stage 0; bilateral mastectomy (prophylactic on left)    Chronic foot pain     Chronic narcotic dependence (HCC)     pt denies    Chronic pain disorder     Cirrhosis, alcoholic (HCC)     Colitis     Depression     situational    Esophagitis     History of bilateral mastectomy     History of gastric bypass     Hypertension     Liver disease     Morbid obesity ([facility] [address] )     Osteopenia     Palpitations     Pancytopenia ([facility] [address] )     Peripheral neuropathy     Pernicious anemia     Rosacea        Past Surgical History:   Procedure Laterality Date    ABDOMINAL SURGERY      abdominoplasty    BREAST SURGERY      mastectomy    CHOLECYSTECTOMY      COLONOSCOPY  2017    ESOPHAGOGASTRODUODENOSCOPY N/A 8/2/2017    Procedure: ESOPHAGOGASTRODUODENOSCOPY (EGD);   Surgeon: Lupis Rodriguez DO;  Location:  MAIN OR; Service: Gastroenterology    GASTRIC BYPASS      HERNIA REPAIR      MASTECTOMY      [place] ESOPHAGOGASTRODUODENOSCOPY TRANSORAL DIAGNOSTIC N/A 5/18/2017    Procedure: ESOPHAGOGASTRODUODENOSCOPY (EGD) with bx;  Surgeon: Maxim Lyons MD;  Location: [facility]; Service: Gastroenterology    [place] ESOPHAGOGASTRODUODENOSCOPY TRANSORAL DIAGNOSTIC N/A 9/14/2017    Procedure: EGD with bx AND COLONOSCOPY with polypectomy;  Surgeon: Maxim Lyons MD;  Location: [facility]; Service: Gastroenterology    DECLAN-EN-Y PROCEDURE  2005    UPPER GASTROINTESTINAL ENDOSCOPY  2017       Family History   Problem Relation Age of Onset    Diabetes Mother     Alzheimer's disease Mother     Kidney disease Father     Hypertension Father     Obesity Sister     Fibrocystic breast disease Sister     Breast cancer Sister 62    Colon cancer Neg Hx     Uterine cancer Neg Hx     Ovarian cancer Neg Hx      I have reviewed and agree with the history as documented  E-Cigarette/Vaping     E-Cigarette/Vaping Substances     Social History     Tobacco Use    Smoking status: Never Smoker    Smokeless tobacco: Never Used   Substance Use Topics    Alcohol use: Yes     Frequency: Monthly or less    Drug use: No       Review of Systems   Constitutional: Positive for fatigue  Negative for chills, diaphoresis and fever  Eyes: Negative for visual disturbance  Respiratory: Negative for cough and shortness of breath (exertional)  Cardiovascular: Positive for leg swelling  Negative for chest pain and palpitations  Gastrointestinal: Negative for abdominal pain, diarrhea, nausea and vomiting  Genitourinary: Negative for dysuria, flank pain and frequency  Musculoskeletal: Negative for arthralgias and myalgias  Skin: Negative for color change, rash and wound  Allergic/Immunologic: Negative for immunocompromised state  Neurological: Positive for weakness (generalized)  Negative for dizziness and light-headedness     Hematological: Does not bruise/bleed easily  Psychiatric/Behavioral: Negative for confusion  The patient is not nervous/anxious  Physical Exam  Physical Exam   Constitutional: She is oriented to person, place, and time  She appears well-developed and well-nourished  No distress  HENT:   Head: Normocephalic and atraumatic  Mouth/Throat: Oropharynx is clear and moist    Eyes: Pupils are equal, round, and reactive to light  No scleral icterus  Neck: No JVD present  Cardiovascular: Normal rate and regular rhythm  Exam reveals no gallop and no friction rub  No murmur heard  Pulmonary/Chest: No respiratory distress  She has no decreased breath sounds  She has no wheezes  She has no rhonchi  She has no rales  Abdominal: Soft  Bowel sounds are normal  She exhibits no distension and no mass  There is no tenderness  There is no rebound and no guarding  Genitourinary:   Genitourinary Comments: Brown stool, heme negative   Musculoskeletal:        Right lower leg: She exhibits edema (3+)  Left lower leg: Left lower leg edema: 3+   Neurological: She is alert and oriented to person, place, and time  Skin: Skin is warm and dry  Capillary refill takes less than 2 seconds  She is not diaphoretic  No pallor  Psychiatric: She has a normal mood and affect  Her behavior is normal    Vitals reviewed        Vital Signs  ED Triage Vitals   Temperature Pulse Respirations Blood Pressure SpO2   07/21/20 1131 07/21/20 1130 07/21/20 1130 07/21/20 1131 07/21/20 1130   98 8 °F (37 1 °C) (!) 107 20 163/72 98 %      Temp Source Heart Rate Source Patient Position - Orthostatic VS BP Location FiO2 (%)   07/21/20 1131 07/21/20 1130 07/21/20 1130 07/21/20 1130 --   Oral Monitor Sitting Left arm       Pain Score       --                  Vitals:    07/21/20 1538 07/21/20 1611 07/21/20 1612 07/21/20 1659   BP: 123/56 133/60 133/60 143/63   Pulse: 87 87 87 89   Patient Position - Orthostatic VS:   Lying          Visual Acuity      ED Medications  Medications - No data to display    Diagnostic Studies  Results Reviewed     Procedure Component Value Units Date/Time    Hemoglobin and hematocrit, blood [[id number]]  (Abnormal) Collected:  07/21/20 1747    Lab Status:  Final result Specimen:  Blood from Arm, Left Updated:  07/21/20 1754     Hemoglobin 8 0 g/dL      Hematocrit 27 2 %     NT-BNP PRO [[id number]]  (Abnormal) Collected:  07/21/20 1232    Lab Status:  Final result Specimen:  Blood from Arm, Left Updated:  07/21/20 1310     NT-proBNP 188 pg/mL     Troponin I [[id number]]  (Normal) Collected:  07/21/20 1232    Lab Status:  Final result Specimen:  Blood from Arm, Left Updated:  07/21/20 1305     Troponin I <0 02 ng/mL     Protime-INR [[id number]]  (Abnormal) Collected:  07/21/20 1232    Lab Status:  Final result Specimen:  Blood from Arm, Left Updated:  07/21/20 1305     Protime 18 5 seconds      INR 1 58    APTT [[id number]]  (Normal) Collected:  07/21/20 1232    Lab Status:  Final result Specimen:  Blood from Arm, Left Updated:  07/21/20 1305     PTT 34 seconds     Comprehensive metabolic panel [[id number]]  (Abnormal) Collected:  07/21/20 1232    Lab Status:  Final result Specimen:  Blood from Arm, Left Updated:  07/21/20 1303     Sodium 139 mmol/L      Potassium 4 2 mmol/L      Chloride 108 mmol/L      CO2 23 mmol/L      ANION GAP 8 mmol/L      BUN 10 mg/dL      Creatinine 0 82 mg/dL      Glucose 94 mg/dL      Calcium 8 1 mg/dL      AST 67 U/L      ALT 24 U/L      Alkaline Phosphatase 158 U/L      Total Protein 6 7 g/dL      Albumin 2 5 g/dL      Total Bilirubin 2 50 mg/dL      eGFR 78 ml/min/1 73sq m     Narrative:       [place] guidelines for Chronic Kidney Disease (CKD):     Stage 1 with normal or high GFR (GFR > 90 mL/min/1 73 square meters)    Stage 2 Mild CKD (GFR = 60-89 mL/min/1 73 square meters)    Stage 3A Moderate CKD (GFR = 45-59 mL/min/1 73 square meters)    Stage 3B Moderate CKD (GFR = 30-44 mL/min/1 73 square meters)    Stage 4 Severe CKD (GFR = 15-29 mL/min/1 73 square meters)    Stage 5 End Stage CKD (GFR <15 mL/min/1 73 square meters)  Note: GFR calculation is accurate only with a steady state creatinine    CBC and differential [[id number]]  (Abnormal) Collected:  07/21/20 1232    Lab Status:  Final result Specimen:  Blood from Arm, Left Updated:  07/21/20 1252     WBC 1 83 Thousand/uL      RBC 2 95 Million/uL      Hemoglobin 7 2 g/dL      Hematocrit 25 2 %      MCV 85 fL      MCH 24 4 pg      MCHC 28 6 g/dL      RDW 21 8 %      Platelets 22 Thousands/uL      nRBC 0 /100 WBCs      Neutrophils Relative 57 %      Immat GRANS % 1 %      Lymphocytes Relative 24 %      Monocytes Relative 14 %      Eosinophils Relative 3 %      Basophils Relative 1 %      Neutrophils Absolute 1 05 Thousands/µL      Immature Grans Absolute 0 01 Thousand/uL      Lymphocytes Absolute 0 44 Thousands/µL      Monocytes Absolute 0 26 Thousand/µL      Eosinophils Absolute 0 06 Thousand/µL      Basophils Absolute 0 01 Thousands/µL     Narrative:        No Clots                 XR chest 2 views   Final Result by Gina Cartagena MD (07/21 1195)      No acute cardiopulmonary disease        Findings are stable      Workstation performed: [id number]                    Procedures  ECG 12 Lead Documentation Only  Date/Time: 7/21/2020 11:45 AM  Performed by: Marlyne Paget, PA-C  Authorized by: Marlyne Paget, PA-C     Indications / Diagnosis:  SOB  ECG reviewed by me, the ED Provider: yes    Patient location:  ED  Previous ECG:     Previous ECG:  Compared to current    Similarity:  No change  Interpretation:     Interpretation: normal    Quality:     Tracing quality:  Limited by artifact  Rate:     ECG rate:  96    ECG rate assessment: normal    Rhythm:     Rhythm: sinus rhythm    Ectopy:     Ectopy: none    QRS:     QRS axis:  Normal    QRS intervals:  Normal  Conduction:     Conduction: normal    ST segments:     ST segments:  Normal  T waves:     T waves: normal    Comments:      Disagree w/ computer interp of junctional, P waves difficult to assess due to artifact             ED Course  ED Course as of Jul 21 1826   Tue Jul 21, 2020   1316 Left message in office of pt's hematologist Dr Isaac Thorpe for discussion of care  1339 Discussed case with heme/onc on call Dr Mickey Tsang, agrees with plan for transfusion of PRBCs  Pt has no clinical signs or symptoms of infectious etiology  Pancytopenia is likely all secondary to cirrhosis  [address] with pt's hematologist Dr Isaac Thorpe, also in agreement with plan for transfusion and d/c  Will see in office in follow up      1625 Pt remains clinically stable  Awaiting completion of blood      1701 Possible IV infiltration with scant remainder of blood in bag  Will recheck H/H and ambulate          US AUDIT      Most Recent Value   Initial Alcohol Screen: US AUDIT-C    1  How often do you have a drink containing alcohol?  0 Filed at: 07/21/2020 1132   2  How many drinks containing alcohol do you have on a typical day you are drinking? 0 Filed at: 07/21/2020 1132   3a  Male UNDER 65: How often do you have five or more drinks on one occasion? 0 Filed at: 07/21/2020 1132   3b  FEMALE Any Age, or MALE 65+: How often do you have 4 or more drinks on one occassion? 0 Filed at: 07/21/2020 1132   Audit-C Score  0 Filed at: 07/21/2020 1132                  CHINO/DAST-10      Most Recent Value   How many times in the past year have you    Used an illegal drug or used a prescription medication for non-medical reasons? Never Filed at: 07/21/2020 1132                                MDM  Number of Diagnoses or Management Options  Alcoholic cirrhosis ([facility] [address] ):   Pancytopenia ([facility] [address] ): new and requires workup  Symptomatic anemia: new and requires workup  Diagnosis management comments: 62 yo female presents with acute on chronic pancytopenia  Heme negative stool  Symptoms improved after transfusion of PRBCs   Case was discussed with heme/onc on call Dr Lynne Velazquez as well as pt's personal hematologist Dr Leonora Jett, who are in agreement with transfusion and discharge       Amount and/or Complexity of Data Reviewed  Clinical lab tests: ordered and reviewed  Tests in the radiology section of CPT®: ordered and reviewed  Tests in the medicine section of CPT®: ordered and reviewed  Review and summarize past medical records: yes  Discuss the patient with other providers: yes  Independent visualization of images, tracings, or specimens: yes          Disposition  Final diagnoses:   Symptomatic anemia   Alcoholic cirrhosis ([facility] )   Pancytopenia ([facility] )     Time reflects when diagnosis was documented in both MDM as applicable and the Disposition within this note     Time User Action Codes Description Comment    7/21/2020  6:04 PM Margaret Del Cid Add [D64 9] Symptomatic anemia     7/21/2020  6:05 PM Margaret Del Cid Add [B58 41] Alcoholic cirrhosis ([facility] )     7/21/2020  6:05 PM Margaret Del Cid Add [W09 064] Pancytopenia [facility])       ED Disposition     ED Disposition Condition Date/Time Comment    Discharge Stable Tue Jul 21, 2020  6:04 PM Elveria Duet discharge to home/self care  Follow-up Information     Follow up With Specialties Details Why Contact Info    Dr Leonora Jett  In 1 week            Patient's Medications   Discharge Prescriptions    No medications on file     No discharge procedures on file      PDMP Review     None          ED Provider  Electronically Signed by           Deuce Schreiber PA-C  07/21/20 1348

## 2020-07-22 LAB
ABO GROUP BLD BPU: NORMAL
ATRIAL RATE: 97 BPM
BPU ID: NORMAL
CROSSMATCH: NORMAL
QRS AXIS: 49 DEGREES
QRSD INTERVAL: 76 MS
QT INTERVAL: 370 MS
QTC INTERVAL: 467 MS
T WAVE AXIS: 54 DEGREES
UNIT DISPENSE STATUS: NORMAL
UNIT PRODUCT CODE: NORMAL
UNIT RH: NORMAL
VENTRICULAR RATE: 96 BPM

## 2020-07-22 PROCEDURE — 93010 ELECTROCARDIOGRAM REPORT: CPT | Performed by: INTERNAL MEDICINE

## 2020-07-31 DIAGNOSIS — M79.2 NEUROPATHIC PAIN: ICD-10-CM

## 2020-07-31 NOTE — TELEPHONE ENCOUNTER
Pt contacted Call Center requested refill of their medication  Medication Name:pregabalin (LYRICA)          Dosage of Med: 200 mg       Frequency of Med: Take 1 PO TID      Remaining Medication:       Pharmacy and Location:    [address] [place] [address]  [phone]          Pt  Preferred Callback Phone Number: [phone]      Thank you

## 2020-07-31 NOTE — TELEPHONE ENCOUNTER
S/w pt, stated that [facility] has not received her rx for lyrica to date  Pt stated that she and DG discussed having a supply of "extra" lyrica on hand - her last rx was sent to [facility], a rx for 90 days to be sent to [facility] was discussed per the pt  Advised pt, will forward this info to [facility] as a reminder for monday morning  Anticipate rx will be sent to [place]  This office will cb only if there is a question or change in the plan as discussed  Pt verbalized understanding and appreciation

## 2020-08-03 RX ORDER — PREGABALIN 200 MG/1
CAPSULE ORAL
Qty: 270 CAPSULE | Refills: 1 | Status: SHIPPED | OUTPATIENT
Start: 2020-08-03 | End: 2020-12-07 | Stop reason: SDUPTHER

## 2020-08-03 NOTE — TELEPHONE ENCOUNTER
A 30 day supply was sent to  [facility] as per her request at her OV  I just sent the 90 day script with a refill to [facility] as per her wishes as well  Thank you

## 2020-08-10 ENCOUNTER — TELEPHONE (OUTPATIENT)
Dept: GASTROENTEROLOGY | Facility: CLINIC | Age: 60
End: 2020-08-10

## 2020-08-10 ENCOUNTER — TRANSCRIBE ORDERS (OUTPATIENT)
Dept: GASTROENTEROLOGY | Facility: CLINIC | Age: 60
End: 2020-08-10

## 2020-08-15 ENCOUNTER — HOSPITAL ENCOUNTER (EMERGENCY)
Facility: HOSPITAL | Age: 60
Discharge: HOME | End: 2020-08-15
Attending: EMERGENCY MEDICINE | Admitting: EMERGENCY MEDICINE
Payer: OTHER GOVERNMENT

## 2020-08-15 ENCOUNTER — APPOINTMENT (EMERGENCY)
Dept: RADIOLOGY | Facility: HOSPITAL | Age: 60
End: 2020-08-15
Payer: OTHER GOVERNMENT

## 2020-08-15 VITALS
DIASTOLIC BLOOD PRESSURE: 58 MMHG | RESPIRATION RATE: 16 BRPM | HEART RATE: 81 BPM | BODY MASS INDEX: 44.63 KG/M2 | SYSTOLIC BLOOD PRESSURE: 120 MMHG | WEIGHT: 260 LBS | OXYGEN SATURATION: 99 % | TEMPERATURE: 98.4 F

## 2020-08-15 DIAGNOSIS — F10.10 ALCOHOL ABUSE: ICD-10-CM

## 2020-08-15 DIAGNOSIS — D61.818 PANCYTOPENIA (HCC): Primary | ICD-10-CM

## 2020-08-15 DIAGNOSIS — D64.9 SYMPTOMATIC ANEMIA: ICD-10-CM

## 2020-08-15 LAB
ABO GROUP BLD: NORMAL
ALBUMIN SERPL BCP-MCNC: 2.6 G/DL (ref 3.5–5)
ALP SERPL-CCNC: 154 U/L (ref 46–116)
ALT SERPL W P-5'-P-CCNC: 32 U/L (ref 12–78)
ANION GAP SERPL CALCULATED.3IONS-SCNC: 10 MMOL/L (ref 4–13)
APTT PPP: 40 SECONDS (ref 23–37)
AST SERPL W P-5'-P-CCNC: 105 U/L (ref 5–45)
ATRIAL RATE: 85 BPM
BASOPHILS # BLD AUTO: 0.01 THOUSANDS/ΜL (ref 0–0.1)
BASOPHILS NFR BLD AUTO: 1 % (ref 0–1)
BILIRUB SERPL-MCNC: 2.3 MG/DL (ref 0.2–1)
BLD GP AB SCN SERPL QL: NEGATIVE
BUN SERPL-MCNC: 11 MG/DL (ref 5–25)
CALCIUM SERPL-MCNC: 7.5 MG/DL (ref 8.3–10.1)
CHLORIDE SERPL-SCNC: 109 MMOL/L (ref 100–108)
CO2 SERPL-SCNC: 23 MMOL/L (ref 21–32)
CREAT SERPL-MCNC: 0.97 MG/DL (ref 0.6–1.3)
EOSINOPHIL # BLD AUTO: 0.07 THOUSAND/ΜL (ref 0–0.61)
EOSINOPHIL NFR BLD AUTO: 3 % (ref 0–6)
ERYTHROCYTE [DISTWIDTH] IN BLOOD BY AUTOMATED COUNT: 21.2 % (ref 11.6–15.1)
GFR SERPL CREATININE-BSD FRML MDRD: 64 ML/MIN/1.73SQ M
GLUCOSE SERPL-MCNC: 90 MG/DL (ref 65–140)
HCT VFR BLD AUTO: 25.4 % (ref 34.8–46.1)
HGB BLD-MCNC: 7.4 G/DL (ref 11.5–15.4)
IMM GRANULOCYTES # BLD AUTO: 0.02 THOUSAND/UL (ref 0–0.2)
IMM GRANULOCYTES NFR BLD AUTO: 1 % (ref 0–2)
INR PPP: 1.66 (ref 0.84–1.19)
LYMPHOCYTES # BLD AUTO: 0.56 THOUSANDS/ΜL (ref 0.6–4.47)
LYMPHOCYTES NFR BLD AUTO: 27 % (ref 14–44)
MCH RBC QN AUTO: 24.7 PG (ref 26.8–34.3)
MCHC RBC AUTO-ENTMCNC: 29.1 G/DL (ref 31.4–37.4)
MCV RBC AUTO: 85 FL (ref 82–98)
MONOCYTES # BLD AUTO: 0.22 THOUSAND/ΜL (ref 0.17–1.22)
MONOCYTES NFR BLD AUTO: 11 % (ref 4–12)
NEUTROPHILS # BLD AUTO: 1.17 THOUSANDS/ΜL (ref 1.85–7.62)
NEUTS SEG NFR BLD AUTO: 57 % (ref 43–75)
NRBC BLD AUTO-RTO: 0 /100 WBCS
P AXIS: 58 DEGREES
PLATELET # BLD AUTO: 18 THOUSANDS/UL (ref 149–390)
POTASSIUM SERPL-SCNC: 3.8 MMOL/L (ref 3.5–5.3)
PR INTERVAL: 164 MS
PROT SERPL-MCNC: 6.8 G/DL (ref 6.4–8.2)
PROTHROMBIN TIME: 19.4 SECONDS (ref 11.6–14.5)
QRS AXIS: 68 DEGREES
QRSD INTERVAL: 86 MS
QT INTERVAL: 400 MS
QTC INTERVAL: 476 MS
RBC # BLD AUTO: 2.99 MILLION/UL (ref 3.81–5.12)
RH BLD: POSITIVE
SODIUM SERPL-SCNC: 142 MMOL/L (ref 136–145)
SPECIMEN EXPIRATION DATE: NORMAL
T WAVE AXIS: 60 DEGREES
TROPONIN I SERPL-MCNC: <0.02 NG/ML
VENTRICULAR RATE: 85 BPM
WBC # BLD AUTO: 2.05 THOUSAND/UL (ref 4.31–10.16)

## 2020-08-15 PROCEDURE — 85610 PROTHROMBIN TIME: CPT | Performed by: PHYSICIAN ASSISTANT

## 2020-08-15 PROCEDURE — 99285 EMERGENCY DEPT VISIT HI MDM: CPT

## 2020-08-15 PROCEDURE — 99284 EMERGENCY DEPT VISIT MOD MDM: CPT | Performed by: PHYSICIAN ASSISTANT

## 2020-08-15 PROCEDURE — 86901 BLOOD TYPING SEROLOGIC RH(D): CPT | Performed by: PHYSICIAN ASSISTANT

## 2020-08-15 PROCEDURE — 71045 X-RAY EXAM CHEST 1 VIEW: CPT

## 2020-08-15 PROCEDURE — 85730 THROMBOPLASTIN TIME PARTIAL: CPT | Performed by: PHYSICIAN ASSISTANT

## 2020-08-15 PROCEDURE — 85025 COMPLETE CBC W/AUTO DIFF WBC: CPT | Performed by: PHYSICIAN ASSISTANT

## 2020-08-15 PROCEDURE — P9040 RBC LEUKOREDUCED IRRADIATED: HCPCS

## 2020-08-15 PROCEDURE — 36430 TRANSFUSION BLD/BLD COMPNT: CPT

## 2020-08-15 PROCEDURE — 86900 BLOOD TYPING SEROLOGIC ABO: CPT | Performed by: PHYSICIAN ASSISTANT

## 2020-08-15 PROCEDURE — 86923 COMPATIBILITY TEST ELECTRIC: CPT

## 2020-08-15 PROCEDURE — 80053 COMPREHEN METABOLIC PANEL: CPT | Performed by: PHYSICIAN ASSISTANT

## 2020-08-15 PROCEDURE — 93010 ELECTROCARDIOGRAM REPORT: CPT | Performed by: INTERNAL MEDICINE

## 2020-08-15 PROCEDURE — 84484 ASSAY OF TROPONIN QUANT: CPT | Performed by: PHYSICIAN ASSISTANT

## 2020-08-15 PROCEDURE — 93005 ELECTROCARDIOGRAM TRACING: CPT

## 2020-08-15 PROCEDURE — 36415 COLL VENOUS BLD VENIPUNCTURE: CPT | Performed by: PHYSICIAN ASSISTANT

## 2020-08-15 PROCEDURE — 86850 RBC ANTIBODY SCREEN: CPT | Performed by: PHYSICIAN ASSISTANT

## 2020-08-15 NOTE — ED NOTES
Patient is resting in bed waiting for brother to give her a ride home        aDniel Smith RN  08/15/20 8591

## 2020-08-15 NOTE — ED PROVIDER NOTES
History  Chief Complaint   Patient presents with    Shortness of Breath     States that she has had worsening SOB, weakness for several days  Has history of alcohol related anemia with blood transfusions  51-year-old female with history of alcoholic cirrhosis, pancytopenia secondary to liver disease, chronic opiate dependence, gastric bypass with recurrent anastomotic ulcer, peripheral edema, and hypertension presents emergency department for evaluation of shortness of breath  She states she feels comfortable to her most recent episode of low hemoglobin  She denies any bright red blood per rectum or melena, denies any hematuria, gingival bleeding, or hematemesis  She does endorse that she has continued to drink on a daily basis since her last visit, however her last drink was 3 days ago  She no showed a recent heme-onc appointment  She resides in [address] [place] [address] and follows primarily with [facility] specialists, however states that 1 week ago she moved out of her house to avoid drinking with her , and is staying locally with her brother currently  She is interested in EtOH cessation resources however she openly acknowledges that if she returns to [address] [place] [address], she will drink again  Prior to Admission Medications   Prescriptions Last Dose Informant Patient Reported? Taking?    CARAFATE 1 GM/10ML suspension   No No   Sig: TAKE 10 ML (1 GRAM) FOUR TIMES A DAY   Patient not taking: Reported on 7/20/2020   Calcium Citrate 250 MG TABS   Yes No   Sig: Take by mouth daily 500mg, takes 3 daily- 1500mg total   Cholecalciferol (VITAMIN D3) 5000 units TABS   Yes No   Sig: Take 5,000 Units by mouth daily   Multiple Vitamins-Minerals (MULTIVITAMIN WITH MINERALS) tablet   Yes No   Sig: Take 1 tablet by mouth daily   baclofen 10 mg tablet   No No   Sig: Take 1 tablet (10 mg total) by mouth daily   bumetanide (BUMEX) 2 mg tablet   No No   Sig: Take 1 tablet (2 mg total) by mouth daily   carvedilol (COREG) 3 125 mg tablet   Yes No   folic acid (FOLVITE) 1 mg tablet  Self No No   Sig: Take 1 tablet by mouth daily   lactulose (CEPHULAC) 10 g packet   No No   Sig: Take 1 packet (10 g total) by mouth 3 (three) times a day Titrate up or down to obtain goal of 2-3 bowel movements per day   magnesium chloride (MAG64) 64 MG TBEC EC tablet   Yes No   Sig: Take 64 mg by mouth   magnesium oxide (MAG-OX) 400 mg   No No   Sig: Take 1 tablet (400 mg total) by mouth daily   omeprazole (PriLOSEC) 20 mg delayed release capsule   Yes No   Sig: Take 20 mg by mouth daily   potassium chloride (Klor-Con M10) 10 mEq tablet   No No   Sig: Take 1 tablet (10 mEq total) by mouth daily   pregabalin (LYRICA) 200 MG capsule   No No   Sig: Take 1 PO TID   spironolactone (ALDACTONE) 25 mg tablet   No No   Sig: Take 4 tablets (100 mg total) by mouth daily   thiamine 100 MG tablet   No No   Sig: Take 1 tablet (100 mg total) by mouth daily   tobramycin-dexamethasone (TOBRADEX) ophthalmic suspension   Yes No   Sig: Administer 1 drop to the right eye 3 (three) times a day      Facility-Administered Medications: None       Past Medical History:   Diagnosis Date    Alcohol use disorder     Alcoholic hepatitis     Anastomotic ulcer     Anemia     Anxiety     Ascites     [facility] 2010    stage 0; bilateral mastectomy (prophylactic on left)    Chronic foot pain     Chronic narcotic dependence (HCC)     pt denies    Chronic pain disorder     Cirrhosis, alcoholic (HCC)     Colitis     Depression     situational    Esophagitis     History of bilateral mastectomy     History of gastric bypass     Hypertension     Liver disease     Morbid obesity ([facility] [address] )     Osteopenia     Palpitations     Pancytopenia (HCC)     Peripheral neuropathy     Pernicious anemia     Rosacea        Past Surgical History:   Procedure Laterality Date    ABDOMINAL SURGERY      abdominoplasty    BREAST SURGERY      mastectomy    CHOLECYSTECTOMY      COLONOSCOPY 2017    ESOPHAGOGASTRODUODENOSCOPY N/A 8/2/2017    Procedure: ESOPHAGOGASTRODUODENOSCOPY (EGD); Surgeon: Zoila Mcgovern DO;  Location: [facility];  Service: Gastroenterology    GASTRIC BYPASS      HERNIA REPAIR      MASTECTOMY      RI ESOPHAGOGASTRODUODENOSCOPY TRANSORAL DIAGNOSTIC N/A 5/18/2017    Procedure: ESOPHAGOGASTRODUODENOSCOPY (EGD) with bx;  Surgeon: Jamie Adams MD;  [facility]: [facility]; Service: Gastroenterology    RI ESOPHAGOGASTRODUODENOSCOPY TRANSORAL DIAGNOSTIC N/A 9/14/2017    Procedure: EGD with bx AND COLONOSCOPY with polypectomy;  Surgeon: Jamie Adams MD;  [facility]; Service: Gastroenterology    DECLAN-EN-Y PROCEDURE  2005    UPPER GASTROINTESTINAL ENDOSCOPY  2017       Family History   Problem Relation Age of Onset    Diabetes Mother     Alzheimer's disease Mother     Kidney disease Father     Hypertension Father     Obesity Sister     Fibrocystic breast disease Sister     Breast cancer Sister 62    Colon cancer Neg Hx     Uterine cancer Neg Hx     Ovarian cancer Neg Hx      I have reviewed and agree with the history as documented  E-Cigarette/Vaping    E-Cigarette Use Never User      E-Cigarette/Vaping Substances    Nicotine No     Flavoring No      Social History     Tobacco Use    Smoking status: Never Smoker    Smokeless tobacco: Never Used   Substance Use Topics    Alcohol use: Yes     Frequency: 4 or more times a week     Drinks per session: 7 to 9     Binge frequency: Daily or almost daily    Drug use: No       Review of Systems   Constitutional: Positive for fatigue  Negative for chills, diaphoresis and fever  Eyes: Negative for visual disturbance  Respiratory: Positive for shortness of breath  Negative for cough  Cardiovascular: Negative for chest pain and palpitations  Gastrointestinal: Negative for abdominal pain, diarrhea, nausea and vomiting  Genitourinary: Negative for dysuria, flank pain and frequency     Musculoskeletal: Negative for arthralgias and myalgias  Skin: Negative for color change, rash and wound  Allergic/Immunologic: Negative for immunocompromised state  Neurological: Negative for dizziness and light-headedness  Hematological: Does not bruise/bleed easily  Psychiatric/Behavioral: Negative for confusion  The patient is not nervous/anxious  Physical Exam  Physical Exam  Vitals signs reviewed  Constitutional:       General: She is not in acute distress  Appearance: She is well-developed  She is not diaphoretic  HENT:      Head: Normocephalic and atraumatic  Eyes:      General: No scleral icterus  Pupils: Pupils are equal, round, and reactive to light  Neck:      Vascular: No JVD  Cardiovascular:      Rate and Rhythm: Normal rate and regular rhythm  Heart sounds: No murmur  No friction rub  No gallop  Pulmonary:      Effort: No respiratory distress  Breath sounds: No wheezing or rales  Abdominal:      General: Bowel sounds are normal  There is no distension  Palpations: Abdomen is soft  There is no mass  Tenderness: There is no abdominal tenderness  There is no guarding or rebound  Musculoskeletal:      Right lower leg: Edema (chronic, baseline) present  Left lower leg: Edema (chronic, baseline) present  Skin:     General: Skin is warm and dry  Capillary Refill: Capillary refill takes less than 2 seconds  Coloration: Skin is not pale  Neurological:      General: No focal deficit present  Mental Status: She is alert and oriented to person, place, and time     Psychiatric:         Behavior: Behavior normal          Vital Signs  ED Triage Vitals   Temperature Pulse Respirations Blood Pressure SpO2   08/15/20 1031 08/15/20 1031 08/15/20 1031 08/15/20 1100 08/15/20 1031   97 6 °F (36 4 °C) 90 20 109/56 98 %      Temp Source Heart Rate Source Patient Position - Orthostatic VS BP Location FiO2 (%)   08/15/20 1031 08/15/20 1031 08/15/20 1031 08/15/20 1031 --   Tympanic Monitor Sitting Left arm       Pain Score       08/15/20 1135       No Pain           Vitals:    08/15/20 1400 08/15/20 1415 08/15/20 1430 08/15/20 1450   BP: 121/57 117/57 118/59 120/58   Pulse: 84 83 81 81   Patient Position - Orthostatic VS:    Lying         Visual Acuity      ED Medications  Medications - No data to display    Diagnostic Studies  Results Reviewed     Procedure Component Value Units Date/Time    CBC and differential [[id number]]  (Abnormal) Collected:  08/15/20 1038    Lab Status:  Final result Specimen:  Blood from Hand, Left Updated:  08/15/20 1131     WBC 2 05 Thousand/uL      RBC 2 99 Million/uL      Hemoglobin 7 4 g/dL      Hematocrit 25 4 %      MCV 85 fL      MCH 24 7 pg      MCHC 29 1 g/dL      RDW 21 2 %      Platelets 18 Thousands/uL      nRBC 0 /100 WBCs      Neutrophils Relative 57 %      Immat GRANS % 1 %      Lymphocytes Relative 27 %      Monocytes Relative 11 %      Eosinophils Relative 3 %      Basophils Relative 1 %      Neutrophils Absolute 1 17 Thousands/µL      Immature Grans Absolute 0 02 Thousand/uL      Lymphocytes Absolute 0 56 Thousands/µL      Monocytes Absolute 0 22 Thousand/µL      Eosinophils Absolute 0 07 Thousand/µL      Basophils Absolute 0 01 Thousands/µL     Comprehensive metabolic panel [[id number]]  (Abnormal) Collected:  08/15/20 1038    Lab Status:  Final result Specimen:  Blood from Hand, Left Updated:  08/15/20 1105     Sodium 142 mmol/L      Potassium 3 8 mmol/L      Chloride 109 mmol/L      CO2 23 mmol/L      ANION GAP 10 mmol/L      BUN 11 mg/dL      Creatinine 0 97 mg/dL      Glucose 90 mg/dL      Calcium 7 5 mg/dL       U/L      ALT 32 U/L      Alkaline Phosphatase 154 U/L      Total Protein 6 8 g/dL      Albumin 2 6 g/dL      Total Bilirubin 2 30 mg/dL      eGFR 64 ml/min/1 73sq m     Narrative:       [place] guidelines for Chronic Kidney Disease (CKD):     Stage 1 with normal or high GFR (GFR > 90 mL/min/1 73 square meters)    Stage 2 Mild CKD (GFR = 60-89 mL/min/1 73 square meters)    Stage 3A Moderate CKD (GFR = 45-59 mL/min/1 73 square meters)    Stage 3B Moderate CKD (GFR = 30-44 mL/min/1 73 square meters)    Stage 4 Severe CKD (GFR = 15-29 mL/min/1 73 square meters)    Stage 5 End Stage CKD (GFR <15 mL/min/1 73 square meters)  Note: GFR calculation is accurate only with a steady state creatinine    Troponin I [[id number]]  (Normal) Collected:  08/15/20 1038    Lab Status:  Final result Specimen:  Blood from Hand, Left Updated:  08/15/20 1105     Troponin I <0 02 ng/mL     Protime-INR [[id number]]  (Abnormal) Collected:  08/15/20 1038    Lab Status:  Final result Specimen:  Blood from Hand, Left Updated:  08/15/20 1102     Protime 19 4 seconds      INR 1 66    APTT [[id number]]  (Abnormal) Collected:  08/15/20 1038    Lab Status:  Final result Specimen:  Blood from Hand, Left Updated:  08/15/20 1102     PTT 40 seconds                  XR chest 1 view portable   Final Result by Mark Louis MD (08/15 1109)      No acute cardiopulmonary disease  Workstation performed: [id number]                    Procedures  Procedures         ED Course  ED Course as of Aug 15 1511   Sat Aug 15, 2020   1147 Pt is consented for blood transfusion          US AUDIT      Most Recent Value   Initial Alcohol Screen: US AUDIT-C    1  How often do you have a drink containing alcohol? 6 Filed at: 08/15/2020 1032   2  How many drinks containing alcohol do you have on a typical day you are drinking? 5 Filed at: 08/15/2020 1032   3a  Male UNDER 65: How often do you have five or more drinks on one occasion? 0 Filed at: 08/15/2020 1032   3b  FEMALE Any Age, or MALE 65+: How often do you have 4 or more drinks on one occassion? 6 Filed at: 08/15/2020 1032   Audit-C Score  (!) 17 Filed at: 08/15/2020 1032   Full Alcohol Screen: US AUDIT   4   How often during the last year have you found that you were not able to stop drinking once you had started? 4 Filed at: 08/15/2020 1032   5  How often during past year have you failed to do what was normally expected of you because of drinking? 4 Filed at: 08/15/2020 1032   6  How often in past year have you needed a first drink in the morning to get yourself going after a heavy drinking session? 4 Filed at: 08/15/2020 1032   8  How often in past year have you been unable to remember what happened night before because you had been drinking? 4 Filed at: 08/15/2020 1032   9  Have you or someone else been injured as a result of your drinking? 0 Filed at: 08/15/2020 1032   10  Has a relative, friend, doctor or other health worker been concerned about your drinking and suggested you cut down? 4 Filed at: 08/15/2020 1032                  CHINO/DAST-10      Most Recent Value   How many times in the past year have you    Used an illegal drug or used a prescription medication for non-medical reasons? Never Filed at: 08/15/2020 1033                                MDM  Number of Diagnoses or Management Options  Alcohol abuse:   Pancytopenia ([facility] ):   Symptomatic anemia:   Diagnosis management comments: 69-year-old female presents emergency department with shortness of breath secondary to anemia  There is no evidence of active blood loss  Patient's anemia is multifactorial in the setting of marrow suppression from chronic alcohol use and liver disease    She is transfused with 1 unit PRBCs with symptomatic improvement, strongly encouraged she follow up with her outpatient hematologist for further management       Amount and/or Complexity of Data Reviewed  Clinical lab tests: ordered and reviewed  Tests in the radiology section of CPT®: ordered and reviewed  Tests in the medicine section of CPT®: ordered and reviewed  Review and summarize past medical records: yes  Discuss the patient with other providers: yes  Independent visualization of images, tracings, or specimens: yes          Disposition  Final diagnoses:   Pancytopenia ([address] )   Symptomatic anemia   Alcohol abuse     Time reflects when diagnosis was documented in both MDM as applicable and the Disposition within this note     Time User Action Codes Description Comment    8/15/2020 12:30 PM Porsha  Add [D43 525] Pancytopenia ([address] )     8/15/2020 12:31 PM Rodriguez Gayer [D64 9] Symptomatic anemia     8/15/2020 12:31 PM Porsha  Add [F10 10] Alcohol abuse       ED Disposition     ED Disposition Condition Date/Time Comment    Discharge Stable Sat Aug 15, 2020  2:13 PM Rivka Perales discharge to home/self care              Follow-up Information     Follow up With Specialties Details Why Contact Info    Dr Patricio Cramer  Call in 2 days            Discharge Medication List as of 8/15/2020  2:13 PM      CONTINUE these medications which have NOT CHANGED    Details   baclofen 10 mg tablet Take 1 tablet (10 mg total) by mouth daily, Starting Mon 4/27/2020, Until Sun 7/26/2020, Normal      bumetanide (BUMEX) 2 mg tablet Take 1 tablet (2 mg total) by mouth daily, Starting Thu 7/9/2020, Until Wed 10/7/2020, Normal      Calcium Citrate 250 MG TABS Take by mouth daily 500mg, takes 3 daily- 1500mg total, Historical Med      CARAFATE 1 GM/10ML suspension TAKE 10 ML (1 GRAM) FOUR TIMES A DAY, Normal      carvedilol (COREG) 3 125 mg tablet Starting Wed 2/19/2020, Historical Med      Cholecalciferol (VITAMIN D3) 5000 units TABS Take 5,000 Units by mouth daily, Historical Med      folic acid (FOLVITE) 1 mg tablet Take 1 tablet by mouth daily, Starting Wed 8/9/2017, Normal      lactulose (CEPHULAC) 10 g packet Take 1 packet (10 g total) by mouth 3 (three) times a day Titrate up or down to obtain goal of 2-3 bowel movements per day, Starting Thu 8/29/2019, Normal      magnesium chloride (MAG64) 64 MG TBEC EC tablet Take 64 mg by mouth, Starting Sat 1/11/2020, Historical Med      magnesium oxide (MAG-OX) 400 mg Take 1 tablet (400 mg total) by mouth daily, Starting Thu 7/9/2020, Until Wed 10/7/2020, Normal      Multiple Vitamins-Minerals (MULTIVITAMIN WITH MINERALS) tablet Take 1 tablet by mouth daily, Historical Med      omeprazole (PriLOSEC) 20 mg delayed release capsule Take 20 mg by mouth daily, Historical Med      potassium chloride (Klor-Con M10) 10 mEq tablet Take 1 tablet (10 mEq total) by mouth daily, Starting Fri 4/3/2020, Until Mon 7/20/2020, Normal      pregabalin (LYRICA) 200 MG capsule Take 1 PO TID, Normal      spironolactone (ALDACTONE) 25 mg tablet Take 4 tablets (100 mg total) by mouth daily, Starting Thu 7/9/2020, Until Wed 10/7/2020, Normal      thiamine 100 MG tablet Take 1 tablet (100 mg total) by mouth daily, Starting Fri 7/12/2019, Normal      tobramycin-dexamethasone (TOBRADEX) ophthalmic suspension Administer 1 drop to the right eye 3 (three) times a day, Historical Med           No discharge procedures on file      PDMP Review     None          ED Provider  Electronically Signed by           Rakesh Ott PA-C  08/15/20 9427

## 2020-08-16 LAB
ABO GROUP BLD BPU: NORMAL
BPU ID: NORMAL
CROSSMATCH: NORMAL
UNIT DISPENSE STATUS: NORMAL
UNIT PRODUCT CODE: NORMAL
UNIT RH: NORMAL

## 2020-08-17 NOTE — TELEPHONE ENCOUNTER
S/w pt, stated that  approved lyrica, will allow the medication to be released and will cover the rx  Pt stated that [facility] - [facility] are requiring an ok from the prescriber to release the medication  Pt stated that a form will be faxed to [facility] for DG to complete  Pt requested that this form be completed today / tomorrow in order for the rx to be sent in time  Advised pt, will make SL aware, DG is out of the office  Pt verbalized understanding and appreciation  Pt requested a cb if the fax is not received

## 2020-08-17 NOTE — TELEPHONE ENCOUNTER
Patient called stating she's not able to have her Lyrica medication processed until 9/9/20  Then its [facility] & she won't have it until the middle of September  She would like to know if she's able to have a 13 day prescription to hold her over until she receives it next month   Please advise, chiqui    Call back# [phone]

## 2020-08-17 NOTE — TELEPHONE ENCOUNTER
S/w pt, stated that [facility] will not fill her rx for lyrica until 9/9/2020 s/t rx filled on 5/28 for a 90 day supply  Pt stated that she must have lost some medication or the rx, because she is almost done the 7/20 rx and will have a gap in medication until she receives her rx from [facility] on ~ 9/17  Advised pt, there is a refill on her rx from 7/20/2020 for 30 day supply which should cover that gap  Advised pt to contact [facility] and find out if they will release the rx in september if she fills this 30 day supply  If so - will it be covered by insurance? Advised pt, if the medication will be released - the writer will help with good rx prices at a commercial pharmacy to fill the refill on hand  Pt verbalized understanding  Will fu w/ naga and taj to advise

## 2020-08-18 NOTE — TELEPHONE ENCOUNTER
Pt would like you to call her back   Its about the Lyrica having trouble getting it    Pt can be reached at [phone]

## 2020-08-18 NOTE — TELEPHONE ENCOUNTER
S/w pt, stated that [facility]  They are now refusing to send the rx until 9/6  Pt stated that a replacement rx will be covered by her ins - but the [facility] will not be sent until 9/6 s/t qty on hand  Advised pt, likely a prior auth or exception request is required  Requested the pt hold while the writer nikko w/ [facility]  S/w JACOB [facility], [phone], explained that the pt did receive rx in may - 90 day supply and a 30 day supply in July  Advised jacob, the pt did move since May and lost her medication in the move  Per pt # 53 pills on hand yesterday, will run out on 9/3  Pt expressed concern re: the processing time w/ [facility], fearful that she will be without medication for a prolonged period of time  Per Guerda Galloway, ok to send the rx on Friday 8/21  Allow 3-4 days for delivery  Anticipate delivery by 8/27  Asaf Rivera, will make the pt aware  S/w pt, advised of above  Pt verbalized understanding and appreciation       CRISS

## 2020-09-03 DIAGNOSIS — K74.60 CIRRHOSIS OF LIVER WITHOUT ASCITES, UNSPECIFIED HEPATIC CIRRHOSIS TYPE (HCC): ICD-10-CM

## 2020-09-03 RX ORDER — SPIRONOLACTONE 25 MG/1
100 TABLET ORAL DAILY
Qty: 360 TABLET | Refills: 4 | Status: SHIPPED | OUTPATIENT
Start: 2020-09-03 | End: 2020-12-02

## 2020-09-10 DIAGNOSIS — R60.0 LOCALIZED EDEMA: ICD-10-CM

## 2020-09-11 RX ORDER — BUMETANIDE 2 MG/1
2 TABLET ORAL DAILY
Qty: 90 TABLET | Refills: 3 | Status: SHIPPED | OUTPATIENT
Start: 2020-09-11 | End: 2020-12-10

## 2020-09-14 ENCOUNTER — TRANSCRIBE ORDERS (OUTPATIENT)
Dept: GASTROENTEROLOGY | Facility: CLINIC | Age: 60
End: 2020-09-14

## 2020-09-14 RX ORDER — SODIUM CHLORIDE 9 MG/ML
125 INJECTION, SOLUTION INTRAVENOUS CONTINUOUS
Status: CANCELLED | OUTPATIENT
Start: 2020-09-14

## 2020-09-15 ENCOUNTER — HOSPITAL ENCOUNTER (OUTPATIENT)
Dept: GASTROENTEROLOGY | Facility: HOSPITAL | Age: 60
Setting detail: OUTPATIENT SURGERY
Discharge: HOME | End: 2020-09-15
Attending: INTERNAL MEDICINE

## 2020-09-24 ENCOUNTER — TELEPHONE (OUTPATIENT)
Dept: PAIN MEDICINE | Facility: CLINIC | Age: 60
End: 2020-09-24

## 2020-09-24 NOTE — TELEPHONE ENCOUNTER
S/w pt, stated that she has been dc from the hospital and is home bound  Pt stated that at the time of dc, the hospitalist recommended home PT s/t deconditioning  Pt stated that she s/w her pcp's office who denied home PT  Pt stated that she does not understand why it was denied and is asking if DG can write the order as this office ordered outpt PT in the past  Advised pt, will d/w DG and cb to advise  Pt verbalized understanding and appreciatoin

## 2020-09-24 NOTE — TELEPHONE ENCOUNTER
Pt called in to see if Matthew can write a script for in home physical  therapy because the pt is home bound  Pt has been discharged from inpatient and it was recommended in home PT        Please be advise thank you        Please call patient back @ [phone]

## 2020-09-25 NOTE — TELEPHONE ENCOUNTER
S/w pt, stated that she was hospitalized for low blood cell count, received 3 u of blood  Pt stated that she sw/ her insurance co re: home PT  Pt stated that the order has to come from either her pcp or the hospital  Pt stated that she will be following up with both today  Nothing further from [address]       FYI

## 2020-11-19 ENCOUNTER — TELEPHONE (OUTPATIENT)
Dept: GASTROENTEROLOGY | Facility: CLINIC | Age: 60
End: 2020-11-19

## 2020-12-04 DIAGNOSIS — K70.30 ALCOHOLIC CIRRHOSIS OF LIVER WITHOUT ASCITES (HCC): ICD-10-CM

## 2020-12-04 RX ORDER — POTASSIUM CHLORIDE 750 MG/1
10 TABLET, EXTENDED RELEASE ORAL DAILY
Qty: 90 TABLET | Refills: 2 | Status: SHIPPED | OUTPATIENT
Start: 2020-12-04 | End: 2021-03-04

## 2020-12-07 ENCOUNTER — TELEPHONE (OUTPATIENT)
Dept: PAIN MEDICINE | Facility: CLINIC | Age: 60
End: 2020-12-07

## 2020-12-07 DIAGNOSIS — M79.2 NEUROPATHIC PAIN: ICD-10-CM

## 2020-12-07 RX ORDER — PREGABALIN 200 MG/1
CAPSULE ORAL
Qty: 270 CAPSULE | Refills: 0 | Status: SHIPPED | OUTPATIENT
Start: 2020-12-07

## 2020-12-24 ENCOUNTER — TELEPHONE (OUTPATIENT)
Dept: PAIN MEDICINE | Facility: CLINIC | Age: 60
End: 2020-12-24

## 2020-12-28 NOTE — ASSESSMENT & PLAN NOTE
· POA, unknown etiology  Possibly colitis, no other etiology found  · Tx out of ICU 10/21  · On day #5 cipro/flagyl   Off broad spectrum abx  · Has been afebrile, leukocytosis resolved  · Blood cx negative, flu/RSV negative, stool studies negative  · C diff not sent; if worsens clinically, would check  · Tolerating regular diet No

## 2021-01-11 ENCOUNTER — TELEMEDICINE (OUTPATIENT)
Dept: GASTROENTEROLOGY | Facility: CLINIC | Age: 61
End: 2021-01-11
Payer: OTHER GOVERNMENT

## 2021-01-11 DIAGNOSIS — D69.6 THROMBOCYTOPENIA (HCC): ICD-10-CM

## 2021-01-11 DIAGNOSIS — D50.9 IRON DEFICIENCY ANEMIA, UNSPECIFIED IRON DEFICIENCY ANEMIA TYPE: ICD-10-CM

## 2021-01-11 DIAGNOSIS — K72.90 ENCEPHALOPATHY, PORTAL SYSTEMIC (HCC): ICD-10-CM

## 2021-01-11 DIAGNOSIS — K70.10 ALCOHOLIC HEPATITIS WITHOUT ASCITES: ICD-10-CM

## 2021-01-11 DIAGNOSIS — K28.9 ANASTOMOTIC ULCER: Primary | ICD-10-CM

## 2021-01-11 DIAGNOSIS — K70.30 ALCOHOLIC CIRRHOSIS OF LIVER WITHOUT ASCITES (HCC): ICD-10-CM

## 2021-01-11 PROCEDURE — 99214 OFFICE O/P EST MOD 30 MIN: CPT | Performed by: INTERNAL MEDICINE

## 2021-01-11 NOTE — PROGRESS NOTES
Virtual Regular Visit      Assessment/Plan:    Problem List Items Addressed This Visit     None        1  Anastomotic ulcer  2  Alcoholic hepatitis without ascites  3  Thrombocytopenia ([address] )  4  Alcoholic cirrhosis of liver without ascites ([address] )  5  Encephalopathy, portal systemic ([address] )  6  Iron deficiency anemia, unspecified iron deficiency anemia type  - polyethylene glycol (GOLYTELY) 4000 mL solution; Take 4,000 mL by mouth once for 1 dose  Dispense: 4000 mL; Refill: 0    Bhakti Phelps is a 49-year-old female with past medical history of alcoholic cirrhosis complicated by thrombocytopenia, ascites, edema, hepatic encephalopathy, gastric bypass with anastomotic ulcer recently admitted at OSH for symptomatic anemia, hepatic encephalopathy  She has required multiple blood transfusion over the last year due to persistent anemia  We have emphasize importance of undergoing endoscopic evaluation but she has held off due to current COVID pandemic      Cirrhosis is complicated by thrombocytopenia, ascites, edema, hepatic encephalopathy   She has history of gastric bypass  Marvin Umanzor is currently taking Bumex 2 mg BID and 25 mg of  spironolactone   She is also taking lactulose and rifaximin   She denies any signs overt bleeding at this time   She denies NSAID use  She is planning on getting oral surgery for poor dentition      Differential diagnosis for anemia may include anastomotic ulceration versus colon lesion versus AVM disease versus other etiology     -discussed low-sodium diet  -discuss avoiding raw foods  -discuss avoiding NSAIDs  -discuss not taking Tylenol more than 1 g  -will need monitoring for [facility] [address]  but does not want to go the hospital at this time  -Discussed avoidance strict avoidance of driving   -continue multivitamin  -continue Bumex 2 mg b i d   And spironolactone 25 mg which was started outside facility/nephrology  - EGD and colonoscopy for further eval of anemia   - Pillcam evaluation if EGD and colonoscopy are negative  - MELD labs with current MELD of 28  -last alcohol drink 6 weeks ago discussed importance to continue avoidance of alcohol  -she will need platelet transfusion prior to endoscopic evaluation and colonoscopy     Burrell postop mortality score:    7 days- 2%  30 days- 8%   90 days- 13%     Previous workup    EGD from 07/07/2019    Esophagus without esophageal varices or pathology     Stomach 2 ulcers at the anastomotic site with contact bleeding  Treated with epinephrine 0 5 mL x3 injections then cauterization of the area of oozing of 1 of the ulcers     Stomach-proximal normal-appearing post gastric bypass stomach  Biopsy taken in the proximal stomach but this caused significant bleeding probably related to the patient's thrombocytopenia  This was injected and cauterized  There was still some oozing of blood near the termination of the procedure but we concerned about the patient's airway and the procedure terminated-this was injected with 0 5 mL of epi solution and then bipolar cautery applied to the area improved hemostasis but did not appear complete     Small bowel distal to the gastric anastomosis normal he friend and efferent limbs  Blood loss about 150 mL       Last colonoscopy was from 09/2017    -2 diminutive polyps removed with repeat planned in 5 years    Last imaging study was in 2019 [facility] [place]       Reason for visit is   Chief Complaint   Patient presents with    Virtual Regular Visit        Encounter provider Gray Blanchard MD    Provider located at [address] [place] [address]  [phone]      Recent Visits  No visits were found meeting these conditions  Showing recent visits within past 7 days and meeting all other requirements     Future Appointments  No visits were found meeting these conditions     Showing future appointments within next 150 days and meeting all other requirements        The patient was identified by name and date of birth  Kg Aleman was informed that this is a telemedicine visit and that the visit is being conducted through [facility] and patient was informed that this is a secure, HIPAA-compliant platform  She agrees to proceed     My office door was closed  No one else was in the room  She acknowledged consent and understanding of privacy and security of the video platform  The patient has agreed to participate and understands they can discontinue the visit at any time  Patient is aware this is a billable service  Subjective  Kg Aleman is a 61 y o  female for follow-up visit past multiple recent hospitalization  Hospitalization for hepatic encephalopathy  She is now alert oriented x3 without any issues  She is trying to optimize her intake of lactulose and was recently discharged  Recent hospitalization was complicated by acute kidney injury  Budesonide and Aldactone were on hold and restarted  She is watching her sodium intake and avoiding hepatotoxic drugs  Current MELD is 28  She had her last alcohol intake and November of 2020         HPI     Past Medical History:   Diagnosis Date    Alcohol use disorder     Alcoholic hepatitis     Anastomotic ulcer     Anemia     Anxiety     Ascites     Breast cancer ([facility] [address] ) 2010    stage 0; bilateral mastectomy (prophylactic on left)    Chronic foot pain     Chronic narcotic dependence ([facility] [address] )     pt denies    Chronic pain disorder     Cirrhosis, alcoholic (HCC)     Colitis     Depression     situational    Esophagitis     History of bilateral mastectomy     History of gastric bypass     Hypertension     Liver disease     Morbid obesity (HCC)     Osteopenia     Palpitations     Pancytopenia (HCC)     Peripheral neuropathy     Pernicious anemia     Rosacea        Past Surgical History:   Procedure Laterality Date    ABDOMINAL SURGERY      abdominoplasty    BREAST SURGERY mastectomy    CHOLECYSTECTOMY      COLONOSCOPY  2017    ESOPHAGOGASTRODUODENOSCOPY N/A 8/2/2017    Procedure: ESOPHAGOGASTRODUODENOSCOPY (EGD); Surgeon: Libra Leroy DO;  Location: [facility];  Service: Gastroenterology    GASTRIC BYPASS      HERNIA REPAIR      MASTECTOMY      AL ESOPHAGOGASTRODUODENOSCOPY TRANSORAL DIAGNOSTIC N/A 5/18/2017    Procedure: ESOPHAGOGASTRODUODENOSCOPY (EGD) with bx;  Surgeon: Jennifer Friedman MD;  [facility]: [facility]; Service: Gastroenterology    AL ESOPHAGOGASTRODUODENOSCOPY TRANSORAL DIAGNOSTIC N/A 9/14/2017    Procedure: EGD with bx AND COLONOSCOPY with polypectomy;  Surgeon: Jennifer Friedman MD;  [facility];   Service: Gastroenterology    DECLAN-EN-Y PROCEDURE  2005    UPPER GASTROINTESTINAL ENDOSCOPY  2017       Current Outpatient Medications   Medication Sig Dispense Refill    baclofen 10 mg tablet Take 1 tablet (10 mg total) by mouth daily 90 tablet 3    bumetanide (BUMEX) 2 mg tablet Take 1 tablet (2 mg total) by mouth daily 90 tablet 3    Calcium Citrate 250 MG TABS Take by mouth daily 500mg, takes 3 daily- 1500mg total      CARAFATE 1 GM/10ML suspension TAKE 10 ML (1 GRAM) FOUR TIMES A DAY (Patient not taking: Reported on 7/20/2020) 1200 mL 5    carvedilol (COREG) 3 125 mg tablet       Cholecalciferol (VITAMIN D3) 5000 units TABS Take 5,000 Units by mouth daily      folic acid (FOLVITE) 1 mg tablet Take 1 tablet by mouth daily 30 tablet 0    lactulose (CEPHULAC) 10 g packet Take 1 packet (10 g total) by mouth 3 (three) times a day Titrate up or down to obtain goal of 2-3 bowel movements per day 30 each 7    magnesium chloride (MAG64) 64 MG TBEC EC tablet Take 64 mg by mouth      magnesium oxide (MAG-OX) 400 mg Take 1 tablet (400 mg total) by mouth daily 90 tablet 3    Multiple Vitamins-Minerals (MULTIVITAMIN WITH MINERALS) tablet Take 1 tablet by mouth daily      omeprazole (PriLOSEC) 20 mg delayed release capsule Take 20 mg by mouth daily      potassium chloride (Klor-Con M10) 10 mEq tablet Take 1 tablet (10 mEq total) by mouth daily 90 tablet 2    pregabalin (LYRICA) 200 MG capsule Take 1 PO  capsule 0    spironolactone (ALDACTONE) 25 mg tablet Take 4 tablets (100 mg total) by mouth daily 360 tablet 4    thiamine 100 MG tablet Take 1 tablet (100 mg total) by mouth daily 30 tablet 0    tobramycin-dexamethasone (TOBRADEX) ophthalmic suspension Administer 1 drop to the right eye 3 (three) times a day       No current facility-administered medications for this visit  Allergies   Allergen Reactions    Acetaminophen Other (See Comments)     Liver function, s/p bariatric surgery    Cymbalta [Duloxetine Hcl] GI Intolerance    Duloxetine GI Intolerance       Review of Systems    Video Exam    There were no vitals filed for this visit  Attempts were made to make to make this a video phone call but due to lack of connection conversation took place over the phone  Physical exam was not conducted due to this  VIRTUAL VISIT DISCLAIMER    Phuong Tom acknowledges that she has consented to an online visit or consultation  She understands that the online visit is based solely on information provided by her, and that, in the absence of a face-to-face physical evaluation by the physician, the diagnosis she receives is both limited and provisional in terms of accuracy and completeness  This is not intended to replace a full medical face-to-face evaluation by the physician  Phuong Santos understands and accepts these terms

## 2021-03-09 ENCOUNTER — TELEPHONE (OUTPATIENT)
Dept: HEMATOLOGY ONCOLOGY | Facility: CLINIC | Age: 61
End: 2021-03-09

## 2021-03-09 ENCOUNTER — TELEPHONE (OUTPATIENT)
Dept: GASTROENTEROLOGY | Facility: CLINIC | Age: 61
End: 2021-03-09

## 2021-03-09 NOTE — TELEPHONE ENCOUNTER
----- Message from Re Hamilton MD sent at 1/11/2021 11:20 PM EST -----  Patient is to be scheduled for EGD and colonoscopy evaluation  She does need a platelet transfusion on the day of her EGD and colonoscopy evaluation  How can be set this up  Please let me know what to do      Estefani Hebert

## 2021-03-09 NOTE — TELEPHONE ENCOUNTER
EGD/COLON scheduled on 5/20/2021 with Dr Ty at [facility]  I gave pt verbal instructions/mailed  I spoke with Larissa from [id number] [facility] line regarding setting up platelet transfusion  States she will reach out to [facility]

## 2021-03-09 NOTE — TELEPHONE ENCOUNTER
Kevan Sloan is neva palacio from [employer], Dr Ty's office, requesting that patient will need to have a platelet transfusion before procedure, colonoscopy and EGD and it needs to be set up by her oncologist  Kevan Sloan can be reached back at [phone]

## 2021-03-09 NOTE — TELEPHONE ENCOUNTER
I returned the phone call to Chel Chapin and advised that patient discharged herself from the group in 2/20 as she moved to [place]  She has a provider that she sees out by where she lives and will need to follow with care there for any procedures

## 2021-03-18 DIAGNOSIS — K70.30 ALCOHOLIC CIRRHOSIS OF LIVER WITHOUT ASCITES (HCC): ICD-10-CM

## 2021-03-18 DIAGNOSIS — K70.10 ALCOHOLIC HEPATITIS WITHOUT ASCITES: Primary | ICD-10-CM

## 2021-05-17 ENCOUNTER — TELEPHONE (OUTPATIENT)
Dept: GASTROENTEROLOGY | Facility: AMBULARY SURGERY CENTER | Age: 61
End: 2021-05-17

## 2021-07-11 NOTE — TELEPHONE ENCOUNTER
[facility] called and states that topiramate sprinkle caps on backorder  tabs are available    i gave verbal ok to change to tabs
Yes - the patient is able to be screened

## 2021-07-13 ENCOUNTER — TELEPHONE (OUTPATIENT)
Dept: PAIN MEDICINE | Facility: CLINIC | Age: 61
End: 2021-07-13

## 2022-01-19 ENCOUNTER — HOSPITAL ENCOUNTER (INPATIENT)
Age: 62
LOS: 3 days | Discharge: NURSING, REHAB OR LONG-TERM CARE | DRG: 442 | End: 2022-01-24
Attending: EMERGENCY MEDICINE | Admitting: INTERNAL MEDICINE

## 2022-01-19 ENCOUNTER — APPOINTMENT (OUTPATIENT)
Dept: CT IMAGING | Age: 62
DRG: 442 | End: 2022-01-19
Attending: EMERGENCY MEDICINE

## 2022-01-19 DIAGNOSIS — K76.82 HEPATIC ENCEPHALOPATHY (CMD): ICD-10-CM

## 2022-01-19 DIAGNOSIS — D69.6 THROMBOCYTOPENIA (CMD): Primary | ICD-10-CM

## 2022-01-19 LAB
ABO + RH BLD: NORMAL
ALBUMIN SERPL-MCNC: 2.4 G/DL (ref 3.6–5.1)
ALBUMIN/GLOB SERPL: 0.5 {RATIO} (ref 1–2.4)
ALP SERPL-CCNC: 137 UNITS/L (ref 45–117)
ALT SERPL-CCNC: 28 UNITS/L
AMMONIA PLAS-SCNC: 51 MCMOL/L
ANION GAP SERPL CALC-SCNC: 17 MMOL/L (ref 10–20)
APPEARANCE UR: CLEAR
AST SERPL-CCNC: 90 UNITS/L
BACTERIA #/AREA URNS HPF: ABNORMAL /HPF
BASOPHILS # BLD: 0 K/MCL (ref 0–0.3)
BASOPHILS NFR BLD: 1 %
BILIRUB SERPL-MCNC: 5.2 MG/DL (ref 0.2–1)
BILIRUB UR QL STRIP: NEGATIVE
BLD GP AB SCN SERPL QL GEL: NEGATIVE
BLOOD EXPIRATION DATE: NORMAL
BUN SERPL-MCNC: 12 MG/DL (ref 6–20)
BUN/CREAT SERPL: 14 (ref 7–25)
CALCIUM SERPL-MCNC: 8.6 MG/DL (ref 8.4–10.2)
CHLORIDE SERPL-SCNC: 103 MMOL/L (ref 98–107)
CO2 SERPL-SCNC: 22 MMOL/L (ref 21–32)
COLOR UR: YELLOW
CREAT SERPL-MCNC: 0.83 MG/DL (ref 0.51–0.95)
DEPRECATED RDW RBC: 86 FL (ref 39–50)
DISPENSE STATUS: NORMAL
EOSINOPHIL # BLD: 0 K/MCL (ref 0–0.5)
EOSINOPHIL NFR BLD: 3 %
ERYTHROCYTE [DISTWIDTH] IN BLOOD: 22.7 % (ref 11–15)
FASTING DURATION TIME PATIENT: ABNORMAL H
GFR SERPLBLD BASED ON 1.73 SQ M-ARVRAT: 76 ML/MIN
GLOBULIN SER-MCNC: 4.9 G/DL (ref 2–4)
GLUCOSE SERPL-MCNC: 96 MG/DL (ref 70–99)
GLUCOSE UR STRIP-MCNC: NEGATIVE MG/DL
HCT VFR BLD CALC: 33.7 % (ref 36–46.5)
HGB BLD-MCNC: 9.3 G/DL (ref 12–15.5)
HGB UR QL STRIP: ABNORMAL
HYALINE CASTS #/AREA URNS LPF: ABNORMAL /LPF
HYPOCHROMIA BLD QL SMEAR: NORMAL
IMM GRANULOCYTES # BLD AUTO: 0 K/MCL (ref 0–0.2)
IMM GRANULOCYTES # BLD: 2 %
INR PPP: 1.7
ISBT BLOOD TYPE: 6200
ISSUE DATE/TIME: NORMAL
KETONES UR STRIP-MCNC: NEGATIVE MG/DL
LEUKOCYTE ESTERASE UR QL STRIP: NEGATIVE
LYMPHOCYTES # BLD: 0.5 K/MCL (ref 1–4)
LYMPHOCYTES NFR BLD: 33 %
MACROCYTES BLD QL SMEAR: NORMAL
MAGNESIUM SERPL-MCNC: 1.6 MG/DL (ref 1.7–2.4)
MCH RBC QN AUTO: 28.2 PG (ref 26–34)
MCHC RBC AUTO-ENTMCNC: 27.6 G/DL (ref 32–36.5)
MCV RBC AUTO: 102.1 FL (ref 78–100)
MONOCYTES # BLD: 0.3 K/MCL (ref 0.3–0.9)
MONOCYTES NFR BLD: 18 %
NEUTROPHILS # BLD: 0.6 K/MCL (ref 1.8–7.7)
NEUTROPHILS NFR BLD: 43 %
NITRITE UR QL STRIP: NEGATIVE
NRBC BLD MANUAL-RTO: 0 /100 WBC
OVALOCYTES BLD QL SMEAR: NORMAL
PH UR STRIP: 7 [PH] (ref 5–7)
PHOSPHATE SERPL-MCNC: 4.8 MG/DL (ref 2.4–4.7)
PLATELET # BLD AUTO: 18 K/MCL (ref 140–450)
POTASSIUM SERPL-SCNC: 4.6 MMOL/L (ref 3.4–5.1)
PRODUCT CODE: NORMAL
PRODUCT DESCRIPTION: NORMAL
PRODUCT ID: NORMAL
PROT SERPL-MCNC: 7.3 G/DL (ref 6.4–8.2)
PROT UR STRIP-MCNC: NEGATIVE MG/DL
PROTHROMBIN TIME: 16.9 SEC (ref 9.7–11.8)
RAINBOW EXTRA TUBES HOLD SPECIMEN: NORMAL
RBC # BLD: 3.3 MIL/MCL (ref 4–5.2)
RBC #/AREA URNS HPF: ABNORMAL /HPF
SODIUM SERPL-SCNC: 137 MMOL/L (ref 135–145)
SP GR UR STRIP: 1.01 (ref 1–1.03)
SQUAMOUS #/AREA URNS HPF: ABNORMAL /HPF
TYPE AND SCREEN EXPIRATION DATE: NORMAL
UNIT BLOOD TYPE: NORMAL
UNIT NUMBER: NORMAL
UROBILINOGEN UR STRIP-MCNC: 1 MG/DL
WBC # BLD: 1.4 K/MCL (ref 4.2–11)
WBC #/AREA URNS HPF: ABNORMAL /HPF

## 2022-01-19 PROCEDURE — 10004651 HB RX, NO CHARGE ITEM: Performed by: HOSPITALIST

## 2022-01-19 PROCEDURE — C9113 INJ PANTOPRAZOLE SODIUM, VIA: HCPCS | Performed by: HOSPITALIST

## 2022-01-19 PROCEDURE — 86900 BLOOD TYPING SEROLOGIC ABO: CPT | Performed by: EMERGENCY MEDICINE

## 2022-01-19 PROCEDURE — 80053 COMPREHEN METABOLIC PANEL: CPT | Performed by: EMERGENCY MEDICINE

## 2022-01-19 PROCEDURE — G0378 HOSPITAL OBSERVATION PER HR: HCPCS

## 2022-01-19 PROCEDURE — G1004 CDSM NDSC: HCPCS

## 2022-01-19 PROCEDURE — 36430 TRANSFUSION BLD/BLD COMPNT: CPT

## 2022-01-19 PROCEDURE — 81001 URINALYSIS AUTO W/SCOPE: CPT | Performed by: EMERGENCY MEDICINE

## 2022-01-19 PROCEDURE — 10002803 HB RX 637: Performed by: EMERGENCY MEDICINE

## 2022-01-19 PROCEDURE — 83735 ASSAY OF MAGNESIUM: CPT | Performed by: HOSPITALIST

## 2022-01-19 PROCEDURE — 99285 EMERGENCY DEPT VISIT HI MDM: CPT

## 2022-01-19 PROCEDURE — 96374 THER/PROPH/DIAG INJ IV PUSH: CPT

## 2022-01-19 PROCEDURE — 10002800 HB RX 250 W HCPCS: Performed by: HOSPITALIST

## 2022-01-19 PROCEDURE — 85025 COMPLETE CBC W/AUTO DIFF WBC: CPT | Performed by: EMERGENCY MEDICINE

## 2022-01-19 PROCEDURE — 99219 INITIAL OBSERVATION CARE,LEVL II: CPT | Performed by: HOSPITALIST

## 2022-01-19 PROCEDURE — 84100 ASSAY OF PHOSPHORUS: CPT | Performed by: HOSPITALIST

## 2022-01-19 PROCEDURE — 82140 ASSAY OF AMMONIA: CPT | Performed by: EMERGENCY MEDICINE

## 2022-01-19 PROCEDURE — P9073 PLATELETS PHERESIS PATH REDU: HCPCS

## 2022-01-19 PROCEDURE — 36415 COLL VENOUS BLD VENIPUNCTURE: CPT | Performed by: EMERGENCY MEDICINE

## 2022-01-19 PROCEDURE — 10002803 HB RX 637: Performed by: HOSPITALIST

## 2022-01-19 PROCEDURE — U0003 INFECTIOUS AGENT DETECTION BY NUCLEIC ACID (DNA OR RNA); SEVERE ACUTE RESPIRATORY SYNDROME CORONAVIRUS 2 (SARS-COV-2) (CORONAVIRUS DISEASE [COVID-19]), AMPLIFIED PROBE TECHNIQUE, MAKING USE OF HIGH THROUGHPUT TECHNOLOGIES AS DESCRIBED BY CMS-2020-01-R: HCPCS | Performed by: EMERGENCY MEDICINE

## 2022-01-19 PROCEDURE — 85610 PROTHROMBIN TIME: CPT | Performed by: EMERGENCY MEDICINE

## 2022-01-19 PROCEDURE — 70450 CT HEAD/BRAIN W/O DYE: CPT

## 2022-01-19 RX ORDER — ONDANSETRON 2 MG/ML
4 INJECTION INTRAMUSCULAR; INTRAVENOUS 2 TIMES DAILY PRN
Status: DISCONTINUED | OUTPATIENT
Start: 2022-01-19 | End: 2022-01-24 | Stop reason: HOSPADM

## 2022-01-19 RX ORDER — BACLOFEN 10 MG/1
10 TABLET ORAL DAILY PRN
COMMUNITY

## 2022-01-19 RX ORDER — DIPHENHYDRAMINE HYDROCHLORIDE 50 MG/ML
25 INJECTION INTRAMUSCULAR; INTRAVENOUS EVERY 4 HOURS PRN
Status: DISCONTINUED | OUTPATIENT
Start: 2022-01-19 | End: 2022-01-24 | Stop reason: HOSPADM

## 2022-01-19 RX ORDER — 0.9 % SODIUM CHLORIDE 0.9 %
2 VIAL (ML) INJECTION EVERY 12 HOURS SCHEDULED
Status: DISCONTINUED | OUTPATIENT
Start: 2022-01-19 | End: 2022-01-24 | Stop reason: HOSPADM

## 2022-01-19 RX ORDER — LORAZEPAM 2 MG/ML
4 INJECTION INTRAMUSCULAR
Status: DISCONTINUED | OUTPATIENT
Start: 2022-01-19 | End: 2022-01-21

## 2022-01-19 RX ORDER — BUSPIRONE HYDROCHLORIDE 7.5 MG/1
7.5 TABLET ORAL AT BEDTIME
COMMUNITY

## 2022-01-19 RX ORDER — PANTOPRAZOLE SODIUM 40 MG/10ML
40 INJECTION, POWDER, LYOPHILIZED, FOR SOLUTION INTRAVENOUS EVERY 12 HOURS SCHEDULED
Status: DISCONTINUED | OUTPATIENT
Start: 2022-01-19 | End: 2022-01-24

## 2022-01-19 RX ORDER — PREGABALIN 100 MG/1
200 CAPSULE ORAL 3 TIMES DAILY
Status: DISCONTINUED | OUTPATIENT
Start: 2022-01-19 | End: 2022-01-24 | Stop reason: HOSPADM

## 2022-01-19 RX ORDER — LANOLIN ALCOHOL/MO/W.PET/CERES
400 CREAM (GRAM) TOPICAL ONCE
Status: COMPLETED | OUTPATIENT
Start: 2022-01-19 | End: 2022-01-19

## 2022-01-19 RX ORDER — OMEPRAZOLE 40 MG/1
40 CAPSULE, DELAYED RELEASE ORAL DAILY
COMMUNITY

## 2022-01-19 RX ORDER — LACTULOSE 10 G/15ML
20 SOLUTION ORAL ONCE
Status: COMPLETED | OUTPATIENT
Start: 2022-01-19 | End: 2022-01-19

## 2022-01-19 RX ORDER — LANOLIN ALCOHOL/MO/W.PET/CERES
6 CREAM (GRAM) TOPICAL
COMMUNITY

## 2022-01-19 RX ORDER — BUMETANIDE 1 MG/1
1 TABLET ORAL DAILY
COMMUNITY

## 2022-01-19 RX ORDER — ONDANSETRON 4 MG/1
4 TABLET, FILM COATED ORAL EVERY 6 HOURS PRN
COMMUNITY

## 2022-01-19 RX ORDER — FOLIC ACID 1 MG/1
1 TABLET ORAL DAILY
COMMUNITY

## 2022-01-19 RX ORDER — LORAZEPAM 2 MG/ML
3 INJECTION INTRAMUSCULAR
Status: DISCONTINUED | OUTPATIENT
Start: 2022-01-19 | End: 2022-01-21

## 2022-01-19 RX ORDER — PREGABALIN 200 MG/1
200 CAPSULE ORAL 3 TIMES DAILY
Status: ON HOLD | COMMUNITY
End: 2022-02-01

## 2022-01-19 RX ORDER — LORAZEPAM 2 MG/ML
2 INJECTION INTRAMUSCULAR
Status: DISCONTINUED | OUTPATIENT
Start: 2022-01-19 | End: 2022-01-21

## 2022-01-19 RX ORDER — LACTULOSE 10 G/15ML
10 SOLUTION ORAL DAILY
COMMUNITY

## 2022-01-19 RX ORDER — FOLIC ACID 1 MG/1
1 TABLET ORAL DAILY
Status: COMPLETED | OUTPATIENT
Start: 2022-01-19 | End: 2022-01-21

## 2022-01-19 RX ORDER — CITALOPRAM 20 MG/1
20 TABLET ORAL DAILY
COMMUNITY

## 2022-01-19 RX ORDER — SPIRONOLACTONE 50 MG/1
50 TABLET, FILM COATED ORAL DAILY
COMMUNITY

## 2022-01-19 RX ORDER — PREGABALIN 100 MG/1
200 CAPSULE ORAL 3 TIMES DAILY
Status: DISCONTINUED | OUTPATIENT
Start: 2022-01-20 | End: 2022-01-19

## 2022-01-19 RX ORDER — SODIUM CHLORIDE 9 MG/ML
INJECTION, SOLUTION INTRAVENOUS CONTINUOUS PRN
Status: DISCONTINUED | OUTPATIENT
Start: 2022-01-19 | End: 2022-01-24 | Stop reason: HOSPADM

## 2022-01-19 RX ORDER — ACETAMINOPHEN 325 MG/1
650 TABLET ORAL EVERY 4 HOURS PRN
Status: DISCONTINUED | OUTPATIENT
Start: 2022-01-19 | End: 2022-01-24 | Stop reason: HOSPADM

## 2022-01-19 RX ORDER — HYDROXYZINE PAMOATE 25 MG/1
25 CAPSULE ORAL EVERY 4 HOURS PRN
COMMUNITY

## 2022-01-19 RX ORDER — LANOLIN ALCOHOL/MO/W.PET/CERES
400 CREAM (GRAM) TOPICAL ONCE
Status: COMPLETED | OUTPATIENT
Start: 2022-01-20 | End: 2022-01-20

## 2022-01-19 RX ORDER — PANTOPRAZOLE SODIUM 40 MG/1
40 TABLET, DELAYED RELEASE ORAL
Status: DISCONTINUED | OUTPATIENT
Start: 2022-01-20 | End: 2022-01-20

## 2022-01-19 RX ADMIN — SODIUM CHLORIDE, PRESERVATIVE FREE 2 ML: 5 INJECTION INTRAVENOUS at 22:21

## 2022-01-19 RX ADMIN — MAGNESIUM OXIDE TAB 400 MG (241.3 MG ELEMENTAL MG) 400 MG: 400 (241.3 MG) TAB at 21:35

## 2022-01-19 RX ADMIN — LACTULOSE 20 G: 10 SOLUTION ORAL at 18:27

## 2022-01-19 RX ADMIN — FOLIC ACID 1 MG: 1 TABLET ORAL at 19:55

## 2022-01-19 RX ADMIN — PANTOPRAZOLE SODIUM 40 MG: 40 INJECTION, POWDER, FOR SOLUTION INTRAVENOUS at 19:56

## 2022-01-19 RX ADMIN — Medication 100 MG: at 19:55

## 2022-01-19 ASSESSMENT — PAIN SCALES - GENERAL
PAINLEVEL_OUTOF10: 0
PAINLEVEL_OUTOF10: 0

## 2022-01-19 ASSESSMENT — LIFESTYLE VARIABLES
AGITATION: NORMAL ACTIVITY
PAROXYSMAL SWEATS: NO SWEAT VISIBLE
TREMOR: 2
AUDITORY DISTURBANCES: NOT PRESENT
AUDITORY DISTURBANCES: NOT PRESENT
HEADACHE, FULLNESS IN HEAD: NOT PRESENT
NAUSEA AND VOMITING: NO NAUSEA AND NO VOMITING
ANXIETY: MILDLY ANXIOUS
AGITATION: NORMAL ACTIVITY
PAROXYSMAL SWEATS: NO SWEAT VISIBLE
ANXIETY: NO ANXIETY, AT EASE
VISUAL DISTURBANCES: NOT PRESENT
TREMOR: NO TREMOR
VISUAL DISTURBANCES: NOT PRESENT
HEADACHE, FULLNESS IN HEAD: NOT PRESENT
NAUSEA AND VOMITING: NO NAUSEA AND NO VOMITING

## 2022-01-20 PROBLEM — F10.10 ETOH ABUSE: Status: ACTIVE | Noted: 2019-07-06

## 2022-01-20 PROBLEM — D61.818 PANCYTOPENIA (CMD): Status: ACTIVE | Noted: 2022-01-20

## 2022-01-20 PROBLEM — K70.10 ALCOHOLIC HEPATITIS: Status: ACTIVE | Noted: 2017-08-14

## 2022-01-20 PROBLEM — D69.6 THROMBOCYTOPENIA (CMD): Status: ACTIVE | Noted: 2022-01-20

## 2022-01-20 PROBLEM — K70.31 ALCOHOLIC CIRRHOSIS OF LIVER WITH ASCITES (CMD): Status: ACTIVE | Noted: 2017-08-02

## 2022-01-20 PROBLEM — K76.82 HEPATIC ENCEPHALOPATHY (CMD): Status: ACTIVE | Noted: 2022-01-20

## 2022-01-20 PROBLEM — F32.A CHRONIC DEPRESSION: Status: ACTIVE | Noted: 2017-04-24

## 2022-01-20 LAB
AMMONIA PLAS-SCNC: 53 MCMOL/L
ANION GAP SERPL CALC-SCNC: 12 MMOL/L (ref 10–20)
BASOPHILS # BLD: 0 K/MCL (ref 0–0.3)
BASOPHILS NFR BLD: 2 %
BUN SERPL-MCNC: 9 MG/DL (ref 6–20)
BUN/CREAT SERPL: 12 (ref 7–25)
CALCIUM SERPL-MCNC: 8.5 MG/DL (ref 8.4–10.2)
CHLORIDE SERPL-SCNC: 103 MMOL/L (ref 98–107)
CO2 SERPL-SCNC: 29 MMOL/L (ref 21–32)
CREAT SERPL-MCNC: 0.74 MG/DL (ref 0.51–0.95)
DACRYOCYTES BLD QL SMEAR: NORMAL
DEPRECATED RDW RBC: 73.9 FL (ref 39–50)
EOSINOPHIL # BLD: 0.1 K/MCL (ref 0–0.5)
EOSINOPHIL NFR BLD: 5 %
ERYTHROCYTE [DISTWIDTH] IN BLOOD: 21.5 % (ref 11–15)
FASTING DURATION TIME PATIENT: NORMAL H
FERRITIN SERPL-MCNC: 75 NG/ML (ref 8–252)
GFR SERPLBLD BASED ON 1.73 SQ M-ARVRAT: 87 ML/MIN
GLUCOSE SERPL-MCNC: 80 MG/DL (ref 70–99)
HCT VFR BLD CALC: 32.2 % (ref 36–46.5)
HGB BLD-MCNC: 9.9 G/DL (ref 12–15.5)
IMM GRANULOCYTES # BLD AUTO: 0 K/MCL (ref 0–0.2)
IMM GRANULOCYTES # BLD: 2 %
IRON SATN MFR SERPL: 15 % (ref 15–45)
IRON SERPL-MCNC: 43 MCG/DL (ref 50–170)
LG PLATELETS BLD QL SMEAR: PRESENT
LYMPHOCYTES # BLD: 0.4 K/MCL (ref 1–4)
LYMPHOCYTES NFR BLD: 33 %
MAGNESIUM SERPL-MCNC: 1.5 MG/DL (ref 1.7–2.4)
MCH RBC QN AUTO: 28.4 PG (ref 26–34)
MCHC RBC AUTO-ENTMCNC: 30.7 G/DL (ref 32–36.5)
MCV RBC AUTO: 92.3 FL (ref 78–100)
MONOCYTES # BLD: 0.3 K/MCL (ref 0.3–0.9)
MONOCYTES NFR BLD: 22 %
NEUTROPHILS # BLD: 0.4 K/MCL (ref 1.8–7.7)
NEUTROPHILS NFR BLD: 36 %
NRBC BLD MANUAL-RTO: 2 /100 WBC
PLATELET # BLD AUTO: 17 K/MCL (ref 140–450)
POTASSIUM SERPL-SCNC: 3.9 MMOL/L (ref 3.4–5.1)
RBC # BLD: 3.49 MIL/MCL (ref 4–5.2)
SARS-COV-2 RNA RESP QL NAA+PROBE: NOT DETECTED
SERVICE CMNT-IMP: NORMAL
SERVICE CMNT-IMP: NORMAL
SODIUM SERPL-SCNC: 140 MMOL/L (ref 135–145)
TIBC SERPL-MCNC: 282 MCG/DL (ref 250–450)
WBC # BLD: 1.2 K/MCL (ref 4.2–11)

## 2022-01-20 PROCEDURE — 10002803 HB RX 637: Performed by: HOSPITALIST

## 2022-01-20 PROCEDURE — 97161 PT EVAL LOW COMPLEX 20 MIN: CPT | Performed by: PHYSICAL THERAPIST

## 2022-01-20 PROCEDURE — 83735 ASSAY OF MAGNESIUM: CPT | Performed by: HOSPITALIST

## 2022-01-20 PROCEDURE — 99226 SUBSEQUENT OBSERVATION CARE III: CPT | Performed by: INTERNAL MEDICINE

## 2022-01-20 PROCEDURE — G0378 HOSPITAL OBSERVATION PER HR: HCPCS

## 2022-01-20 PROCEDURE — 10004651 HB RX, NO CHARGE ITEM: Performed by: HOSPITALIST

## 2022-01-20 PROCEDURE — 97530 THERAPEUTIC ACTIVITIES: CPT | Performed by: PHYSICAL THERAPIST

## 2022-01-20 PROCEDURE — 82140 ASSAY OF AMMONIA: CPT | Performed by: HOSPITALIST

## 2022-01-20 PROCEDURE — 85025 COMPLETE CBC W/AUTO DIFF WBC: CPT | Performed by: HOSPITALIST

## 2022-01-20 PROCEDURE — 10002803 HB RX 637: Performed by: INTERNAL MEDICINE

## 2022-01-20 PROCEDURE — 82728 ASSAY OF FERRITIN: CPT | Performed by: INTERNAL MEDICINE

## 2022-01-20 PROCEDURE — 82746 ASSAY OF FOLIC ACID SERUM: CPT | Performed by: INTERNAL MEDICINE

## 2022-01-20 PROCEDURE — C9113 INJ PANTOPRAZOLE SODIUM, VIA: HCPCS | Performed by: HOSPITALIST

## 2022-01-20 PROCEDURE — 10002800 HB RX 250 W HCPCS: Performed by: HOSPITALIST

## 2022-01-20 PROCEDURE — 80048 BASIC METABOLIC PNL TOTAL CA: CPT | Performed by: HOSPITALIST

## 2022-01-20 PROCEDURE — 96376 TX/PRO/DX INJ SAME DRUG ADON: CPT

## 2022-01-20 PROCEDURE — 83550 IRON BINDING TEST: CPT | Performed by: INTERNAL MEDICINE

## 2022-01-20 PROCEDURE — 36415 COLL VENOUS BLD VENIPUNCTURE: CPT | Performed by: HOSPITALIST

## 2022-01-20 RX ORDER — SPIRONOLACTONE 25 MG/1
50 TABLET ORAL DAILY
Status: DISCONTINUED | OUTPATIENT
Start: 2022-01-20 | End: 2022-01-24 | Stop reason: HOSPADM

## 2022-01-20 RX ORDER — LACTULOSE 10 G/15ML
20 SOLUTION ORAL 2 TIMES DAILY
Status: DISCONTINUED | OUTPATIENT
Start: 2022-01-20 | End: 2022-01-24 | Stop reason: HOSPADM

## 2022-01-20 RX ORDER — BUMETANIDE 1 MG/1
1 TABLET ORAL DAILY
Status: DISCONTINUED | OUTPATIENT
Start: 2022-01-20 | End: 2022-01-24 | Stop reason: HOSPADM

## 2022-01-20 RX ORDER — POTASSIUM CHLORIDE 20 MEQ/1
40 TABLET, EXTENDED RELEASE ORAL ONCE
Status: COMPLETED | OUTPATIENT
Start: 2022-01-20 | End: 2022-01-20

## 2022-01-20 RX ADMIN — SODIUM CHLORIDE, PRESERVATIVE FREE 2 ML: 5 INJECTION INTRAVENOUS at 21:10

## 2022-01-20 RX ADMIN — PREGABALIN 200 MG: 100 CAPSULE ORAL at 09:43

## 2022-01-20 RX ADMIN — PREGABALIN 200 MG: 100 CAPSULE ORAL at 01:26

## 2022-01-20 RX ADMIN — SPIRONOLACTONE 50 MG: 25 TABLET, FILM COATED ORAL at 15:29

## 2022-01-20 RX ADMIN — PREGABALIN 200 MG: 100 CAPSULE ORAL at 15:29

## 2022-01-20 RX ADMIN — POTASSIUM CHLORIDE 40 MEQ: 1500 TABLET, EXTENDED RELEASE ORAL at 12:54

## 2022-01-20 RX ADMIN — FOLIC ACID 1 MG: 1 TABLET ORAL at 09:43

## 2022-01-20 RX ADMIN — PANTOPRAZOLE SODIUM 40 MG: 40 TABLET, DELAYED RELEASE ORAL at 05:56

## 2022-01-20 RX ADMIN — PANTOPRAZOLE SODIUM 40 MG: 40 INJECTION, POWDER, FOR SOLUTION INTRAVENOUS at 09:50

## 2022-01-20 RX ADMIN — LACTULOSE 20 G: 10 SOLUTION ORAL at 15:29

## 2022-01-20 RX ADMIN — MAGNESIUM OXIDE TAB 400 MG (241.3 MG ELEMENTAL MG) 400 MG: 400 (241.3 MG) TAB at 05:56

## 2022-01-20 RX ADMIN — PANTOPRAZOLE SODIUM 40 MG: 40 INJECTION, POWDER, FOR SOLUTION INTRAVENOUS at 21:10

## 2022-01-20 RX ADMIN — RIFAXIMIN 550 MG: 550 TABLET ORAL at 21:10

## 2022-01-20 RX ADMIN — RIFAXIMIN 550 MG: 550 TABLET ORAL at 15:29

## 2022-01-20 RX ADMIN — SODIUM CHLORIDE, PRESERVATIVE FREE 2 ML: 5 INJECTION INTRAVENOUS at 09:50

## 2022-01-20 RX ADMIN — BUMETANIDE 1 MG: 1 TABLET ORAL at 15:29

## 2022-01-20 RX ADMIN — PREGABALIN 200 MG: 100 CAPSULE ORAL at 21:10

## 2022-01-20 RX ADMIN — Medication 100 MG: at 09:43

## 2022-01-20 ASSESSMENT — LIFESTYLE VARIABLES
AUDITORY DISTURBANCES: NOT PRESENT
TREMOR: NOT VISIBLE, BUT CAN BE FELT FINGERTIP TO FINGERTIP
PAROXYSMAL SWEATS: NO SWEAT VISIBLE
HEADACHE, FULLNESS IN HEAD: NOT PRESENT
AGITATION: NORMAL ACTIVITY
ANXIETY: NO ANXIETY, AT EASE
AGITATION: NORMAL ACTIVITY
VISUAL DISTURBANCES: NOT PRESENT
AGITATION: NORMAL ACTIVITY
NAUSEA AND VOMITING: NO NAUSEA AND NO VOMITING
PAROXYSMAL SWEATS: NO SWEAT VISIBLE
NAUSEA AND VOMITING: NO NAUSEA AND NO VOMITING
VISUAL DISTURBANCES: NOT PRESENT
HEADACHE, FULLNESS IN HEAD: NOT PRESENT
NAUSEA AND VOMITING: NO NAUSEA AND NO VOMITING
NAUSEA AND VOMITING: NO NAUSEA AND NO VOMITING
TREMOR: NOT VISIBLE, BUT CAN BE FELT FINGERTIP TO FINGERTIP
ANXIETY: NO ANXIETY, AT EASE
ANXIETY: NO ANXIETY, AT EASE
PAROXYSMAL SWEATS: NO SWEAT VISIBLE
TREMOR: NOT VISIBLE, BUT CAN BE FELT FINGERTIP TO FINGERTIP
PAROXYSMAL SWEATS: NO SWEAT VISIBLE
ANXIETY: NO ANXIETY, AT EASE
HEADACHE, FULLNESS IN HEAD: NOT PRESENT
AUDITORY DISTURBANCES: NOT PRESENT
VISUAL DISTURBANCES: NOT PRESENT
AUDITORY DISTURBANCES: NOT PRESENT
TREMOR: NO TREMOR
AGITATION: NORMAL ACTIVITY
VISUAL DISTURBANCES: NOT PRESENT
HEADACHE, FULLNESS IN HEAD: NOT PRESENT
HEADACHE, FULLNESS IN HEAD: NOT PRESENT
AUDITORY DISTURBANCES: NOT PRESENT
PAROXYSMAL SWEATS: NO SWEAT VISIBLE
TREMOR: NOT VISIBLE, BUT CAN BE FELT FINGERTIP TO FINGERTIP
VISUAL DISTURBANCES: NOT PRESENT
NAUSEA AND VOMITING: NO NAUSEA AND NO VOMITING
AGITATION: NORMAL ACTIVITY
AUDITORY DISTURBANCES: NOT PRESENT
ANXIETY: NO ANXIETY, AT EASE

## 2022-01-20 ASSESSMENT — COGNITIVE AND FUNCTIONAL STATUS - GENERAL
BASIC_MOBILITY_RAW_SCORE: 20
BASIC_MOBILITY_CONVERTED_SCORE: 43.99

## 2022-01-20 ASSESSMENT — PAIN SCALES - GENERAL
PAINLEVEL_OUTOF10: 0
PAINLEVEL_OUTOF10: 0

## 2022-01-21 LAB
ALBUMIN SERPL-MCNC: 1.9 G/DL (ref 3.6–5.1)
ALBUMIN/GLOB SERPL: 0.5 {RATIO} (ref 1–2.4)
ALP SERPL-CCNC: 100 UNITS/L (ref 45–117)
ALT SERPL-CCNC: 21 UNITS/L
ANION GAP SERPL CALC-SCNC: 9 MMOL/L (ref 10–20)
AST SERPL-CCNC: 53 UNITS/L
BASOPHILS # BLD: 0 K/MCL (ref 0–0.3)
BASOPHILS NFR BLD: 2 %
BILIRUB SERPL-MCNC: 3.8 MG/DL (ref 0.2–1)
BUN SERPL-MCNC: 9 MG/DL (ref 6–20)
BUN/CREAT SERPL: 9 (ref 7–25)
CALCIUM SERPL-MCNC: 8.4 MG/DL (ref 8.4–10.2)
CHLORIDE SERPL-SCNC: 105 MMOL/L (ref 98–107)
CO2 SERPL-SCNC: 28 MMOL/L (ref 21–32)
CREAT SERPL-MCNC: 1.01 MG/DL (ref 0.51–0.95)
DEPRECATED RDW RBC: 72.2 FL (ref 39–50)
EOSINOPHIL # BLD: 0.1 K/MCL (ref 0–0.5)
EOSINOPHIL NFR BLD: 4 %
ERYTHROCYTE [DISTWIDTH] IN BLOOD: 21.4 % (ref 11–15)
FASTING DURATION TIME PATIENT: ABNORMAL H
FOLATE SERPL-MCNC: >24 NG/ML
GFR SERPLBLD BASED ON 1.73 SQ M-ARVRAT: 60 ML/MIN
GLOBULIN SER-MCNC: 4.2 G/DL (ref 2–4)
GLUCOSE SERPL-MCNC: 91 MG/DL (ref 70–99)
HCT VFR BLD CALC: 29.1 % (ref 36–46.5)
HGB BLD-MCNC: 9 G/DL (ref 12–15.5)
IMM GRANULOCYTES # BLD AUTO: 0 K/MCL (ref 0–0.2)
IMM GRANULOCYTES # BLD: 0 %
LYMPHOCYTES # BLD: 0.7 K/MCL (ref 1–4)
LYMPHOCYTES NFR BLD: 36 %
MAGNESIUM SERPL-MCNC: 1.6 MG/DL (ref 1.7–2.4)
MCH RBC QN AUTO: 28.3 PG (ref 26–34)
MCHC RBC AUTO-ENTMCNC: 30.9 G/DL (ref 32–36.5)
MCV RBC AUTO: 91.5 FL (ref 78–100)
MONOCYTES # BLD: 0.4 K/MCL (ref 0.3–0.9)
MONOCYTES NFR BLD: 23 %
NEUTROPHILS # BLD: 0.6 K/MCL (ref 1.8–7.7)
NEUTROPHILS NFR BLD: 35 %
NRBC BLD MANUAL-RTO: 0 /100 WBC
PLATELET # BLD AUTO: 27 K/MCL (ref 140–450)
POTASSIUM SERPL-SCNC: 4.2 MMOL/L (ref 3.4–5.1)
PROT SERPL-MCNC: 6.1 G/DL (ref 6.4–8.2)
RAINBOW EXTRA TUBES HOLD SPECIMEN: NORMAL
RBC # BLD: 3.18 MIL/MCL (ref 4–5.2)
SODIUM SERPL-SCNC: 138 MMOL/L (ref 135–145)
VIT B12 SERPL-MCNC: >2000 PG/ML (ref 211–911)
WBC # BLD: 1.8 K/MCL (ref 4.2–11)

## 2022-01-21 PROCEDURE — 10002803 HB RX 637: Performed by: HOSPITALIST

## 2022-01-21 PROCEDURE — 10000002 HB ROOM CHARGE MED SURG

## 2022-01-21 PROCEDURE — 10004651 HB RX, NO CHARGE ITEM: Performed by: HOSPITALIST

## 2022-01-21 PROCEDURE — 10002803 HB RX 637: Performed by: INTERNAL MEDICINE

## 2022-01-21 PROCEDURE — 36415 COLL VENOUS BLD VENIPUNCTURE: CPT | Performed by: INTERNAL MEDICINE

## 2022-01-21 PROCEDURE — 99233 SBSQ HOSP IP/OBS HIGH 50: CPT | Performed by: INTERNAL MEDICINE

## 2022-01-21 PROCEDURE — 80053 COMPREHEN METABOLIC PANEL: CPT | Performed by: INTERNAL MEDICINE

## 2022-01-21 PROCEDURE — G0378 HOSPITAL OBSERVATION PER HR: HCPCS

## 2022-01-21 PROCEDURE — 85025 COMPLETE CBC W/AUTO DIFF WBC: CPT | Performed by: INTERNAL MEDICINE

## 2022-01-21 PROCEDURE — 84443 ASSAY THYROID STIM HORMONE: CPT | Performed by: INTERNAL MEDICINE

## 2022-01-21 PROCEDURE — 83735 ASSAY OF MAGNESIUM: CPT | Performed by: INTERNAL MEDICINE

## 2022-01-21 PROCEDURE — 10002800 HB RX 250 W HCPCS: Performed by: HOSPITALIST

## 2022-01-21 PROCEDURE — C9113 INJ PANTOPRAZOLE SODIUM, VIA: HCPCS | Performed by: HOSPITALIST

## 2022-01-21 RX ORDER — LANOLIN ALCOHOL/MO/W.PET/CERES
400 CREAM (GRAM) TOPICAL ONCE
Status: COMPLETED | OUTPATIENT
Start: 2022-01-21 | End: 2022-01-21

## 2022-01-21 RX ADMIN — RIFAXIMIN 550 MG: 550 TABLET ORAL at 20:13

## 2022-01-21 RX ADMIN — PREGABALIN 200 MG: 100 CAPSULE ORAL at 20:13

## 2022-01-21 RX ADMIN — MAGNESIUM OXIDE TAB 400 MG (241.3 MG ELEMENTAL MG) 400 MG: 400 (241.3 MG) TAB at 17:30

## 2022-01-21 RX ADMIN — SODIUM CHLORIDE, PRESERVATIVE FREE 2 ML: 5 INJECTION INTRAVENOUS at 08:48

## 2022-01-21 RX ADMIN — BUMETANIDE 1 MG: 1 TABLET ORAL at 08:50

## 2022-01-21 RX ADMIN — SODIUM CHLORIDE, PRESERVATIVE FREE 2 ML: 5 INJECTION INTRAVENOUS at 20:18

## 2022-01-21 RX ADMIN — PREGABALIN 200 MG: 100 CAPSULE ORAL at 08:49

## 2022-01-21 RX ADMIN — PANTOPRAZOLE SODIUM 40 MG: 40 INJECTION, POWDER, FOR SOLUTION INTRAVENOUS at 20:13

## 2022-01-21 RX ADMIN — MAGNESIUM OXIDE TAB 400 MG (241.3 MG ELEMENTAL MG) 400 MG: 400 (241.3 MG) TAB at 08:49

## 2022-01-21 RX ADMIN — FOLIC ACID 1 MG: 1 TABLET ORAL at 08:50

## 2022-01-21 RX ADMIN — Medication 100 MG: at 08:49

## 2022-01-21 RX ADMIN — PREGABALIN 200 MG: 100 CAPSULE ORAL at 14:47

## 2022-01-21 RX ADMIN — LACTULOSE 20 G: 10 SOLUTION ORAL at 08:50

## 2022-01-21 RX ADMIN — RIFAXIMIN 550 MG: 550 TABLET ORAL at 08:50

## 2022-01-21 RX ADMIN — SPIRONOLACTONE 50 MG: 25 TABLET, FILM COATED ORAL at 08:50

## 2022-01-21 RX ADMIN — PANTOPRAZOLE SODIUM 40 MG: 40 INJECTION, POWDER, FOR SOLUTION INTRAVENOUS at 08:46

## 2022-01-21 ASSESSMENT — LIFESTYLE VARIABLES
HEADACHE, FULLNESS IN HEAD: NOT PRESENT
TREMOR: NO TREMOR
AGITATION: NORMAL ACTIVITY
AUDITORY DISTURBANCES: NOT PRESENT
ANXIETY: NO ANXIETY, AT EASE
PAROXYSMAL SWEATS: NO SWEAT VISIBLE
PAROXYSMAL SWEATS: NO SWEAT VISIBLE
VISUAL DISTURBANCES: NOT PRESENT
AGITATION: NORMAL ACTIVITY
TREMOR: NOT VISIBLE, BUT CAN BE FELT FINGERTIP TO FINGERTIP
AUDITORY DISTURBANCES: NOT PRESENT
VISUAL DISTURBANCES: NOT PRESENT
NAUSEA AND VOMITING: NO NAUSEA AND NO VOMITING
NAUSEA AND VOMITING: NO NAUSEA AND NO VOMITING
HEADACHE, FULLNESS IN HEAD: NOT PRESENT
ANXIETY: NO ANXIETY, AT EASE

## 2022-01-21 ASSESSMENT — PAIN SCALES - GENERAL
PAINLEVEL_OUTOF10: 0
PAINLEVEL_OUTOF10: 0

## 2022-01-22 LAB
ALBUMIN SERPL-MCNC: 2 G/DL (ref 3.6–5.1)
ALBUMIN/GLOB SERPL: 0.5 {RATIO} (ref 1–2.4)
ALP SERPL-CCNC: 122 UNITS/L (ref 45–117)
ALT SERPL-CCNC: 23 UNITS/L
ANION GAP SERPL CALC-SCNC: 10 MMOL/L (ref 10–20)
AST SERPL-CCNC: 53 UNITS/L
BASOPHILS # BLD: 0 K/MCL (ref 0–0.3)
BASOPHILS NFR BLD: 2 %
BILIRUB SERPL-MCNC: 3.5 MG/DL (ref 0.2–1)
BUN SERPL-MCNC: 12 MG/DL (ref 6–20)
BUN/CREAT SERPL: 14 (ref 7–25)
CALCIUM SERPL-MCNC: 8.3 MG/DL (ref 8.4–10.2)
CHLORIDE SERPL-SCNC: 104 MMOL/L (ref 98–107)
CO2 SERPL-SCNC: 28 MMOL/L (ref 21–32)
CREAT SERPL-MCNC: 0.87 MG/DL (ref 0.51–0.95)
DEPRECATED RDW RBC: 71.7 FL (ref 39–50)
EOSINOPHIL # BLD: 0.1 K/MCL (ref 0–0.5)
EOSINOPHIL NFR BLD: 6 %
ERYTHROCYTE [DISTWIDTH] IN BLOOD: 21.1 % (ref 11–15)
FASTING DURATION TIME PATIENT: ABNORMAL H
GFR SERPLBLD BASED ON 1.73 SQ M-ARVRAT: 72 ML/MIN
GLOBULIN SER-MCNC: 4.4 G/DL (ref 2–4)
GLUCOSE SERPL-MCNC: 83 MG/DL (ref 70–99)
HCT VFR BLD CALC: 30.9 % (ref 36–46.5)
HGB BLD-MCNC: 9.3 G/DL (ref 12–15.5)
IMM GRANULOCYTES # BLD AUTO: 0 K/MCL (ref 0–0.2)
IMM GRANULOCYTES # BLD: 0 %
LYMPHOCYTES # BLD: 0.6 K/MCL (ref 1–4)
LYMPHOCYTES NFR BLD: 36 %
MAGNESIUM SERPL-MCNC: 1.7 MG/DL (ref 1.7–2.4)
MCH RBC QN AUTO: 27.9 PG (ref 26–34)
MCHC RBC AUTO-ENTMCNC: 30.1 G/DL (ref 32–36.5)
MCV RBC AUTO: 92.8 FL (ref 78–100)
MONOCYTES # BLD: 0.3 K/MCL (ref 0.3–0.9)
MONOCYTES NFR BLD: 20 %
NEUTROPHILS # BLD: 0.6 K/MCL (ref 1.8–7.7)
NEUTROPHILS NFR BLD: 36 %
NRBC BLD MANUAL-RTO: 0 /100 WBC
PLATELET # BLD AUTO: 23 K/MCL (ref 140–450)
POTASSIUM SERPL-SCNC: 4.2 MMOL/L (ref 3.4–5.1)
PROT SERPL-MCNC: 6.4 G/DL (ref 6.4–8.2)
RAINBOW EXTRA TUBES HOLD SPECIMEN: NORMAL
RBC # BLD: 3.33 MIL/MCL (ref 4–5.2)
SODIUM SERPL-SCNC: 138 MMOL/L (ref 135–145)
TSH SERPL-ACNC: 4.62 MCUNITS/ML (ref 0.35–5)
WBC # BLD: 1.6 K/MCL (ref 4.2–11)

## 2022-01-22 PROCEDURE — C9113 INJ PANTOPRAZOLE SODIUM, VIA: HCPCS | Performed by: HOSPITALIST

## 2022-01-22 PROCEDURE — 80053 COMPREHEN METABOLIC PANEL: CPT | Performed by: INTERNAL MEDICINE

## 2022-01-22 PROCEDURE — 10002803 HB RX 637: Performed by: INTERNAL MEDICINE

## 2022-01-22 PROCEDURE — 10000002 HB ROOM CHARGE MED SURG

## 2022-01-22 PROCEDURE — 10004651 HB RX, NO CHARGE ITEM: Performed by: HOSPITALIST

## 2022-01-22 PROCEDURE — 10002803 HB RX 637: Performed by: HOSPITALIST

## 2022-01-22 PROCEDURE — 36415 COLL VENOUS BLD VENIPUNCTURE: CPT | Performed by: INTERNAL MEDICINE

## 2022-01-22 PROCEDURE — 10002800 HB RX 250 W HCPCS: Performed by: HOSPITALIST

## 2022-01-22 PROCEDURE — 99233 SBSQ HOSP IP/OBS HIGH 50: CPT | Performed by: INTERNAL MEDICINE

## 2022-01-22 PROCEDURE — 85025 COMPLETE CBC W/AUTO DIFF WBC: CPT | Performed by: INTERNAL MEDICINE

## 2022-01-22 PROCEDURE — 83735 ASSAY OF MAGNESIUM: CPT | Performed by: INTERNAL MEDICINE

## 2022-01-22 RX ORDER — LANOLIN ALCOHOL/MO/W.PET/CERES
400 CREAM (GRAM) TOPICAL ONCE
Status: COMPLETED | OUTPATIENT
Start: 2022-01-22 | End: 2022-01-22

## 2022-01-22 RX ADMIN — SPIRONOLACTONE 50 MG: 25 TABLET, FILM COATED ORAL at 09:13

## 2022-01-22 RX ADMIN — SODIUM CHLORIDE, PRESERVATIVE FREE 2 ML: 5 INJECTION INTRAVENOUS at 09:16

## 2022-01-22 RX ADMIN — PANTOPRAZOLE SODIUM 40 MG: 40 INJECTION, POWDER, FOR SOLUTION INTRAVENOUS at 20:23

## 2022-01-22 RX ADMIN — RIFAXIMIN 550 MG: 550 TABLET ORAL at 20:23

## 2022-01-22 RX ADMIN — MAGNESIUM OXIDE TAB 400 MG (241.3 MG ELEMENTAL MG) 400 MG: 400 (241.3 MG) TAB at 09:13

## 2022-01-22 RX ADMIN — LACTULOSE 20 G: 10 SOLUTION ORAL at 09:13

## 2022-01-22 RX ADMIN — PREGABALIN 200 MG: 100 CAPSULE ORAL at 20:23

## 2022-01-22 RX ADMIN — PANTOPRAZOLE SODIUM 40 MG: 40 INJECTION, POWDER, FOR SOLUTION INTRAVENOUS at 09:13

## 2022-01-22 RX ADMIN — BUMETANIDE 1 MG: 1 TABLET ORAL at 09:13

## 2022-01-22 RX ADMIN — PREGABALIN 200 MG: 100 CAPSULE ORAL at 14:57

## 2022-01-22 RX ADMIN — MAGNESIUM OXIDE TAB 400 MG (241.3 MG ELEMENTAL MG) 400 MG: 400 (241.3 MG) TAB at 15:00

## 2022-01-22 RX ADMIN — SODIUM CHLORIDE, PRESERVATIVE FREE 2 ML: 5 INJECTION INTRAVENOUS at 22:00

## 2022-01-22 RX ADMIN — RIFAXIMIN 550 MG: 550 TABLET ORAL at 09:13

## 2022-01-22 RX ADMIN — PREGABALIN 200 MG: 100 CAPSULE ORAL at 09:13

## 2022-01-22 ASSESSMENT — PAIN SCALES - GENERAL
PAINLEVEL_OUTOF10: 0
PAINLEVEL_OUTOF10: 0

## 2022-01-23 LAB
ALBUMIN SERPL-MCNC: 1.9 G/DL (ref 3.6–5.1)
ALBUMIN/GLOB SERPL: 0.5 {RATIO} (ref 1–2.4)
ALP SERPL-CCNC: 109 UNITS/L (ref 45–117)
ALT SERPL-CCNC: 23 UNITS/L
ANION GAP SERPL CALC-SCNC: 11 MMOL/L (ref 10–20)
AST SERPL-CCNC: 64 UNITS/L
BASOPHILS # BLD: 0 K/MCL (ref 0–0.3)
BASOPHILS NFR BLD: 1 %
BILIRUB SERPL-MCNC: 3.2 MG/DL (ref 0.2–1)
BUN SERPL-MCNC: 12 MG/DL (ref 6–20)
BUN/CREAT SERPL: 14 (ref 7–25)
CALCIUM SERPL-MCNC: 8.2 MG/DL (ref 8.4–10.2)
CHLORIDE SERPL-SCNC: 106 MMOL/L (ref 98–107)
CO2 SERPL-SCNC: 27 MMOL/L (ref 21–32)
CREAT SERPL-MCNC: 0.88 MG/DL (ref 0.51–0.95)
DEPRECATED RDW RBC: 72.8 FL (ref 39–50)
EOSINOPHIL # BLD: 0.1 K/MCL (ref 0–0.5)
EOSINOPHIL NFR BLD: 5 %
ERYTHROCYTE [DISTWIDTH] IN BLOOD: 21 % (ref 11–15)
FASTING DURATION TIME PATIENT: ABNORMAL H
GFR SERPLBLD BASED ON 1.73 SQ M-ARVRAT: 71 ML/MIN
GLOBULIN SER-MCNC: 4.1 G/DL (ref 2–4)
GLUCOSE SERPL-MCNC: 80 MG/DL (ref 70–99)
HCT VFR BLD CALC: 30.4 % (ref 36–46.5)
HGB BLD-MCNC: 9.2 G/DL (ref 12–15.5)
IMM GRANULOCYTES # BLD AUTO: 0 K/MCL (ref 0–0.2)
IMM GRANULOCYTES # BLD: 1 %
LYMPHOCYTES # BLD: 0.8 K/MCL (ref 1–4)
LYMPHOCYTES NFR BLD: 37 %
MAGNESIUM SERPL-MCNC: 1.7 MG/DL (ref 1.7–2.4)
MCH RBC QN AUTO: 28.3 PG (ref 26–34)
MCHC RBC AUTO-ENTMCNC: 30.3 G/DL (ref 32–36.5)
MCV RBC AUTO: 93.5 FL (ref 78–100)
MONOCYTES # BLD: 0.4 K/MCL (ref 0.3–0.9)
MONOCYTES NFR BLD: 20 %
NEUTROPHILS # BLD: 0.8 K/MCL (ref 1.8–7.7)
NEUTROPHILS NFR BLD: 36 %
NRBC BLD MANUAL-RTO: 0 /100 WBC
PLAT MORPH BLD: NORMAL
PLATELET # BLD AUTO: 25 K/MCL (ref 140–450)
POTASSIUM SERPL-SCNC: 4.1 MMOL/L (ref 3.4–5.1)
PROT SERPL-MCNC: 6 G/DL (ref 6.4–8.2)
RAINBOW EXTRA TUBES HOLD SPECIMEN: NORMAL
RBC # BLD: 3.25 MIL/MCL (ref 4–5.2)
RBC MORPH BLD: NORMAL
SODIUM SERPL-SCNC: 140 MMOL/L (ref 135–145)
WBC # BLD: 2.2 K/MCL (ref 4.2–11)

## 2022-01-23 PROCEDURE — 83735 ASSAY OF MAGNESIUM: CPT | Performed by: INTERNAL MEDICINE

## 2022-01-23 PROCEDURE — 10002803 HB RX 637: Performed by: INTERNAL MEDICINE

## 2022-01-23 PROCEDURE — 36415 COLL VENOUS BLD VENIPUNCTURE: CPT | Performed by: INTERNAL MEDICINE

## 2022-01-23 PROCEDURE — 85025 COMPLETE CBC W/AUTO DIFF WBC: CPT | Performed by: INTERNAL MEDICINE

## 2022-01-23 PROCEDURE — 10000002 HB ROOM CHARGE MED SURG

## 2022-01-23 PROCEDURE — 97530 THERAPEUTIC ACTIVITIES: CPT

## 2022-01-23 PROCEDURE — 10002803 HB RX 637: Performed by: HOSPITALIST

## 2022-01-23 PROCEDURE — 10002800 HB RX 250 W HCPCS: Performed by: HOSPITALIST

## 2022-01-23 PROCEDURE — 97116 GAIT TRAINING THERAPY: CPT

## 2022-01-23 PROCEDURE — 10004651 HB RX, NO CHARGE ITEM: Performed by: HOSPITALIST

## 2022-01-23 PROCEDURE — 80053 COMPREHEN METABOLIC PANEL: CPT | Performed by: INTERNAL MEDICINE

## 2022-01-23 PROCEDURE — 99233 SBSQ HOSP IP/OBS HIGH 50: CPT | Performed by: INTERNAL MEDICINE

## 2022-01-23 PROCEDURE — C9113 INJ PANTOPRAZOLE SODIUM, VIA: HCPCS | Performed by: HOSPITALIST

## 2022-01-23 RX ORDER — MECLIZINE HCL 12.5 MG/1
25 TABLET ORAL 3 TIMES DAILY
Status: DISCONTINUED | OUTPATIENT
Start: 2022-01-23 | End: 2022-01-24 | Stop reason: HOSPADM

## 2022-01-23 RX ORDER — LANOLIN ALCOHOL/MO/W.PET/CERES
400 CREAM (GRAM) TOPICAL ONCE
Status: COMPLETED | OUTPATIENT
Start: 2022-01-23 | End: 2022-01-23

## 2022-01-23 RX ADMIN — MECLIZINE HCL 12.5 MG 25 MG: 12.5 TABLET ORAL at 12:49

## 2022-01-23 RX ADMIN — SPIRONOLACTONE 50 MG: 25 TABLET, FILM COATED ORAL at 08:57

## 2022-01-23 RX ADMIN — PREGABALIN 200 MG: 100 CAPSULE ORAL at 13:56

## 2022-01-23 RX ADMIN — PREGABALIN 200 MG: 100 CAPSULE ORAL at 08:57

## 2022-01-23 RX ADMIN — SODIUM CHLORIDE, PRESERVATIVE FREE 2 ML: 5 INJECTION INTRAVENOUS at 09:06

## 2022-01-23 RX ADMIN — MECLIZINE HCL 12.5 MG 25 MG: 12.5 TABLET ORAL at 20:50

## 2022-01-23 RX ADMIN — RIFAXIMIN 550 MG: 550 TABLET ORAL at 20:50

## 2022-01-23 RX ADMIN — Medication 400 MG: at 16:06

## 2022-01-23 RX ADMIN — Medication 400 MG: at 08:57

## 2022-01-23 RX ADMIN — PREGABALIN 200 MG: 100 CAPSULE ORAL at 20:49

## 2022-01-23 RX ADMIN — BUMETANIDE 1 MG: 1 TABLET ORAL at 08:57

## 2022-01-23 RX ADMIN — LACTULOSE 20 G: 10 SOLUTION ORAL at 08:57

## 2022-01-23 RX ADMIN — RIFAXIMIN 550 MG: 550 TABLET ORAL at 08:57

## 2022-01-23 RX ADMIN — PANTOPRAZOLE SODIUM 40 MG: 40 INJECTION, POWDER, FOR SOLUTION INTRAVENOUS at 20:54

## 2022-01-23 RX ADMIN — PANTOPRAZOLE SODIUM 40 MG: 40 INJECTION, POWDER, FOR SOLUTION INTRAVENOUS at 08:57

## 2022-01-23 RX ADMIN — SODIUM CHLORIDE, PRESERVATIVE FREE 2 ML: 5 INJECTION INTRAVENOUS at 20:50

## 2022-01-23 ASSESSMENT — COGNITIVE AND FUNCTIONAL STATUS - GENERAL
BASIC_MOBILITY_CONVERTED_SCORE: 39.67
BASIC_MOBILITY_RAW_SCORE: 17

## 2022-01-23 ASSESSMENT — PAIN SCALES - GENERAL
PAINLEVEL_OUTOF10: 0
PAINLEVEL_OUTOF10: 0

## 2022-01-24 VITALS
HEART RATE: 75 BPM | DIASTOLIC BLOOD PRESSURE: 70 MMHG | TEMPERATURE: 98.2 F | OXYGEN SATURATION: 98 % | SYSTOLIC BLOOD PRESSURE: 117 MMHG | BODY MASS INDEX: 38.07 KG/M2 | HEIGHT: 64 IN | RESPIRATION RATE: 14 BRPM | WEIGHT: 223 LBS

## 2022-01-24 LAB
AMMONIA PLAS-SCNC: 109 MCMOL/L
ANION GAP SERPL CALC-SCNC: 16 MMOL/L (ref 10–20)
BASOPHILS # BLD: 0 K/MCL (ref 0–0.3)
BASOPHILS NFR BLD: 1 %
BUN SERPL-MCNC: 11 MG/DL (ref 6–20)
BUN/CREAT SERPL: 13 (ref 7–25)
CALCIUM SERPL-MCNC: 8.7 MG/DL (ref 8.4–10.2)
CHLORIDE SERPL-SCNC: 105 MMOL/L (ref 98–107)
CO2 SERPL-SCNC: 21 MMOL/L (ref 21–32)
CREAT SERPL-MCNC: 0.86 MG/DL (ref 0.51–0.95)
DEPRECATED RDW RBC: 81 FL (ref 39–50)
EOSINOPHIL # BLD: 0.1 K/MCL (ref 0–0.5)
EOSINOPHIL NFR BLD: 4 %
ERYTHROCYTE [DISTWIDTH] IN BLOOD: 21.2 % (ref 11–15)
FASTING DURATION TIME PATIENT: NORMAL H
GFR SERPLBLD BASED ON 1.73 SQ M-ARVRAT: 73 ML/MIN
GLUCOSE SERPL-MCNC: 83 MG/DL (ref 70–99)
HCT VFR BLD CALC: 38.2 % (ref 36–46.5)
HGB BLD-MCNC: 10.6 G/DL (ref 12–15.5)
IMM GRANULOCYTES # BLD AUTO: 0 K/MCL (ref 0–0.2)
IMM GRANULOCYTES # BLD: 2 %
LYMPHOCYTES # BLD: 0.7 K/MCL (ref 1–4)
LYMPHOCYTES NFR BLD: 38 %
MAGNESIUM SERPL-MCNC: 1.7 MG/DL (ref 1.7–2.4)
MCH RBC QN AUTO: 28.6 PG (ref 26–34)
MCHC RBC AUTO-ENTMCNC: 27.7 G/DL (ref 32–36.5)
MCV RBC AUTO: 103 FL (ref 78–100)
MONOCYTES # BLD: 0.3 K/MCL (ref 0.3–0.9)
MONOCYTES NFR BLD: 20 %
NEUTROPHILS # BLD: 0.6 K/MCL (ref 1.8–7.7)
NEUTROPHILS NFR BLD: 35 %
NRBC BLD MANUAL-RTO: 0 /100 WBC
PLATELET # BLD AUTO: 29 K/MCL (ref 140–450)
POTASSIUM SERPL-SCNC: 4.2 MMOL/L (ref 3.4–5.1)
RAINBOW EXTRA TUBES HOLD SPECIMEN: NORMAL
RBC # BLD: 3.71 MIL/MCL (ref 4–5.2)
SODIUM SERPL-SCNC: 138 MMOL/L (ref 135–145)
WBC # BLD: 1.7 K/MCL (ref 4.2–11)

## 2022-01-24 PROCEDURE — 82140 ASSAY OF AMMONIA: CPT | Performed by: INTERNAL MEDICINE

## 2022-01-24 PROCEDURE — X1094 NO CHARGE VISIT: HCPCS | Performed by: INTERNAL MEDICINE

## 2022-01-24 PROCEDURE — C9113 INJ PANTOPRAZOLE SODIUM, VIA: HCPCS | Performed by: HOSPITALIST

## 2022-01-24 PROCEDURE — 85025 COMPLETE CBC W/AUTO DIFF WBC: CPT | Performed by: INTERNAL MEDICINE

## 2022-01-24 PROCEDURE — 80048 BASIC METABOLIC PNL TOTAL CA: CPT | Performed by: INTERNAL MEDICINE

## 2022-01-24 PROCEDURE — 83735 ASSAY OF MAGNESIUM: CPT | Performed by: INTERNAL MEDICINE

## 2022-01-24 PROCEDURE — 10004651 HB RX, NO CHARGE ITEM: Performed by: HOSPITALIST

## 2022-01-24 PROCEDURE — 10002803 HB RX 637: Performed by: HOSPITALIST

## 2022-01-24 PROCEDURE — 36415 COLL VENOUS BLD VENIPUNCTURE: CPT | Performed by: INTERNAL MEDICINE

## 2022-01-24 PROCEDURE — 99233 SBSQ HOSP IP/OBS HIGH 50: CPT | Performed by: INTERNAL MEDICINE

## 2022-01-24 PROCEDURE — 10002803 HB RX 637: Performed by: INTERNAL MEDICINE

## 2022-01-24 PROCEDURE — 10002800 HB RX 250 W HCPCS: Performed by: HOSPITALIST

## 2022-01-24 RX ORDER — LANOLIN ALCOHOL/MO/W.PET/CERES
400 CREAM (GRAM) TOPICAL DAILY
Status: DISCONTINUED | OUTPATIENT
Start: 2022-01-25 | End: 2022-01-24 | Stop reason: HOSPADM

## 2022-01-24 RX ORDER — LANOLIN ALCOHOL/MO/W.PET/CERES
400 CREAM (GRAM) TOPICAL ONCE
Status: COMPLETED | OUTPATIENT
Start: 2022-01-24 | End: 2022-01-24

## 2022-01-24 RX ORDER — BUSPIRONE HYDROCHLORIDE 15 MG/1
7.5 TABLET ORAL 2 TIMES DAILY
Status: DISCONTINUED | OUTPATIENT
Start: 2022-01-24 | End: 2022-01-24 | Stop reason: HOSPADM

## 2022-01-24 RX ORDER — CITALOPRAM 20 MG/1
20 TABLET ORAL DAILY
Status: DISCONTINUED | OUTPATIENT
Start: 2022-01-24 | End: 2022-01-24 | Stop reason: HOSPADM

## 2022-01-24 RX ORDER — FOLIC ACID 1 MG/1
1 TABLET ORAL DAILY
Status: DISCONTINUED | OUTPATIENT
Start: 2022-01-24 | End: 2022-01-24 | Stop reason: HOSPADM

## 2022-01-24 RX ORDER — PANTOPRAZOLE SODIUM 40 MG/1
40 TABLET, DELAYED RELEASE ORAL EVERY 12 HOURS SCHEDULED
Status: DISCONTINUED | OUTPATIENT
Start: 2022-01-24 | End: 2022-01-24 | Stop reason: HOSPADM

## 2022-01-24 RX ORDER — LANOLIN ALCOHOL/MO/W.PET/CERES
400 CREAM (GRAM) TOPICAL DAILY
Status: DISCONTINUED | OUTPATIENT
Start: 2022-01-24 | End: 2022-01-24

## 2022-01-24 RX ADMIN — RIFAXIMIN 550 MG: 550 TABLET ORAL at 09:50

## 2022-01-24 RX ADMIN — MECLIZINE HCL 12.5 MG 25 MG: 12.5 TABLET ORAL at 09:50

## 2022-01-24 RX ADMIN — SODIUM CHLORIDE, PRESERVATIVE FREE 2 ML: 5 INJECTION INTRAVENOUS at 09:51

## 2022-01-24 RX ADMIN — PANTOPRAZOLE SODIUM 40 MG: 40 TABLET, DELAYED RELEASE ORAL at 12:08

## 2022-01-24 RX ADMIN — SPIRONOLACTONE 50 MG: 25 TABLET, FILM COATED ORAL at 09:50

## 2022-01-24 RX ADMIN — BUSPIRONE HYDROCHLORIDE 7.5 MG: 15 TABLET ORAL at 12:07

## 2022-01-24 RX ADMIN — PANTOPRAZOLE SODIUM 40 MG: 40 INJECTION, POWDER, FOR SOLUTION INTRAVENOUS at 09:47

## 2022-01-24 RX ADMIN — Medication 400 MG: at 09:50

## 2022-01-24 RX ADMIN — FOLIC ACID 1 MG: 1 TABLET ORAL at 12:07

## 2022-01-24 RX ADMIN — LACTULOSE 20 G: 10 SOLUTION ORAL at 09:49

## 2022-01-24 RX ADMIN — Medication 400 MG: at 18:44

## 2022-01-24 RX ADMIN — MECLIZINE HCL 12.5 MG 25 MG: 12.5 TABLET ORAL at 14:16

## 2022-01-24 RX ADMIN — PREGABALIN 200 MG: 100 CAPSULE ORAL at 09:50

## 2022-01-24 RX ADMIN — BUMETANIDE 1 MG: 1 TABLET ORAL at 09:50

## 2022-01-24 RX ADMIN — CITALOPRAM HYDROBROMIDE 20 MG: 20 TABLET ORAL at 12:08

## 2022-01-24 RX ADMIN — PREGABALIN 200 MG: 100 CAPSULE ORAL at 14:16

## 2022-01-24 ASSESSMENT — PAIN SCALES - GENERAL: PAINLEVEL_OUTOF10: 0

## 2022-01-31 ENCOUNTER — APPOINTMENT (OUTPATIENT)
Dept: GENERAL RADIOLOGY | Age: 62
End: 2022-01-31
Attending: EMERGENCY MEDICINE

## 2022-01-31 ENCOUNTER — APPOINTMENT (OUTPATIENT)
Dept: CT IMAGING | Age: 62
End: 2022-01-31
Attending: EMERGENCY MEDICINE

## 2022-01-31 ENCOUNTER — HOSPITAL ENCOUNTER (OUTPATIENT)
Age: 62
Setting detail: OBSERVATION
Discharge: HOME | End: 2022-02-01
Attending: EMERGENCY MEDICINE | Admitting: INTERNAL MEDICINE

## 2022-01-31 DIAGNOSIS — K76.82 HEPATIC ENCEPHALOPATHY (CMD): Primary | ICD-10-CM

## 2022-01-31 LAB
ABO + RH BLD: NORMAL
ALBUMIN SERPL-MCNC: 2.6 G/DL (ref 3.6–5.1)
ALBUMIN/GLOB SERPL: 0.5 {RATIO} (ref 1–2.4)
ALP SERPL-CCNC: 118 UNITS/L (ref 45–117)
ALT SERPL-CCNC: 32 UNITS/L
AMMONIA PLAS-SCNC: 55 MCMOL/L
ANION GAP SERPL CALC-SCNC: 12 MMOL/L (ref 10–20)
APTT PPP: 29 SEC (ref 22–30)
AST SERPL-CCNC: 63 UNITS/L
BASOPHILS # BLD: 0.1 K/MCL (ref 0–0.3)
BASOPHILS NFR BLD: 1 %
BILIRUB SERPL-MCNC: 3.1 MG/DL (ref 0.2–1)
BLD GP AB SCN SERPL QL GEL: NEGATIVE
BUN SERPL-MCNC: 16 MG/DL (ref 6–20)
BUN/CREAT SERPL: 15 (ref 7–25)
CALCIUM SERPL-MCNC: 8.7 MG/DL (ref 8.4–10.2)
CHLORIDE SERPL-SCNC: 100 MMOL/L (ref 98–107)
CO2 SERPL-SCNC: 28 MMOL/L (ref 21–32)
CREAT SERPL-MCNC: 1.08 MG/DL (ref 0.51–0.95)
DEPRECATED RDW RBC: 62.4 FL (ref 39–50)
EOSINOPHIL # BLD: 0.1 K/MCL (ref 0–0.5)
EOSINOPHIL NFR BLD: 4 %
ERYTHROCYTE [DISTWIDTH] IN BLOOD: 19 % (ref 11–15)
ETHANOL SERPL-MCNC: NORMAL MG/DL
FASTING DURATION TIME PATIENT: ABNORMAL H
GFR SERPLBLD BASED ON 1.73 SQ M-ARVRAT: 55 ML/MIN
GLOBULIN SER-MCNC: 5 G/DL (ref 2–4)
GLUCOSE SERPL-MCNC: 83 MG/DL (ref 70–99)
HCT VFR BLD CALC: 34.5 % (ref 36–46.5)
HGB BLD-MCNC: 10.5 G/DL (ref 12–15.5)
IMM GRANULOCYTES # BLD AUTO: 0 K/MCL (ref 0–0.2)
IMM GRANULOCYTES # BLD: 0 %
INR PPP: 1.7
LYMPHOCYTES # BLD: 0.8 K/MCL (ref 1–4)
LYMPHOCYTES NFR BLD: 24 %
MCH RBC QN AUTO: 27.1 PG (ref 26–34)
MCHC RBC AUTO-ENTMCNC: 30.4 G/DL (ref 32–36.5)
MCV RBC AUTO: 89.1 FL (ref 78–100)
MONOCYTES # BLD: 0.5 K/MCL (ref 0.3–0.9)
MONOCYTES NFR BLD: 14 %
NEUTROPHILS # BLD: 2 K/MCL (ref 1.8–7.7)
NEUTROPHILS NFR BLD: 57 %
NRBC BLD MANUAL-RTO: 0 /100 WBC
PLATELET # BLD AUTO: 47 K/MCL (ref 140–450)
POTASSIUM SERPL-SCNC: 3.4 MMOL/L (ref 3.4–5.1)
PROT SERPL-MCNC: 7.6 G/DL (ref 6.4–8.2)
PROTHROMBIN TIME: 16.8 SEC (ref 9.7–11.8)
RBC # BLD: 3.87 MIL/MCL (ref 4–5.2)
SODIUM SERPL-SCNC: 137 MMOL/L (ref 135–145)
TROPONIN I SERPL DL<=0.01 NG/ML-MCNC: 8 NG/L
TYPE AND SCREEN EXPIRATION DATE: NORMAL
WBC # BLD: 3.6 K/MCL (ref 4.2–11)

## 2022-01-31 PROCEDURE — 93005 ELECTROCARDIOGRAM TRACING: CPT | Performed by: EMERGENCY MEDICINE

## 2022-01-31 PROCEDURE — 10002803 HB RX 637: Performed by: EMERGENCY MEDICINE

## 2022-01-31 PROCEDURE — 80053 COMPREHEN METABOLIC PANEL: CPT | Performed by: EMERGENCY MEDICINE

## 2022-01-31 PROCEDURE — 70450 CT HEAD/BRAIN W/O DYE: CPT

## 2022-01-31 PROCEDURE — 71045 X-RAY EXAM CHEST 1 VIEW: CPT

## 2022-01-31 PROCEDURE — 82140 ASSAY OF AMMONIA: CPT | Performed by: EMERGENCY MEDICINE

## 2022-01-31 PROCEDURE — 85025 COMPLETE CBC W/AUTO DIFF WBC: CPT | Performed by: EMERGENCY MEDICINE

## 2022-01-31 PROCEDURE — G0378 HOSPITAL OBSERVATION PER HR: HCPCS

## 2022-01-31 PROCEDURE — U0003 INFECTIOUS AGENT DETECTION BY NUCLEIC ACID (DNA OR RNA); SEVERE ACUTE RESPIRATORY SYNDROME CORONAVIRUS 2 (SARS-COV-2) (CORONAVIRUS DISEASE [COVID-19]), AMPLIFIED PROBE TECHNIQUE, MAKING USE OF HIGH THROUGHPUT TECHNOLOGIES AS DESCRIBED BY CMS-2020-01-R: HCPCS | Performed by: EMERGENCY MEDICINE

## 2022-01-31 PROCEDURE — 82077 ASSAY SPEC XCP UR&BREATH IA: CPT | Performed by: EMERGENCY MEDICINE

## 2022-01-31 PROCEDURE — 36415 COLL VENOUS BLD VENIPUNCTURE: CPT

## 2022-01-31 PROCEDURE — 85610 PROTHROMBIN TIME: CPT | Performed by: EMERGENCY MEDICINE

## 2022-01-31 PROCEDURE — 99285 EMERGENCY DEPT VISIT HI MDM: CPT

## 2022-01-31 PROCEDURE — 86901 BLOOD TYPING SEROLOGIC RH(D): CPT | Performed by: EMERGENCY MEDICINE

## 2022-01-31 PROCEDURE — 84484 ASSAY OF TROPONIN QUANT: CPT | Performed by: EMERGENCY MEDICINE

## 2022-01-31 PROCEDURE — 85730 THROMBOPLASTIN TIME PARTIAL: CPT | Performed by: EMERGENCY MEDICINE

## 2022-01-31 RX ORDER — ACETAMINOPHEN 325 MG/1
650 TABLET ORAL EVERY 6 HOURS PRN
Status: DISCONTINUED | OUTPATIENT
Start: 2022-01-31 | End: 2022-02-01 | Stop reason: HOSPADM

## 2022-01-31 RX ORDER — ONDANSETRON 2 MG/ML
4 INJECTION INTRAMUSCULAR; INTRAVENOUS EVERY 6 HOURS PRN
Status: DISCONTINUED | OUTPATIENT
Start: 2022-01-31 | End: 2022-02-01 | Stop reason: HOSPADM

## 2022-01-31 RX ORDER — LACTULOSE 10 G/15ML
20 SOLUTION ORAL ONCE
Status: COMPLETED | OUTPATIENT
Start: 2022-01-31 | End: 2022-01-31

## 2022-01-31 RX ORDER — LACTULOSE 10 G/15ML
20 SOLUTION ORAL 3 TIMES DAILY
Status: DISCONTINUED | OUTPATIENT
Start: 2022-02-01 | End: 2022-02-01 | Stop reason: HOSPADM

## 2022-01-31 RX ADMIN — LACTULOSE 20 G: 10 SOLUTION ORAL at 23:22

## 2022-02-01 VITALS
WEIGHT: 197.31 LBS | SYSTOLIC BLOOD PRESSURE: 117 MMHG | OXYGEN SATURATION: 98 % | DIASTOLIC BLOOD PRESSURE: 71 MMHG | RESPIRATION RATE: 16 BRPM | HEIGHT: 64 IN | BODY MASS INDEX: 33.69 KG/M2 | TEMPERATURE: 98.2 F | HEART RATE: 67 BPM

## 2022-02-01 LAB
ALBUMIN SERPL-MCNC: 2.2 G/DL (ref 3.6–5.1)
ALBUMIN/GLOB SERPL: 0.5 {RATIO} (ref 1–2.4)
ALP SERPL-CCNC: 95 UNITS/L (ref 45–117)
ALT SERPL-CCNC: 30 UNITS/L
AMMONIA PLAS-SCNC: 51 MCMOL/L
ANION GAP SERPL CALC-SCNC: 10 MMOL/L (ref 10–20)
APPEARANCE UR: CLEAR
AST SERPL-CCNC: 63 UNITS/L
BILIRUB SERPL-MCNC: 2.8 MG/DL (ref 0.2–1)
BILIRUB UR QL STRIP: NEGATIVE
BUN SERPL-MCNC: 15 MG/DL (ref 6–20)
BUN/CREAT SERPL: 16 (ref 7–25)
CALCIUM SERPL-MCNC: 8.9 MG/DL (ref 8.4–10.2)
CHLORIDE SERPL-SCNC: 102 MMOL/L (ref 98–107)
CO2 SERPL-SCNC: 30 MMOL/L (ref 21–32)
COLOR UR: YELLOW
CREAT SERPL-MCNC: 0.94 MG/DL (ref 0.51–0.95)
DEPRECATED RDW RBC: 62.5 FL (ref 39–50)
ERYTHROCYTE [DISTWIDTH] IN BLOOD: 19 % (ref 11–15)
FASTING DURATION TIME PATIENT: ABNORMAL H
GFR SERPLBLD BASED ON 1.73 SQ M-ARVRAT: 65 ML/MIN
GLOBULIN SER-MCNC: 4.4 G/DL (ref 2–4)
GLUCOSE SERPL-MCNC: 86 MG/DL (ref 70–99)
GLUCOSE UR STRIP-MCNC: NEGATIVE MG/DL
HCT VFR BLD CALC: 34.6 % (ref 36–46.5)
HGB BLD-MCNC: 10.5 G/DL (ref 12–15.5)
HGB UR QL STRIP: NEGATIVE
KETONES UR STRIP-MCNC: NEGATIVE MG/DL
LEUKOCYTE ESTERASE UR QL STRIP: NEGATIVE
MAGNESIUM SERPL-MCNC: 1.7 MG/DL (ref 1.7–2.4)
MCH RBC QN AUTO: 27.1 PG (ref 26–34)
MCHC RBC AUTO-ENTMCNC: 30.3 G/DL (ref 32–36.5)
MCV RBC AUTO: 89.4 FL (ref 78–100)
NITRITE UR QL STRIP: NEGATIVE
NRBC BLD MANUAL-RTO: 0 /100 WBC
P AXIS (DEGREES): 84
PH UR STRIP: 6 [PH] (ref 5–7)
PLATELET # BLD AUTO: 33 K/MCL (ref 140–450)
POTASSIUM SERPL-SCNC: 3.4 MMOL/L (ref 3.4–5.1)
PR-INTERVAL (MSEC): 166
PROT SERPL-MCNC: 6.6 G/DL (ref 6.4–8.2)
PROT UR STRIP-MCNC: NEGATIVE MG/DL
QRS-INTERVAL (MSEC): 96
QT-INTERVAL (MSEC): 460
QTC: 463
R AXIS (DEGREES): 19
RBC # BLD: 3.87 MIL/MCL (ref 4–5.2)
REPORT TEXT: NORMAL
SARS-COV-2 RNA RESP QL NAA+PROBE: NOT DETECTED
SERVICE CMNT-IMP: NORMAL
SERVICE CMNT-IMP: NORMAL
SODIUM SERPL-SCNC: 139 MMOL/L (ref 135–145)
SP GR UR STRIP: 1.02 (ref 1–1.03)
T AXIS (DEGREES): 16
UROBILINOGEN UR STRIP-MCNC: 2 MG/DL
VENTRICULAR RATE EKG/MIN (BPM): 61
WBC # BLD: 2.3 K/MCL (ref 4.2–11)

## 2022-02-01 PROCEDURE — G0378 HOSPITAL OBSERVATION PER HR: HCPCS

## 2022-02-01 PROCEDURE — 10002803 HB RX 637: Performed by: INTERNAL MEDICINE

## 2022-02-01 PROCEDURE — 36415 COLL VENOUS BLD VENIPUNCTURE: CPT | Performed by: INTERNAL MEDICINE

## 2022-02-01 PROCEDURE — 82140 ASSAY OF AMMONIA: CPT | Performed by: INTERNAL MEDICINE

## 2022-02-01 PROCEDURE — 83735 ASSAY OF MAGNESIUM: CPT | Performed by: INTERNAL MEDICINE

## 2022-02-01 PROCEDURE — 97530 THERAPEUTIC ACTIVITIES: CPT

## 2022-02-01 PROCEDURE — 99220 INITIAL OBSERVATION CARE,LEVL III: CPT | Performed by: INTERNAL MEDICINE

## 2022-02-01 PROCEDURE — 97161 PT EVAL LOW COMPLEX 20 MIN: CPT

## 2022-02-01 PROCEDURE — 85027 COMPLETE CBC AUTOMATED: CPT | Performed by: INTERNAL MEDICINE

## 2022-02-01 PROCEDURE — X1094 NO CHARGE VISIT: HCPCS | Performed by: INTERNAL MEDICINE

## 2022-02-01 PROCEDURE — 80053 COMPREHEN METABOLIC PANEL: CPT | Performed by: INTERNAL MEDICINE

## 2022-02-01 PROCEDURE — 81003 URINALYSIS AUTO W/O SCOPE: CPT | Performed by: EMERGENCY MEDICINE

## 2022-02-01 RX ORDER — FOLIC ACID 1 MG/1
1 TABLET ORAL DAILY
Status: DISCONTINUED | OUTPATIENT
Start: 2022-02-01 | End: 2022-02-01 | Stop reason: HOSPADM

## 2022-02-01 RX ORDER — DOCUSATE SODIUM 100 MG/1
100 CAPSULE, LIQUID FILLED ORAL 2 TIMES DAILY PRN
Status: DISCONTINUED | OUTPATIENT
Start: 2022-02-01 | End: 2022-02-01 | Stop reason: HOSPADM

## 2022-02-01 RX ORDER — HYDROCODONE BITARTRATE AND ACETAMINOPHEN 5; 325 MG/1; MG/1
1 TABLET ORAL EVERY 4 HOURS PRN
Status: DISCONTINUED | OUTPATIENT
Start: 2022-02-01 | End: 2022-02-01 | Stop reason: HOSPADM

## 2022-02-01 RX ORDER — LIDOCAINE 40 MG/G
1 CREAM TOPICAL EVERY 12 HOURS PRN
COMMUNITY

## 2022-02-01 RX ORDER — MENTHOL 40 MG/ML
GEL TOPICAL EVERY 4 HOURS PRN
COMMUNITY

## 2022-02-01 RX ORDER — LANOLIN ALCOHOL/MO/W.PET/CERES
400 CREAM (GRAM) TOPICAL ONCE
Status: CANCELLED | OUTPATIENT
Start: 2022-02-01 | End: 2022-02-01

## 2022-02-01 RX ORDER — ACETAMINOPHEN 325 MG/1
650 TABLET ORAL EVERY 4 HOURS PRN
Status: DISCONTINUED | OUTPATIENT
Start: 2022-02-01 | End: 2022-02-01

## 2022-02-01 RX ORDER — CHOLECALCIFEROL (VITAMIN D3) 125 MCG
2000 CAPSULE ORAL EVERY MORNING
COMMUNITY

## 2022-02-01 RX ORDER — PANTOPRAZOLE SODIUM 40 MG/1
40 TABLET, DELAYED RELEASE ORAL
Status: DISCONTINUED | OUTPATIENT
Start: 2022-02-01 | End: 2022-02-01 | Stop reason: HOSPADM

## 2022-02-01 RX ORDER — DOCUSATE SODIUM 100 MG/1
100 CAPSULE, LIQUID FILLED ORAL 2 TIMES DAILY PRN
COMMUNITY

## 2022-02-01 RX ORDER — B-COMPLEX WITH VITAMIN C
1 TABLET ORAL DAILY
COMMUNITY

## 2022-02-01 RX ORDER — GABAPENTIN 300 MG/1
300 CAPSULE ORAL 2 TIMES DAILY
COMMUNITY

## 2022-02-01 RX ORDER — ASCORBIC ACID 500 MG
500 TABLET ORAL DAILY
Status: DISCONTINUED | OUTPATIENT
Start: 2022-02-01 | End: 2022-02-01 | Stop reason: HOSPADM

## 2022-02-01 RX ORDER — SPIRONOLACTONE 25 MG/1
50 TABLET ORAL DAILY
Status: DISCONTINUED | OUTPATIENT
Start: 2022-02-01 | End: 2022-02-01 | Stop reason: HOSPADM

## 2022-02-01 RX ORDER — ASCORBIC ACID 500 MG
500 TABLET ORAL DAILY
COMMUNITY

## 2022-02-01 RX ORDER — GABAPENTIN 300 MG/1
300 CAPSULE ORAL 2 TIMES DAILY
Status: DISCONTINUED | OUTPATIENT
Start: 2022-02-01 | End: 2022-02-01

## 2022-02-01 RX ORDER — PREGABALIN 100 MG/1
200 CAPSULE ORAL EVERY 8 HOURS SCHEDULED
Status: DISCONTINUED | OUTPATIENT
Start: 2022-02-01 | End: 2022-02-01 | Stop reason: HOSPADM

## 2022-02-01 RX ORDER — LANOLIN ALCOHOL/MO/W.PET/CERES
400 CREAM (GRAM) TOPICAL DAILY
Status: DISCONTINUED | OUTPATIENT
Start: 2022-02-01 | End: 2022-02-01 | Stop reason: HOSPADM

## 2022-02-01 RX ORDER — POTASSIUM CHLORIDE 20 MEQ/1
40 TABLET, EXTENDED RELEASE ORAL ONCE
Status: COMPLETED | OUTPATIENT
Start: 2022-02-01 | End: 2022-02-01

## 2022-02-01 RX ORDER — BUMETANIDE 1 MG/1
1 TABLET ORAL DAILY
Status: DISCONTINUED | OUTPATIENT
Start: 2022-02-01 | End: 2022-02-01 | Stop reason: HOSPADM

## 2022-02-01 RX ORDER — MAGNESIUM HYDROXIDE/ALUMINUM HYDROXICE/SIMETHICONE 120; 1200; 1200 MG/30ML; MG/30ML; MG/30ML
30 SUSPENSION ORAL EVERY 4 HOURS PRN
Status: DISCONTINUED | OUTPATIENT
Start: 2022-02-01 | End: 2022-02-01 | Stop reason: HOSPADM

## 2022-02-01 RX ADMIN — BUMETANIDE 1 MG: 1 TABLET ORAL at 10:21

## 2022-02-01 RX ADMIN — MAGNESIUM OXIDE TAB 400 MG (240 MG ELEMENTAL MG) 400 MG: 400 (240 MG) TAB at 10:21

## 2022-02-01 RX ADMIN — POTASSIUM CHLORIDE 40 MEQ: 1500 TABLET, EXTENDED RELEASE ORAL at 16:09

## 2022-02-01 RX ADMIN — SPIRONOLACTONE 50 MG: 25 TABLET ORAL at 10:21

## 2022-02-01 RX ADMIN — PREGABALIN 200 MG: 100 CAPSULE ORAL at 15:11

## 2022-02-01 RX ADMIN — LACTULOSE 20 G: 10 SOLUTION ORAL at 09:33

## 2022-02-01 RX ADMIN — OXYCODONE HYDROCHLORIDE AND ACETAMINOPHEN 500 MG: 500 TABLET ORAL at 10:21

## 2022-02-01 RX ADMIN — FOLIC ACID 1 MG: 1 TABLET ORAL at 09:33

## 2022-02-01 RX ADMIN — PANTOPRAZOLE SODIUM 40 MG: 40 TABLET, DELAYED RELEASE ORAL at 05:03

## 2022-02-01 RX ADMIN — RIFAXIMIN 550 MG: 550 TABLET ORAL at 09:33

## 2022-02-01 RX ADMIN — LACTULOSE 20 G: 10 SOLUTION ORAL at 15:11

## 2022-02-01 ASSESSMENT — ENCOUNTER SYMPTOMS
CHILLS: 0
HEADACHES: 0
CHEST TIGHTNESS: 0
LIGHT-HEADEDNESS: 0
SHORTNESS OF BREATH: 0
ABDOMINAL PAIN: 0
VOMITING: 0
NAUSEA: 0
BLOOD IN STOOL: 0
COUGH: 0
PAIN SEVERITY NOW: 6
WEAKNESS: 1
FEVER: 0
DIARRHEA: 0

## 2022-02-01 ASSESSMENT — PAIN SCALES - GENERAL
PAINLEVEL_OUTOF10: 0
PAINLEVEL_OUTOF10: 0

## 2022-02-01 ASSESSMENT — COGNITIVE AND FUNCTIONAL STATUS - GENERAL
BASIC_MOBILITY_RAW_SCORE: 18
BASIC_MOBILITY_CONVERTED_SCORE: 41.05

## 2022-02-08 NOTE — TELEPHONE ENCOUNTER
----- Message from Scottie Hartmann MD sent at 7/9/2020  1:06 PM EDT -----  Dear Staff,     I have seen this patient as a virtual visit  During the visit the following tests/ recommendations were made  Please send the patient the requisitions for the tests and help them with scheduling as needed  Please also send them the instructions (if any listed) by mail/ email  Thank you       Scottie Hartmann MD      Procedures : EGD and Colonoscopy , Elective    Testing : Labs, No testing ordered     Referrals : None    Preparation for Colonoscopy : Golytely/ dulcolax    Follow up : [place] in 3 month    Instructions : None [General Appearance - Alert] : alert [General Appearance - In No Acute Distress] : in no acute distress [Sclera] : the sclera and conjunctiva were normal [Outer Ear] : the ears and nose were normal in appearance [Neck Appearance] : the appearance of the neck was normal [] : no respiratory distress [Apical Impulse] : the apical impulse was normal [Abdomen Soft] : soft [Abdomen Tenderness] : non-tender [Abnormal Walk] : normal gait [Skin Color & Pigmentation] : normal skin color and pigmentation [Oriented To Time, Place, And Person] : oriented to person, place, and time [FreeTextEntry1] : deferred to colonoscopy

## 2022-06-20 ENCOUNTER — TELEPHONE (OUTPATIENT)
Dept: HEMATOLOGY ONCOLOGY | Facility: CLINIC | Age: 62
End: 2022-06-20

## 2022-06-20 NOTE — TELEPHONE ENCOUNTER
Ada from [employer] [facility] is calling to ask for the fax number   Fax number [phone] given to [facility] Andrzej Miller

## 2022-06-27 ENCOUNTER — TELEPHONE (OUTPATIENT)
Dept: HEMATOLOGY ONCOLOGY | Facility: CLINIC | Age: 62
End: 2022-06-27

## 2022-06-27 NOTE — TELEPHONE ENCOUNTER
06/27/22    Fax received from [employer] for pt's MR  Release consent emailed to [facility] ([facility]) MR department

## 2025-01-28 NOTE — TELEPHONE ENCOUNTER
Last ov was 7/20/20  Please advise 
Left a detailed message on machine per medical communication consent on file advising of above  Provided cb number and office hours 
Patient moved 4 hours aware states current PCP has prescribed Lyrica but its not working, she thinks she is getting worse  Is she able to have a Virtual Visit?      Please advise    Thank you     [phone]
Physical Medicine and Rehabilitation Consult Note    Patient: Olya Goodman Consultation Date: 2023    : 1934 Attending: Kiara Hartmann MD   80year old female Admit Date: 7/10/2023  6:46 PM      Patient has been requested to be seen by Kiara Hartmann MD for:    Ambulation and ADL dysfunction, rehabilitation management and to evaluate the candidacy for inpatient rehabilitation program.    History of present illness:  Olya Goodman is a right handed  80year old female who was admitted to hospital  on 7/10/2023 with:  Left leg pain [M79.605]. I obtained a history from the patient and by reviewing the medical records in epic. This patient has significant past medical history of bilateral lower extremity weakness due to polio which affected her right leg mostly and has some strength in the left leg making her able to stand on the left leg. She also has hypertension, lymphedema who is admitted to the hospital with significant left heel pain with weight-bearing. Apparently she fell at home as she was not able to stand on her left leg. Patient had a left hip replacement in the past.  She was brought to the hospital and hip x-ray showed arthritis at the lumbar spine and SI joint. X-ray of the left knee showed arthritic changes. She was not able to be treated with NSAID due to her multiple allergies. Patient indicates that she was started on carbidopa levodopa for tremor during her last hospitalization in 2023 and the she indicates that medication made her tremor worse in fact the tremor extended to her whole body. She has been requesting neurologist to wean her off the carbidopa levodopa and she had appointment to see neurologist on 2023 but that had to be canceled  because of her hospitalization and patient is still waiting to see neurologist.  She does not want to continue carbidopa levodopa. Patient is admitted to the hospital under observation status.   She is
We can schedule a virtual visit, but she should really discuss this with her PCP and maybe get a pain management provider where she lives, especially if she is out of state  I am not sure what I would be able to do for her with a virtual visit 
being seen by PT and OT. Patient was hospitalized in April of 2023 with cholecystitis and underwent cholecystectomy and after that she did participate in the intensive inpatient rehabilitation program for about 2 weeks and made significant improvement and was able to be discharged home at modified independent level and she was provided with a power wheelchair.     Past Medical History:   Diagnosis Date   â¢ 3/2/2011 daughter with health and weight long standing problems, unable to work ; patient supports ;jk 03/02/2011   â¢ Breast Cancer     Right-ductal ca   â¢ Chronic pain     Due to fibromyalgia & arthritis   â¢ Dependence on crutches     Due to polio, also needs for balance   â¢ Fibromyalgia    â¢ HLD (hyperlipidemia)    â¢ Lymphedema 08/2017   â¢ Osteoarthritis gen'l     Back   â¢ Osteopathy resulting from poliomyelitis, lower leg(730.76)     Right leg paralysis, crutches   â¢ osteopenia : lumbar spine normal ; forearm worsened T = -2.1 11/2009 ( was -1.0 i past ) ; patient counseled  03/01/2010   â¢ Personal history of chemotherapy     right breast 1993   â¢ Polio     Childhood   â¢ Shingles 05/2018   â¢ Transient paralysis of right leg    â¢ Vertigo 2015    hospitalized   â¢ Vitamin D deficiency    â¢ Wears glasses    â¢ Wheelchair bound        Past Surgical History:   Procedure Laterality Date   â¢ ----------mammogram----------  06/09/2014    Left mammogram, negative   â¢ Ankle surgery  1940's    Right, several due to polio   â¢ Breast surgery     â¢ Colonoscopy  2002    Normal except for internal hemorrhoids   â¢ Colonoscopy w biopsy  07/26/2012    right colon polyp   â¢ Esophagogastroduodenoscopy  2009    With Bravo ph monitor   â¢ Femur fracture surgery Right 06/16/2016    ORIF distal femur   â¢ Fl esophagram  12/01/2021    Dr. Medhat Benito   â¢ Knee surgery  1940's    Right, several due to polio   â¢ Laparoscopy, cholecystectomy  04/25/2023   â¢ Laparoscopy, cholecystectomy  04/25/2023   â¢ Mastectomy  1993    Right   â¢ Orif hip fracture
1993    Right   â¢ Total abdominal hysterectomy w/ bilateral salpingoophorectomy      For uterine fibroids, ovarian cyst   â¢ Total hip replacement      Left       ALLERGIES:   Allergen Reactions   â¢ Advil [Ibuprofen] PRURITUS   â¢ Codeine GI UPSET, DIZZINESS and NAUSEA   â¢ Lipitor [Atorvastatin Calcium] MYALGIA     Severe pain   â¢ Morphine Sulfate-Nacl GI UPSET, DIZZINESS and NAUSEA   â¢ Percocet [Oxycodone-Acetaminophen] GI UPSET, DIZZINESS and NAUSEA   â¢ Tramadol Other (See Comments)     Heightened side effects, light headedness, nausea, chills, shaking of hands and feet, tingling in hands and feet, flapping of feet   â¢ Tylenol DIZZINESS and NAUSEA   â¢ Vicodin [Hydrocodone-Acetaminophen] GI UPSET, DIZZINESS and NAUSEA   â¢ Hydromorphone GI UPSET and WEAKNESS   â¢ Neurontin [Gabapentin] Other (See Comments)     Patient has confusion, altered mental status 2017   â¢ Vesicare [Solifenacin Succinate] SWELLING       Family History   Problem Relation Age of Onset   â¢ Heart Mother         CVA   â¢ Hypertension Mother    â¢ Neurological Disorder Mother         TIA   â¢ Thyroid Mother         hypothyroid   â¢ Stroke Mother    â¢ Heart Father         CAD, first MI age 61   â¢ Cancer Maternal Uncle         pancreatic   â¢ NEGATIVE FAMILY HX OF Other          fam hx neg for ov, ut and col cancer   â¢ Other Daughter         fibromyalgia       Social History     Socioeconomic History   â¢ Marital status:       Spouse name: NA   â¢ Number of children: One daughter   â¢ Years of education: Not on file   â¢ Highest education level: Not on file   Occupational History   â¢ 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
Sexual Activity   â¢ Alcohol use: Yes     Alcohol/week: 0.0 - 1.0 standard drinks of alcohol     Comment: Once a year   â¢ Drug use: No   â¢ Sexual activity: Not Currently   Other Topics Concern   â¢ Not on file   Social History Narrative    No family history of bleeding disorders, anesthesia reactions. Parents had hearing loss. Social Determinants of Health     Financial Resource Strain: Low Risk  (7/10/2023)    Financial Resource Strain    â¢ Social Determinants: Financial Resource Strain: None   Food Insecurity: No Food Insecurity (7/10/2023)    Food Insecurity    â¢ Social Determinants: Food Insecurity: Never   Transportation Needs: Unmet Transportation Needs (7/10/2023)    PRAPARE - Transportation    â¢ Lack of Transportation (Medical): Yes    â¢ Lack of Transportation (Non-Medical): Yes   Physical Activity: Not on file   Stress: Not on file   Social Connections: Socially Integrated (7/10/2023)    Social Connections    â¢ Social Determinants: Social Connections: 5 or more times a week   Intimate Partner Violence: Not At Risk (7/10/2023)    Intimate Partner Violence    â¢ Social Determinants: Intimate Partner Violence Past Fear: No    â¢ Social Determinants: Intimate Partner Violence Current Fear: No     Living structure: Patient lives in a house condominium with 2  steps to enter. Patient does use an assistive device. Use crutches and power wheelchair  Lives with: Daughter.   Patient used to live alone but daughter moved in with her in January of 2023 as patient is requiring more help and not safe to be home alone  Occupation: Retired speech pathologist.    Laboratory Results:  Lab Results   Component Value Date    SODIUM 141 07/11/2023    POTASSIUM 4.6 07/11/2023    CHLORIDE 109 07/11/2023    CO2 29 07/11/2023    BUN 27 (H) 07/11/2023    CREATININE 0.98 (H) 07/11/2023    WBC 4.2 07/11/2023    HCT 36.8 07/11/2023    HGB 12.0 07/11/2023    TSH 6.493 (H) 05/08/2023    ALBUMIN 3.4 (L) 07/11/2023    GLUCOSE 102 (H)
07/11/2023        IMAGING:   Left foot x-ray on 07/10/2023: No acute osseous finding. Severe pes cavus. Left knee x-ray on 07/10/2023:  FINDINGS/IMPRESSION:   Â   Mild to moderate patchy demineralization. Â   Mild to moderate arthritic changes left knee. Small amount of knee joint  fluid, if any. Mild superior and inferior patellar spurs. Â   Mild soft tissue swelling anterior to the patella and patellar tendon  regions; please exclude any signs or symptoms of prepatellar bursitis. Â   RECOMMENDATION: If clinical suspicion exists or persists for underlying  ligamentous, cartilaginous, or musculotendinous injury/pathology, follow-up  MRI may be useful in further evaluation. Left hip x-ray on 07/10/2023:  FINDINGS/IMPRESSION:   Â   Again noted left ROSELYN. Unchanged chronic appearing periosteal new bone about  the medial aspect of the proximal femoral shaft. Components remain grossly  well seated. Small amount of heterotopic ossification adjacent to greater  trochanter. Â   Previous 3 screw fixation of the right femoral neck and associated  posttraumatic/postoperative fracture deformity of the right femoral  intertrochanteric region. Â   Arthritis lower lumbar spine. Degenerative changes SI joints and pubic  symphysis.  Demineralization  Meds:  Current Facility-Administered Medications   Medication Dose Route Frequency Provider Last Rate Last Admin   â¢ aspirin (ECOTRIN) enteric coated tablet 81 mg  81 mg Oral Daily Grant SOLIZ MD   81 mg at 07/11/23 0836   â¢ carbidopa-levodopa (SINEMET)  MG per tablet 1 tablet  1 tablet Oral TID AC Grant SOLIZ MD   1 tablet at 07/11/23 1630   â¢ diphenhydrAMINE (BENADRYL) capsule 25 mg  25 mg Oral Daily Grant SOLIZ MD       â¢ furosemide (LASIX) tablet 20 mg  20 mg Oral Daily Grant SOLIZ MD   20 mg at 07/11/23 0836   â¢ lisinopril (ZESTRIL) tablet 20 mg  20 mg Oral Daily Grant SOLIZ MD   20 mg at 07/11/23 0836   â¢ sodium chloride (PF) 0.9 % injection 2 mL  2 mL
"Intracatheter 2 times per day Ale SOLIZ MD   2 mL at 07/11/23 0836   â¢ enoxaparin (LOVENOX) injection 40 mg  40 mg Subcutaneous Daily Ale SOLIZ MD   40 mg at 07/11/23 0836   â¢ Potassium Standard Replacement Protocol (Levels 3.5 and lower)   Does not apply See Admin Instructions Meenu Giang MD       â¢ Magnesium Standard Replacement Protocol   Does not apply See Admin Instructions Meenu Giang MD           Review of Systems:   CONSTITUTIONAL: The patient denied any weight loss, fevers, admits of weakness and fatigue. HEENT: Denied any head trauma, diplopia, any vision changes, tinnitus, nasal discharge or throat pain, no throat soreness or difficulty in swallowing. RESPIRATORY: Denied painful respiration, wheezing, cough, dyspnea, orthopnea, or hemoptysis. GASTROINTESTINAL: Normal appetite, no nausea, vomiting, hematemesis, rectal pain or bleeding, diarrhea or constipation. GENITOURINARY: No painful urination, nocturia, pyuria, hematuria, has incontinence. EXTREMITIES: No joint deformities or joint ROM issues. Lower extremity paresis secondary to polio  SKIN: No reported rashes, eruptions, jaundice or itching. HEMATOLOGIC AND LYMPHATIC: Denied anemia, bleeding or easy bruising. ALLERGIC AND IMMUNOLOGIC: Denied recurrent infections or hypersensitivity. NEUROLOGIC: Denied headaches, numbness and tingling, neuropathy, or seizures. MUSCULOSKELETAL: Denied joint disorders or deformity. PSYCHIATRIC: Denied depression or suicidal ideation. CARDIAC: Denied chest pain, or palpitations, SOB. ENDOCRINE: Denied heat or cold intolerance.     Physical Exam:       Visit Vitals  /68 (BP Location: LUE - Left upper extremity, Patient Position: Supine)   Pulse 79   Temp 98.6 Â°F (37 Â°C) (Oral)   Resp 18   Ht 4' 11"" (1.499 m)   Wt 76 kg (167 lb 8.8 oz)   LMP 01/01/1993   SpO2 97%   BMI 33.84 kg/mÂ²       General Appearance:    Alert, cooperative, no distress, appears younger than stated age "
Constitutional:  Head:    Patient is well developed and nourished    Normocephalic, without obvious abnormality, atraumatic   Eyes:    PERRL, conjunctiva/corneas clear, EOM's intact, both eyes, no icterus        Ears:    External pinnae intact both ears   Nose:  Teeth:      Nares normal, no drainage    Normal dentition. Throat:   Lips, mucosa moist   Neck:   Supple, symmetrical, trachea midline, no JVD   Lungs:     Clear to auscultation bilaterally, respirations unlabored, no wheezes or rhonchi   Chest wall:    No tenderness or deformity   Heart:    Regular rate and rhythm, S1 and S2 normal, no murmur, rub   or gallop   Abdomen:     Soft, non-tender, bowel sounds active all four quadrants, no hepatosplenomegaly   Genitalia:    Deferred, no vega   Extremities:   Extremities normal, atraumatic, no cyanosis , has edema   Pulses:   2+ and symmetric all extremities   Skin:   Warm and dry, turgor normal, no rashes or lesions at exposed areas   Neurologic:   CNII-XII intact. Normal strength, sensation and DTR's       Throughout both upper extremity. Not much active movement noted on the right lower extremity. Has active movement in the left lower extremity strength 2 to 3/5. Has bilateral upper extremity tremor. Prior to hospitalization functional status:   Patient was modified independent with all activities. Patient was able to perform stand pivot transfer at home by using axillary crutches for standing and she was able to stand with the crutches to perform ADL. She was able to maneuver her electric wheelchair.     Present functional status:  Bed Mobility      Mobility (since 7/9/2023)     supine to sit  supervision    sit to stand  minimal assist; 2 persons    stand to sit  minimal assist; 2 persons    stand pivot  minimal assist; 2 persons    transfers assistive devices  gait belt                         Self Care/ADL (since 7/9/2023)     lower body dressing assist  maximal assist    lower body
dressing position  edge of bed; standing    lower body dressing deficit  thread left lower extremity into underwear; thread right lower extremity into underwear; pull up over hips    pt activity tolerance  1 to 1 activity to rest           Communication/Cognition (since 7/9/2023)     Arousal Alertness  appropriate responses to stimuli    Affect / Behavior  cooperative; pleasant    overall status  within functional limits    form of communication  verbal    attention span  intact         ,        ,     ,         Impressions/Plan/DC Plan:  Patient Active Problem List   Diagnosis   â¢  right breast cancer - 1993  status post right  mastectomy  + chemo - see comment    â¢ History of total abdominal hysterectomy and bilateral salpingo-oophorectomy -comment    â¢ Fibromyalgia - Good response to massage   â¢ osteopenia - see comment    â¢ History of fracture - recent : MAJOR    â¢ Other osteoarthritis of spine   â¢ much improved : incontinence issues    â¢ History of colonic polyps   â¢ no further pap smears are indicated    â¢ Benign positional vertigo   â¢ Gait instability   â¢ Frontal headache   â¢ Nausea with vomiting   â¢ Closed fracture of distal end of right femur (CMD)   â¢ Urethral caruncle : see in past , not now    â¢ CKD (chronic kidney disease), symptom management only, stage 3 (moderate) (CMD)   â¢ Lymphedema of both lower extremities   â¢ Constipation   â¢ Chronic left hip pain   â¢ Obesity (BMI 30-39.9)   â¢ 10/15/2020 Medicare defined breast and pelvic exam ( )  done    â¢ Abdominal pain   â¢ Choledocholithiasis   â¢ CKD (chronic kidney disease) stage 3, GFR 30-59 ml/min (CMD)   â¢ Essential hypertension, benign   â¢ History of poliomyelitis   â¢ Vertigo   â¢ Edema   â¢ Constipation   â¢ Left leg pain   Assessment:  1. Arthritic pain in left lower extremity involving knee and heel  2. Paraparesis due to polio  3.  Bilateral upper extremity tremor, being treated with carbidopa levodopa:  Parkinson need to be ruled out  Patient has
ambulatory and ADL dysfunction due to above mentioned diagnoses. Recommend: Please transfer the patient to Inpatient rehabilitation once deemed medically stable and when insurance authorization is obtained. .  Please review IPR justification in rehabilitation screening section of patient's chart. I have personally reviewed the labs as above. Medications are reviewed as above. Meanwhile: Continue therapies while on the acute floor to prevent stasis related complications such as pneumonia, DVT and skin pressure ulcers. The patient will benefit from an intensive therapy program consisting of nursing, PT, OT  to allow for neuromuscular re-education, strengthening, range of motion, bed mobility training, functional transfer training, wheelchair mobility training, balance training,  ADL retraining,  safety education, endurance training, patient/family training, compensatory technique education, and equipment evaluation/education/training. Patient needs short course of hospital-based intensive inpatient rehabilitation program as she needs very close medical and nursing supervision while undergoing rehabilitation. Given her multiple allergy to medication she needs involvement of the pain management specialist to manage her arthritic pain. She is also being treated for parkinsonism with carbidopa levodopa and patient is not tolerating the medication and having increased whole-body tremor, hence she needs involvement of the neurologist for adjustment of the medication and rule out Parkinson disease. Patient did extremely well with intensive rehabilitation during her last hospitalization couple of months ago and patient is hoping to participate in the same program and achieve her independent status from wheelchair level. She needs 24 hour rehabilitation nursing to manage issues with her bowel, bladder, safety and making sure she carries over her therapeutic exercise in therapy off hours.      The patient's
medical condition is such that the patient can actively participate in, appropriately benefit from, and tolerate an intensive rehabilitation program. Close medical supervision by a physiatrist is required to ensure that the patient's status continues to allow maximized progress and outcome from a medical and functional standpoint. Due to the complexity of the medical and rehabilitation needs, this patient will need an intensive interdisciplinary and coordinated rehabilitation program.          Goal: Return to a safe community living situation. Post discharge therapy intervention via home health or OP will be determined during IRP interdisciplinary team meetings. Note that the individualized plan of care will be developed at the first interdisciplinary team meeting. Expected intensity, frequency, and duration of services:    Physical therapy: 1.5 hours per day, 5-7 days per week for duration of IRP   Occupational therapy: 1.5 hours per day, 5-7 days per week for duration  of IRP      Prognosis: Medical: Good     Functional: Good     Length of stay in IPR: 1 to 2 weeks   Goals: Patient to return at prior living status, at a modified independent level with ambulation and ADL's, this can be achieved by OT, PT, RT and rehabilitation nursing. Many thanks for allowing us to participate in the care of your patient. Will wait for insurance authorization.     Doroteo Bishop MD   7/11/2023   8:15 PM
Patient requests all Lab, Cardiology, and Radiology Results on their Discharge Instructions

## (undated) DEVICE — 1200CC GUARDIAN II: Brand: GUARDIAN

## (undated) DEVICE — SINGLE-USE BIOPSY FORCEPS: Brand: RADIAL JAW 4

## (undated) DEVICE — SYRINGE 50ML LL

## (undated) DEVICE — BITE BLOCK ADULT 11FR OMNI BLOC

## (undated) DEVICE — TUBING SUCTION 5MM X 12 FT

## (undated) DEVICE — NEEDLE SCLERO INTERJECT 25G 240MM

## (undated) DEVICE — SYRINGE 30ML LL